# Patient Record
Sex: FEMALE | Race: WHITE | NOT HISPANIC OR LATINO | Employment: OTHER | URBAN - METROPOLITAN AREA
[De-identification: names, ages, dates, MRNs, and addresses within clinical notes are randomized per-mention and may not be internally consistent; named-entity substitution may affect disease eponyms.]

---

## 2018-06-26 ENCOUNTER — OFFICE VISIT CONVERTED (OUTPATIENT)
Dept: FAMILY MEDICINE CLINIC | Facility: CLINIC | Age: 68
End: 2018-06-26
Attending: NURSE PRACTITIONER

## 2019-01-14 ENCOUNTER — HOSPITAL ENCOUNTER (OUTPATIENT)
Dept: FAMILY MEDICINE CLINIC | Facility: CLINIC | Age: 69
Discharge: HOME | End: 2019-01-14
Attending: FAMILY MEDICINE

## 2019-01-14 ENCOUNTER — OFFICE VISIT CONVERTED (OUTPATIENT)
Dept: FAMILY MEDICINE CLINIC | Facility: CLINIC | Age: 69
End: 2019-01-14
Attending: FAMILY MEDICINE

## 2019-01-14 LAB
ALBUMIN SERPL-MCNC: 4.8 G/DL (ref 3.5–5)
ALBUMIN/GLOB SERPL: 1.4 {RATIO} (ref 1.4–2.6)
ALP SERPL-CCNC: 53 U/L (ref 43–160)
ALT SERPL-CCNC: 7 U/L (ref 10–40)
ANION GAP SERPL CALC-SCNC: 22 MMOL/L (ref 8–19)
APPEARANCE UR: CLEAR
AST SERPL-CCNC: 22 U/L (ref 15–50)
BASOPHILS # BLD AUTO: 0.01 10*3/UL (ref 0–0.2)
BASOPHILS NFR BLD AUTO: 0.09 % (ref 0–3)
BILIRUB SERPL-MCNC: 0.88 MG/DL (ref 0.2–1.3)
BILIRUB UR QL STRIP: ABNORMAL
BUN SERPL-MCNC: 13 MG/DL (ref 5–25)
BUN/CREAT SERPL: 12 {RATIO} (ref 6–20)
CALCIUM SERPL-MCNC: 9.7 MG/DL (ref 8.7–10.4)
CHLORIDE SERPL-SCNC: 100 MMOL/L (ref 99–111)
COLOR UR: ABNORMAL
CONV CO2: 22 MMOL/L (ref 22–32)
CONV COLLECTION SOURCE (UA): ABNORMAL
CONV TOTAL PROTEIN: 8.3 G/DL (ref 6.3–8.2)
CONV UROBILINOGEN IN URINE BY AUTOMATED TEST STRIP: 1 {EHRLICHU}/DL (ref 0.1–1)
CREAT UR-MCNC: 1.08 MG/DL (ref 0.5–0.9)
EOSINOPHIL # BLD AUTO: 0.09 10*3/UL (ref 0–0.7)
EOSINOPHIL # BLD AUTO: 0.86 % (ref 0–7)
ERYTHROCYTE [DISTWIDTH] IN BLOOD BY AUTOMATED COUNT: 11.5 % (ref 11.5–14.5)
EST. AVERAGE GLUCOSE BLD GHB EST-MCNC: 117 MG/DL
FOLATE SERPL-MCNC: 10.8 NG/ML (ref 4.8–20)
GFR SERPLBLD BASED ON 1.73 SQ M-ARVRAT: 52 ML/MIN/{1.73_M2}
GLOBULIN UR ELPH-MCNC: 3.5 G/DL (ref 2–3.5)
GLUCOSE SERPL-MCNC: 124 MG/DL (ref 65–99)
GLUCOSE UR QL: NEGATIVE MG/DL
HBA1C MFR BLD: 12 G/DL (ref 12–16)
HBA1C MFR BLD: 5.7 % (ref 3.5–5.7)
HCT VFR BLD AUTO: 35.2 % (ref 37–47)
HGB UR QL STRIP: ABNORMAL
KETONES UR QL STRIP: ABNORMAL MG/DL
LYMPHOCYTES # BLD AUTO: 1.48 10*3/UL (ref 1–5)
Lab: ABNORMAL
MCH RBC QN AUTO: 33.7 PG (ref 27–31)
MCHC RBC AUTO-ENTMCNC: 34.2 G/DL (ref 33–37)
MCV RBC AUTO: 98.5 FL (ref 81–99)
MONOCYTES # BLD AUTO: 0.99 10*3/UL (ref 0.2–1.2)
MONOCYTES NFR BLD AUTO: 9.96 % (ref 3–10)
NEUTROPHILS # BLD AUTO: 7.36 10*3/UL (ref 2–8)
NEUTROPHILS NFR BLD AUTO: 74.1 % (ref 30–85)
NITRITE UR QL STRIP: NEGATIVE
NRBC BLD AUTO-RTO: 0 % (ref 0–0.01)
OSMOLALITY SERPL CALC.SUM OF ELEC: 290 MOSM/KG (ref 273–304)
PH UR: 5.5 [PH] (ref 5–8)
PLATELET # BLD AUTO: 264 10*3/UL (ref 130–400)
PMV BLD AUTO: 8.7 FL (ref 7.4–10.4)
POTASSIUM SERPL-SCNC: 4.8 MMOL/L (ref 3.5–5.3)
PROT UR-MCNC: 15 MG/DL
RBC # BLD AUTO: 3.57 10*6/UL (ref 4.2–5.4)
SODIUM SERPL-SCNC: 139 MMOL/L (ref 135–147)
SP GR UR: 1.01 (ref 1–1.03)
TSH SERPL-ACNC: 2.07 M[IU]/L (ref 0.27–4.2)
VARIANT LYMPHS NFR BLD MANUAL: 15 % (ref 20–45)
VIT B12 SERPL-MCNC: 296 PG/ML (ref 211–911)
WBC # BLD AUTO: 9.93 10*3/UL (ref 4.8–10.8)

## 2019-01-17 LAB
AMOXICILLIN+CLAV SUSC ISLT: 4
AMPICILLIN SUSC ISLT: 4
AMPICILLIN+SULBAC SUSC ISLT: <=2
BACTERIA UR CULT: ABNORMAL
CEFAZOLIN SUSC ISLT: <=4
CEFEPIME SUSC ISLT: <=1
CEFTAZIDIME SUSC ISLT: <=1
CEFTRIAXONE SUSC ISLT: <=1
CEFUROXIME ORAL SUSC ISLT: 4
CEFUROXIME PARENTER SUSC ISLT: 4
CIPROFLOXACIN SUSC ISLT: >=4
ERTAPENEM SUSC ISLT: <=0.5
GENTAMICIN SUSC ISLT: <=1
LEVOFLOXACIN SUSC ISLT: >=8
NITROFURANTOIN SUSC ISLT: <=16
TETRACYCLINE SUSC ISLT: <=1
TMP SMX SUSC ISLT: <=20
TOBRAMYCIN SUSC ISLT: <=1

## 2019-01-28 ENCOUNTER — HOSPITAL ENCOUNTER (OUTPATIENT)
Dept: FAMILY MEDICINE CLINIC | Facility: CLINIC | Age: 69
Discharge: HOME | End: 2019-01-28
Attending: FAMILY MEDICINE

## 2019-01-28 ENCOUNTER — CONVERSION ENCOUNTER (OUTPATIENT)
Dept: FAMILY MEDICINE CLINIC | Facility: CLINIC | Age: 69
End: 2019-01-28

## 2019-01-28 ENCOUNTER — OFFICE VISIT CONVERTED (OUTPATIENT)
Dept: FAMILY MEDICINE CLINIC | Facility: CLINIC | Age: 69
End: 2019-01-28
Attending: FAMILY MEDICINE

## 2019-01-28 LAB
BILIRUB UR QL STRIP: NEGATIVE
COLOR UR: YELLOW
CONV CLARITY OF URINE: CLEAR
CONV PROTEIN IN URINE BY AUTOMATED TEST STRIP: NEGATIVE
CONV UROBILINOGEN IN URINE BY AUTOMATED TEST STRIP: NORMAL
GLUCOSE UR QL: NEGATIVE
HGB UR QL STRIP: NORMAL
KETONES UR QL STRIP: NEGATIVE
LEUKOCYTE ESTERASE UR QL STRIP: NEGATIVE
NITRITE UR QL STRIP: NEGATIVE
PH UR STRIP.AUTO: 5.5 [PH]
SP GR UR: 1.01

## 2019-01-29 LAB
ANION GAP SERPL CALC-SCNC: 22 MMOL/L (ref 8–19)
BUN SERPL-MCNC: 21 MG/DL (ref 5–25)
BUN/CREAT SERPL: 16 {RATIO} (ref 6–20)
CALCIUM SERPL-MCNC: 9.6 MG/DL (ref 8.7–10.4)
CHLORIDE SERPL-SCNC: 101 MMOL/L (ref 99–111)
CONV CO2: 22 MMOL/L (ref 22–32)
CREAT UR-MCNC: 1.3 MG/DL (ref 0.5–0.9)
GFR SERPLBLD BASED ON 1.73 SQ M-ARVRAT: 42 ML/MIN/{1.73_M2}
GLUCOSE SERPL-MCNC: 96 MG/DL (ref 65–99)
OSMOLALITY SERPL CALC.SUM OF ELEC: 291 MOSM/KG (ref 273–304)
POTASSIUM SERPL-SCNC: 5.6 MMOL/L (ref 3.5–5.3)
SODIUM SERPL-SCNC: 139 MMOL/L (ref 135–147)

## 2019-01-31 ENCOUNTER — HOSPITAL ENCOUNTER (OUTPATIENT)
Dept: GENERAL RADIOLOGY | Facility: HOSPITAL | Age: 69
Discharge: HOME | End: 2019-01-31
Attending: FAMILY MEDICINE

## 2019-02-11 ENCOUNTER — OFFICE VISIT CONVERTED (OUTPATIENT)
Dept: FAMILY MEDICINE CLINIC | Facility: CLINIC | Age: 69
End: 2019-02-11
Attending: FAMILY MEDICINE

## 2019-02-11 ENCOUNTER — HOSPITAL ENCOUNTER (OUTPATIENT)
Dept: FAMILY MEDICINE CLINIC | Facility: CLINIC | Age: 69
Discharge: HOME | End: 2019-02-11
Attending: FAMILY MEDICINE

## 2019-02-12 LAB
25(OH)D3 SERPL-MCNC: 30.6 NG/ML (ref 30–100)
ANION GAP SERPL CALC-SCNC: 17 MMOL/L (ref 8–19)
BUN SERPL-MCNC: 18 MG/DL (ref 5–25)
BUN/CREAT SERPL: 15 {RATIO} (ref 6–20)
CALCIUM SERPL-MCNC: 9.7 MG/DL (ref 8.7–10.4)
CHLORIDE SERPL-SCNC: 102 MMOL/L (ref 99–111)
CONV CO2: 23 MMOL/L (ref 22–32)
CREAT UR-MCNC: 1.2 MG/DL (ref 0.5–0.9)
GFR SERPLBLD BASED ON 1.73 SQ M-ARVRAT: 46 ML/MIN/{1.73_M2}
GLUCOSE SERPL-MCNC: 96 MG/DL (ref 65–99)
OSMOLALITY SERPL CALC.SUM OF ELEC: 286 MOSM/KG (ref 273–304)
POTASSIUM SERPL-SCNC: 5.4 MMOL/L (ref 3.5–5.3)
SODIUM SERPL-SCNC: 137 MMOL/L (ref 135–147)

## 2019-03-04 ENCOUNTER — OFFICE VISIT CONVERTED (OUTPATIENT)
Dept: FAMILY MEDICINE CLINIC | Facility: CLINIC | Age: 69
End: 2019-03-04
Attending: FAMILY MEDICINE

## 2019-03-04 ENCOUNTER — HOSPITAL ENCOUNTER (OUTPATIENT)
Dept: FAMILY MEDICINE CLINIC | Facility: CLINIC | Age: 69
Discharge: HOME | End: 2019-03-04
Attending: FAMILY MEDICINE

## 2019-03-04 ENCOUNTER — CONVERSION ENCOUNTER (OUTPATIENT)
Dept: FAMILY MEDICINE CLINIC | Facility: CLINIC | Age: 69
End: 2019-03-04

## 2019-03-05 LAB
ALBUMIN SERPL-MCNC: 4.5 G/DL (ref 3.5–5)
ALBUMIN/GLOB SERPL: 1.5 {RATIO} (ref 1.4–2.6)
ALP SERPL-CCNC: 47 U/L (ref 43–160)
ALT SERPL-CCNC: 14 U/L (ref 10–40)
ANION GAP SERPL CALC-SCNC: 22 MMOL/L (ref 8–19)
AST SERPL-CCNC: 18 U/L (ref 15–50)
BASOPHILS # BLD AUTO: 0.02 10*3/UL (ref 0–0.2)
BASOPHILS NFR BLD AUTO: 0.2 % (ref 0–3)
BILIRUB SERPL-MCNC: 0.71 MG/DL (ref 0.2–1.3)
BUN SERPL-MCNC: 24 MG/DL (ref 5–25)
BUN/CREAT SERPL: 17 {RATIO} (ref 6–20)
CALCIUM SERPL-MCNC: 9.7 MG/DL (ref 8.7–10.4)
CHLORIDE SERPL-SCNC: 101 MMOL/L (ref 99–111)
CONV ABS IMM GRAN: 0.04 10*3/UL (ref 0–0.2)
CONV CO2: 21 MMOL/L (ref 22–32)
CONV IMMATURE GRAN: 0.5 % (ref 0–1.8)
CONV TOTAL PROTEIN: 7.6 G/DL (ref 6.3–8.2)
CREAT UR-MCNC: 1.39 MG/DL (ref 0.5–0.9)
DEPRECATED RDW RBC AUTO: 52.9 FL (ref 36.4–46.3)
EOSINOPHIL # BLD AUTO: 0.23 10*3/UL (ref 0–0.7)
EOSINOPHIL # BLD AUTO: 2.7 % (ref 0–7)
ERYTHROCYTE [DISTWIDTH] IN BLOOD BY AUTOMATED COUNT: 13.6 % (ref 11.7–14.4)
GFR SERPLBLD BASED ON 1.73 SQ M-ARVRAT: 38 ML/MIN/{1.73_M2}
GLOBULIN UR ELPH-MCNC: 3.1 G/DL (ref 2–3.5)
GLUCOSE SERPL-MCNC: 120 MG/DL (ref 65–99)
HBA1C MFR BLD: 13.1 G/DL (ref 12–16)
HCT VFR BLD AUTO: 43.6 % (ref 37–47)
LYMPHOCYTES # BLD AUTO: 2.82 10*3/UL (ref 1–5)
MCH RBC QN AUTO: 31.5 PG (ref 27–31)
MCHC RBC AUTO-ENTMCNC: 30 G/DL (ref 33–37)
MCV RBC AUTO: 104.8 FL (ref 81–99)
MONOCYTES # BLD AUTO: 0.83 10*3/UL (ref 0.2–1.2)
MONOCYTES NFR BLD AUTO: 9.6 % (ref 3–10)
NEUTROPHILS # BLD AUTO: 4.72 10*3/UL (ref 2–8)
NEUTROPHILS NFR BLD AUTO: 54.4 % (ref 30–85)
NRBC CBCN: 0 % (ref 0–0.7)
OSMOLALITY SERPL CALC.SUM OF ELEC: 293 MOSM/KG (ref 273–304)
PLATELET # BLD AUTO: 278 10*3/UL (ref 130–400)
PMV BLD AUTO: 11.6 FL (ref 9.4–12.3)
POTASSIUM SERPL-SCNC: 4.8 MMOL/L (ref 3.5–5.3)
RBC # BLD AUTO: 4.16 10*6/UL (ref 4.2–5.4)
SODIUM SERPL-SCNC: 139 MMOL/L (ref 135–147)
VARIANT LYMPHS NFR BLD MANUAL: 32.6 % (ref 20–45)
WBC # BLD AUTO: 8.66 10*3/UL (ref 4.8–10.8)

## 2019-03-06 LAB
APPEARANCE UR: CLEAR
BILIRUB UR QL: NEGATIVE
COLOR UR: YELLOW
CONV COLLECTION SOURCE (UA): ABNORMAL
CONV UROBILINOGEN IN URINE BY AUTOMATED TEST STRIP: 0.2 {EHRLICHU}/DL (ref 0.1–1)
GLUCOSE UR QL: NEGATIVE MG/DL
HGB UR QL STRIP: NEGATIVE
KETONES UR QL STRIP: ABNORMAL MG/DL
LEUKOCYTE ESTERASE UR QL STRIP: NEGATIVE
NITRITE UR QL STRIP: NEGATIVE
PH UR STRIP.AUTO: 5 [PH] (ref 5–8)
PROT UR QL: NEGATIVE MG/DL
SP GR UR: 1.03 (ref 1–1.03)

## 2019-03-08 ENCOUNTER — HOSPITAL ENCOUNTER (OUTPATIENT)
Dept: FAMILY MEDICINE CLINIC | Facility: CLINIC | Age: 69
Discharge: HOME | End: 2019-03-08
Attending: FAMILY MEDICINE

## 2019-03-08 LAB
AMOXICILLIN+CLAV SUSC ISLT: <=2
AMPICILLIN SUSC ISLT: <=2
AMPICILLIN+SULBAC SUSC ISLT: <=2
BACTERIA UR CULT: ABNORMAL
CEFAZOLIN SUSC ISLT: <=4
CEFEPIME SUSC ISLT: <=1
CEFTAZIDIME SUSC ISLT: <=1
CEFTRIAXONE SUSC ISLT: <=1
CEFUROXIME ORAL SUSC ISLT: 4
CEFUROXIME PARENTER SUSC ISLT: 4
CIPROFLOXACIN SUSC ISLT: <=0.25
ERTAPENEM SUSC ISLT: <=0.5
GENTAMICIN SUSC ISLT: <=1
LEVOFLOXACIN SUSC ISLT: <=0.12
NITROFURANTOIN SUSC ISLT: <=16
TETRACYCLINE SUSC ISLT: <=1
TMP SMX SUSC ISLT: <=20
TOBRAMYCIN SUSC ISLT: <=1

## 2019-03-10 LAB — BACTERIA SPEC AEROBE CULT: NORMAL

## 2019-04-01 ENCOUNTER — CONVERSION ENCOUNTER (OUTPATIENT)
Dept: FAMILY MEDICINE CLINIC | Facility: CLINIC | Age: 69
End: 2019-04-01

## 2019-04-08 ENCOUNTER — HOSPITAL ENCOUNTER (OUTPATIENT)
Dept: FAMILY MEDICINE CLINIC | Facility: CLINIC | Age: 69
Discharge: HOME | End: 2019-04-08
Attending: INTERNAL MEDICINE

## 2019-04-08 LAB
ALBUMIN SERPL-MCNC: 4.6 G/DL (ref 3.5–5)
ALBUMIN/GLOB SERPL: 1.6 {RATIO} (ref 1.4–2.6)
ALP SERPL-CCNC: 45 U/L (ref 43–160)
ALT SERPL-CCNC: 9 U/L (ref 10–40)
ANION GAP SERPL CALC-SCNC: 18 MMOL/L (ref 8–19)
APPEARANCE UR: CLEAR
AST SERPL-CCNC: 14 U/L (ref 15–50)
BILIRUB SERPL-MCNC: 0.91 MG/DL (ref 0.2–1.3)
BILIRUB UR QL: NEGATIVE
BUN SERPL-MCNC: 14 MG/DL (ref 5–25)
BUN/CREAT SERPL: 13 {RATIO} (ref 6–20)
CALCIUM SERPL-MCNC: 9.6 MG/DL (ref 8.7–10.4)
CHLORIDE SERPL-SCNC: 99 MMOL/L (ref 99–111)
COLOR UR: YELLOW
CONV BACTERIA: NEGATIVE
CONV CO2: 26 MMOL/L (ref 22–32)
CONV COLLECTION SOURCE (UA): ABNORMAL
CONV CREATININE URINE, RANDOM: 52.1 MG/DL (ref 10–300)
CONV MICROALBUM.,U,RANDOM: 14.7 MG/L (ref 0–20)
CONV TOTAL PROTEIN: 7.5 G/DL (ref 6.3–8.2)
CONV UROBILINOGEN IN URINE BY AUTOMATED TEST STRIP: 0.2 {EHRLICHU}/DL (ref 0.1–1)
CREAT UR-MCNC: 1.05 MG/DL (ref 0.5–0.9)
GFR SERPLBLD BASED ON 1.73 SQ M-ARVRAT: 54 ML/MIN/{1.73_M2}
GLOBULIN UR ELPH-MCNC: 2.9 G/DL (ref 2–3.5)
GLUCOSE SERPL-MCNC: 153 MG/DL (ref 65–99)
GLUCOSE UR QL: NEGATIVE MG/DL
HGB UR QL STRIP: ABNORMAL
KETONES UR QL STRIP: NEGATIVE MG/DL
LEUKOCYTE ESTERASE UR QL STRIP: ABNORMAL
MICROALBUMIN/CREAT UR: 28.2 MG/G{CRE} (ref 0–35)
NITRITE UR QL STRIP: NEGATIVE
OSMOLALITY SERPL CALC.SUM OF ELEC: 290 MOSM/KG (ref 273–304)
PH UR STRIP.AUTO: 5 [PH] (ref 5–8)
POTASSIUM SERPL-SCNC: 4.8 MMOL/L (ref 3.5–5.3)
PROT UR QL: NEGATIVE MG/DL
RBC #/AREA URNS HPF: ABNORMAL /[HPF]
SODIUM SERPL-SCNC: 138 MMOL/L (ref 135–147)
SP GR UR: 1.01 (ref 1–1.03)
SQUAMOUS SPT QL MICRO: ABNORMAL /[HPF]
WBC #/AREA URNS HPF: ABNORMAL /[HPF]

## 2019-04-09 LAB
CONV PROTEIN TO CREATININE RATIO (RANDOM URINE): 0.12 {RATIO} (ref 0–0.1)
PROT UR-MCNC: 6.4 MG/DL

## 2019-08-22 ENCOUNTER — OFFICE VISIT CONVERTED (OUTPATIENT)
Dept: FAMILY MEDICINE CLINIC | Facility: CLINIC | Age: 69
End: 2019-08-22
Attending: FAMILY MEDICINE

## 2019-08-22 ENCOUNTER — HOSPITAL ENCOUNTER (OUTPATIENT)
Dept: FAMILY MEDICINE CLINIC | Facility: CLINIC | Age: 69
Discharge: HOME | End: 2019-08-22
Attending: FAMILY MEDICINE

## 2019-08-22 LAB
ALBUMIN SERPL-MCNC: 4.6 G/DL (ref 3.5–5)
ALBUMIN/GLOB SERPL: 1.5 {RATIO} (ref 1.4–2.6)
ALP SERPL-CCNC: 48 U/L (ref 43–160)
ALT SERPL-CCNC: 10 U/L (ref 10–40)
ANION GAP SERPL CALC-SCNC: 21 MMOL/L (ref 8–19)
AST SERPL-CCNC: 17 U/L (ref 15–50)
BASOPHILS # BLD AUTO: 0.01 10*3/UL (ref 0–0.2)
BASOPHILS NFR BLD AUTO: 0.1 % (ref 0–3)
BILIRUB SERPL-MCNC: 0.78 MG/DL (ref 0.2–1.3)
BUN SERPL-MCNC: 14 MG/DL (ref 5–25)
BUN/CREAT SERPL: 13 {RATIO} (ref 6–20)
CALCIUM SERPL-MCNC: 9.4 MG/DL (ref 8.7–10.4)
CHLORIDE SERPL-SCNC: 102 MMOL/L (ref 99–111)
CHOLEST SERPL-MCNC: 202 MG/DL (ref 107–200)
CHOLEST/HDLC SERPL: 6.3 {RATIO} (ref 3–6)
CONV ABS IMM GRAN: 0.04 10*3/UL (ref 0–0.2)
CONV CO2: 22 MMOL/L (ref 22–32)
CONV CREATININE URINE, RANDOM: 53.3 MG/DL (ref 10–300)
CONV IMMATURE GRAN: 0.6 % (ref 0–1.8)
CONV MICROALBUM.,U,RANDOM: <12 MG/L (ref 0–20)
CONV TOTAL PROTEIN: 7.7 G/DL (ref 6.3–8.2)
CREAT UR-MCNC: 1.06 MG/DL (ref 0.5–0.9)
DEPRECATED RDW RBC AUTO: 47 FL (ref 36.4–46.3)
EOSINOPHIL # BLD AUTO: 0.21 10*3/UL (ref 0–0.7)
EOSINOPHIL # BLD AUTO: 3.1 % (ref 0–7)
ERYTHROCYTE [DISTWIDTH] IN BLOOD BY AUTOMATED COUNT: 13 % (ref 11.7–14.4)
EST. AVERAGE GLUCOSE BLD GHB EST-MCNC: 137 MG/DL
GFR SERPLBLD BASED ON 1.73 SQ M-ARVRAT: 53 ML/MIN/{1.73_M2}
GLOBULIN UR ELPH-MCNC: 3.1 G/DL (ref 2–3.5)
GLUCOSE SERPL-MCNC: 108 MG/DL (ref 65–99)
HBA1C MFR BLD: 6.4 % (ref 3.5–5.7)
HCT VFR BLD AUTO: 38 % (ref 37–47)
HDLC SERPL-MCNC: 32 MG/DL (ref 40–60)
HGB BLD-MCNC: 11.7 G/DL (ref 12–16)
LDLC SERPL CALC-MCNC: 130 MG/DL (ref 70–100)
LYMPHOCYTES # BLD AUTO: 2.03 10*3/UL (ref 1–5)
LYMPHOCYTES NFR BLD AUTO: 29.7 % (ref 20–45)
MCH RBC QN AUTO: 30.5 PG (ref 27–31)
MCHC RBC AUTO-ENTMCNC: 30.8 G/DL (ref 33–37)
MCV RBC AUTO: 99 FL (ref 81–99)
MICROALBUMIN/CREAT UR: 22.5 MG/G{CRE} (ref 0–35)
MONOCYTES # BLD AUTO: 0.69 10*3/UL (ref 0.2–1.2)
MONOCYTES NFR BLD AUTO: 10.1 % (ref 3–10)
NEUTROPHILS # BLD AUTO: 3.86 10*3/UL (ref 2–8)
NEUTROPHILS NFR BLD AUTO: 56.4 % (ref 30–85)
NRBC CBCN: 0 % (ref 0–0.7)
OSMOLALITY SERPL CALC.SUM OF ELEC: 291 MOSM/KG (ref 273–304)
PLATELET # BLD AUTO: 271 10*3/UL (ref 130–400)
PMV BLD AUTO: 11.4 FL (ref 9.4–12.3)
POTASSIUM SERPL-SCNC: 5.2 MMOL/L (ref 3.5–5.3)
RBC # BLD AUTO: 3.84 10*6/UL (ref 4.2–5.4)
SODIUM SERPL-SCNC: 140 MMOL/L (ref 135–147)
T4 FREE SERPL-MCNC: 1 NG/DL (ref 0.9–1.8)
TRIGL SERPL-MCNC: 198 MG/DL (ref 40–150)
TSH SERPL-ACNC: 3.18 M[IU]/L (ref 0.27–4.2)
VLDLC SERPL-MCNC: 40 MG/DL (ref 5–37)
WBC # BLD AUTO: 6.84 10*3/UL (ref 4.8–10.8)

## 2019-12-09 ENCOUNTER — HOSPITAL ENCOUNTER (OUTPATIENT)
Dept: FAMILY MEDICINE CLINIC | Facility: CLINIC | Age: 69
Discharge: HOME | End: 2019-12-09
Attending: FAMILY MEDICINE

## 2019-12-09 ENCOUNTER — CONVERSION ENCOUNTER (OUTPATIENT)
Dept: FAMILY MEDICINE CLINIC | Facility: CLINIC | Age: 69
End: 2019-12-09

## 2019-12-09 ENCOUNTER — OFFICE VISIT CONVERTED (OUTPATIENT)
Dept: FAMILY MEDICINE CLINIC | Facility: CLINIC | Age: 69
End: 2019-12-09
Attending: FAMILY MEDICINE

## 2019-12-09 LAB
ALBUMIN SERPL-MCNC: 4.5 G/DL (ref 3.5–5)
ALBUMIN/GLOB SERPL: 1.3 {RATIO} (ref 1.4–2.6)
ALP SERPL-CCNC: 52 U/L (ref 43–160)
ALT SERPL-CCNC: 20 U/L (ref 10–40)
ANION GAP SERPL CALC-SCNC: 17 MMOL/L (ref 8–19)
AST SERPL-CCNC: 22 U/L (ref 15–50)
BASOPHILS # BLD AUTO: 0.01 10*3/UL (ref 0–0.2)
BASOPHILS NFR BLD AUTO: 0.1 % (ref 0–3)
BILIRUB SERPL-MCNC: 0.79 MG/DL (ref 0.2–1.3)
BUN SERPL-MCNC: 17 MG/DL (ref 5–25)
BUN/CREAT SERPL: 16 {RATIO} (ref 6–20)
CALCIUM SERPL-MCNC: 10.2 MG/DL (ref 8.7–10.4)
CHLORIDE SERPL-SCNC: 97 MMOL/L (ref 99–111)
CHOLEST SERPL-MCNC: 223 MG/DL (ref 107–200)
CHOLEST/HDLC SERPL: 6.4 {RATIO} (ref 3–6)
CONV ABS IMM GRAN: 0.05 10*3/UL (ref 0–0.2)
CONV CO2: 28 MMOL/L (ref 22–32)
CONV CREATININE URINE, RANDOM: 60.2 MG/DL (ref 10–300)
CONV IMMATURE GRAN: 0.6 % (ref 0–1.8)
CONV MICROALBUM.,U,RANDOM: 24.9 MG/L (ref 0–20)
CONV TOTAL PROTEIN: 7.9 G/DL (ref 6.3–8.2)
CREAT UR-MCNC: 1.08 MG/DL (ref 0.5–0.9)
DEPRECATED RDW RBC AUTO: 48.3 FL (ref 36.4–46.3)
EOSINOPHIL # BLD AUTO: 0.23 10*3/UL (ref 0–0.7)
EOSINOPHIL # BLD AUTO: 2.9 % (ref 0–7)
ERYTHROCYTE [DISTWIDTH] IN BLOOD BY AUTOMATED COUNT: 13 % (ref 11.7–14.4)
GFR SERPLBLD BASED ON 1.73 SQ M-ARVRAT: 52 ML/MIN/{1.73_M2}
GLOBULIN UR ELPH-MCNC: 3.4 G/DL (ref 2–3.5)
GLUCOSE SERPL-MCNC: 123 MG/DL (ref 65–99)
HCT VFR BLD AUTO: 40.6 % (ref 37–47)
HDLC SERPL-MCNC: 35 MG/DL (ref 40–60)
HGB BLD-MCNC: 12.4 G/DL (ref 12–16)
LDLC SERPL CALC-MCNC: 156 MG/DL (ref 70–100)
LYMPHOCYTES # BLD AUTO: 1.86 10*3/UL (ref 1–5)
LYMPHOCYTES NFR BLD AUTO: 23.5 % (ref 20–45)
MCH RBC QN AUTO: 30.7 PG (ref 27–31)
MCHC RBC AUTO-ENTMCNC: 30.5 G/DL (ref 33–37)
MCV RBC AUTO: 100.5 FL (ref 81–99)
MICROALBUMIN/CREAT UR: 41.4 MG/G{CRE} (ref 0–35)
MONOCYTES # BLD AUTO: 0.72 10*3/UL (ref 0.2–1.2)
MONOCYTES NFR BLD AUTO: 9.1 % (ref 3–10)
NEUTROPHILS # BLD AUTO: 5.06 10*3/UL (ref 2–8)
NEUTROPHILS NFR BLD AUTO: 63.8 % (ref 30–85)
NRBC CBCN: 0 % (ref 0–0.7)
OSMOLALITY SERPL CALC.SUM OF ELEC: 287 MOSM/KG (ref 273–304)
PLATELET # BLD AUTO: 288 10*3/UL (ref 130–400)
PMV BLD AUTO: 11.5 FL (ref 9.4–12.3)
POTASSIUM SERPL-SCNC: 4.6 MMOL/L (ref 3.5–5.3)
RBC # BLD AUTO: 4.04 10*6/UL (ref 4.2–5.4)
SODIUM SERPL-SCNC: 137 MMOL/L (ref 135–147)
TRIGL SERPL-MCNC: 162 MG/DL (ref 40–150)
VLDLC SERPL-MCNC: 32 MG/DL (ref 5–37)
WBC # BLD AUTO: 7.93 10*3/UL (ref 4.8–10.8)

## 2019-12-11 LAB
EST. AVERAGE GLUCOSE BLD GHB EST-MCNC: 148 MG/DL
HBA1C MFR BLD: 6.8 % (ref 3.5–5.7)

## 2020-08-06 ENCOUNTER — TELEPHONE CONVERTED (OUTPATIENT)
Dept: FAMILY MEDICINE CLINIC | Facility: CLINIC | Age: 70
End: 2020-08-06
Attending: FAMILY MEDICINE

## 2020-08-07 ENCOUNTER — HOSPITAL ENCOUNTER (OUTPATIENT)
Dept: FAMILY MEDICINE CLINIC | Facility: CLINIC | Age: 70
Discharge: HOME | End: 2020-08-07
Attending: FAMILY MEDICINE

## 2020-08-07 LAB
BASOPHILS # BLD AUTO: 0.03 10*3/UL (ref 0–0.2)
BASOPHILS NFR BLD AUTO: 0.4 % (ref 0–3)
CONV ABS IMM GRAN: 0.04 10*3/UL (ref 0–0.2)
CONV IMMATURE GRAN: 0.6 % (ref 0–1.8)
DEPRECATED RDW RBC AUTO: 49.2 FL (ref 36.4–46.3)
EOSINOPHIL # BLD AUTO: 0.2 10*3/UL (ref 0–0.7)
EOSINOPHIL # BLD AUTO: 2.9 % (ref 0–7)
ERYTHROCYTE [DISTWIDTH] IN BLOOD BY AUTOMATED COUNT: 13.3 % (ref 11.7–14.4)
EST. AVERAGE GLUCOSE BLD GHB EST-MCNC: 157 MG/DL
HBA1C MFR BLD: 7.1 % (ref 3.5–5.7)
HCT VFR BLD AUTO: 40.6 % (ref 37–47)
HGB BLD-MCNC: 12.3 G/DL (ref 12–16)
LYMPHOCYTES # BLD AUTO: 1.99 10*3/UL (ref 1–5)
LYMPHOCYTES NFR BLD AUTO: 28.5 % (ref 20–45)
MCH RBC QN AUTO: 30.4 PG (ref 27–31)
MCHC RBC AUTO-ENTMCNC: 30.3 G/DL (ref 33–37)
MCV RBC AUTO: 100.2 FL (ref 81–99)
MONOCYTES # BLD AUTO: 0.65 10*3/UL (ref 0.2–1.2)
MONOCYTES NFR BLD AUTO: 9.3 % (ref 3–10)
NEUTROPHILS # BLD AUTO: 4.08 10*3/UL (ref 2–8)
NEUTROPHILS NFR BLD AUTO: 58.3 % (ref 30–85)
NRBC CBCN: 0 % (ref 0–0.7)
PLATELET # BLD AUTO: 298 10*3/UL (ref 130–400)
PMV BLD AUTO: 11.5 FL (ref 9.4–12.3)
RBC # BLD AUTO: 4.05 10*6/UL (ref 4.2–5.4)
WBC # BLD AUTO: 6.99 10*3/UL (ref 4.8–10.8)

## 2020-08-08 LAB
ALBUMIN SERPL-MCNC: 4.4 G/DL (ref 3.5–5)
ALBUMIN/GLOB SERPL: 1.6 {RATIO} (ref 1.4–2.6)
ALP SERPL-CCNC: 44 U/L (ref 43–160)
ALT SERPL-CCNC: 38 U/L (ref 10–40)
ANION GAP SERPL CALC-SCNC: 17 MMOL/L (ref 8–19)
AST SERPL-CCNC: 34 U/L (ref 15–50)
BILIRUB SERPL-MCNC: 0.69 MG/DL (ref 0.2–1.3)
BUN SERPL-MCNC: 18 MG/DL (ref 5–25)
BUN/CREAT SERPL: 15 {RATIO} (ref 6–20)
CALCIUM SERPL-MCNC: 10.1 MG/DL (ref 8.7–10.4)
CHLORIDE SERPL-SCNC: 98 MMOL/L (ref 99–111)
CHOLEST SERPL-MCNC: 212 MG/DL (ref 107–200)
CHOLEST/HDLC SERPL: 7.3 {RATIO} (ref 3–6)
CONV CO2: 23 MMOL/L (ref 22–32)
CONV TOTAL PROTEIN: 7.2 G/DL (ref 6.3–8.2)
CREAT UR-MCNC: 1.17 MG/DL (ref 0.5–0.9)
GFR SERPLBLD BASED ON 1.73 SQ M-ARVRAT: 47 ML/MIN/{1.73_M2}
GLOBULIN UR ELPH-MCNC: 2.8 G/DL (ref 2–3.5)
GLUCOSE SERPL-MCNC: 126 MG/DL (ref 65–99)
HDLC SERPL-MCNC: 29 MG/DL (ref 40–60)
LDLC SERPL CALC-MCNC: 150 MG/DL (ref 70–100)
OSMOLALITY SERPL CALC.SUM OF ELEC: 279 MOSM/KG (ref 273–304)
POTASSIUM SERPL-SCNC: 4.9 MMOL/L (ref 3.5–5.3)
SODIUM SERPL-SCNC: 133 MMOL/L (ref 135–147)
T4 FREE SERPL-MCNC: 1.1 NG/DL (ref 0.9–1.8)
TRIGL SERPL-MCNC: 165 MG/DL (ref 40–150)
TSH SERPL-ACNC: 2.73 M[IU]/L (ref 0.27–4.2)
VLDLC SERPL-MCNC: 33 MG/DL (ref 5–37)

## 2020-08-13 LAB
CONV CREATININE URINE, RANDOM: 62.1 MG/DL (ref 10–300)
CONV MICROALBUM.,U,RANDOM: <12 MG/L (ref 0–20)
MICROALBUMIN/CREAT UR: 19.3 MG/G{CRE} (ref 0–35)

## 2021-04-01 ENCOUNTER — OFFICE VISIT CONVERTED (OUTPATIENT)
Dept: FAMILY MEDICINE CLINIC | Facility: CLINIC | Age: 71
End: 2021-04-01
Attending: NURSE PRACTITIONER

## 2021-05-09 VITALS
SYSTOLIC BLOOD PRESSURE: 150 MMHG | TEMPERATURE: 97.4 F | HEIGHT: 65 IN | DIASTOLIC BLOOD PRESSURE: 54 MMHG | WEIGHT: 180.5 LBS | BODY MASS INDEX: 30.07 KG/M2 | OXYGEN SATURATION: 97 % | HEART RATE: 84 BPM

## 2021-05-09 VITALS
OXYGEN SATURATION: 98 % | BODY MASS INDEX: 30.03 KG/M2 | HEIGHT: 65 IN | WEIGHT: 180.25 LBS | SYSTOLIC BLOOD PRESSURE: 144 MMHG | DIASTOLIC BLOOD PRESSURE: 74 MMHG | HEART RATE: 69 BPM | TEMPERATURE: 97.9 F

## 2021-05-09 VITALS
SYSTOLIC BLOOD PRESSURE: 148 MMHG | DIASTOLIC BLOOD PRESSURE: 80 MMHG | OXYGEN SATURATION: 97 % | BODY MASS INDEX: 32.68 KG/M2 | WEIGHT: 196.12 LBS | TEMPERATURE: 96.8 F | HEIGHT: 65 IN | HEART RATE: 54 BPM

## 2021-05-09 VITALS
OXYGEN SATURATION: 95 % | HEART RATE: 71 BPM | TEMPERATURE: 98.5 F | HEIGHT: 65 IN | WEIGHT: 176.12 LBS | BODY MASS INDEX: 29.34 KG/M2 | DIASTOLIC BLOOD PRESSURE: 66 MMHG | SYSTOLIC BLOOD PRESSURE: 140 MMHG

## 2021-05-09 VITALS
HEART RATE: 81 BPM | SYSTOLIC BLOOD PRESSURE: 125 MMHG | OXYGEN SATURATION: 95 % | WEIGHT: 203 LBS | DIASTOLIC BLOOD PRESSURE: 60 MMHG

## 2021-05-09 VITALS
SYSTOLIC BLOOD PRESSURE: 134 MMHG | OXYGEN SATURATION: 97 % | TEMPERATURE: 97.4 F | HEIGHT: 65 IN | DIASTOLIC BLOOD PRESSURE: 80 MMHG | WEIGHT: 200 LBS | BODY MASS INDEX: 33.32 KG/M2 | HEART RATE: 54 BPM

## 2021-05-09 VITALS
OXYGEN SATURATION: 98 % | TEMPERATURE: 97.8 F | HEART RATE: 70 BPM | SYSTOLIC BLOOD PRESSURE: 150 MMHG | WEIGHT: 156.19 LBS | DIASTOLIC BLOOD PRESSURE: 64 MMHG | RESPIRATION RATE: 18 BRPM

## 2021-05-09 VITALS — DIASTOLIC BLOOD PRESSURE: 72 MMHG | SYSTOLIC BLOOD PRESSURE: 160 MMHG | HEART RATE: 51 BPM

## 2021-05-09 VITALS
DIASTOLIC BLOOD PRESSURE: 68 MMHG | HEART RATE: 55 BPM | OXYGEN SATURATION: 98 % | WEIGHT: 176.5 LBS | TEMPERATURE: 98 F | SYSTOLIC BLOOD PRESSURE: 124 MMHG

## 2021-07-19 DIAGNOSIS — E11.65 TYPE 2 DIABETES MELLITUS WITH HYPERGLYCEMIA, WITHOUT LONG-TERM CURRENT USE OF INSULIN (HCC): Primary | ICD-10-CM

## 2021-07-19 LAB
ALBUMIN SERPL-MCNC: 4.6 G/DL (ref 3.5–5.2)
ALBUMIN UR-MCNC: 3.5 MG/DL
ALBUMIN/GLOB SERPL: 1.5 G/DL
ALP SERPL-CCNC: 47 U/L (ref 39–117)
ALT SERPL W P-5'-P-CCNC: 53 U/L (ref 1–33)
ANION GAP SERPL CALCULATED.3IONS-SCNC: 14.4 MMOL/L (ref 5–15)
AST SERPL-CCNC: 58 U/L (ref 1–32)
BASOPHILS # BLD AUTO: 0.03 10*3/MM3 (ref 0–0.2)
BASOPHILS NFR BLD AUTO: 0.4 % (ref 0–1.5)
BILIRUB SERPL-MCNC: 0.7 MG/DL (ref 0–1.2)
BUN SERPL-MCNC: 16 MG/DL (ref 8–23)
BUN/CREAT SERPL: 16.2 (ref 7–25)
CALCIUM SPEC-SCNC: 9.5 MG/DL (ref 8.6–10.5)
CHLORIDE SERPL-SCNC: 99 MMOL/L (ref 98–107)
CHOLEST SERPL-MCNC: 236 MG/DL (ref 0–200)
CO2 SERPL-SCNC: 23.6 MMOL/L (ref 22–29)
CREAT SERPL-MCNC: 0.99 MG/DL (ref 0.57–1)
DEPRECATED RDW RBC AUTO: 45.7 FL (ref 37–54)
EOSINOPHIL # BLD AUTO: 0.18 10*3/MM3 (ref 0–0.4)
EOSINOPHIL NFR BLD AUTO: 2.6 % (ref 0.3–6.2)
ERYTHROCYTE [DISTWIDTH] IN BLOOD BY AUTOMATED COUNT: 13.3 % (ref 12.3–15.4)
GFR SERPL CREATININE-BSD FRML MDRD: 55 ML/MIN/1.73
GLOBULIN UR ELPH-MCNC: 3.1 GM/DL
GLUCOSE SERPL-MCNC: 145 MG/DL (ref 65–99)
HCT VFR BLD AUTO: 41 % (ref 34–46.6)
HDLC SERPL-MCNC: 29 MG/DL (ref 40–60)
HGB BLD-MCNC: 13.3 G/DL (ref 12–15.9)
IMM GRANULOCYTES # BLD AUTO: 0.04 10*3/MM3 (ref 0–0.05)
IMM GRANULOCYTES NFR BLD AUTO: 0.6 % (ref 0–0.5)
LDLC SERPL CALC-MCNC: 163 MG/DL (ref 0–100)
LDLC/HDLC SERPL: 5.52 {RATIO}
LYMPHOCYTES # BLD AUTO: 2.1 10*3/MM3 (ref 0.7–3.1)
LYMPHOCYTES NFR BLD AUTO: 30.3 % (ref 19.6–45.3)
MCH RBC QN AUTO: 30.6 PG (ref 26.6–33)
MCHC RBC AUTO-ENTMCNC: 32.4 G/DL (ref 31.5–35.7)
MCV RBC AUTO: 94.5 FL (ref 79–97)
MONOCYTES # BLD AUTO: 0.59 10*3/MM3 (ref 0.1–0.9)
MONOCYTES NFR BLD AUTO: 8.5 % (ref 5–12)
NEUTROPHILS NFR BLD AUTO: 3.99 10*3/MM3 (ref 1.7–7)
NEUTROPHILS NFR BLD AUTO: 57.6 % (ref 42.7–76)
NRBC BLD AUTO-RTO: 0 /100 WBC (ref 0–0.2)
PLATELET # BLD AUTO: 339 10*3/MM3 (ref 140–450)
PMV BLD AUTO: 11.5 FL (ref 6–12)
POTASSIUM SERPL-SCNC: 5 MMOL/L (ref 3.5–5.2)
PROT SERPL-MCNC: 7.7 G/DL (ref 6–8.5)
RBC # BLD AUTO: 4.34 10*6/MM3 (ref 3.77–5.28)
SODIUM SERPL-SCNC: 137 MMOL/L (ref 136–145)
T4 FREE SERPL-MCNC: 1.04 NG/DL (ref 0.93–1.7)
TRIGL SERPL-MCNC: 234 MG/DL (ref 0–150)
TSH SERPL DL<=0.05 MIU/L-ACNC: 3.18 UIU/ML (ref 0.27–4.2)
VLDLC SERPL-MCNC: 44 MG/DL (ref 5–40)
WBC # BLD AUTO: 6.93 10*3/MM3 (ref 3.4–10.8)

## 2021-07-19 PROCEDURE — 80061 LIPID PANEL: CPT | Performed by: FAMILY MEDICINE

## 2021-07-19 PROCEDURE — 85025 COMPLETE CBC W/AUTO DIFF WBC: CPT | Performed by: FAMILY MEDICINE

## 2021-07-19 PROCEDURE — 83036 HEMOGLOBIN GLYCOSYLATED A1C: CPT | Performed by: FAMILY MEDICINE

## 2021-07-19 PROCEDURE — 82043 UR ALBUMIN QUANTITATIVE: CPT | Performed by: FAMILY MEDICINE

## 2021-07-19 PROCEDURE — 80053 COMPREHEN METABOLIC PANEL: CPT | Performed by: FAMILY MEDICINE

## 2021-07-19 PROCEDURE — 84443 ASSAY THYROID STIM HORMONE: CPT | Performed by: FAMILY MEDICINE

## 2021-07-19 PROCEDURE — 84439 ASSAY OF FREE THYROXINE: CPT | Performed by: FAMILY MEDICINE

## 2021-07-19 RX ORDER — FLUOXETINE HYDROCHLORIDE 20 MG/1
CAPSULE ORAL
Qty: 270 CAPSULE | Refills: 0 | OUTPATIENT
Start: 2021-07-19

## 2021-07-20 LAB — HBA1C MFR BLD: 7.27 % (ref 4.8–5.6)

## 2021-07-20 RX ORDER — LEVETIRACETAM 500 MG/1
TABLET ORAL
Qty: 270 TABLET | OUTPATIENT
Start: 2021-07-20

## 2021-07-20 RX ORDER — FLUOXETINE HYDROCHLORIDE 20 MG/1
CAPSULE ORAL
Qty: 270 CAPSULE | Refills: 0 | OUTPATIENT
Start: 2021-07-20

## 2021-07-20 NOTE — PROGRESS NOTES
Labs show no significant abnormalities except for elevated cholesterol and elevated liver enzymes.  Follow-up in 1-2 weeks to discuss these things.

## 2021-07-23 ENCOUNTER — OFFICE VISIT (OUTPATIENT)
Dept: FAMILY MEDICINE CLINIC | Facility: CLINIC | Age: 71
End: 2021-07-23

## 2021-07-23 VITALS
BODY MASS INDEX: 33.98 KG/M2 | HEART RATE: 81 BPM | WEIGHT: 204.2 LBS | DIASTOLIC BLOOD PRESSURE: 82 MMHG | TEMPERATURE: 96.8 F | OXYGEN SATURATION: 96 % | SYSTOLIC BLOOD PRESSURE: 138 MMHG

## 2021-07-23 DIAGNOSIS — F41.9 ANXIETY AND DEPRESSION: ICD-10-CM

## 2021-07-23 DIAGNOSIS — F32.A ANXIETY AND DEPRESSION: ICD-10-CM

## 2021-07-23 DIAGNOSIS — E78.2 MIXED HYPERLIPIDEMIA: ICD-10-CM

## 2021-07-23 DIAGNOSIS — G40.909 SEIZURE DISORDER (HCC): ICD-10-CM

## 2021-07-23 DIAGNOSIS — E11.65 TYPE 2 DIABETES MELLITUS WITH HYPERGLYCEMIA, WITHOUT LONG-TERM CURRENT USE OF INSULIN (HCC): ICD-10-CM

## 2021-07-23 DIAGNOSIS — R41.3 MEMORY LOSS: ICD-10-CM

## 2021-07-23 DIAGNOSIS — R74.8 ELEVATED LIVER ENZYMES: ICD-10-CM

## 2021-07-23 DIAGNOSIS — F03.90 DEMENTIA WITHOUT BEHAVIORAL DISTURBANCE, UNSPECIFIED DEMENTIA TYPE: Primary | ICD-10-CM

## 2021-07-23 DIAGNOSIS — M19.90 ARTHRITIS: ICD-10-CM

## 2021-07-23 DIAGNOSIS — I10 ESSENTIAL HYPERTENSION: ICD-10-CM

## 2021-07-23 DIAGNOSIS — N30.00 ACUTE CYSTITIS WITHOUT HEMATURIA: ICD-10-CM

## 2021-07-23 LAB
BILIRUB BLD-MCNC: NEGATIVE MG/DL
CLARITY, POC: CLEAR
COLOR UR: YELLOW
FOLATE SERPL-MCNC: 6.41 NG/ML (ref 4.78–24.2)
GLUCOSE UR STRIP-MCNC: NEGATIVE MG/DL
KETONES UR QL: NEGATIVE
LEUKOCYTE EST, POC: ABNORMAL
NITRITE UR-MCNC: POSITIVE MG/ML
PH UR: 5.5 [PH] (ref 5–8)
PROT UR STRIP-MCNC: NEGATIVE MG/DL
RBC # UR STRIP: ABNORMAL /UL
SP GR UR: 1.02 (ref 1–1.03)
UROBILINOGEN UR QL: ABNORMAL
VIT B12 BLD-MCNC: 197 PG/ML (ref 211–946)

## 2021-07-23 PROCEDURE — 87186 SC STD MICRODIL/AGAR DIL: CPT | Performed by: FAMILY MEDICINE

## 2021-07-23 PROCEDURE — 82746 ASSAY OF FOLIC ACID SERUM: CPT | Performed by: FAMILY MEDICINE

## 2021-07-23 PROCEDURE — 81003 URINALYSIS AUTO W/O SCOPE: CPT | Performed by: FAMILY MEDICINE

## 2021-07-23 PROCEDURE — 87086 URINE CULTURE/COLONY COUNT: CPT | Performed by: FAMILY MEDICINE

## 2021-07-23 PROCEDURE — 36415 COLL VENOUS BLD VENIPUNCTURE: CPT | Performed by: FAMILY MEDICINE

## 2021-07-23 PROCEDURE — 99214 OFFICE O/P EST MOD 30 MIN: CPT | Performed by: FAMILY MEDICINE

## 2021-07-23 PROCEDURE — 82607 VITAMIN B-12: CPT | Performed by: FAMILY MEDICINE

## 2021-07-23 PROCEDURE — 87077 CULTURE AEROBIC IDENTIFY: CPT | Performed by: FAMILY MEDICINE

## 2021-07-23 RX ORDER — FLUOXETINE HYDROCHLORIDE 20 MG/1
20 CAPSULE ORAL 3 TIMES DAILY
COMMUNITY
End: 2021-07-23 | Stop reason: SDUPTHER

## 2021-07-23 RX ORDER — DONEPEZIL HYDROCHLORIDE 5 MG/1
5 TABLET, FILM COATED ORAL NIGHTLY
Qty: 90 TABLET | Refills: 1 | Status: SHIPPED | OUTPATIENT
Start: 2021-07-23 | End: 2022-01-10 | Stop reason: SDUPTHER

## 2021-07-23 RX ORDER — LEVETIRACETAM 500 MG/1
500 TABLET ORAL DAILY
Qty: 90 TABLET | Refills: 1 | Status: SHIPPED | OUTPATIENT
Start: 2021-07-23 | End: 2022-01-05 | Stop reason: SDUPTHER

## 2021-07-23 RX ORDER — IBUPROFEN 800 MG/1
800 TABLET ORAL AS NEEDED
Qty: 90 TABLET | Refills: 0 | Status: SHIPPED | OUTPATIENT
Start: 2021-07-23 | End: 2021-10-14

## 2021-07-23 RX ORDER — ASPIRIN 81 MG/1
81 TABLET, CHEWABLE ORAL DAILY
Qty: 90 TABLET | Refills: 1 | Status: SHIPPED | OUTPATIENT
Start: 2021-07-23 | End: 2022-01-05 | Stop reason: SDUPTHER

## 2021-07-23 RX ORDER — FLUOXETINE HYDROCHLORIDE 20 MG/1
60 CAPSULE ORAL 3 TIMES DAILY
Qty: 270 CAPSULE | Refills: 1 | Status: SHIPPED | OUTPATIENT
Start: 2021-07-23 | End: 2021-07-26 | Stop reason: SDUPTHER

## 2021-07-23 RX ORDER — NITROFURANTOIN 25; 75 MG/1; MG/1
100 CAPSULE ORAL 2 TIMES DAILY
Qty: 14 CAPSULE | Refills: 0 | Status: SHIPPED | OUTPATIENT
Start: 2021-07-23 | End: 2021-07-30

## 2021-07-23 RX ORDER — IBUPROFEN 800 MG/1
1 TABLET ORAL AS NEEDED
COMMUNITY
End: 2021-07-23 | Stop reason: SDUPTHER

## 2021-07-23 RX ORDER — LEVETIRACETAM 500 MG/1
1 TABLET ORAL TAKE AS DIRECTED
COMMUNITY
Start: 2021-04-20 | End: 2021-07-23 | Stop reason: SDUPTHER

## 2021-07-23 RX ORDER — ASPIRIN 81 MG/1
1 TABLET, CHEWABLE ORAL DAILY
COMMUNITY
End: 2021-07-23 | Stop reason: SDUPTHER

## 2021-07-23 NOTE — PROGRESS NOTES
Chief Complaint  Anxiety (Refill all meds)  The 10-year ASCVD risk score (Tai CHAO Jr., et al., 2013) is: 34.5%    Values used to calculate the score:      Age: 71 years      Sex: Female      Is Non- : No      Diabetic: Yes      Tobacco smoker: No      Systolic Blood Pressure: 138 mmHg      Is BP treated: Yes      HDL Cholesterol: 29 mg/dL      Total Cholesterol: 236 mg/dL    Subjective          Haylee Gibbs presents to [facility]  Pt having worsening dementia- pt has quit driving- pt has been getting lost- symptoms x 1.5 yrs  Discussed labs  I recommended statin to take to stabilize plaques and help cholesterol- pt refuses any treatment at this time and understands high risk of stroke and heart attack she has in next 10 yrs.  Seizure DO controlled on med- last seizure several yrs ago  Elevated liver enzyes    Anxiety  Presents for follow-up visit. Patient reports no chest pain, compulsions, confusion, decreased concentration, depressed mood, dizziness, dry mouth, excessive worry, feeling of choking, hyperventilation, insomnia, irritability, malaise, muscle tension, nausea, nervous/anxious behavior, obsessions, palpitations, panic, restlessness, shortness of breath or suicidal ideas. Symptoms occur rarely. The severity of symptoms is moderate. The quality of sleep is good. Nighttime awakenings: none.     Compliance with medications is %. Treatment side effects: no side effects.       Objective   No Known Allergies  Immunization History   Administered Date(s) Administered   • COVID-19 (ИРИНА) 03/08/2021   • Influenza, Unspecified 09/30/2019   • Pneumococcal Conjugate 13-Valent (PCV13) 11/24/2015       Vital Signs:   Vitals:    07/23/21 1537   BP: 138/82   Pulse: 81   Temp: 96.8 °F (36 °C)   SpO2: 96%   Weight: 92.6 kg (204 lb 3.2 oz)       Physical Exam  Vitals reviewed.   Constitutional:       Appearance: Normal appearance. She is well-developed.    HENT:      Head: Normocephalic and atraumatic.      Right Ear: External ear normal.      Left Ear: External ear normal.      Mouth/Throat:      Pharynx: No oropharyngeal exudate.   Eyes:      Conjunctiva/sclera: Conjunctivae normal.      Pupils: Pupils are equal, round, and reactive to light.   Cardiovascular:      Rate and Rhythm: Normal rate and regular rhythm.      Pulses: Normal pulses.           Dorsalis pedis pulses are 2+ on the right side and 2+ on the left side.      Heart sounds: Normal heart sounds. No murmur heard.   No friction rub. No gallop.    Pulmonary:      Effort: Pulmonary effort is normal.      Breath sounds: Normal breath sounds. No wheezing or rhonchi.   Abdominal:      General: Bowel sounds are normal. There is no distension.      Palpations: Abdomen is soft.      Tenderness: There is no abdominal tenderness.   Musculoskeletal:      Right foot: Normal range of motion. Deformity present. No bunion, Charcot foot, foot drop or prominent metatarsal heads.      Left foot: Normal range of motion. Deformity present. No bunion, Charcot foot, foot drop or prominent metatarsal heads.   Feet:      Right foot:      Protective Sensation: 3 sites tested. 3 sites sensed.      Skin integrity: Skin integrity normal. No ulcer or blister.      Toenail Condition: Right toenails are normal.      Left foot:      Protective Sensation: 3 sites tested. 0 sites sensed.      Skin integrity: Skin integrity normal. No ulcer or blister.      Toenail Condition: Left toenails are normal.      Comments:      Skin:     General: Skin is warm and dry.   Neurological:      Mental Status: She is alert and oriented to person, place, and time.      Cranial Nerves: No cranial nerve deficit.   Psychiatric:         Mood and Affect: Mood and affect normal.         Behavior: Behavior normal.         Thought Content: Thought content normal.         Judgment: Judgment normal.        Result Review :   The following data was reviewed by:  Kj Canales MD on 07/23/2021:  CMP    CMP 8/7/20 7/19/21   Glucose  145 (A)   Glucose 126 (A)    BUN 18 16   Creatinine 1.17 (A) 0.99   eGFR Non African Am  55 (A)   Sodium 133 (A) 137   Potassium 4.9 5.0   Chloride 98 (A) 99   Calcium 10.1 9.5   Albumin 4.4 4.60   Total Bilirubin 0.69 0.7   Alkaline Phosphatase 44 47   AST (SGOT) 34 58 (A)   ALT (SGPT) 38 53 (A)   (A) Abnormal value            CBC    CBC 8/7/20 7/19/21   WBC 6.99 6.93   RBC 4.05 (A) 4.34   Hemoglobin 12.3 13.3   Hematocrit 40.6 41.0   .2 (A) 94.5   MCH 30.4 30.6   MCHC 30.3 (A) 32.4   RDW 13.3 13.3   Platelets 298 339   (A) Abnormal value            Lipid Panel    Lipid Panel 8/7/20 7/19/21   Total Cholesterol  236 (A)   Total Cholesterol 212 (A)    Triglycerides 165 (A) 234 (A)   HDL Cholesterol 29 (A) 29 (A)   VLDL Cholesterol 33 44 (A)   LDL Cholesterol  150 (A) 163 (A)   LDL/HDL Ratio  5.52   (A) Abnormal value       Comments are available for some flowsheets but are not being displayed.           TSH    TSH 8/7/20 7/19/21   TSH 2.730 3.180           Most Recent A1C    HGBA1C Most Recent 7/19/21   Hemoglobin A1C 7.27 (A)   (A) Abnormal value                      Assessment and Plan    Diagnoses and all orders for this visit:    1. Dementia without behavioral disturbance, unspecified dementia type (CMS/HCC) (Primary)  -     MRI Brain With & Without Contrast; Future  -     Ambulatory Referral to Neurology  -     POCT urinalysis dipstick, automated  -     Vitamin B12 & Folate  -     donepezil (Aricept) 5 MG tablet; Take 1 tablet by mouth Every Night.  Dispense: 90 tablet; Refill: 1    2. Memory loss  -     POCT urinalysis dipstick, automated    3. Type 2 diabetes mellitus with hyperglycemia, without long-term current use of insulin (CMS/[facility])  -     metFORMIN (GLUCOPHAGE) 500 MG tablet; Take 1 tablet by mouth 2 (Two) Times a Day.  Dispense: 180 tablet; Refill: 1  -     Ambulatory Referral for Diabetic Eye Exam-Ophthalmology    4.  Essential hypertension  -     aspirin 81 MG chewable tablet; Chew 1 tablet Daily.  Dispense: 90 tablet; Refill: 1  -     metoprolol tartrate (LOPRESSOR) 25 MG tablet; Take 1 tablet by mouth 2 (Two) Times a Day.  Dispense: 180 tablet; Refill: 1    5. Seizure disorder (CMS/HCC)  -     levETIRAcetam (KEPPRA) 500 MG tablet; Take 1 tablet by mouth Daily. One tablet in the morning and two in the evening  Dispense: 90 tablet; Refill: 1    6. Mixed hyperlipidemia    7. Anxiety and depression  -     FLUoxetine (PROzac) 20 MG capsule; Take 3 capsules by mouth 3 (Three) Times a Day.  Dispense: 270 capsule; Refill: 1    8. Arthritis  -     ibuprofen (ADVIL,MOTRIN) 800 MG tablet; Take 1 tablet by mouth As Needed for Mild Pain .  Dispense: 90 tablet; Refill: 0    9. Elevated liver enzymes  -     US Abdomen Complete; Future    10. Acute cystitis without hematuria  -     nitrofurantoin, macrocrystal-monohydrate, (Macrobid) 100 MG capsule; Take 1 capsule by mouth 2 (Two) Times a Day for 7 days.  Dispense: 14 capsule; Refill: 0  -     Urine Culture - Urine, Urine, Clean Catch            Follow Up   Return in about 1 month (around 8/23/2021) for Recheck.  Patient was given instructions and counseling regarding her condition or for health maintenance advice. Please see specific information pulled into the AVS if appropriate.

## 2021-07-23 NOTE — PATIENT INSTRUCTIONS
Donepezil tablets  What is this medicine?  DONEPEZIL (conti NEP e zil) is used to treat mild to moderate dementia caused by Alzheimer's disease.  This medicine may be used for other purposes; ask your health care provider or pharmacist if you have questions.  COMMON BRAND NAME(S): Aricept  What should I tell my health care provider before I take this medicine?  They need to know if you have any of these conditions:  · asthma or other lung disease  · difficulty passing urine  · head injury  · heart disease  · history of irregular heartbeat  · liver disease  · seizures (convulsions)  · stomach or intestinal disease, ulcers or stomach bleeding  · an unusual or allergic reaction to donepezil, other medicines, foods, dyes, or preservatives  · pregnant or trying to get pregnant  · breast-feeding  How should I use this medicine?  Take this medicine by mouth with a glass of water. Follow the directions on the prescription label. You may take this medicine with or without food. Take this medicine at regular intervals. This medicine is usually taken before bedtime. Do not take it more often than directed. Continue to take your medicine even if you feel better. Do not stop taking except on your doctor's advice.  If you are taking the 23 mg donepezil tablet, swallow it whole; do not cut, crush, or chew it.  Talk to your pediatrician regarding the use of this medicine in children. Special care may be needed.  Overdosage: If you think you have taken too much of this medicine contact a poison control center or emergency room at once.  NOTE: This medicine is only for you. Do not share this medicine with others.  What if I miss a dose?  If you miss a dose, take it as soon as you can. If it is almost time for your next dose, take only that dose, do not take double or extra doses.  What may interact with this medicine?  Do not take this medicine with any of the following medications:  · certain medicines for fungal infections like  itraconazole, fluconazole, posaconazole, and voriconazole  · cisapride  · dextromethorphan; quinidine  · dronedarone  · pimozide  · quinidine  · thioridazine  This medicine may also interact with the following medications:  · antihistamines for allergy, cough and cold  · atropine  · bethanechol  · carbamazepine  · certain medicines for bladder problems like oxybutynin, tolterodine  · certain medicines for Parkinson's disease like benztropine, trihexyphenidyl  · certain medicines for stomach problems like dicyclomine, hyoscyamine  · certain medicines for travel sickness like scopolamine  · dexamethasone  · dofetilide  · ipratropium  · NSAIDs, medicines for pain and inflammation, like ibuprofen or naproxen  · other medicines for Alzheimer's disease  · other medicines that prolong the QT interval (cause an abnormal heart rhythm)  · phenobarbital  · phenytoin  · rifampin, rifabutin or rifapentine  · ziprasidone  This list may not describe all possible interactions. Give your health care provider a list of all the medicines, herbs, non-prescription drugs, or dietary supplements you use. Also tell them if you smoke, drink alcohol, or use illegal drugs. Some items may interact with your medicine.  What should I watch for while using this medicine?  Visit your doctor or health care professional for regular checks on your progress. Check with your doctor or health care professional if your symptoms do not get better or if they get worse.  You may get drowsy or dizzy. Do not drive, use machinery, or do anything that needs mental alertness until you know how this drug affects you.  What side effects may I notice from receiving this medicine?  Side effects that you should report to your doctor or health care professional as soon as possible:  · allergic reactions like skin rash, itching or hives, swelling of the face, lips, or tongue  · feeling faint or lightheaded, falls  · loss of bladder control  · seizures  · signs and  symptoms of a dangerous change in heartbeat or heart rhythm like chest pain; dizziness; fast or irregular heartbeat; palpitations; feeling faint or lightheaded, falls; breathing problems  · signs and symptoms of infection like fever or chills; cough; sore throat; pain or trouble passing urine  · signs and symptoms of liver injury like dark yellow or brown urine; general ill feeling or flu-like symptoms; light-colored stools; loss of appetite; nausea; right upper belly pain; unusually weak or tired; yellowing of the eyes or skin  · slow heartbeat or palpitations  · unusual bleeding or bruising  · vomiting  Side effects that usually do not require medical attention (report to your doctor or health care professional if they continue or are bothersome):  · diarrhea, especially when starting treatment  · headache  · loss of appetite  · muscle cramps  · nausea  · stomach upset  This list may not describe all possible side effects. Call your doctor for medical advice about side effects. You may report side effects to FDA at 8-502-FBR-4994.  Where should I keep my medicine?  Keep out of reach of children.  Store at room temperature between 15 and 30 degrees C (59 and 86 degrees F). Throw away any unused medicine after the expiration date.  NOTE: This sheet is a summary. It may not cover all possible information. If you have questions about this medicine, talk to your doctor, pharmacist, or health care provider.  © 2021 [employer]/Gold Standard (2019-12-09 10:33:41)

## 2021-07-23 NOTE — PROGRESS NOTES
Venipuncture Blood Specimen Collection  Venipuncture performed in left arm by Lexie Phelan with good hemostasis. Patient tolerated the procedure well without complications.   07/23/21   Lexie Phelan

## 2021-07-24 NOTE — PROGRESS NOTES
Call pt: Vitamin b12 lab shows low vitamin B12- this can enhance any dementia/memory problems.  Follow-up in 1-2 weeks to discuss further and you will need monthly shots.

## 2021-07-26 ENCOUNTER — TELEPHONE (OUTPATIENT)
Dept: FAMILY MEDICINE CLINIC | Facility: CLINIC | Age: 71
End: 2021-07-26

## 2021-07-26 DIAGNOSIS — N30.00 ACUTE CYSTITIS WITHOUT HEMATURIA: Primary | ICD-10-CM

## 2021-07-26 DIAGNOSIS — F41.9 ANXIETY AND DEPRESSION: ICD-10-CM

## 2021-07-26 DIAGNOSIS — F32.A ANXIETY AND DEPRESSION: ICD-10-CM

## 2021-07-26 LAB — BACTERIA SPEC AEROBE CULT: ABNORMAL

## 2021-07-26 RX ORDER — FLUOXETINE HYDROCHLORIDE 20 MG/1
60 CAPSULE ORAL DAILY
Qty: 270 CAPSULE | Refills: 1 | Status: SHIPPED | OUTPATIENT
Start: 2021-07-26 | End: 2022-01-05 | Stop reason: SDUPTHER

## 2021-07-26 RX ORDER — SULFAMETHOXAZOLE AND TRIMETHOPRIM 800; 160 MG/1; MG/1
1 TABLET ORAL 2 TIMES DAILY
Qty: 14 TABLET | Refills: 0 | Status: SHIPPED | OUTPATIENT
Start: 2021-07-26 | End: 2021-08-02

## 2021-07-26 NOTE — PROGRESS NOTES
Call pt:  Her urine culture showed UTI resistant to the macrobid prescribed.  I sent in bactrim DS to start taking.  Finish this med and follow-up if not better after finishing the bactrim.  Thanks.

## 2021-07-26 NOTE — TELEPHONE ENCOUNTER
Pharmacy called. Fluoxetine script is 3 tablets 3 times daily. Phar wants to know if this is accurate

## 2021-07-26 NOTE — TELEPHONE ENCOUNTER
Call pharmacy and let them know that med is 3 tabs PO daily- I resent the right script to the pharmacy now.  Thanks.

## 2021-08-09 ENCOUNTER — APPOINTMENT (OUTPATIENT)
Dept: MRI IMAGING | Facility: HOSPITAL | Age: 71
End: 2021-08-09

## 2021-08-09 ENCOUNTER — APPOINTMENT (OUTPATIENT)
Dept: ULTRASOUND IMAGING | Facility: HOSPITAL | Age: 71
End: 2021-08-09

## 2021-08-12 ENCOUNTER — HOSPITAL ENCOUNTER (OUTPATIENT)
Dept: MRI IMAGING | Facility: HOSPITAL | Age: 71
Discharge: HOME | End: 2021-08-12

## 2021-08-12 ENCOUNTER — HOSPITAL ENCOUNTER (OUTPATIENT)
Dept: ULTRASOUND IMAGING | Facility: HOSPITAL | Age: 71
Discharge: HOME | End: 2021-08-12

## 2021-08-12 DIAGNOSIS — F03.90 DEMENTIA WITHOUT BEHAVIORAL DISTURBANCE, UNSPECIFIED DEMENTIA TYPE: ICD-10-CM

## 2021-08-12 DIAGNOSIS — R74.8 ELEVATED LIVER ENZYMES: ICD-10-CM

## 2021-08-12 PROCEDURE — 70553 MRI BRAIN STEM W/O & W/DYE: CPT

## 2021-08-12 PROCEDURE — 0 GADOBENATE DIMEGLUMINE 529 MG/ML SOLUTION: Performed by: FAMILY MEDICINE

## 2021-08-12 PROCEDURE — 76700 US EXAM ABDOM COMPLETE: CPT

## 2021-08-12 PROCEDURE — A9577 INJ MULTIHANCE: HCPCS | Performed by: FAMILY MEDICINE

## 2021-08-12 RX ADMIN — GADOBENATE DIMEGLUMINE 20 ML: 529 INJECTION, SOLUTION INTRAVENOUS at 10:55

## 2021-08-12 NOTE — PROGRESS NOTES
Call pt:  Ultrasound shows fatty liver.  Follow-up in office in 1-2 weeks to discuss.  You will need to see gastroenterology.

## 2021-08-13 NOTE — PROGRESS NOTES
Call pt: MRI showed only small vessel disease, which can lead to stroke over many years- you will need to practice healthy lifestyle by exercising, watching diet, watching BP, blood sugar, and cholesterol in order to prevent strokes.  Follow-up in office in next 1-2 weeks to discuss further.

## 2021-08-17 ENCOUNTER — OFFICE VISIT (OUTPATIENT)
Dept: FAMILY MEDICINE CLINIC | Facility: CLINIC | Age: 71
End: 2021-08-17

## 2021-08-17 VITALS
WEIGHT: 203.6 LBS | DIASTOLIC BLOOD PRESSURE: 72 MMHG | OXYGEN SATURATION: 98 % | HEART RATE: 55 BPM | BODY MASS INDEX: 33.92 KG/M2 | TEMPERATURE: 97.1 F | HEIGHT: 65 IN | SYSTOLIC BLOOD PRESSURE: 138 MMHG

## 2021-08-17 DIAGNOSIS — F03.90 DEMENTIA WITHOUT BEHAVIORAL DISTURBANCE, UNSPECIFIED DEMENTIA TYPE: ICD-10-CM

## 2021-08-17 DIAGNOSIS — I73.9 SMALL VESSEL DISEASE (HCC): ICD-10-CM

## 2021-08-17 DIAGNOSIS — K76.0 FATTY LIVER: Primary | ICD-10-CM

## 2021-08-17 PROCEDURE — 99213 OFFICE O/P EST LOW 20 MIN: CPT | Performed by: FAMILY MEDICINE

## 2021-08-17 NOTE — PROGRESS NOTES
"Chief Complaint  Dementia (MRI brain follow up) and Elevated Hepatic Enzymes (US follow up)    Subjective          Haylee Gibbs presents to [facility]  Ultrasound showed fatty liver  Discussed MRI small vessel disease  Dicussed dementia  Pt has decided not to see neurology or GI for liver or dementia- she will let us know if she needs it    ROS: pt has had no recent fevers, soa, cough, vision changes, headaches, chest pains, palpitations, syncope, dizziness, nausea, vomiting, diarrhea, abdominal pain, rashes, joint pain, depression, anxiety, memory problems, leg swelling.        Objective   Allergies   Allergen Reactions   • Tramadol Hcl GI Intolerance     Immunization History   Administered Date(s) Administered   • COVID-19 (ИРИНА) 03/08/2021   • Influenza, Unspecified 09/30/2019   • Pneumococcal Conjugate 13-Valent (PCV13) 11/24/2015       Vital Signs:   Vitals:    08/17/21 1623   BP: 138/72   Pulse: 55   Temp: 97.1 °F (36.2 °C)   SpO2: 98%   Weight: 92.4 kg (203 lb 9.6 oz)   Height: 163.8 cm (64.5\")       Physical Exam  Vitals reviewed.   Constitutional:       Appearance: Normal appearance. She is well-developed.   HENT:      Head: Normocephalic and atraumatic.      Right Ear: External ear normal.      Left Ear: External ear normal.      Mouth/Throat:      Pharynx: No oropharyngeal exudate.   Eyes:      Conjunctiva/sclera: Conjunctivae normal.      Pupils: Pupils are equal, round, and reactive to light.   Cardiovascular:      Rate and Rhythm: Normal rate and regular rhythm.      Pulses: Normal pulses.      Heart sounds: Normal heart sounds. No murmur heard.   No friction rub. No gallop.    Pulmonary:      Effort: Pulmonary effort is normal.      Breath sounds: Normal breath sounds. No wheezing or rhonchi.   Abdominal:      General: Bowel sounds are normal. There is no distension.      Palpations: Abdomen is soft.      Tenderness: There is no abdominal tenderness.   Skin:     " General: Skin is warm and dry.   Neurological:      Mental Status: She is alert and oriented to person, place, and time.      Cranial Nerves: No cranial nerve deficit.   Psychiatric:         Mood and Affect: Mood and affect normal.         Behavior: Behavior normal.         Thought Content: Thought content normal.         Judgment: Judgment normal.        Result Review :                 Assessment and Plan    Diagnoses and all orders for this visit:    1. Fatty liver (Primary)  -     Ambulatory Referral to Gastroenterology    2. Dementia without behavioral disturbance, unspecified dementia type (CMS/HCC)    3. Small vessel disease (CMS/HCC)            Follow Up   Return in about 6 months (around 2/17/2022) for Recheck.  Patient was given instructions and counseling regarding her condition or for health maintenance advice. Please see specific information pulled into the AVS if appropriate.

## 2021-10-14 DIAGNOSIS — M19.90 ARTHRITIS: ICD-10-CM

## 2021-10-14 RX ORDER — IBUPROFEN 800 MG/1
800 TABLET ORAL AS NEEDED
Qty: 90 TABLET | Refills: 0 | Status: SHIPPED | OUTPATIENT
Start: 2021-10-14 | End: 2022-01-10 | Stop reason: SDUPTHER

## 2022-01-05 DIAGNOSIS — I10 ESSENTIAL HYPERTENSION: ICD-10-CM

## 2022-01-05 DIAGNOSIS — G40.909 SEIZURE DISORDER: ICD-10-CM

## 2022-01-05 DIAGNOSIS — F32.A ANXIETY AND DEPRESSION: ICD-10-CM

## 2022-01-05 DIAGNOSIS — F41.9 ANXIETY AND DEPRESSION: ICD-10-CM

## 2022-01-05 DIAGNOSIS — E11.65 TYPE 2 DIABETES MELLITUS WITH HYPERGLYCEMIA, WITHOUT LONG-TERM CURRENT USE OF INSULIN: ICD-10-CM

## 2022-01-05 RX ORDER — LEVETIRACETAM 500 MG/1
500 TABLET ORAL DAILY
Qty: 90 TABLET | Refills: 1 | Status: SHIPPED | OUTPATIENT
Start: 2022-01-05 | End: 2022-01-10 | Stop reason: SDUPTHER

## 2022-01-05 RX ORDER — FLUOXETINE HYDROCHLORIDE 20 MG/1
60 CAPSULE ORAL DAILY
Qty: 270 CAPSULE | Refills: 1 | Status: SHIPPED | OUTPATIENT
Start: 2022-01-05 | End: 2022-01-10 | Stop reason: SDUPTHER

## 2022-01-05 RX ORDER — ASPIRIN 81 MG/1
81 TABLET, CHEWABLE ORAL DAILY
Qty: 90 TABLET | Refills: 1 | Status: SHIPPED | OUTPATIENT
Start: 2022-01-05 | End: 2022-01-10 | Stop reason: SDUPTHER

## 2022-01-05 NOTE — TELEPHONE ENCOUNTER
Caller: Haylee Gibbs    Relationship: Self    Best call back number: [phone]     Requested Prescriptions:   Requested Prescriptions     Pending Prescriptions Disp Refills   • metFORMIN (GLUCOPHAGE) 500 MG tablet 180 tablet 1     Sig: Take 1 tablet by mouth 2 (Two) Times a Day.   • metoprolol tartrate (LOPRESSOR) 25 MG tablet 180 tablet 1     Sig: Take 1 tablet by mouth 2 (Two) Times a Day.   • aspirin 81 MG chewable tablet 90 tablet 1     Sig: Chew 1 tablet Daily.   • FLUoxetine (PROzac) 20 MG capsule 270 capsule 1     Sig: Take 3 capsules by mouth Daily.   • levETIRAcetam (KEPPRA) 500 MG tablet 90 tablet 1     Sig: Take 1 tablet by mouth Daily. One tablet in the morning and two in the evening        Pharmacy where request should be sent: [address] [phone] [facility] [phone] [facility]         Does the patient have less than a 3 day supply:  [x] Yes  [] No    Alexander Sanchez Rep   01/05/22 14:33 EST

## 2022-01-10 ENCOUNTER — TELEPHONE (OUTPATIENT)
Dept: FAMILY MEDICINE CLINIC | Facility: CLINIC | Age: 72
End: 2022-01-10

## 2022-01-10 DIAGNOSIS — F32.A ANXIETY AND DEPRESSION: ICD-10-CM

## 2022-01-10 DIAGNOSIS — G40.909 SEIZURE DISORDER: ICD-10-CM

## 2022-01-10 DIAGNOSIS — E11.65 TYPE 2 DIABETES MELLITUS WITH HYPERGLYCEMIA, WITHOUT LONG-TERM CURRENT USE OF INSULIN: ICD-10-CM

## 2022-01-10 DIAGNOSIS — F03.90 DEMENTIA WITHOUT BEHAVIORAL DISTURBANCE, UNSPECIFIED DEMENTIA TYPE: ICD-10-CM

## 2022-01-10 DIAGNOSIS — I10 ESSENTIAL HYPERTENSION: ICD-10-CM

## 2022-01-10 DIAGNOSIS — M19.90 ARTHRITIS: ICD-10-CM

## 2022-01-10 DIAGNOSIS — F41.9 ANXIETY AND DEPRESSION: ICD-10-CM

## 2022-01-10 RX ORDER — IBUPROFEN 800 MG/1
800 TABLET ORAL AS NEEDED
Qty: 30 TABLET | Refills: 0 | Status: SHIPPED | OUTPATIENT
Start: 2022-01-10 | End: 2022-01-31 | Stop reason: SDUPTHER

## 2022-01-10 RX ORDER — FLUOXETINE HYDROCHLORIDE 20 MG/1
60 CAPSULE ORAL DAILY
Qty: 90 CAPSULE | Refills: 0 | Status: SHIPPED | OUTPATIENT
Start: 2022-01-10 | End: 2022-01-31 | Stop reason: SDUPTHER

## 2022-01-10 RX ORDER — DONEPEZIL HYDROCHLORIDE 5 MG/1
5 TABLET, FILM COATED ORAL NIGHTLY
Qty: 30 TABLET | Refills: 0 | Status: SHIPPED | OUTPATIENT
Start: 2022-01-10 | End: 2022-01-31 | Stop reason: SINTOL

## 2022-01-10 RX ORDER — LEVETIRACETAM 500 MG/1
500 TABLET ORAL DAILY
Qty: 30 TABLET | Refills: 0 | Status: SHIPPED | OUTPATIENT
Start: 2022-01-10 | End: 2022-01-31 | Stop reason: SDUPTHER

## 2022-01-10 RX ORDER — ASPIRIN 81 MG/1
81 TABLET, CHEWABLE ORAL DAILY
Qty: 30 TABLET | Refills: 0 | Status: SHIPPED | OUTPATIENT
Start: 2022-01-10 | End: 2022-01-31 | Stop reason: SDUPTHER

## 2022-01-10 NOTE — TELEPHONE ENCOUNTER
MED REFILLS ON METFORMIN, METOPROLOL, ASPIRIN, KEPPRA, AND PROZAC TO [facility] [place]. I MADE AN APPOINTMENT ON 01/31/2022 WITH YOU.

## 2022-01-31 ENCOUNTER — OFFICE VISIT (OUTPATIENT)
Dept: FAMILY MEDICINE CLINIC | Facility: CLINIC | Age: 72
End: 2022-01-31

## 2022-01-31 VITALS
HEART RATE: 62 BPM | WEIGHT: 207 LBS | TEMPERATURE: 98 F | HEIGHT: 65 IN | OXYGEN SATURATION: 97 % | BODY MASS INDEX: 34.49 KG/M2 | DIASTOLIC BLOOD PRESSURE: 84 MMHG | SYSTOLIC BLOOD PRESSURE: 148 MMHG

## 2022-01-31 DIAGNOSIS — F32.A ANXIETY AND DEPRESSION: ICD-10-CM

## 2022-01-31 DIAGNOSIS — G40.909 SEIZURE DISORDER: ICD-10-CM

## 2022-01-31 DIAGNOSIS — F41.9 ANXIETY AND DEPRESSION: ICD-10-CM

## 2022-01-31 DIAGNOSIS — I10 PRIMARY HYPERTENSION: Primary | ICD-10-CM

## 2022-01-31 DIAGNOSIS — I10 ESSENTIAL HYPERTENSION: ICD-10-CM

## 2022-01-31 DIAGNOSIS — F03.90 DEMENTIA WITHOUT BEHAVIORAL DISTURBANCE, UNSPECIFIED DEMENTIA TYPE: ICD-10-CM

## 2022-01-31 DIAGNOSIS — E11.65 TYPE 2 DIABETES MELLITUS WITH HYPERGLYCEMIA, WITHOUT LONG-TERM CURRENT USE OF INSULIN: ICD-10-CM

## 2022-01-31 DIAGNOSIS — M19.90 ARTHRITIS: ICD-10-CM

## 2022-01-31 LAB
ALBUMIN SERPL-MCNC: 4.7 G/DL (ref 3.5–5.2)
ALBUMIN UR-MCNC: 3.3 MG/DL
ALBUMIN/GLOB SERPL: 1.4 G/DL
ALP SERPL-CCNC: 59 U/L (ref 39–117)
ALT SERPL W P-5'-P-CCNC: 48 U/L (ref 1–33)
ANION GAP SERPL CALCULATED.3IONS-SCNC: 11.7 MMOL/L (ref 5–15)
AST SERPL-CCNC: 51 U/L (ref 1–32)
BASOPHILS # BLD AUTO: 0.03 10*3/MM3 (ref 0–0.2)
BASOPHILS NFR BLD AUTO: 0.3 % (ref 0–1.5)
BILIRUB SERPL-MCNC: 0.8 MG/DL (ref 0–1.2)
BUN SERPL-MCNC: 22 MG/DL (ref 8–23)
BUN/CREAT SERPL: 21.2 (ref 7–25)
CALCIUM SPEC-SCNC: 10 MG/DL (ref 8.6–10.5)
CHLORIDE SERPL-SCNC: 98 MMOL/L (ref 98–107)
CHOLEST SERPL-MCNC: 230 MG/DL (ref 0–200)
CO2 SERPL-SCNC: 27.3 MMOL/L (ref 22–29)
CREAT SERPL-MCNC: 1.04 MG/DL (ref 0.57–1)
DEPRECATED RDW RBC AUTO: 43.2 FL (ref 37–54)
EOSINOPHIL # BLD AUTO: 0.16 10*3/MM3 (ref 0–0.4)
EOSINOPHIL NFR BLD AUTO: 1.8 % (ref 0.3–6.2)
ERYTHROCYTE [DISTWIDTH] IN BLOOD BY AUTOMATED COUNT: 12.6 % (ref 12.3–15.4)
GFR SERPL CREATININE-BSD FRML MDRD: 52 ML/MIN/1.73
GLOBULIN UR ELPH-MCNC: 3.3 GM/DL
GLUCOSE SERPL-MCNC: 130 MG/DL (ref 65–99)
HBA1C MFR BLD: 8.82 % (ref 4.8–5.6)
HCT VFR BLD AUTO: 38.8 % (ref 34–46.6)
HDLC SERPL-MCNC: 28 MG/DL (ref 40–60)
HGB BLD-MCNC: 12.6 G/DL (ref 12–15.9)
IMM GRANULOCYTES # BLD AUTO: 0.07 10*3/MM3 (ref 0–0.05)
IMM GRANULOCYTES NFR BLD AUTO: 0.8 % (ref 0–0.5)
LDLC SERPL CALC-MCNC: 164 MG/DL (ref 0–100)
LDLC/HDLC SERPL: 5.78 {RATIO}
LYMPHOCYTES # BLD AUTO: 1.98 10*3/MM3 (ref 0.7–3.1)
LYMPHOCYTES NFR BLD AUTO: 22.2 % (ref 19.6–45.3)
MCH RBC QN AUTO: 30.5 PG (ref 26.6–33)
MCHC RBC AUTO-ENTMCNC: 32.5 G/DL (ref 31.5–35.7)
MCV RBC AUTO: 93.9 FL (ref 79–97)
MONOCYTES # BLD AUTO: 0.77 10*3/MM3 (ref 0.1–0.9)
MONOCYTES NFR BLD AUTO: 8.6 % (ref 5–12)
NEUTROPHILS NFR BLD AUTO: 5.91 10*3/MM3 (ref 1.7–7)
NEUTROPHILS NFR BLD AUTO: 66.3 % (ref 42.7–76)
NRBC BLD AUTO-RTO: 0 /100 WBC (ref 0–0.2)
PLATELET # BLD AUTO: 308 10*3/MM3 (ref 140–450)
PMV BLD AUTO: 12.1 FL (ref 6–12)
POTASSIUM SERPL-SCNC: 4.5 MMOL/L (ref 3.5–5.2)
PROT SERPL-MCNC: 8 G/DL (ref 6–8.5)
RBC # BLD AUTO: 4.13 10*6/MM3 (ref 3.77–5.28)
SODIUM SERPL-SCNC: 137 MMOL/L (ref 136–145)
T4 FREE SERPL-MCNC: 1.01 NG/DL (ref 0.93–1.7)
TRIGL SERPL-MCNC: 201 MG/DL (ref 0–150)
TSH SERPL DL<=0.05 MIU/L-ACNC: 2.44 UIU/ML (ref 0.27–4.2)
VLDLC SERPL-MCNC: 38 MG/DL (ref 5–40)
WBC NRBC COR # BLD: 8.92 10*3/MM3 (ref 3.4–10.8)

## 2022-01-31 PROCEDURE — 82043 UR ALBUMIN QUANTITATIVE: CPT | Performed by: FAMILY MEDICINE

## 2022-01-31 PROCEDURE — 80053 COMPREHEN METABOLIC PANEL: CPT | Performed by: FAMILY MEDICINE

## 2022-01-31 PROCEDURE — 80061 LIPID PANEL: CPT | Performed by: FAMILY MEDICINE

## 2022-01-31 PROCEDURE — 99214 OFFICE O/P EST MOD 30 MIN: CPT | Performed by: FAMILY MEDICINE

## 2022-01-31 PROCEDURE — 84439 ASSAY OF FREE THYROXINE: CPT | Performed by: FAMILY MEDICINE

## 2022-01-31 PROCEDURE — 83036 HEMOGLOBIN GLYCOSYLATED A1C: CPT | Performed by: FAMILY MEDICINE

## 2022-01-31 PROCEDURE — 84443 ASSAY THYROID STIM HORMONE: CPT | Performed by: FAMILY MEDICINE

## 2022-01-31 PROCEDURE — 85025 COMPLETE CBC W/AUTO DIFF WBC: CPT | Performed by: FAMILY MEDICINE

## 2022-01-31 RX ORDER — LEVETIRACETAM 500 MG/1
500 TABLET ORAL DAILY
Qty: 90 TABLET | Refills: 1 | Status: SHIPPED | OUTPATIENT
Start: 2022-01-31 | End: 2022-03-22 | Stop reason: SDUPTHER

## 2022-01-31 RX ORDER — ASPIRIN 81 MG/1
81 TABLET, CHEWABLE ORAL DAILY
Qty: 90 TABLET | Refills: 1 | Status: SHIPPED | OUTPATIENT
Start: 2022-01-31 | End: 2022-09-19

## 2022-01-31 RX ORDER — IBUPROFEN 800 MG/1
800 TABLET ORAL AS NEEDED
Qty: 90 TABLET | Refills: 1 | Status: SHIPPED | OUTPATIENT
Start: 2022-01-31 | End: 2022-07-07 | Stop reason: HOSPADM

## 2022-01-31 RX ORDER — LISINOPRIL 10 MG/1
5 TABLET ORAL DAILY
Qty: 45 TABLET | Refills: 1 | Status: ON HOLD | OUTPATIENT
Start: 2022-01-31 | End: 2022-07-03 | Stop reason: SDUPTHER

## 2022-01-31 RX ORDER — FLUOXETINE HYDROCHLORIDE 20 MG/1
60 CAPSULE ORAL DAILY
Qty: 270 CAPSULE | Refills: 1 | Status: SHIPPED | OUTPATIENT
Start: 2022-01-31 | End: 2022-07-19 | Stop reason: SDUPTHER

## 2022-01-31 NOTE — PROGRESS NOTES
Chief Complaint  Hypertension (Refills )    Subjective          Haylee Gibbs presents to [facility]  Discussed pt needing statin- pt refuses at this time- she understands risk of stroke and mI with diabetes and not using statin    Hypertension  This is a chronic problem. The current episode started more than 1 year ago. The problem has been gradually improving since onset. The problem is uncontrolled. Pertinent negatives include no anxiety, blurred vision, chest pain, headaches, malaise/fatigue, neck pain, orthopnea, palpitations, peripheral edema, PND, shortness of breath or sweats. There are no associated agents to hypertension. Risk factors for coronary artery disease include diabetes mellitus, dyslipidemia, post-menopausal state and family history. Current antihypertension treatment includes beta blockers. The current treatment provides moderate improvement. There are no compliance problems.    Diabetes  She presents for her follow-up diabetic visit. She has type 2 diabetes mellitus. Her disease course has been stable. There are no hypoglycemic associated symptoms. Pertinent negatives for hypoglycemia include no headaches or sweats. There are no diabetic associated symptoms. Pertinent negatives for diabetes include no blurred vision, no chest pain, no fatigue, no foot paresthesias, no foot ulcerations, no polydipsia, no polyphagia, no polyuria, no visual change, no weakness and no weight loss. There are no hypoglycemic complications. Symptoms are stable. Current diabetic treatment includes oral agent (monotherapy). She is compliant with treatment all of the time. She is following a generally healthy diet. She rarely participates in exercise. An ACE inhibitor/angiotensin II receptor blocker is not being taken. She does not see a podiatrist.Eye exam is not current.       Objective   Allergies   Allergen Reactions   • Tramadol Hcl GI Intolerance     Immunization History    Administered Date(s) Administered   • COVID-19 (ИРИНА) 03/08/2021   • COVID-19 (PFIZER) PURPLE CAP 11/08/2021   • Influenza, Unspecified 09/30/2019   • Pneumococcal Conjugate 13-Valent (PCV13) 11/24/2015     Past Medical History:   Diagnosis Date   • Anxiety disorder 06/26/2018   • Benign essential hypertension    • Cataract    • COPD (chronic obstructive pulmonary disease) (HCC)    • CVA (cerebral vascular accident) (HCC)    • Depression    • Diabetes mellitus, type 2 (HCC)    • Hyperlipidemia    • Paresthesia and pain of both upper extremities 06/26/2018   • Seizure disorder (HCC) 06/26/2018   • Vitamin B 12 deficiency    • Vitamin D deficiency 06/26/2018      Past Surgical History:   Procedure Laterality Date   • APPENDECTOMY     • BACK SURGERY      LOW BACK DISC   • CATARACT EXTRACTION     • HYSTERECTOMY     • NECK SURGERY      NECK DISC   • TONSILLECTOMY        Social History     Socioeconomic History   • Marital status: Single   Tobacco Use   • Smoking status: Former Smoker     Packs/day: 0.50     Years: 20.00     Pack years: 10.00   • Smokeless tobacco: Former User   • Tobacco comment: SMOKES LESS THAN 1 PACK PER DAY, FOR 20 YEARS NEVER USES OTHER TOBACCO PRODCUTS   Vaping Use   • Vaping Use: Never used   Substance and Sexual Activity   • Alcohol use: Never   • Drug use: Never        Current Outpatient Medications:   •  aspirin 81 MG chewable tablet, Chew 1 tablet Daily., Disp: 90 tablet, Rfl: 1  •  FLUoxetine (PROzac) 20 MG capsule, Take 3 capsules by mouth Daily., Disp: 270 capsule, Rfl: 1  •  ibuprofen (ADVIL,MOTRIN) 800 MG tablet, Take 1 tablet by mouth As Needed for Mild Pain ., Disp: 90 tablet, Rfl: 1  •  levETIRAcetam (KEPPRA) 500 MG tablet, Take 1 tablet by mouth Daily. One tablet in the morning and two in the evening, Disp: 90 tablet, Rfl: 1  •  metFORMIN (GLUCOPHAGE) 500 MG tablet, Take 1 tablet by mouth 2 (Two) Times a Day., Disp: 180 tablet, Rfl: 1  •  metoprolol tartrate (LOPRESSOR) 25 MG  "tablet, Take 1 tablet by mouth 2 (Two) Times a Day., Disp: 180 tablet, Rfl: 1  •  lisinopril (PRINIVIL,ZESTRIL) 10 MG tablet, Take 0.5 tablets by mouth Daily., Disp: 45 tablet, Rfl: 1   Family History   Problem Relation Age of Onset   • Seizures Mother    • Stroke Mother    • Diabetes type II Sister    • Hypertension Sister    • Diabetes type II Brother    • Hypertension Brother    • Alcohol abuse Brother    • Heart disease Other         MOTHER, GRANDMOTHER, SISTER; FATHER, GRANDFATHER OR BROTHER DEVELOPED HEART DISEASE BEFORE THE AGE OF 65          Vital Signs:   Vitals:    01/31/22 1113   BP: 148/84   Pulse: 62   Temp: 98 °F (36.7 °C)   SpO2: 97%   Weight: 93.9 kg (207 lb)   Height: 163.8 cm (64.5\")       Physical Exam  Vitals reviewed.   Constitutional:       Appearance: Normal appearance. She is well-developed.   HENT:      Head: Normocephalic and atraumatic.      Right Ear: External ear normal.      Left Ear: External ear normal.      Mouth/Throat:      Pharynx: No oropharyngeal exudate.   Eyes:      Conjunctiva/sclera: Conjunctivae normal.      Pupils: Pupils are equal, round, and reactive to light.   Cardiovascular:      Rate and Rhythm: Normal rate and regular rhythm.      Pulses: Normal pulses.           Dorsalis pedis pulses are 2+ on the right side and 2+ on the left side.      Heart sounds: Normal heart sounds. No murmur heard.  No friction rub. No gallop.    Pulmonary:      Effort: Pulmonary effort is normal.      Breath sounds: Normal breath sounds. No wheezing or rhonchi.   Abdominal:      General: Abdomen is flat. Bowel sounds are normal. There is no distension.      Palpations: Abdomen is soft. There is no mass.      Tenderness: There is no abdominal tenderness. There is no guarding or rebound.      Hernia: No hernia is present.   Musculoskeletal:         General: Normal range of motion.      Cervical back: Normal range of motion and neck supple.      Right foot: Normal range of motion. Deformity " present. No bunion, Charcot foot, foot drop or prominent metatarsal heads.      Left foot: Normal range of motion. Deformity present. No bunion, Charcot foot, foot drop or prominent metatarsal heads.   Feet:      Right foot:      Protective Sensation: 3 sites tested. 3 sites sensed.      Skin integrity: Skin integrity normal. No ulcer or blister.      Toenail Condition: Right toenails are normal.      Left foot:      Protective Sensation: 3 sites tested. 1 site sensed.     Skin integrity: Skin integrity normal. No ulcer or blister.      Toenail Condition: Left toenails are normal.      Comments:    Hammer toes bilaterally.  Skin:     General: Skin is warm and dry.      Capillary Refill: Capillary refill takes less than 2 seconds.   Neurological:      General: No focal deficit present.      Mental Status: She is alert and oriented to person, place, and time.      Cranial Nerves: No cranial nerve deficit.   Psychiatric:         Mood and Affect: Mood and affect normal.         Behavior: Behavior normal.         Thought Content: Thought content normal.         Judgment: Judgment normal.        Result Review :   The following data was reviewed by: Kj Canales MD on 01/31/2022:  CMP    CMP 7/19/21   Glucose 145 (A)   BUN 16   Creatinine 0.99   eGFR Non African Am 55 (A)   Sodium 137   Potassium 5.0   Chloride 99   Calcium 9.5   Albumin 4.60   Total Bilirubin 0.7   Alkaline Phosphatase 47   AST (SGOT) 58 (A)   ALT (SGPT) 53 (A)   (A) Abnormal value            CBC    CBC 7/19/21   WBC 6.93   RBC 4.34   Hemoglobin 13.3   Hematocrit 41.0   MCV 94.5   MCH 30.6   MCHC 32.4   RDW 13.3   Platelets 339           Lipid Panel    Lipid Panel 7/19/21   Total Cholesterol 236 (A)   Triglycerides 234 (A)   HDL Cholesterol 29 (A)   VLDL Cholesterol 44 (A)   LDL Cholesterol  163 (A)   LDL/HDL Ratio 5.52   (A) Abnormal value            TSH    TSH 7/19/21   TSH 3.180           Most Recent A1C    HGBA1C Most Recent 7/19/21   Hemoglobin  A1C 7.27 (A)   (A) Abnormal value            Microalbumin    Microalbumin 7/19/21   Microalbumin, Urine 3.5                     Assessment and Plan    Diagnoses and all orders for this visit:    1. Primary hypertension (Primary)  -     TSH+Free T4  -     TSH+Free T4; Future  -     lisinopril (PRINIVIL,ZESTRIL) 10 MG tablet; Take 0.5 tablets by mouth Daily.  Dispense: 45 tablet; Refill: 1    2. Essential hypertension  -     aspirin 81 MG chewable tablet; Chew 1 tablet Daily.  Dispense: 90 tablet; Refill: 1  -     metoprolol tartrate (LOPRESSOR) 25 MG tablet; Take 1 tablet by mouth 2 (Two) Times a Day.  Dispense: 180 tablet; Refill: 1    3. Dementia without behavioral disturbance, unspecified dementia type (HCC)    4. Anxiety and depression  -     FLUoxetine (PROzac) 20 MG capsule; Take 3 capsules by mouth Daily.  Dispense: 270 capsule; Refill: 1    5. Arthritis  -     ibuprofen (ADVIL,MOTRIN) 800 MG tablet; Take 1 tablet by mouth As Needed for Mild Pain .  Dispense: 90 tablet; Refill: 1    6. Seizure disorder (HCC)  -     levETIRAcetam (KEPPRA) 500 MG tablet; Take 1 tablet by mouth Daily. One tablet in the morning and two in the evening  Dispense: 90 tablet; Refill: 1    7. Type 2 diabetes mellitus with hyperglycemia, without long-term current use of insulin (HCC)  -     metFORMIN (GLUCOPHAGE) 500 MG tablet; Take 1 tablet by mouth 2 (Two) Times a Day.  Dispense: 180 tablet; Refill: 1  -     CBC Auto Differential  -     Comprehensive Metabolic Panel  -     Hemoglobin A1c  -     Lipid Panel  -     MicroAlbumin, Urine, Random - Urine, Clean Catch  -     CBC Auto Differential; Future  -     Comprehensive Metabolic Panel; Future  -     Hemoglobin A1c; Future  -     Lipid Panel; Future  -     Ambulatory Referral for Diabetic Eye Exam-Ophthalmology  -     Ortho Footwear Ladies' Shoes            Follow Up   Return in about 6 months (around 7/31/2022) for Medicare Wellness.  Patient was given instructions and counseling  regarding her condition or for health maintenance advice. Please see specific information pulled into the AVS if appropriate.     Pt refuses to see podiatry at this time.

## 2022-01-31 NOTE — PROGRESS NOTES
Venipuncture Blood Specimen Collection  Venipuncture performed in left arm  by Deepika Sevilla with good hemostasis. Patient tolerated the procedure well without complications.   01/31/22   Deepika Sevilla

## 2022-03-22 ENCOUNTER — TELEPHONE (OUTPATIENT)
Dept: FAMILY MEDICINE CLINIC | Facility: CLINIC | Age: 72
End: 2022-03-22

## 2022-03-22 DIAGNOSIS — G40.909 SEIZURE DISORDER: ICD-10-CM

## 2022-03-22 DIAGNOSIS — E11.65 TYPE 2 DIABETES MELLITUS WITH HYPERGLYCEMIA, WITHOUT LONG-TERM CURRENT USE OF INSULIN: ICD-10-CM

## 2022-03-22 RX ORDER — LEVETIRACETAM 500 MG/1
500 TABLET ORAL DAILY
Qty: 90 TABLET | Refills: 1 | Status: SHIPPED | OUTPATIENT
Start: 2022-03-22 | End: 2022-04-14

## 2022-03-22 NOTE — TELEPHONE ENCOUNTER
Caller: YAMILET    Relationship: Brother/Sister    Best call back number: [phone]    Requested Prescriptions:   Requested Prescriptions     Pending Prescriptions Disp Refills   • levETIRAcetam (KEPPRA) 500 MG tablet 90 tablet 1     Sig: Take 1 tablet by mouth Daily. One tablet in the morning and two in the evening   • metFORMIN (GLUCOPHAGE) 500 MG tablet 180 tablet 1     Sig: Take 1 tablet by mouth 2 (Two) Times a Day.        Pharmacy where request should be sent: [address] [phone] [facility] [phone] [facility]     Additional details provided by patient: THE PATIENT IS RUNNING OUT OF MEDICATION AND WOULD LIKE TO VERIFY THE FREQUENCY AND QUANTITY    Does the patient have less than a 3 day supply:  [] Yes  [x] No    Alexander Teran Rep   03/22/22 09:42 EDT

## 2022-03-22 NOTE — TELEPHONE ENCOUNTER
Caller: YAMILET    Relationship: Brother/Sister    Best call back number: [phone]    What form or medical record are you requesting: JURY DUTY EXCUSE    Who is requesting this form or medical record from you: SELF    How would you like to receive the form or medical records (pick-up, mail, fax): PICK-UP  If pick-up, provide patient with address and location details    Timeframe paperwork needed: ASAP    Additional notes: THE PATIENT RECEIVED A JURY DUTY SUMMONS AND WOULD LIKE A NOTE FROM PCP EXCUSING HER. SHE NEEDS HER SISTER TO PICK THIS UP ASAP AND A CALL BACK WHEN THIS IS READY.

## 2022-03-30 ENCOUNTER — OFFICE VISIT (OUTPATIENT)
Dept: FAMILY MEDICINE CLINIC | Facility: CLINIC | Age: 72
End: 2022-03-30

## 2022-03-30 VITALS
TEMPERATURE: 98.1 F | BODY MASS INDEX: 34.64 KG/M2 | HEART RATE: 82 BPM | SYSTOLIC BLOOD PRESSURE: 112 MMHG | OXYGEN SATURATION: 98 % | WEIGHT: 205 LBS | DIASTOLIC BLOOD PRESSURE: 78 MMHG

## 2022-03-30 DIAGNOSIS — M25.562 ACUTE PAIN OF LEFT KNEE: ICD-10-CM

## 2022-03-30 DIAGNOSIS — M79.601 RIGHT ARM PAIN: Primary | ICD-10-CM

## 2022-03-30 PROCEDURE — 99213 OFFICE O/P EST LOW 20 MIN: CPT | Performed by: FAMILY MEDICINE

## 2022-03-30 NOTE — PROGRESS NOTES
Chief Complaint  Arm Pain (Right arm pain from fall this morning )    Subjective          Haylee Gibbs presents to [facility]  Pt never hit her head    Arm Pain   The incident occurred 3 to 6 hours ago. The incident occurred at home. The injury mechanism was a fall. Pain location: righty arm from elbow down and left knee. The quality of the pain is described as aching. The pain does not radiate. The pain is moderate. The pain has been constant since the incident. Pertinent negatives include no chest pain, muscle weakness, numbness or tingling. Nothing aggravates the symptoms. She has tried nothing for the symptoms.       Objective   Allergies   Allergen Reactions   • Tramadol Hcl GI Intolerance     Immunization History   Administered Date(s) Administered   • COVID-19 (anywayanyday) 03/08/2021   • COVID-19 (PFIZER) PURPLE CAP 11/08/2021   • Influenza, Unspecified 09/30/2019   • Pneumococcal Conjugate 13-Valent (PCV13) 11/24/2015     Past Medical History:   Diagnosis Date   • Anxiety disorder 06/26/2018   • Benign essential hypertension    • Cataract    • COPD (chronic obstructive pulmonary disease) ([facility])    • CVA (cerebral vascular accident) ([facility])    • Depression    • Diabetes mellitus, type 2 ([facility])    • Hyperlipidemia    • Paresthesia and pain of both upper extremities 06/26/2018   • Seizure disorder ([facility]) 06/26/2018   • Vitamin B 12 deficiency    • Vitamin D deficiency 06/26/2018      Past Surgical History:   Procedure Laterality Date   • APPENDECTOMY     • BACK SURGERY      LOW BACK DISC   • CATARACT EXTRACTION     • HYSTERECTOMY     • NECK SURGERY      NECK DISC   • TONSILLECTOMY        Social History     Socioeconomic History   • Marital status: Single   Tobacco Use   • Smoking status: Former Smoker     Packs/day: 0.50     Years: 20.00     Pack years: 10.00   • Smokeless tobacco: Former User   • Tobacco comment: SMOKES LESS THAN 1 PACK PER DAY, FOR 20 YEARS NEVER USES OTHER TOBACCO  PRODCUTS   Vaping Use   • Vaping Use: Never used   Substance and Sexual Activity   • Alcohol use: Never   • Drug use: Never        Current Outpatient Medications:   •  aspirin 81 MG chewable tablet, Chew 1 tablet Daily., Disp: 90 tablet, Rfl: 1  •  FLUoxetine (PROzac) 20 MG capsule, Take 3 capsules by mouth Daily., Disp: 270 capsule, Rfl: 1  •  ibuprofen (ADVIL,MOTRIN) 800 MG tablet, Take 1 tablet by mouth As Needed for Mild Pain ., Disp: 90 tablet, Rfl: 1  •  levETIRAcetam (KEPPRA) 500 MG tablet, Take 1 tablet by mouth Daily. One tablet in the morning and two in the evening, Disp: 90 tablet, Rfl: 1  •  lisinopril (PRINIVIL,ZESTRIL) 10 MG tablet, Take 0.5 tablets by mouth Daily., Disp: 45 tablet, Rfl: 1  •  metFORMIN (GLUCOPHAGE) 500 MG tablet, Take 1 tablet by mouth 2 (Two) Times a Day., Disp: 180 tablet, Rfl: 1  •  metoprolol tartrate (LOPRESSOR) 25 MG tablet, Take 1 tablet by mouth 2 (Two) Times a Day., Disp: 180 tablet, Rfl: 1   Family History   Problem Relation Age of Onset   • Seizures Mother    • Stroke Mother    • Diabetes type II Sister    • Hypertension Sister    • Diabetes type II Brother    • Hypertension Brother    • Alcohol abuse Brother    • Heart disease Other         MOTHER, GRANDMOTHER, SISTER; FATHER, GRANDFATHER OR BROTHER DEVELOPED HEART DISEASE BEFORE THE AGE OF 65          Vital Signs:   Vitals:    03/30/22 1552   BP: 112/78   Pulse: 82   Temp: 98.1 °F (36.7 °C)   SpO2: 98%   Weight: 93 kg (205 lb)       Review of Systems   Constitutional: Negative for fatigue and fever.   HENT: Negative for sore throat.    Eyes: Negative for visual disturbance.   Respiratory: Negative for cough, chest tightness, shortness of breath and wheezing.    Cardiovascular: Negative for chest pain, palpitations and leg swelling.   Gastrointestinal: Negative for abdominal pain, diarrhea, nausea and vomiting.   Musculoskeletal: Positive for arthralgias.   Skin: Negative for rash.   Neurological: Negative for dizziness,  tingling, light-headedness, numbness and headaches.      Physical Exam  Vitals reviewed.   Constitutional:       Appearance: Normal appearance. She is well-developed.   HENT:      Head: Normocephalic and atraumatic.      Right Ear: External ear normal.      Left Ear: External ear normal.      Mouth/Throat:      Pharynx: No oropharyngeal exudate.   Eyes:      Conjunctiva/sclera: Conjunctivae normal.      Pupils: Pupils are equal, round, and reactive to light.   Cardiovascular:      Rate and Rhythm: Normal rate and regular rhythm.      Pulses: Normal pulses.      Heart sounds: Normal heart sounds. No murmur heard.    No friction rub. No gallop.   Pulmonary:      Effort: Pulmonary effort is normal.      Breath sounds: Normal breath sounds. No wheezing or rhonchi.   Abdominal:      General: Abdomen is flat. Bowel sounds are normal. There is no distension.      Palpations: Abdomen is soft. There is no mass.      Tenderness: There is no abdominal tenderness. There is no guarding or rebound.      Hernia: No hernia is present.   Musculoskeletal:         General: Normal range of motion.      Cervical back: Normal range of motion and neck supple.      Comments: Tenderness and abrasion left knee and right forearm with tenderness also right elbow, wrist, metacarpals of right hand, normal ROM, stable of all extremities, no redness, warmth, bruising, or swelling.   Skin:     General: Skin is warm and dry.      Capillary Refill: Capillary refill takes less than 2 seconds.   Neurological:      General: No focal deficit present.      Mental Status: She is alert and oriented to person, place, and time.      Cranial Nerves: No cranial nerve deficit.   Psychiatric:         Mood and Affect: Mood and affect normal.         Behavior: Behavior normal.         Thought Content: Thought content normal.         Judgment: Judgment normal.        Result Review :   The following data was reviewed by: Kj Canales MD on 03/30/2022:    Data  reviewed: Radiologic studies I viewed and interpreted 3 views of right elbow, forearm, wrist, hand, and left knee x-rays:no fxs.          Assessment and Plan    Diagnoses and all orders for this visit:    1. Right arm pain (Primary)  -     XR Elbow 2 View Right (In Office)  -     XR Forearm 2 View Right (In Office)  -     XR Wrist 3+ View Right (In Office)  -     XR Hand 3+ View Right (In Office)    2. Acute pain of left knee  -     XR Knee 1 or 2 View Left (In Office)            Follow Up   Return in 3 weeks (on 4/20/2022) for Medicare Wellness.  Patient was given instructions and counseling regarding her condition or for health maintenance advice. Please see specific information pulled into the AVS if appropriate.

## 2022-04-13 DIAGNOSIS — G40.909 SEIZURE DISORDER: ICD-10-CM

## 2022-04-14 RX ORDER — LEVETIRACETAM 500 MG/1
TABLET ORAL
Qty: 90 TABLET | Refills: 1 | Status: SHIPPED | OUTPATIENT
Start: 2022-04-14 | End: 2022-07-19 | Stop reason: SDUPTHER

## 2022-04-29 ENCOUNTER — OFFICE VISIT (OUTPATIENT)
Dept: FAMILY MEDICINE CLINIC | Facility: CLINIC | Age: 72
End: 2022-04-29

## 2022-04-29 VITALS
HEIGHT: 65 IN | WEIGHT: 210 LBS | DIASTOLIC BLOOD PRESSURE: 80 MMHG | BODY MASS INDEX: 34.99 KG/M2 | SYSTOLIC BLOOD PRESSURE: 112 MMHG | TEMPERATURE: 98.3 F | HEART RATE: 59 BPM | OXYGEN SATURATION: 98 %

## 2022-04-29 DIAGNOSIS — R29.6 MULTIPLE FALLS: ICD-10-CM

## 2022-04-29 DIAGNOSIS — M79.601 RIGHT ARM PAIN: Primary | ICD-10-CM

## 2022-04-29 PROCEDURE — 99213 OFFICE O/P EST LOW 20 MIN: CPT | Performed by: FAMILY MEDICINE

## 2022-04-29 NOTE — PROGRESS NOTES
Chief Complaint  Dizziness (Needs handrails for home )    Subjective          Haylee Gibbs presents to [facility]  Pt has had multiple falls from a step in front of her apartment that she keeps falling on- she says step is slightly uneven and there are no rails to hold onto when going up or down step- pt requesting that a handrail be placed next to step so she can better keep her balance and not fall- last fall was in March when she was seen in this office (see that visit note 3/30/2022)    Pt is sometimes unsteady on feet and a handrail by her step would help steady her and keep her from falling anymore at her apartment    Pt fell last month and right arm is still having generalized pain throughout worse with doing dishes and certain movements  Arm Pain   Incident onset: 1 month ago. The incident occurred at home. The injury mechanism was a fall. Pain location: right arm, including wrist, elbow, shoulder. The quality of the pain is described as aching. The pain radiates to the right arm. The pain is moderate. The pain has been intermittent since the incident. Pertinent negatives include no chest pain, muscle weakness, numbness or tingling. The symptoms are aggravated by movement. She has tried NSAIDs, heat, ice and rest for the symptoms. The treatment provided no relief.       Objective   Allergies   Allergen Reactions   • Tramadol Hcl GI Intolerance     Immunization History   Administered Date(s) Administered   • COVID-19 (ИРИНА) 03/08/2021   • COVID-19 (PFIZER) PURPLE CAP 11/08/2021   • Influenza, Unspecified 09/30/2019   • Pneumococcal Conjugate 13-Valent (PCV13) 11/24/2015     Past Medical History:   Diagnosis Date   • Anxiety disorder 06/26/2018   • Benign essential hypertension    • Cataract    • COPD (chronic obstructive pulmonary disease) ([facility])    • CVA (cerebral vascular accident) ([facility])    • Depression    • Diabetes mellitus, type 2 ([facility])    • Hyperlipidemia    •  Paresthesia and pain of both upper extremities 06/26/2018   • Seizure disorder (HCC) 06/26/2018   • Vitamin B 12 deficiency    • Vitamin D deficiency 06/26/2018      Past Surgical History:   Procedure Laterality Date   • APPENDECTOMY     • BACK SURGERY      LOW BACK DISC   • CATARACT EXTRACTION     • HYSTERECTOMY     • NECK SURGERY      NECK DISC   • TONSILLECTOMY        Social History     Socioeconomic History   • Marital status: Single   Tobacco Use   • Smoking status: Former Smoker     Packs/day: 0.50     Years: 20.00     Pack years: 10.00   • Smokeless tobacco: Former User   • Tobacco comment: SMOKES LESS THAN 1 PACK PER DAY, FOR 20 YEARS NEVER USES OTHER TOBACCO PRODCUTS   Vaping Use   • Vaping Use: Never used   Substance and Sexual Activity   • Alcohol use: Never   • Drug use: Never        Current Outpatient Medications:   •  aspirin 81 MG chewable tablet, Chew 1 tablet Daily., Disp: 90 tablet, Rfl: 1  •  FLUoxetine (PROzac) 20 MG capsule, Take 3 capsules by mouth Daily., Disp: 270 capsule, Rfl: 1  •  ibuprofen (ADVIL,MOTRIN) 800 MG tablet, Take 1 tablet by mouth As Needed for Mild Pain ., Disp: 90 tablet, Rfl: 1  •  levETIRAcetam (KEPPRA) 500 MG tablet, TAKE 1 TABLET BY MOUTH EVERY MORNING AND 2 TABLETS IN THE EVENING, Disp: 90 tablet, Rfl: 1  •  lisinopril (PRINIVIL,ZESTRIL) 10 MG tablet, Take 0.5 tablets by mouth Daily., Disp: 45 tablet, Rfl: 1  •  metFORMIN (GLUCOPHAGE) 500 MG tablet, Take 1 tablet by mouth 2 (Two) Times a Day., Disp: 180 tablet, Rfl: 1  •  metoprolol tartrate (LOPRESSOR) 25 MG tablet, Take 1 tablet by mouth 2 (Two) Times a Day., Disp: 180 tablet, Rfl: 1   Family History   Problem Relation Age of Onset   • Seizures Mother    • Stroke Mother    • Diabetes type II Sister    • Hypertension Sister    • Diabetes type II Brother    • Hypertension Brother    • Alcohol abuse Brother    • Heart disease Other         MOTHER, GRANDMOTHER, SISTER; FATHER, GRANDFATHER OR BROTHER DEVELOPED HEART DISEASE  "BEFORE THE AGE OF 65          Vital Signs:   Vitals:    04/29/22 1719   BP: 112/80   Pulse: 59   Temp: 98.3 °F (36.8 °C)   SpO2: 98%   Weight: 95.3 kg (210 lb)   Height: 165.1 cm (65\")       Review of Systems   Cardiovascular: Negative for chest pain.   Neurological: Negative for tingling and numbness.      Physical Exam  Vitals reviewed.   Constitutional:       Appearance: Normal appearance. She is well-developed.   HENT:      Head: Normocephalic and atraumatic.      Right Ear: External ear normal.      Left Ear: External ear normal.      Mouth/Throat:      Pharynx: No oropharyngeal exudate.   Eyes:      Conjunctiva/sclera: Conjunctivae normal.      Pupils: Pupils are equal, round, and reactive to light.   Cardiovascular:      Rate and Rhythm: Normal rate and regular rhythm.      Pulses: Normal pulses.      Heart sounds: Normal heart sounds. No murmur heard.    No friction rub. No gallop.   Pulmonary:      Effort: Pulmonary effort is normal.      Breath sounds: Normal breath sounds. No wheezing or rhonchi.   Abdominal:      General: Bowel sounds are normal. There is no distension.      Palpations: Abdomen is soft.      Tenderness: There is no abdominal tenderness.   Musculoskeletal:         General: Normal range of motion.      Comments: Generalized right arm tenderness at multiple points along arm, no redness, warmth, swelling, or bruising, stable, normal ROM.    Pt has steady gait when walking in office on flat smooth floor.   Skin:     General: Skin is warm and dry.      Capillary Refill: Capillary refill takes less than 2 seconds.   Neurological:      General: No focal deficit present.      Mental Status: She is alert and oriented to person, place, and time.      Cranial Nerves: No cranial nerve deficit.   Psychiatric:         Mood and Affect: Mood and affect normal.         Behavior: Behavior normal.         Thought Content: Thought content normal.         Judgment: Judgment normal.        Result Review : "                 Assessment and Plan    Diagnoses and all orders for this visit:    1. Right arm pain (Primary)  -     Ambulatory Referral to Orthopedic Surgery    2. Multiple falls            Follow Up   Return if symptoms worsen or fail to improve.  Patient was given instructions and counseling regarding her condition or for health maintenance advice. Please see specific information pulled into the AVS if appropriate.     Filled out forms for pt to get hand rail at steps outside her apartment.

## 2022-06-12 DIAGNOSIS — E11.65 TYPE 2 DIABETES MELLITUS WITH HYPERGLYCEMIA, WITHOUT LONG-TERM CURRENT USE OF INSULIN: ICD-10-CM

## 2022-06-27 DIAGNOSIS — M19.90 ARTHRITIS: ICD-10-CM

## 2022-06-27 DIAGNOSIS — I10 PRIMARY HYPERTENSION: ICD-10-CM

## 2022-06-27 RX ORDER — LISINOPRIL 10 MG/1
TABLET ORAL
Qty: 45 TABLET | Refills: 1 | OUTPATIENT
Start: 2022-06-27

## 2022-06-27 RX ORDER — IBUPROFEN 800 MG/1
800 TABLET ORAL AS NEEDED
Qty: 90 TABLET | Refills: 1 | OUTPATIENT
Start: 2022-06-27

## 2022-07-01 ENCOUNTER — APPOINTMENT (OUTPATIENT)
Dept: CT IMAGING | Facility: HOSPITAL | Age: 72
End: 2022-07-01

## 2022-07-01 ENCOUNTER — APPOINTMENT (OUTPATIENT)
Dept: GENERAL RADIOLOGY | Facility: HOSPITAL | Age: 72
End: 2022-07-01

## 2022-07-01 ENCOUNTER — APPOINTMENT (OUTPATIENT)
Dept: MRI IMAGING | Facility: HOSPITAL | Age: 72
End: 2022-07-01

## 2022-07-01 ENCOUNTER — HOSPITAL ENCOUNTER (INPATIENT)
Facility: HOSPITAL | Age: 72
LOS: 6 days | Discharge: NURSING, REHAB OR LONG-TERM CARE | End: 2022-07-07
Attending: EMERGENCY MEDICINE | Admitting: FAMILY MEDICINE

## 2022-07-01 DIAGNOSIS — R29.810 FACIAL DROOP DUE TO ACUTE STROKE: ICD-10-CM
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
## 2022-07-01 DIAGNOSIS — I63.9 FACIAL DROOP DUE TO ACUTE STROKE: ICD-10-CM

## 2022-07-01 DIAGNOSIS — R26.2 DIFFICULTY WALKING: ICD-10-CM

## 2022-07-01 DIAGNOSIS — R13.12 DYSPHAGIA, OROPHARYNGEAL: ICD-10-CM

## 2022-07-01 DIAGNOSIS — I10 PRIMARY HYPERTENSION: ICD-10-CM

## 2022-07-01 DIAGNOSIS — I63.81 ACUTE LACUNAR STROKE: Primary | ICD-10-CM

## 2022-07-01 DIAGNOSIS — Z78.9 DECREASED ACTIVITIES OF DAILY LIVING (ADL): ICD-10-CM

## 2022-07-01 DIAGNOSIS — R27.0 ATAXIA: ICD-10-CM

## 2022-07-01 LAB
ALBUMIN SERPL-MCNC: 4.2 G/DL (ref 3.5–5.2)
ALBUMIN/GLOB SERPL: 1.4 G/DL
ALP SERPL-CCNC: 66 U/L (ref 39–117)
ALT SERPL W P-5'-P-CCNC: 49 U/L (ref 1–33)
ANION GAP SERPL CALCULATED.3IONS-SCNC: 12.9 MMOL/L (ref 5–15)
AST SERPL-CCNC: 61 U/L (ref 1–32)
BASOPHILS # BLD AUTO: 0.03 10*3/MM3 (ref 0–0.2)
BASOPHILS NFR BLD AUTO: 0.3 % (ref 0–1.5)
BILIRUB SERPL-MCNC: 0.7 MG/DL (ref 0–1.2)
BUN SERPL-MCNC: 15 MG/DL (ref 8–23)
BUN/CREAT SERPL: 13.5 (ref 7–25)
CALCIUM SPEC-SCNC: 10.1 MG/DL (ref 8.6–10.5)
CHLORIDE SERPL-SCNC: 97 MMOL/L (ref 98–107)
CK SERPL-CCNC: 196 U/L (ref 20–180)
CO2 SERPL-SCNC: 24.1 MMOL/L (ref 22–29)
CREAT SERPL-MCNC: 1.11 MG/DL (ref 0.57–1)
DEPRECATED RDW RBC AUTO: 45.4 FL (ref 37–54)
EGFRCR SERPLBLD CKD-EPI 2021: 52.6 ML/MIN/1.73
EOSINOPHIL # BLD AUTO: 0.06 10*3/MM3 (ref 0–0.4)
EOSINOPHIL NFR BLD AUTO: 0.6 % (ref 0.3–6.2)
ERYTHROCYTE [DISTWIDTH] IN BLOOD BY AUTOMATED COUNT: 13.1 % (ref 12.3–15.4)
GLOBULIN UR ELPH-MCNC: 3.1 GM/DL
GLUCOSE SERPL-MCNC: 197 MG/DL (ref 65–99)
HCT VFR BLD AUTO: 37.2 % (ref 34–46.6)
HGB BLD-MCNC: 12.2 G/DL (ref 12–15.9)
HOLD SPECIMEN: NORMAL
HOLD SPECIMEN: NORMAL
IMM GRANULOCYTES # BLD AUTO: 0.07 10*3/MM3 (ref 0–0.05)
IMM GRANULOCYTES NFR BLD AUTO: 0.7 % (ref 0–0.5)
INR PPP: 1.09 (ref 0.86–1.15)
LYMPHOCYTES # BLD AUTO: 1.88 10*3/MM3 (ref 0.7–3.1)
LYMPHOCYTES NFR BLD AUTO: 18.3 % (ref 19.6–45.3)
MAGNESIUM SERPL-MCNC: 1.8 MG/DL (ref 1.6–2.4)
MCH RBC QN AUTO: 31 PG (ref 26.6–33)
MCHC RBC AUTO-ENTMCNC: 32.8 G/DL (ref 31.5–35.7)
MCV RBC AUTO: 94.7 FL (ref 79–97)
MONOCYTES # BLD AUTO: 0.85 10*3/MM3 (ref 0.1–0.9)
MONOCYTES NFR BLD AUTO: 8.3 % (ref 5–12)
NEUTROPHILS NFR BLD AUTO: 7.37 10*3/MM3 (ref 1.7–7)
NEUTROPHILS NFR BLD AUTO: 71.8 % (ref 42.7–76)
NRBC BLD AUTO-RTO: 0 /100 WBC (ref 0–0.2)
PHOSPHATE SERPL-MCNC: 3.5 MG/DL (ref 2.5–4.5)
PLATELET # BLD AUTO: 293 10*3/MM3 (ref 140–450)
PMV BLD AUTO: 11 FL (ref 6–12)
POTASSIUM SERPL-SCNC: 4.5 MMOL/L (ref 3.5–5.2)
PROT SERPL-MCNC: 7.3 G/DL (ref 6–8.5)
PROTHROMBIN TIME: 14.2 SECONDS (ref 11.8–14.9)
RBC # BLD AUTO: 3.93 10*6/MM3 (ref 3.77–5.28)
SODIUM SERPL-SCNC: 134 MMOL/L (ref 136–145)
TROPONIN T SERPL-MCNC: <0.01 NG/ML (ref 0–0.03)
WBC NRBC COR # BLD: 10.26 10*3/MM3 (ref 3.4–10.8)
WHOLE BLOOD HOLD COAG: NORMAL
WHOLE BLOOD HOLD SPECIMEN: NORMAL

## 2022-07-01 PROCEDURE — 85025 COMPLETE CBC W/AUTO DIFF WBC: CPT | Performed by: EMERGENCY MEDICINE

## 2022-07-01 PROCEDURE — 84100 ASSAY OF PHOSPHORUS: CPT | Performed by: HOSPITALIST

## 2022-07-01 PROCEDURE — 85610 PROTHROMBIN TIME: CPT | Performed by: EMERGENCY MEDICINE

## 2022-07-01 PROCEDURE — 99285 EMERGENCY DEPT VISIT HI MDM: CPT

## 2022-07-01 PROCEDURE — 71045 X-RAY EXAM CHEST 1 VIEW: CPT

## 2022-07-01 PROCEDURE — 94799 UNLISTED PULMONARY SVC/PX: CPT

## 2022-07-01 PROCEDURE — 83735 ASSAY OF MAGNESIUM: CPT | Performed by: HOSPITALIST

## 2022-07-01 PROCEDURE — 94761 N-INVAS EAR/PLS OXIMETRY MLT: CPT

## 2022-07-01 PROCEDURE — 80053 COMPREHEN METABOLIC PANEL: CPT | Performed by: EMERGENCY MEDICINE

## 2022-07-01 PROCEDURE — 93005 ELECTROCARDIOGRAM TRACING: CPT | Performed by: EMERGENCY MEDICINE

## 2022-07-01 PROCEDURE — 70551 MRI BRAIN STEM W/O DYE: CPT

## 2022-07-01 PROCEDURE — 70450 CT HEAD/BRAIN W/O DYE: CPT

## 2022-07-01 PROCEDURE — 84484 ASSAY OF TROPONIN QUANT: CPT | Performed by: EMERGENCY MEDICINE

## 2022-07-01 PROCEDURE — 82550 ASSAY OF CK (CPK): CPT | Performed by: EMERGENCY MEDICINE

## 2022-07-01 PROCEDURE — 94760 N-INVAS EAR/PLS OXIMETRY 1: CPT

## 2022-07-01 RX ORDER — LEVETIRACETAM 500 MG/1
1000 TABLET ORAL EVERY EVENING
COMMUNITY
End: 2022-07-19 | Stop reason: SDUPTHER

## 2022-07-01 RX ORDER — NITROGLYCERIN 0.4 MG/1
0.4 TABLET SUBLINGUAL
Status: DISCONTINUED | OUTPATIENT
Start: 2022-07-01 | End: 2022-07-07 | Stop reason: HOSPADM

## 2022-07-01 RX ORDER — DOCUSATE SODIUM 100 MG/1
100 CAPSULE, LIQUID FILLED ORAL DAILY
Status: DISCONTINUED | OUTPATIENT
Start: 2022-07-02 | End: 2022-07-07 | Stop reason: HOSPADM

## 2022-07-01 RX ORDER — ONDANSETRON 2 MG/ML
4 INJECTION INTRAMUSCULAR; INTRAVENOUS EVERY 6 HOURS PRN
Status: DISCONTINUED | OUTPATIENT
Start: 2022-07-01 | End: 2022-07-07 | Stop reason: HOSPADM

## 2022-07-01 RX ORDER — ALUMINA, MAGNESIA, AND SIMETHICONE 2400; 2400; 240 MG/30ML; MG/30ML; MG/30ML
15 SUSPENSION ORAL EVERY 6 HOURS PRN
Status: DISCONTINUED | OUTPATIENT
Start: 2022-07-01 | End: 2022-07-07 | Stop reason: HOSPADM

## 2022-07-01 RX ORDER — ATORVASTATIN CALCIUM 40 MG/1
80 TABLET, FILM COATED ORAL NIGHTLY
Status: DISCONTINUED | OUTPATIENT
Start: 2022-07-01 | End: 2022-07-07 | Stop reason: HOSPADM

## 2022-07-01 RX ORDER — ASPIRIN 81 MG/1
81 TABLET, CHEWABLE ORAL DAILY
Status: DISCONTINUED | OUTPATIENT
Start: 2022-07-02 | End: 2022-07-07 | Stop reason: HOSPADM

## 2022-07-01 RX ORDER — LEVETIRACETAM 500 MG/1
500 TABLET ORAL DAILY
Status: DISCONTINUED | OUTPATIENT
Start: 2022-07-02 | End: 2022-07-07 | Stop reason: HOSPADM

## 2022-07-01 RX ORDER — SODIUM CHLORIDE 0.9 % (FLUSH) 0.9 %
10 SYRINGE (ML) INJECTION AS NEEDED
Status: DISCONTINUED | OUTPATIENT
Start: 2022-07-01 | End: 2022-07-07 | Stop reason: HOSPADM

## 2022-07-01 RX ORDER — SODIUM CHLORIDE 0.9 % (FLUSH) 0.9 %
10 SYRINGE (ML) INJECTION EVERY 12 HOURS SCHEDULED
Status: DISCONTINUED | OUTPATIENT
Start: 2022-07-01 | End: 2022-07-07 | Stop reason: HOSPADM

## 2022-07-01 RX ORDER — NICOTINE 21 MG/24HR
1 PATCH, TRANSDERMAL 24 HOURS TRANSDERMAL EVERY 24 HOURS
Status: DISCONTINUED | OUTPATIENT
Start: 2022-07-01 | End: 2022-07-02

## 2022-07-01 RX ORDER — FLUOXETINE HYDROCHLORIDE 20 MG/1
60 CAPSULE ORAL DAILY
Status: DISCONTINUED | OUTPATIENT
Start: 2022-07-02 | End: 2022-07-07 | Stop reason: HOSPADM

## 2022-07-01 RX ORDER — ONDANSETRON 2 MG/ML
4 INJECTION INTRAMUSCULAR; INTRAVENOUS EVERY 6 HOURS PRN
Status: DISCONTINUED | OUTPATIENT
Start: 2022-07-01 | End: 2022-07-01 | Stop reason: SDUPTHER

## 2022-07-01 RX ORDER — ONDANSETRON 4 MG/1
4 TABLET, FILM COATED ORAL EVERY 6 HOURS PRN
Status: DISCONTINUED | OUTPATIENT
Start: 2022-07-01 | End: 2022-07-07 | Stop reason: HOSPADM

## 2022-07-01 RX ORDER — SODIUM CHLORIDE 9 MG/ML
75 INJECTION, SOLUTION INTRAVENOUS CONTINUOUS
Status: DISCONTINUED | OUTPATIENT
Start: 2022-07-01 | End: 2022-07-02

## 2022-07-01 RX ADMIN — SODIUM CHLORIDE 1000 ML: 9 INJECTION, SOLUTION INTRAVENOUS at 19:07

## 2022-07-01 RX ADMIN — SODIUM CHLORIDE 75 ML/HR: 9 INJECTION, SOLUTION INTRAVENOUS at 23:52

## 2022-07-01 RX ADMIN — ATORVASTATIN CALCIUM 80 MG: 40 TABLET, FILM COATED ORAL at 23:53

## 2022-07-01 NOTE — ED PROVIDER NOTES
Time: 6:19 PM EDT  Arrived by: private car  Chief Complaint: stroke  History provided by: patient/family  History is limited by: N/A     History of Present Illness:  Patient is a 72 y.o. year old female who presents to the emergency department with left sided droop after she fell a couple days ago. She states she spent the night on the floor, usually ambulates with walker and cane.  Per daughter, she has been leaning to left side recently and facial droop is worse than usual. She states patient has a history of stroke and seizure.       History provided by:  Patient and relative      Similar Symptoms Previously: no  Recently seen: no      Patient Care Team  Primary Care Provider: Kj Canales MD    Past Medical History:     Allergies   Allergen Reactions   • Tramadol Hcl GI Intolerance     Past Medical History:   Diagnosis Date   • Anxiety disorder 06/26/2018   • Benign essential hypertension    • Cataract    • COPD (chronic obstructive pulmonary disease) ([facility])    • CVA (cerebral vascular accident) ([facility])    • Depression    • Diabetes mellitus, type 2 ([facility])    • Hyperlipidemia    • Paresthesia and pain of both upper extremities 06/26/2018   • Seizure disorder ([facility]) 06/26/2018   • Vitamin B 12 deficiency    • Vitamin D deficiency 06/26/2018     Past Surgical History:   Procedure Laterality Date   • APPENDECTOMY     • BACK SURGERY      LOW BACK DISC   • CATARACT EXTRACTION     • HYSTERECTOMY     • NECK SURGERY      NECK DISC   • TONSILLECTOMY       Family History   Problem Relation Age of Onset   • Seizures Mother    • Stroke Mother    • Diabetes type II Sister    • Hypertension Sister    • Diabetes type II Brother    • Hypertension Brother    • Alcohol abuse Brother    • Heart disease Other         MOTHER, GRANDMOTHER, SISTER; FATHER, GRANDFATHER OR BROTHER DEVELOPED HEART DISEASE BEFORE THE AGE OF 65       Home Medications:  Prior to Admission medications    Medication Sig Start Date End Date Taking?  Authorizing Provider   aspirin 81 MG chewable tablet Chew 1 tablet Daily. 1/31/22   Kj Canales MD   FLUoxetine (PROzac) 20 MG capsule Take 3 capsules by mouth Daily. 1/31/22   Kj Canales MD   ibuprofen (ADVIL,MOTRIN) 800 MG tablet Take 1 tablet by mouth As Needed for Mild Pain . 1/31/22   Kj Canales MD   levETIRAcetam (KEPPRA) 500 MG tablet TAKE 1 TABLET BY MOUTH EVERY MORNING AND 2 TABLETS IN THE EVENING 4/14/22   Kj Canales MD   lisinopril (PRINIVIL,ZESTRIL) 10 MG tablet Take 0.5 tablets by mouth Daily. 1/31/22   Kj Canales MD   metFORMIN (GLUCOPHAGE) 500 MG tablet Take 1 tablet by mouth 2 (Two) Times a Day. 3/22/22   Kj Canales MD   metoprolol tartrate (LOPRESSOR) 25 MG tablet Take 1 tablet by mouth 2 (Two) Times a Day. 1/31/22   Kj Canales MD        Social History:   Social History     Tobacco Use   • Smoking status: Former Smoker     Packs/day: 0.50     Years: 20.00     Pack years: 10.00   • Smokeless tobacco: Former User   • Tobacco comment: SMOKES LESS THAN 1 PACK PER DAY, FOR 20 YEARS NEVER USES OTHER TOBACCO PRODCUTS   Vaping Use   • Vaping Use: Never used   Substance Use Topics   • Alcohol use: Never   • Drug use: Never     Recent travel: not applicable     Review of Systems:  Review of Systems   Constitutional: Negative for chills and fever.   HENT: Negative for congestion, ear pain and sore throat.    Eyes: Negative for pain.   Respiratory: Negative for cough, chest tightness and shortness of breath.    Cardiovascular: Negative for chest pain.   Gastrointestinal: Negative for abdominal pain, diarrhea, nausea and vomiting.   Genitourinary: Negative for flank pain and hematuria.   Musculoskeletal: Negative for joint swelling.   Skin: Negative for pallor.   Neurological: Positive for facial asymmetry (left side droop). Negative for seizures and headaches.   All other systems reviewed and are negative.       Physical Exam:  BP  "149/83   Pulse 50   Temp 98.4 °F (36.9 °C) (Oral)   Resp 18   Ht 162.6 cm (64\")   Wt 94.2 kg (207 lb 10.8 oz)   SpO2 96%   BMI 35.65 kg/m²     Physical Exam  Vitals and nursing note reviewed.   Constitutional:       General: She is not in acute distress.     Appearance: Normal appearance. She is not toxic-appearing.   HENT:      Head: Normocephalic and atraumatic.      Comments: Minimal Left sided facial droop       Mouth/Throat:      Mouth: Mucous membranes are moist.   Eyes:      General: No scleral icterus.  Cardiovascular:      Rate and Rhythm: Normal rate and regular rhythm.      Pulses: Normal pulses.      Heart sounds: Normal heart sounds.   Pulmonary:      Effort: Pulmonary effort is normal. No respiratory distress.      Breath sounds: Normal breath sounds.   Abdominal:      General: Abdomen is flat.      Palpations: Abdomen is soft.      Tenderness: There is no abdominal tenderness.   Musculoskeletal:         General: Normal range of motion.      Cervical back: Normal range of motion and neck supple.   Skin:     General: Skin is warm and dry.   Neurological:      Mental Status: She is alert and oriented to person, place, and time. Mental status is at baseline.      Sensory: Sensation is intact.      Motor: Motor function is intact.      Comments: Normal finger nose finger                 Medications in the Emergency Department:  Medications   sodium chloride 0.9 % flush 10 mL (has no administration in time range)   aspirin chewable tablet 81 mg (has no administration in time range)   FLUoxetine (PROzac) capsule 60 mg (has no administration in time range)   levETIRAcetam (KEPPRA) tablet 500 mg (has no administration in time range)   sodium chloride 0.9 % flush 10 mL (10 mL Intravenous Given 7/2/22 0008)   sodium chloride 0.9 % flush 10 mL (has no administration in time range)   atorvastatin (LIPITOR) tablet 80 mg (80 mg Oral Given 7/1/22 0520)   sodium chloride 0.9 % infusion (75 mL/hr Intravenous New " Bag 7/1/22 9768)   docusate sodium (COLACE) capsule 100 mg (has no administration in time range)   nitroglycerin (NITROSTAT) SL tablet 0.4 mg (has no administration in time range)   ondansetron (ZOFRAN) tablet 4 mg (has no administration in time range)     Or   ondansetron (ZOFRAN) injection 4 mg (has no administration in time range)   aluminum-magnesium hydroxide-simethicone (MAALOX MAX) 400-400-40 MG/5ML suspension 15 mL (has no administration in time range)   metoprolol tartrate (LOPRESSOR) tablet 25 mg (has no administration in time range)   sodium chloride 0.9 % bolus 1,000 mL (0 mL Intravenous Stopped 7/1/22 2100)        Labs  Lab Results (last 24 hours)     Procedure Component Value Units Date/Time    CBC & Differential [[id number]]  (Abnormal) Collected: 07/01/22 1820    Specimen: Blood Updated: 07/01/22 1827    Narrative:      The following orders were created for panel order CBC & Differential.  Procedure                               Abnormality         Status                     ---------                               -----------         ------                     CBC Auto Differential[[id number]]        Abnormal            Final result                 Please view results for these tests on the individual orders.    Comprehensive Metabolic Panel [[id number]]  (Abnormal) Collected: 07/01/22 1820    Specimen: Blood Updated: 07/01/22 1845     Glucose 197 mg/dL      BUN 15 mg/dL      Creatinine 1.11 mg/dL      Sodium 134 mmol/L      Potassium 4.5 mmol/L      Chloride 97 mmol/L      CO2 24.1 mmol/L      Calcium 10.1 mg/dL      Total Protein 7.3 g/dL      Albumin 4.20 g/dL      ALT (SGPT) 49 U/L      AST (SGOT) 61 U/L      Alkaline Phosphatase 66 U/L      Total Bilirubin 0.7 mg/dL      Globulin 3.1 gm/dL      A/G Ratio 1.4 g/dL      BUN/Creatinine Ratio 13.5     Anion Gap 12.9 mmol/L      eGFR 52.6 mL/min/1.73      Comment: National Kidney Foundation and American Society of Nephrology (ASN) Task Force recommended  calculation based on the Chronic Kidney Disease Epidemiology Collaboration (CKD-EPI) equation refit without adjustment for race.       Narrative:      GFR Normal >60  Chronic Kidney Disease <60  Kidney Failure <15      Protime-INR [[id number]]  (Normal) Collected: 07/01/22 1820    Specimen: Blood Updated: 07/01/22 1844     Protime 14.2 Seconds      INR 1.09    Narrative:      Suggested Therapeutic Ranges For Oral Anticoagulant Therapy:  Level of Therapy                      INR Target Range  Standard Dose                            2.0-3.0  High Dose                                2.5-3.5  Patients not receiving anticoagulant  Therapy Normal Range                     0.86-1.15    CBC Auto Differential [[id number]]  (Abnormal) Collected: 07/01/22 1820    Specimen: Blood Updated: 07/01/22 1827     WBC 10.26 10*3/mm3      RBC 3.93 10*6/mm3      Hemoglobin 12.2 g/dL      Hematocrit 37.2 %      MCV 94.7 fL      MCH 31.0 pg      MCHC 32.8 g/dL      RDW 13.1 %      RDW-SD 45.4 fl      MPV 11.0 fL      Platelets 293 10*3/mm3      Neutrophil % 71.8 %      Lymphocyte % 18.3 %      Monocyte % 8.3 %      Eosinophil % 0.6 %      Basophil % 0.3 %      Immature Grans % 0.7 %      Neutrophils, Absolute 7.37 10*3/mm3      Lymphocytes, Absolute 1.88 10*3/mm3      Monocytes, Absolute 0.85 10*3/mm3      Eosinophils, Absolute 0.06 10*3/mm3      Basophils, Absolute 0.03 10*3/mm3      Immature Grans, Absolute 0.07 10*3/mm3      nRBC 0.0 /100 WBC     CK [[id number]]  (Abnormal) Collected: 07/01/22 1820    Specimen: Blood Updated: 07/01/22 1844     Creatine Kinase 196 U/L     Troponin [[id number]]  (Normal) Collected: 07/01/22 1820    Specimen: Blood Updated: 07/01/22 1856     Troponin T <0.010 ng/mL     Narrative:      Troponin T Reference Range:  <= 0.03 ng/mL-   Negative for AMI  >0.03 ng/mL-     Abnormal for myocardial necrosis.  Clinicians would have to utilize clinical acumen, EKG, Troponin and serial changes to determine if it is an  Acute Myocardial Infarction or myocardial injury due to an underlying chronic condition.       Results may be falsely decreased if patient taking Biotin.      Magnesium [[id number]]  (Normal) Collected: 07/01/22 2302    Specimen: Blood Updated: 07/01/22 2350     Magnesium 1.8 mg/dL     Phosphorus [[id number]]  (Normal) Collected: 07/01/22 2302    Specimen: Blood Updated: 07/01/22 2350     Phosphorus 3.5 mg/dL            Imaging:  MRI Brain Without Contrast    Result Date: 7/1/2022  PROCEDURE: MRI BRAIN WO CONTRAST  COMPARISON: [facility], CT, CT HEAD WO CONTRAST STROKE PROTOCOL, 7/01/2022, 18:11.  INDICATIONS: eval left facial droop, ataxic gait/fall      TECHNIQUE: A variety of imaging planes and parameters were utilized for visualization of suspected pathology.  Images were performed without contrast.  FINDINGS:  There is a focal area of restricted diffusion in the posterior limb of the right internal capsule measuring 9 mm in diameter which is hypointense on the ADC map consistent with an acute lacunar infarct.  There are no additional areas of acute ischemia.  The patient has mild volume loss with slight prominence of the sulci, fissures, and ventricles.  The basal cisterns are well-maintained.  There is abnormal signal in the subcortical and periventricular white matter felt to represent chronic microvascular ischemia.  There is no acute hemorrhage, midline shift, or suspicious extra-axial fluid collections.  There are normal signal voids within the intracranial arteries and the major dural sinuses.  The visualized paranasal and mastoid sinuses are clear.  There is suggestion of previous cataract surgery.        1. 9 mm focal area of restricted diffusion in the posterior limb of the right internal capsule consistent with an acute lacunar infarct. 2. Volume loss secondary to cerebral atrophy. 3. Chronic microvascular ischemia.      SIDNEY HARRELL MD       Electronically Signed and Approved By: SIDNEY HARRELL  MD on 7/01/2022 at 20:17             XR Chest 1 View    Result Date: 7/1/2022  PROCEDURE: XR CHEST 1 VW  COMPARISON: [facility], , CHEST PA/AP & LAT 2V, 1/14/2019, 13:53.  INDICATIONS: Stroke Protocol (Onset > 12 hrs), LEFY SIDED DROOP AFTER FALL A COUPLE DAYS AGO  FINDINGS:  The heart size is normal.  The pulmonary vascular markings are normal.  The lungs and pleural spaces are clear of active disease.  There are chronic age-related changes involving the bony and vascular structures.       No active disease.       SIDNEY HARRELL MD       Electronically Signed and Approved By: SIDNEY HARRELL MD on 7/01/2022 at 19:02             CT Head Without Contrast Stroke Protocol    Result Date: 7/1/2022  PROCEDURE: CT HEAD WO CONTRAST STROKE PROTOCOL  COMPARISON:  None INDICATIONS: Stroke, follow up  PROTOCOL:   Standard imaging protocol performed    RADIATION:   DLP: 954.5 mGy*cm   MA and/or KV was adjusted to minimize radiation dose.     TECHNIQUE: After obtaining the patient's consent, CT images were obtained without non-ionic intravenous contrast material.  FINDINGS:  There is prominence of the sulci, fissures, and ventricles indicating volume loss secondary to cerebral atrophy.  The basal cisterns are well-maintained.  There are areas of decreased density in the white matter tracts felt to represent chronic microvascular ischemia.  There is no acute hemorrhage, midline shift, or suspicious extra-axial fluid collections.  There are atherosclerotic vascular calcifications within the distal vertebral arteries and cavernous carotid arteries.  The visualized paranasal and mastoid sinuses are clear.  There is no depressed skull fracture.        1. No acute findings. 2. Mild volume loss secondary to cerebral atrophy. 3. Chronic ischemic changes.     SIDNEY HARRELL MD       Electronically Signed and Approved By: SIDNEY HARRELL MD on 7/01/2022 at 18:24               Procedures:  ECG 12 Lead      Date/Time: 7/1/2022 6:35  PM  Performed by: Liang Mckeon MD  Authorized by: Liang Mckeon MD   Interpreted by physician  Previous ECG: no previous ECG available  Rhythm: sinus bradycardia  Rate: bradycardic  BPM: 50  ST Segments: ST segments normal  ST Depression: V4, V5, V6, V1, V2 and aVF (mild depression in V1,V2.V4,V5,V6 AVF)  T Waves: T waves normal  Comments: NORMAL P WAVES  QRS WAVES IN SEPTAL LEADS          Progress                            Medical Decision Making:  MDM  Number of Diagnoses or Management Options     Amount and/or Complexity of Data Reviewed  Clinical lab tests: reviewed  Tests in the radiology section of CPT®: reviewed         Differential diagnosis in this patient with signs of possible ischemic stroke include TIA or ischemic stroke, hemorrhagic stroke, hypoglycemic episode, toxic or metabolic encephalopathy, paresthesias.        This patient is a pleasant 72-year-old female with previous history of stroke with no residual deficit now presents after a fall at home and family realize she has some mild left-sided facial droop and also listing to the left when trying to walk.    CT was negative for bleed and I performed MRI of the brain which came back positive for acute lacunar stroke.    I have consulted with our neurologist and will plan to admit the patient for further evaluation and management of acute ischemic stroke.        Final diagnoses:   Fall, initial encounter   Facial droop due to acute stroke (HCC)   Ataxia   Acute lacunar stroke (HCC)        Disposition:  ED Disposition     ED Disposition   Decision to Admit    Condition   --    Comment   Level of Care: Telemetry [5]   Diagnosis: Acute lacunar stroke (HCC) [[id number]]   Admitting Physician: BACILIO DURAND [[id number]]   Attending Physician: BACILIO DURAND [[id number]]   Isolate for COVID?: No [0]   Certification: I Certify That Inpatient Hospital Services Are Medically Necessary For Greater Than 2 Midnights               This medical record created using  voice recognition software.           Hari Rosenbaum  07/01/22 1826       Hari Rosenbaum  07/01/22 1827       Hari Rosenbaum  07/01/22 1830       Hari Rosenbaum  07/01/22 1838       Liang Mckeon MD  07/02/22 0255

## 2022-07-02 ENCOUNTER — APPOINTMENT (OUTPATIENT)
Dept: CARDIOLOGY | Facility: HOSPITAL | Age: 72
End: 2022-07-02

## 2022-07-02 LAB
ANION GAP SERPL CALCULATED.3IONS-SCNC: 13.4 MMOL/L (ref 5–15)
BH CV ECHO MEAS - AO ROOT DIAM: 4.1 CM
BH CV ECHO MEAS - EDV(MOD-SP2): 53 ML
BH CV ECHO MEAS - EDV(MOD-SP4): 53 ML
BH CV ECHO MEAS - EF(MOD-BP): 55.9 %
BH CV ECHO MEAS - ESV(MOD-SP2): 24 ML
BH CV ECHO MEAS - ESV(MOD-SP4): 23 ML
BH CV ECHO MEAS - IVSD: 1.1 CM
BH CV ECHO MEAS - LA DIMENSION: 2.9 CM
BH CV ECHO MEAS - LAT PEAK E' VEL: 6.2 CM/SEC
BH CV ECHO MEAS - LVIDD: 4.6 CM
BH CV ECHO MEAS - LVIDS: 3.3 CM
BH CV ECHO MEAS - LVOT DIAM: 2 CM
BH CV ECHO MEAS - LVPWD: 1.1 CM
BH CV ECHO MEAS - MED PEAK E' VEL: 4.58 CM/SEC
BH CV ECHO MEAS - MV A MAX VEL: 83 CM/SEC
BH CV ECHO MEAS - MV DEC TIME: 187 MSEC
BH CV ECHO MEAS - MV E MAX VEL: 58 CM/SEC
BH CV ECHO MEAS - MV E/A: 0.7
BH CV ECHO MEAS - RVDD: 2.7 CM
BH CV ECHO MEAS - TAPSE (>1.6): 2.19 CM
BH CV ECHO MEASUREMENTS AVERAGE E/E' RATIO: 10.76
BUN SERPL-MCNC: 11 MG/DL (ref 8–23)
BUN/CREAT SERPL: 14.1 (ref 7–25)
CALCIUM SPEC-SCNC: 9.6 MG/DL (ref 8.6–10.5)
CHLORIDE SERPL-SCNC: 98 MMOL/L (ref 98–107)
CHOLEST SERPL-MCNC: 201 MG/DL (ref 0–200)
CO2 SERPL-SCNC: 22.6 MMOL/L (ref 22–29)
CREAT SERPL-MCNC: 0.78 MG/DL (ref 0.57–1)
DEPRECATED RDW RBC AUTO: 46.1 FL (ref 37–54)
EGFRCR SERPLBLD CKD-EPI 2021: 80.3 ML/MIN/1.73
ERYTHROCYTE [DISTWIDTH] IN BLOOD BY AUTOMATED COUNT: 13.1 % (ref 12.3–15.4)
GLUCOSE BLDC GLUCOMTR-MCNC: 206 MG/DL (ref 70–99)
GLUCOSE BLDC GLUCOMTR-MCNC: 234 MG/DL (ref 70–99)
GLUCOSE BLDC GLUCOMTR-MCNC: 242 MG/DL (ref 70–99)
GLUCOSE BLDC GLUCOMTR-MCNC: 257 MG/DL (ref 70–99)
GLUCOSE SERPL-MCNC: 214 MG/DL (ref 65–99)
HBA1C MFR BLD: 9 % (ref 4.8–5.6)
HCT VFR BLD AUTO: 37.3 % (ref 34–46.6)
HDLC SERPL-MCNC: 24 MG/DL (ref 40–60)
HGB BLD-MCNC: 12 G/DL (ref 12–15.9)
IVRT: 74 MSEC
LDLC SERPL CALC-MCNC: 139 MG/DL (ref 0–100)
LDLC/HDLC SERPL: 5.65 {RATIO}
LEFT ATRIUM VOLUME INDEX: 16 ML/M2
MAGNESIUM SERPL-MCNC: 1.8 MG/DL (ref 1.6–2.4)
MAXIMAL PREDICTED HEART RATE: 147 BPM
MCH RBC QN AUTO: 30.6 PG (ref 26.6–33)
MCHC RBC AUTO-ENTMCNC: 32.2 G/DL (ref 31.5–35.7)
MCV RBC AUTO: 95.2 FL (ref 79–97)
PHOSPHATE SERPL-MCNC: 3.5 MG/DL (ref 2.5–4.5)
PLATELET # BLD AUTO: 239 10*3/MM3 (ref 140–450)
PMV BLD AUTO: 11.1 FL (ref 6–12)
POTASSIUM SERPL-SCNC: 4.1 MMOL/L (ref 3.5–5.2)
QT INTERVAL: 515 MS
RBC # BLD AUTO: 3.92 10*6/MM3 (ref 3.77–5.28)
SODIUM SERPL-SCNC: 134 MMOL/L (ref 136–145)
STRESS TARGET HR: 125 BPM
TRIGL SERPL-MCNC: 207 MG/DL (ref 0–150)
VLDLC SERPL-MCNC: 38 MG/DL (ref 5–40)
WBC NRBC COR # BLD: 7.11 10*3/MM3 (ref 3.4–10.8)

## 2022-07-02 PROCEDURE — 99223 1ST HOSP IP/OBS HIGH 75: CPT | Performed by: HOSPITALIST

## 2022-07-02 PROCEDURE — 85027 COMPLETE CBC AUTOMATED: CPT | Performed by: HOSPITALIST

## 2022-07-02 PROCEDURE — 80048 BASIC METABOLIC PNL TOTAL CA: CPT | Performed by: HOSPITALIST

## 2022-07-02 PROCEDURE — 83735 ASSAY OF MAGNESIUM: CPT | Performed by: HOSPITALIST

## 2022-07-02 PROCEDURE — 99222 1ST HOSP IP/OBS MODERATE 55: CPT | Performed by: PSYCHIATRY & NEUROLOGY

## 2022-07-02 PROCEDURE — 82962 GLUCOSE BLOOD TEST: CPT

## 2022-07-02 PROCEDURE — 80061 LIPID PANEL: CPT | Performed by: HOSPITALIST

## 2022-07-02 PROCEDURE — 94761 N-INVAS EAR/PLS OXIMETRY MLT: CPT

## 2022-07-02 PROCEDURE — 63710000001 INSULIN LISPRO (HUMAN) PER 5 UNITS: Performed by: INTERNAL MEDICINE

## 2022-07-02 PROCEDURE — 97161 PT EVAL LOW COMPLEX 20 MIN: CPT

## 2022-07-02 PROCEDURE — 84100 ASSAY OF PHOSPHORUS: CPT | Performed by: HOSPITALIST

## 2022-07-02 PROCEDURE — 94799 UNLISTED PULMONARY SVC/PX: CPT

## 2022-07-02 PROCEDURE — 83036 HEMOGLOBIN GLYCOSYLATED A1C: CPT | Performed by: HOSPITALIST

## 2022-07-02 PROCEDURE — 92610 EVALUATE SWALLOWING FUNCTION: CPT

## 2022-07-02 PROCEDURE — 25010000002 ENOXAPARIN PER 10 MG: Performed by: INTERNAL MEDICINE

## 2022-07-02 PROCEDURE — 93306 TTE W/DOPPLER COMPLETE: CPT

## 2022-07-02 PROCEDURE — 63710000001 INSULIN DETEMIR PER 5 UNITS: Performed by: INTERNAL MEDICINE

## 2022-07-02 RX ORDER — INSULIN LISPRO 100 [IU]/ML
0-7 INJECTION, SOLUTION INTRAVENOUS; SUBCUTANEOUS
Status: DISCONTINUED | OUTPATIENT
Start: 2022-07-02 | End: 2022-07-07 | Stop reason: HOSPADM

## 2022-07-02 RX ORDER — DEXTROSE MONOHYDRATE 25 G/50ML
25 INJECTION, SOLUTION INTRAVENOUS
Status: DISCONTINUED | OUTPATIENT
Start: 2022-07-02 | End: 2022-07-07 | Stop reason: HOSPADM

## 2022-07-02 RX ORDER — ENOXAPARIN SODIUM 100 MG/ML
40 INJECTION SUBCUTANEOUS NIGHTLY
Status: DISCONTINUED | OUTPATIENT
Start: 2022-07-02 | End: 2022-07-07 | Stop reason: HOSPADM

## 2022-07-02 RX ORDER — NICOTINE POLACRILEX 4 MG
24 LOZENGE BUCCAL
Status: DISCONTINUED | OUTPATIENT
Start: 2022-07-02 | End: 2022-07-07 | Stop reason: HOSPADM

## 2022-07-02 RX ORDER — LISINOPRIL 5 MG/1
5 TABLET ORAL DAILY
Status: DISCONTINUED | OUTPATIENT
Start: 2022-07-02 | End: 2022-07-03

## 2022-07-02 RX ADMIN — ATORVASTATIN CALCIUM 80 MG: 40 TABLET, FILM COATED ORAL at 21:54

## 2022-07-02 RX ADMIN — ASPIRIN 81 MG CHEWABLE TABLET 81 MG: 81 TABLET CHEWABLE at 09:53

## 2022-07-02 RX ADMIN — FLUOXETINE 60 MG: 20 CAPSULE ORAL at 09:53

## 2022-07-02 RX ADMIN — LEVETIRACETAM 500 MG: 500 TABLET, FILM COATED ORAL at 09:53

## 2022-07-02 RX ADMIN — INSULIN LISPRO 4 UNITS: 100 INJECTION, SOLUTION INTRAVENOUS; SUBCUTANEOUS at 12:10

## 2022-07-02 RX ADMIN — INSULIN DETEMIR 5 UNITS: 100 INJECTION, SOLUTION SUBCUTANEOUS at 21:56

## 2022-07-02 RX ADMIN — Medication 10 ML: at 00:08

## 2022-07-02 RX ADMIN — ENOXAPARIN SODIUM 40 MG: 100 INJECTION SUBCUTANEOUS at 21:56

## 2022-07-02 RX ADMIN — Medication 10 ML: at 21:57

## 2022-07-02 RX ADMIN — INSULIN LISPRO 3 UNITS: 100 INJECTION, SOLUTION INTRAVENOUS; SUBCUTANEOUS at 17:12

## 2022-07-02 RX ADMIN — LISINOPRIL 5 MG: 5 TABLET ORAL at 12:09

## 2022-07-02 NOTE — PLAN OF CARE
Goal Outcome Evaluation:  Plan of Care Reviewed With: patient, family        Progress: no change  Outcome Evaluation: Patient presents with deficits in balance, endurance, transfers, and ambulation. Patient will benefit from skilled PT services to address these mobility deficits and decrease risk of falls.

## 2022-07-02 NOTE — CONSULTS
[facility]   Neurology Consult Note    Patient Name: Haylee Gibbs  : 1950  MRN: [id number]  Primary Care Physician:  Kj Canales MD  Referring Physician: No ref. provider found  Date of admission: 2022    Subjective   Subjective     Reason for Consult/ Chief Complaint: Left-sided weakness, stroke    HPI:  Haylee Gibbs is a 72 y.o. female evaluated for left-sided weakness and stroke.  Patient was found by her family members on the floor yesterday morning.  She had fall and floor in her place and could not get up.  She states that she could not reach the phone.  MRI of the brain shows a 9 mm focal area of restricted diffusion in the posterior limb of the right internal capsule consistent with acute lacunar infarct.  The patient states that her diabetic medications have been lowered.  She has had elevated hemoglobin A1c for over a year.  She is being followed up for hypertension and diabetes.  She states that she has been taking aspirin on a daily basis.  She has had previous work-up including an MRI of the brain in the past for dementia which shows significant white matter changes.  She is also a carotid ultrasound 2 years ago which was unremarkable.  She does not check her blood sugars nor her blood pressure.  She lives by herself.  Her sister is with her today and tells me that she does not go to the doctor regularly.    She states that she was able to use her arm and leg even when she had this stroke 2 days ago and was never paralyzed completely.  She was just unable to get out of the floor.  Physical therapy is yet to assess the patient at this time.    Personal History     Past Medical History:   Diagnosis Date   • Anxiety disorder 2018   • Benign essential hypertension    • Cataract    • COPD (chronic obstructive pulmonary disease) ([facility])    • CVA (cerebral vascular accident) ([facility])    • Depression    • Diabetes mellitus, type 2 ([facility])    • Hyperlipidemia    • Paresthesia and  pain of both upper extremities 06/26/2018   • Seizure disorder (HCC) 06/26/2018   • Vitamin B 12 deficiency    • Vitamin D deficiency 06/26/2018       Past Surgical History:   Procedure Laterality Date   • APPENDECTOMY     • BACK SURGERY      LOW BACK DISC   • CATARACT EXTRACTION     • HYSTERECTOMY     • NECK SURGERY      NECK DISC   • TONSILLECTOMY         Family History: family history includes Alcohol abuse in her brother; Diabetes type II in her brother and sister; Heart disease in an other family member; Hypertension in her brother and sister; Seizures in her mother; Stroke in her mother. Otherwise pertinent FHx was reviewed and not pertinent to current issue.    Social History:  reports that she has quit smoking. She has a 10.00 pack-year smoking history. She has quit using smokeless tobacco. She reports that she does not drink alcohol and does not use drugs.    Home Medications:  FLUoxetine, aspirin, ibuprofen, levETIRAcetam, lisinopril, metFORMIN, and metoprolol tartrate    Allergies:  Allergies   Allergen Reactions   • Tramadol Hcl GI Intolerance       Objective    Objective     Vitals:   Temp:  [98.1 °F (36.7 °C)-98.4 °F (36.9 °C)] 98.1 °F (36.7 °C)  Heart Rate:  [50-57] 57  Resp:  [16-20] 16  BP: ()/() 159/66    Physical Exam: She is alert, fluent, aphasic, follows commands well.  Visual fields full confrontation, EOMs full directions gaze, facial sensation symmetrical pinprick, facial strength is full, soft palate elevation and tongue normal.  There is no drift of the upper or lower extremities.  There is no weakness in vision muscle testing.  Fine finger movements are intact.  Reflexes absent in the biceps, triceps, patellar's and ankles.  Plantar tend to be extensor on the left.  Sensation symmetrical pinprick left and right.  Cerebellar testing is intact.  Station gait was not tested.  Heart regular and rhythm normal in rate.      Result Review    Result Review:  I have personally reviewed  the results from the time of this admission to 7/2/2022 10:40 EDT and agree with these findings:  [x]  Laboratory  []  Microbiology  [x]  Radiology  [x]  EKG/Telemetry   [x]  Cardiology/Vascular   []  Pathology  [x]  Old records  []  Other:      Assessment & Plan   Assessment / Plan   Active Hospital Problems:  Active Hospital Problems    Diagnosis    • Acute lacunar stroke (HCC)          Plan: I discussed with her that she has lacunar infarction secondary to small vessel disease.  This is from hypertension and diabetes.  She is to take Plavix and aspirin for 3 months and then discontinue taking 1 or the other.  She is to continue taking atorvastatin 80 mg.  I discussed with her that she needs to follow-up with her primary care provider and her diabetes and hypertension needs to be under better control.  We are awaiting the most recent hemoglobin A1c.  Discussed with her that she will be evaluated by physical therapy and if she is unable to ambulate dependently she may need to undergo rehabilitation.  At this time also is pending carotid ultrasound to be performed.    Total time spent with the patient and coordinating patient care was 55 minutes.    electronically signed by Wes Connelly MD, 07/02/22, 10:40 AM EDT.

## 2022-07-02 NOTE — CASE MANAGEMENT/SOCIAL WORK
Discharge Planning Assessment   [facility]     Patient Name: Haylee Gibbs  MRN: [id number]  Today's Date: 7/2/2022    Admit Date: 7/1/2022     Discharge Needs Assessment     Row Name 07/02/22 1408       Living Environment    People in Home alone    Current Living Arrangements home    Duration at Residence 10 years    Primary Care Provided by self    Family Caregiver if Needed child(ning), adult    Quality of Family Relationships helpful;involved;supportive       Resource/Environmental Concerns    Resource/Environmental Concerns none       Transition Planning    Patient/Family Anticipates Transition to home    Patient/Family Anticipated Services at Transition rehabilitation services;home health care    Transportation Anticipated family or friend will provide       Discharge Needs Assessment    Equipment Currently Used at Home walker, standard;cane, straight    Anticipated Changes Related to Illness none    Discharge Coordination/Progress Pt has been vaccinated. PCP: Kj Canales.  Pt is agreeable to Inpt rehab, Home health or OP therapy if needed. Pt has a son and daughter who are very helpful if needed. SW will continue to follow for discharge needs.               Discharge Plan     Row Name 07/02/22 1412       Plan    Plan Pt has been vaccinated. PCP: Kj Canales.  Pt is agreeable to Inpt rehab, Home health or OP therapy if needed. Pt has a son and daughter who are very helpful if needed. SW will continue to follow for discharge needs.              Continued Care and Services - Admitted Since 7/1/2022    Coordination has not been started for this encounter.          Demographic Summary     Row Name 07/02/22 1404       General Information    Admission Type inpatient    Referral Source admission list    Reason for Consult discharge planning    Preferred Language English       Contact Information    Permission Granted to Share Info With power of  for healthcare    Contact Information Obtained for power of   for healthcare       Healthcare Power of  Information    Name, Healthcare Power of  Wolf Rojo    Phone, Healthcare Power of  [phone]               Functional Status     Row Name 07/02/22 1406       Functional Status    Usual Activity Tolerance good    Current Activity Tolerance good       Assessment of Health Literacy    How often do you have someone help you read hospital materials? Sometimes    How often do you have problems learning about your medical condition because of difficulty understanding written information? Sometimes    How often do you have a problem understanding what is told to you about your medical condition? Sometimes    How confident are you filling out medical forms by yourself? Somewhat    Health Literacy Good       Functional Status, IADL    Medications independent    Meal Preparation independent    Housekeeping independent    Laundry independent    Shopping assistive person;independent    IADL Comments Pt does not drive, she has good family support at home.       Mental Status    General Appearance WDL WDL       Employment/    Employment Status self-employed               Psychosocial    No documentation.                Abuse/Neglect    No documentation.                Legal     Row Name 07/02/22 1408       Financial/Legal    Source of Income social security    Application for Public Assistance not applied       Legal    Criminal Activity/Legal Involvement none               Substance Abuse    No documentation.                Patient Forms    No documentation.                   Kelley Mart

## 2022-07-02 NOTE — PROGRESS NOTES
[facility]   Hospitalist Progress Note  Date: 2022  Patient Name: Haylee Gibbs  : 1950  MRN: [id number]  Date of admission: 2022  Consultants:   -Neurology: Dr. Wes Connelly    Subjective   Subjective     Chief Complaint: Left-sided weakness and facial droop    Summary:   Haylee Gibbs is a 72 y.o. female with essential hypertension, COPD, depression, type 2 diabetes mellitus, hyperlipidemia, seizure disorder who presented to the ED with left-sided facial droop and left-sided weakness.  Eval in ED significant for MRI showing 9 mm focal area of restricted diffusion in posterior limb of right internal capsule consistent with acute lacunar infarct.  Hospital service contacted for further evaluation management.  Neurology consulted.  Echocardiogram ordered.    Interval Followup:   No acute events overnight.  Patient endorsed left-sided weakness compared to right side.  She denied any chest pain, shortness breath, abdominal pain, nausea or vomiting.  Nursing with no additional acute issues to report.    Review of Systems   All systems reviewed and negative unless stated otherwise under subjective.    Objective   Objective     Vitals:   Temp:  [97.4 °F (36.3 °C)-98.4 °F (36.9 °C)] 97.4 °F (36.3 °C)  Heart Rate:  [50-75] 75  Resp:  [15-20] 15  BP: ()/() 155/48  Physical Exam   Gen: No acute distress, Conversant, Pleasant, sitting up in bed  HEENT: MMM, Atraumatic  Neck: Supple, Trachea midline  Resp: CTAB, No w/r/r, No respiratory distress appreciated, equal chest rise bilaterally  Card: RRR, No m/r/g  Abd: Soft, Nontender, Nondistended, + bowel sounds  Ext: No cyanosis, No clubbing  Neuro: CN II-XII grossly intact, No focal deficits appreciated  Psych: AAO x 3, Normal mood, Normal affect    Result Review    Result Review:  I have personally reviewed the results from the time of this admission to 2022 11:39 EDT and agree with these findings:  [x]  Laboratory: Hyperglycemia noted.   Creatinine improved.  Total cholesterol and LDL cholesterol elevated.  []  Microbiology:   []  Radiology:   [x]  EKG/Telemetry:    []  Cardiology/Vascular:    []  Pathology:  []  Old records:  []  Other:    Assessment & Plan   Assessment / Plan     Assessment:  Acute lacunar infarct  Type 2 diabetes mellitus with hyperglycemia  Acute renal failure, resolved  Essential hypertension  COPD, not acutely exacerbated  Anxiety/depression  Hyperlipidemia  History of seizure disorder  Obesity with BMI: 35.65    Plan:  -Neurology consulted and following, appreciate assistance and recommendations in the care of this patient.  -Continue aspirin, statin and Plavix  -Start sliding scale insulin.  Monitor blood glucose level and SSI requirement and titrate insulin regimen accordingly.  Patient may require insulin therapy at time of discharge  -Echocardiogram pending, follow-up results  -Carotid ultrasound pending  -PT/OT consulted   -Continue appropriate home medications  -Start home lisinopril  -Will monitor electrolytes and renal function with BMP and magnesium level in the AM  -Will monitor WBC and Hgb with CBC in the AM  -Clinical course will dictate further management     DVT Prophylaxis: Lovenox  Diet: Diabetic/cardiac  Dispo: PT/OT consulted  Code Status: Full code     Personally reviewed patients labs and imaging, discussed with patient and nurse at bedside. Discussed case with the following consultants: Neurology.     Part of this note may be an electronic transcription/translation of spoken language to printed text using the Dragon dictation system.    DVT prophylaxis:  Mechanical DVT prophylaxis orders are present.    CODE STATUS:   Level Of Support Discussed With: Patient  Code Status (Patient has no pulse and is not breathing): CPR (Attempt to Resuscitate)  Medical Interventions (Patient has pulse or is breathing): Full Support        Electronically signed by Heriberto Craig MD, 07/02/22, 11:39 AM EDT.

## 2022-07-02 NOTE — PLAN OF CARE
Problem: Adult Inpatient Plan of Care  Goal: Plan of Care Review  Outcome: Ongoing, Progressing  Goal: Patient-Specific Goal (Individualized)  Outcome: Ongoing, Progressing  Goal: Absence of Hospital-Acquired Illness or Injury  Outcome: Ongoing, Progressing  Intervention: Identify and Manage Fall Risk  Goal: Optimal Comfort and Wellbeing  Outcome: Ongoing, Progressing  Goal: Readiness for Transition of Care  Outcome: Ongoing, Progressing   Goal Outcome Evaluation:  Plan of Care Reviewed With: patient        Progress: no change  Outcome Evaluation: Patient doing well, worked with therapy today, was able to get to bathroom with 1/standby assist. All questions and concerns by pt and family were addressed by staff. No other issues at this time. Will continue to monitor, call light in reach.

## 2022-07-02 NOTE — H&P
[facility]  Date: 20[facility]   Patient Name: Haylee Gibbs  : 1950  MRN: [id number]  Primary Care Physician:  Kj Canales MD  Date of admission: 2022    Subjective Stroke  Subjective     Chief Complaint: Stroke    HPI: Patient is a 72-year-old female with a past medical history of hypertension, seizures, COPD, depression who presents with a left-sided droop.  Patient fell 2 days ago she spent the night on the floor.  At baseline, she ambulates with a walker and a cane.  Her granddaughter who is at the bedside says that the left side of her face is drooping.  She has a history of stroke and seizures.    Patient lives alone.  Granddaughter says she is getting more confused with figuring out which pills to take.    On arrival to the ED, patient's temperature is 98.1, pulse is 52, respiratory rate is 18, blood pressure is 160/69, and she is 96% on room air.  MRI shows a 9 mm focal area of restricted diffusion in the posterior limb of the right internal capsule consistent with an acute lacunar infarct.    Patient passed bedside swallow.  On labs her magnesium is 1.8 phosphorus is 3.5.  Her creatinine is 1.11.  Sodium is 134, chloride is 97.  CBC is normal.  Troponin is negative.  Creatinine kinase is 196.  Personal History     Past Medical History:  Past Medical History:   Diagnosis Date   • Anxiety disorder 2018   • Benign essential hypertension    • Cataract    • COPD (chronic obstructive pulmonary disease) ([facility])    • CVA (cerebral vascular accident) ([facility])    • Depression    • Diabetes mellitus, type 2 ([facility])    • Hyperlipidemia    • Paresthesia and pain of both upper extremities 2018   • Seizure disorder ([facility]) 2018   • Vitamin B 12 deficiency    • Vitamin D deficiency 2018         Past Surgical History:  Past Surgical History:   Procedure Laterality Date   • APPENDECTOMY     • BACK SURGERY      LOW BACK DISC   • CATARACT EXTRACTION      • HYSTERECTOMY     • NECK SURGERY      NECK DISC   • TONSILLECTOMY         Family History:   Family History   Problem Relation Age of Onset   • Seizures Mother    • Stroke Mother    • Diabetes type II Sister    • Hypertension Sister    • Diabetes type II Brother    • Hypertension Brother    • Alcohol abuse Brother    • Heart disease Other         MOTHER, GRANDMOTHER, SISTER; FATHER, GRANDFATHER OR BROTHER DEVELOPED HEART DISEASE BEFORE THE AGE OF 65       Social History:   Social History     Tobacco Use   • Smoking status: Former Smoker     Packs/day: 0.50     Years: 20.00     Pack years: 10.00   • Smokeless tobacco: Former User   • Tobacco comment: SMOKES LESS THAN 1 PACK PER DAY, FOR 20 YEARS NEVER USES OTHER TOBACCO PRODCUTS   Vaping Use   • Vaping Use: Never used   Substance Use Topics   • Alcohol use: Never   • Drug use: Never       Home Medications:  FLUoxetine, aspirin, ibuprofen, levETIRAcetam, lisinopril, metFORMIN, and metoprolol tartrate    Allergies:  Allergies   Allergen Reactions   • Tramadol Hcl GI Intolerance       Review of Systems   All systems were reviewed and negative except for: Minimal face droop    Objective   Objective     Vitals:   Temp:  [98.1 °F (36.7 °C)-98.4 °F (36.9 °C)] 98.4 °F (36.9 °C)  Heart Rate:  [50-57] 50  Resp:  [18] 18  BP: (143-169)/(44-95) 149/83    Physical Exam    Constitutional: Left-sided face droop   Eyes: Pupils equal, sclerae anicteric, no conjunctival injection   HENT: NCAT, mucous membranes moist   Neck: Supple, no thyromegaly, no lymphadenopathy, trachea midline   Respiratory: Clear to auscultation bilaterally, nonlabored respirations    Cardiovascular: RRR, no murmurs, rubs, or gallops, palpable pedal pulses bilaterally   Gastrointestinal: Positive bowel sounds, soft, nontender, nondistended   Musculoskeletal: No bilateral ankle edema, no clubbing or cyanosis to extremities   Psychiatric: Appropriate affect, cooperative   Neurologic: Oriented x 3, strength  symmetric in all extremities, Cranial Nerves grossly intact to confrontation, speech clear   Skin: No rashes     Result Review    Result Review:  I have personally reviewed the results from the time of this admission to 7/2/2022 00:01 EDT and agree with these findings:  []  Laboratory  []  Microbiology  []  Radiology  []  EKG/Telemetry   []  Cardiology/Vascular   []  Pathology  []  Old records  []  Other:      Assessment & Plan   Assessment / Plan   #1 acute lacunar infarct in the posterior limb of the right internal capsule  -Aspirin and high intensity statin started  -Check hemoglobin A1c and lipid panel  -Neurology consulted  -Echo ordered  -Speech, PT, OT    #2 seizure disorder  -Continue Keppra    #3 depression continue Prozac    #4 essential hypertension-resume metoprolol tomorrow    #5 MOSES  -IV fluids      DVT prophylaxis:  Mechanical DVT prophylaxis orders are present.    CODE STATUS:    Level Of Support Discussed With: Patient  Code Status (Patient has no pulse and is not breathing): CPR (Attempt to Resuscitate)  Medical Interventions (Patient has pulse or is breathing): Full Support      Admission Status:  I believe this patient meets inpatient status.    Electronically signed by So Beth DO, 07/02/22, 12:01 AM EDT.

## 2022-07-02 NOTE — PLAN OF CARE
Goal Outcome Evaluation:  Pt rested well with no complaints of pain. AO x 4.  Pt appeared to have slight facial droop, but family member said it had resolved and this is patients normal appearance, possibly from prior stroke.  Patient passed dysphagia screening with no issues and was able to drink water with no coughing or issues.  Pt.  Is positive for stroke.  CHRIS SHELDON RN

## 2022-07-02 NOTE — THERAPY EVALUATION
Acute Care - Physical Therapy Initial Evaluation   [facility]     Patient Name: Haylee Gibbs  : 1950  MRN: [id number]  Today's Date: 2022      Visit Dx:     ICD-10-CM ICD-9-CM   1. Acute lacunar stroke (HCC)  I63.81 434.91   2. Fall, initial encounter  W19.XXXA E888.9   3. Facial droop due to acute stroke (HCC)  I63.9 434.91    R29.810 781.94   4. Ataxia  R27.0 781.3   5. Dysphagia, oropharyngeal  R13.12 787.22   6. Difficulty walking  R26.2 719.7     Patient Active Problem List   Diagnosis   • Type 2 diabetes mellitus with hyperglycemia, without long-term current use of insulin ([facility])   • Seizure disorder ([facility])   • Arthritis   • Anxiety and depression   • Primary hypertension   • Acute lacunar stroke (HCC)     Past Medical History:   Diagnosis Date   • Anxiety disorder 2018   • Benign essential hypertension    • Cataract    • COPD (chronic obstructive pulmonary disease) ([facility])    • CVA (cerebral vascular accident) ([facility])    • Depression    • Diabetes mellitus, type 2 ([facility])    • Hyperlipidemia    • Paresthesia and pain of both upper extremities 2018   • Seizure disorder ([facility]) 2018   • Vitamin B 12 deficiency    • Vitamin D deficiency 2018     Past Surgical History:   Procedure Laterality Date   • APPENDECTOMY     • BACK SURGERY      LOW BACK DISC   • CATARACT EXTRACTION     • HYSTERECTOMY     • NECK SURGERY      NECK DISC   • TONSILLECTOMY       PT Assessment (last 12 hours)     PT Evaluation and Treatment     Row Name 22 1200          Physical Therapy Time and Intention    Document Type evaluation  -AV     Mode of Treatment individual therapy;physical therapy  -AV     Row Name 22 1200          General Information    Patient Profile Reviewed yes  -AV     Patient Observations alert;cooperative;agree to therapy  -AV     Prior Level of Function independent:;all household mobility;gait;transfer;ADL's  Ambulated with STC. No home O2.  -AV     Equipment Currently Used at Home  cane, straight  -AV     Existing Precautions/Restrictions fall  -AV     Row Name 07/02/22 1200          Living Environment    Current Living Arrangements home  -AV     Home Accessibility stairs to enter home  -AV     People in Home alone  -AV     Row Name 07/02/22 1200          Home Main Entrance    Number of Stairs, Main Entrance two  -AV     Stair Railings, Main Entrance railings on both sides of stairs  -AV     Row Name 07/02/22 1200          Range of Motion (ROM)    Range of Motion bilateral lower extremities;ROM is WFL  -AV     Row Name 07/02/22 1200          Strength (Manual Muscle Testing)    Strength (Manual Muscle Testing) bilateral lower extremities  4/5  -AV     Row Name 07/02/22 1200          Bed Mobility    Bed Mobility supine-sit-supine  -AV     Supine-Sit-Supine [place] (Bed Mobility) minimum assist (75% patient effort);verbal cues;1 person assist  -AV     Bed Mobility, Safety Issues decreased use of legs for bridging/pushing  -AV     Assistive Device (Bed Mobility) head of bed elevated;bed rails  -AV     Row Name 07/02/22 1200          Transfers    Transfers sit-stand transfer  -AV     Sit-Stand [place] (Transfers) contact guard;minimum assist (75% patient effort);verbal cues;1 person assist  -AV     Row Name 07/02/22 1200          Sit-Stand Transfer    Assistive Device (Sit-Stand Transfers) walker, front-wheeled  -AV     Row Name 07/02/22 1200          Gait/Stairs (Locomotion)    Gait/Stairs Locomotion gait/ambulation independence;gait/ambulation assistive device;distance ambulated  -AV     [place] Level (Gait) contact guard;verbal cues;1 person assist  -AV     Assistive Device (Gait) walker, front-wheeled  -AV     Distance in Feet (Gait) 50, 15  -AV     Pattern (Gait) step-through  -AV     Deviations/Abnormal Patterns (Gait) gait speed decreased  -AV     Row Name 07/02/22 1200          Safety Issues, Functional Mobility    Impairments Affecting Function (Mobility)  balance;endurance/activity tolerance;strength  -AV     Row Name 07/02/22 1200          Balance    Balance Assessment standing dynamic balance  -AV     Dynamic Standing Balance contact guard;verbal cues;1-person assist  -AV     Position/Device Used, Standing Balance supported;walker, front-wheeled  -AV     Row Name 07/02/22 1200          Plan of Care Review    Plan of Care Reviewed With patient;family  -AV     Progress no change  -AV     Outcome Evaluation Patient presents with deficits in balance, endurance, transfers, and ambulation. Patient will benefit from skilled PT services to address these mobility deficits and decrease risk of falls.  -AV     Row Name 07/02/22 1200          Therapy Assessment/Plan (PT)    Rehab Potential (PT) good, to achieve stated therapy goals  -AV     Criteria for Skilled Interventions Met (PT) yes;meets criteria  -AV     Therapy Frequency (PT) daily  -AV     Problem List (PT) problems related to;balance;mobility;strength  -AV     Activity Limitations Related to Problem List (PT) unable to ambulate safely;unable to transfer safely  -AV     Row Name 07/02/22 1200          PT Evaluation Complexity    History, PT Evaluation Complexity no personal factors and/or comorbidities  -AV     Examination of Body Systems (PT Eval Complexity) total of 4 or more elements  -AV     Clinical Presentation (PT Evaluation Complexity) stable  -AV     Clinical Decision Making (PT Evaluation Complexity) low complexity  -AV     Overall Complexity (PT Evaluation Complexity) low complexity  -AV     Row Name 07/02/22 1200          Therapy Plan Review/Discharge Plan (PT)    Therapy Plan Review (PT) evaluation/treatment results reviewed;participants voiced agreement with care plan;patient  -AV     Row Name 07/02/22 1200          Physical Therapy Goals    Bed Mobility Goal Selection (PT) bed mobility, PT goal 1  -AV     Transfer Goal Selection (PT) transfer, PT goal 1  -AV     Gait Training Goal Selection (PT) gait  training, PT goal 1  -AV     Row Name 07/02/22 1200          Bed Mobility Goal 1 (PT)    Activity/Assistive Device (Bed Mobility Goal 1, PT) bed mobility activities, all  -AV     [place] Level/Cues Needed (Bed Mobility Goal 1, PT) independent  -AV     Time Frame (Bed Mobility Goal 1, PT) 10 days  -AV     Row Name 07/02/22 1200          Transfer Goal 1 (PT)    Activity/Assistive Device (Transfer Goal 1, PT) transfers, all;walker, rolling  -AV     [place] Level/Cues Needed (Transfer Goal 1, PT) modified independence  -AV     Time Frame (Transfer Goal 1, PT) 10 days  -AV     Row Name 07/02/22 1200          Gait Training Goal 1 (PT)    Activity/Assistive Device (Gait Training Goal 1, PT) gait (walking locomotion);assistive device use;walker, rolling  -AV     [place] Level (Gait Training Goal 1, PT) modified independence  -AV     Distance (Gait Training Goal 1, PT) 200  -AV     Time Frame (Gait Training Goal 1, PT) 10 days  -AV           User Key  (r) = Recorded By, (t) = Taken By, (c) = Cosigned By    Initials Name Provider Type    AV Feng Carey, PT Physical Therapist                Physical Therapy Education                 Title: PT OT SLP Therapies (In Progress)     Topic: Physical Therapy (In Progress)     Point: Mobility training (Done)     Learning Progress Summary           Patient Acceptance, E,TB, VU by AV at 7/2/2022 1253                   Point: Home exercise program (Not Started)     Learner Progress:  Not documented in this visit.          Point: Body mechanics (Not Started)     Learner Progress:  Not documented in this visit.          Point: Precautions (Not Started)     Learner Progress:  Not documented in this visit.                      User Key     Initials Effective Dates Name Provider Type Discipline    AV 06/11/21 -  Feng Carey, PT Physical Therapist PT              PT Recommendation and Plan  Anticipated Discharge Disposition (PT): sub acute care setting, home with home  health  Planned Therapy Interventions (PT): balance training, bed mobility training, gait training, neuromuscular re-education, strengthening, transfer training  Therapy Frequency (PT): daily  Plan of Care Reviewed With: patient, family  Progress: no change  Outcome Evaluation: Patient presents with deficits in balance, endurance, transfers, and ambulation. Patient will benefit from skilled PT services to address these mobility deficits and decrease risk of falls.   Outcome Measures     Row Name 07/02/22 1200             How much help from another person do you currently need...    Turning from your back to your side while in flat bed without using bedrails? 3  -AV      Moving from lying on back to sitting on the side of a flat bed without bedrails? 3  -AV      Moving to and from a bed to a chair (including a wheelchair)? 3  -AV      Standing up from a chair using your arms (e.g., wheelchair, bedside chair)? 3  -AV      Climbing 3-5 steps with a railing? 3  -AV      To walk in hospital room? 3  -AV      AM-PAC 6 Clicks Score (PT) 18  -AV              Functional Assessment    Outcome Measure Options AM-PAC 6 Clicks Basic Mobility (PT)  -AV            User Key  (r) = Recorded By, (t) = Taken By, (c) = Cosigned By    Initials Name Provider Type    AV Feng Carey, ANDRÉS Physical Therapist                 Time Calculation:    PT Charges     Row Name 07/02/22 1252             Time Calculation    PT Received On 07/02/22  -AV      PT Goal Re-Cert Due Date 07/11/22  -AV              Untimed Charges    PT Eval/Re-eval Minutes 35  -AV              Total Minutes    Untimed Charges Total Minutes 35  -AV       Total Minutes 35  -AV            User Key  (r) = Recorded By, (t) = Taken By, (c) = Cosigned By    Initials Name Provider Type    Feng Avila, PT Physical Therapist              Therapy Charges for Today     Code Description Service Date Service Provider Modifiers Qty    [id number] HC PT EVAL LOW COMPLEXITY 3  7/2/2022 Feng Carey, PT GP 1          PT G-Codes  Outcome Measure Options: AM-PAC 6 Clicks Basic Mobility (PT)  AM-PAC 6 Clicks Score (PT): 18    Feng Carey, PT  7/2/2022

## 2022-07-02 NOTE — THERAPY EVALUATION
Acute Care - Speech Language Pathology   Swallow Initial Evaluation GANESH Shearer     Patient Name: Haylee Gibbs  : 1950  MRN: [id number]  Today's Date: 2022               Admit Date: 2022    Visit Dx:     ICD-10-CM ICD-9-CM   1. Acute lacunar stroke (HCC)  I63.81 434.91   2. Fall, initial encounter  W19.XXXA E888.9   3. Facial droop due to acute stroke (HCC)  I63.9 434.91    R29.810 781.94   4. Ataxia  R27.0 781.3   5. Dysphagia, oropharyngeal  R13.12 787.22     Patient Active Problem List   Diagnosis   • Type 2 diabetes mellitus with hyperglycemia, without long-term current use of insulin ([facility])   • Seizure disorder ([facility])   • Arthritis   • Anxiety and depression   • Primary hypertension   • Acute lacunar stroke ([facility])     Past Medical History:   Diagnosis Date   • Anxiety disorder 2018   • Benign essential hypertension    • Cataract    • COPD (chronic obstructive pulmonary disease) ([facility])    • CVA (cerebral vascular accident) ([facility])    • Depression    • Diabetes mellitus, type 2 ([facility])    • Hyperlipidemia    • Paresthesia and pain of both upper extremities 2018   • Seizure disorder ([facility]) 2018   • Vitamin B 12 deficiency    • Vitamin D deficiency 2018     Past Surgical History:   Procedure Laterality Date   • APPENDECTOMY     • BACK SURGERY      LOW BACK DISC   • CATARACT EXTRACTION     • HYSTERECTOMY     • NECK SURGERY      NECK DISC   • TONSILLECTOMY       PAIN SCALE: None noted    PRECAUTIONS/CONTRAINDICATIONS: None noted    SUSPECTED ABUSE/NEGLECT/EXPLOITATION: None noted    SOCIAL/PSYCHOLOGICAL NEEDS/BARRIERS: None noted    PAST SOCIAL HISTORY: Lives at home    PRIOR FUNCTION: Independent    PATIENT GOALS/EXPECTATIONS: Did not report    HISTORY: This patient is a 72-year-old white female admitted with left-sided weakness.  MRI was positive for acute CVA.    CURRENT DIET LEVEL: Regular diet    OBJECTIVE:    TEST ADMINISTERED: Clinical dysphagia evaluation    COGNITION/SAFETY  AWARENESS: Alert and oriented    BEHAVIORAL OBSERVATIONS: Pleasant and cooperative    ORAL MOTOR EXAM: Lingual and labial strength and range of motion within functional limits.  Very subtle left labial droop however range of motion appears within functional limits    VOICE QUALITY: Within normal limits    REFLEX EXAM: Deferred    POSTURE: 90 degrees upright    FEEDING/SWALLOWING FUNCTION: Assessed with full range of consistencies with thin liquids from spoon cup and straw, purée consistencies and regular solids    CLINICAL OBSERVATIONS: Oral stage is characterized by good bolus preparation and control.  Timely oral transit.  Pharyngeal phase is timely with good laryngeal elevation per palpation.  No clinical signs or symptoms of aspiration were noted.  Vocal quality may clear to auscultation.  Denies globus sensation after completion of swallow.    DYSPHAGIA CRITERIA: N/A    FUNCTIONAL ASSESSMENT INSTRUMENT: Patient currently scored a level 7 of 7 on Functional Communication Measures for swallowing indicating a 0% limitation in function.    ASSESSMENT/ PLAN OF CARE:  Pt presents with functional oropharyngeal swallow.  No clinical signs or symptoms of aspiration were noted.    PROBLEMS:  1.  N/A   LTG 1: N/A   STG 1a: N/A       FREQUENCY/DURATION: N/A    REHAB POTENTIAL:  Pt has good rehab potential.  The following limitations may influence improvement/ length of tx medical status.    RECOMMENDATIONS:   1.   DIET: Regular diet-thin liquids    2.  POSITION: 90 degrees upright for all intake    3.  COMPENSATORY STRATEGIES: General swallow precautions    No further dysphagia therapy is indicated at this time.  Available for reconsult should patient's medical status warrant.    Pt/responsible party agrees with plan of care and has been informed of all alternatives, risks and benefits.       EDUCATION  The patient has been educated in the following areas:   Dysphagia (Swallowing Impairment).       Nadia Mcnulty,  SLP  7/2/2022

## 2022-07-03 ENCOUNTER — APPOINTMENT (OUTPATIENT)
Dept: CT IMAGING | Facility: HOSPITAL | Age: 72
End: 2022-07-03

## 2022-07-03 LAB
ANION GAP SERPL CALCULATED.3IONS-SCNC: 12.7 MMOL/L (ref 5–15)
BUN SERPL-MCNC: 16 MG/DL (ref 8–23)
BUN/CREAT SERPL: 16.8 (ref 7–25)
CALCIUM SPEC-SCNC: 9.2 MG/DL (ref 8.6–10.5)
CHLORIDE SERPL-SCNC: 98 MMOL/L (ref 98–107)
CO2 SERPL-SCNC: 23.3 MMOL/L (ref 22–29)
CREAT SERPL-MCNC: 0.95 MG/DL (ref 0.57–1)
DEPRECATED RDW RBC AUTO: 44 FL (ref 37–54)
EGFRCR SERPLBLD CKD-EPI 2021: 63.4 ML/MIN/1.73
ERYTHROCYTE [DISTWIDTH] IN BLOOD BY AUTOMATED COUNT: 12.8 % (ref 12.3–15.4)
GLUCOSE BLDC GLUCOMTR-MCNC: 201 MG/DL (ref 70–99)
GLUCOSE BLDC GLUCOMTR-MCNC: 224 MG/DL (ref 70–99)
GLUCOSE BLDC GLUCOMTR-MCNC: 249 MG/DL (ref 70–99)
GLUCOSE BLDC GLUCOMTR-MCNC: 308 MG/DL (ref 70–99)
GLUCOSE SERPL-MCNC: 249 MG/DL (ref 65–99)
HCT VFR BLD AUTO: 36.5 % (ref 34–46.6)
HGB BLD-MCNC: 11.7 G/DL (ref 12–15.9)
MAGNESIUM SERPL-MCNC: 1.8 MG/DL (ref 1.6–2.4)
MCH RBC QN AUTO: 30 PG (ref 26.6–33)
MCHC RBC AUTO-ENTMCNC: 32.1 G/DL (ref 31.5–35.7)
MCV RBC AUTO: 93.6 FL (ref 79–97)
PHOSPHATE SERPL-MCNC: 3.1 MG/DL (ref 2.5–4.5)
PLATELET # BLD AUTO: 247 10*3/MM3 (ref 140–450)
PMV BLD AUTO: 10.8 FL (ref 6–12)
POTASSIUM SERPL-SCNC: 4.7 MMOL/L (ref 3.5–5.2)
RBC # BLD AUTO: 3.9 10*6/MM3 (ref 3.77–5.28)
SODIUM SERPL-SCNC: 134 MMOL/L (ref 136–145)
WBC NRBC COR # BLD: 9.19 10*3/MM3 (ref 3.4–10.8)

## 2022-07-03 PROCEDURE — 80048 BASIC METABOLIC PNL TOTAL CA: CPT | Performed by: HOSPITALIST

## 2022-07-03 PROCEDURE — 85027 COMPLETE CBC AUTOMATED: CPT | Performed by: HOSPITALIST

## 2022-07-03 PROCEDURE — 25010000002 ENOXAPARIN PER 10 MG: Performed by: INTERNAL MEDICINE

## 2022-07-03 PROCEDURE — 63710000001 INSULIN LISPRO (HUMAN) PER 5 UNITS: Performed by: INTERNAL MEDICINE

## 2022-07-03 PROCEDURE — 83735 ASSAY OF MAGNESIUM: CPT | Performed by: HOSPITALIST

## 2022-07-03 PROCEDURE — 84100 ASSAY OF PHOSPHORUS: CPT | Performed by: HOSPITALIST

## 2022-07-03 PROCEDURE — 97165 OT EVAL LOW COMPLEX 30 MIN: CPT

## 2022-07-03 PROCEDURE — 82962 GLUCOSE BLOOD TEST: CPT

## 2022-07-03 PROCEDURE — 94760 N-INVAS EAR/PLS OXIMETRY 1: CPT

## 2022-07-03 PROCEDURE — 70496 CT ANGIOGRAPHY HEAD: CPT

## 2022-07-03 PROCEDURE — 94799 UNLISTED PULMONARY SVC/PX: CPT

## 2022-07-03 PROCEDURE — 63710000001 INSULIN DETEMIR PER 5 UNITS: Performed by: INTERNAL MEDICINE

## 2022-07-03 PROCEDURE — 99233 SBSQ HOSP IP/OBS HIGH 50: CPT | Performed by: INTERNAL MEDICINE

## 2022-07-03 PROCEDURE — 70498 CT ANGIOGRAPHY NECK: CPT

## 2022-07-03 PROCEDURE — 99232 SBSQ HOSP IP/OBS MODERATE 35: CPT | Performed by: PSYCHIATRY & NEUROLOGY

## 2022-07-03 PROCEDURE — 0 IOPAMIDOL PER 1 ML: Performed by: INTERNAL MEDICINE

## 2022-07-03 RX ORDER — LISINOPRIL 10 MG/1
10 TABLET ORAL DAILY
Qty: 30 TABLET | Refills: 0 | Status: SHIPPED | OUTPATIENT
Start: 2022-07-03 | End: 2022-07-19 | Stop reason: SDUPTHER

## 2022-07-03 RX ORDER — LISINOPRIL 10 MG/1
10 TABLET ORAL DAILY
Status: DISCONTINUED | OUTPATIENT
Start: 2022-07-03 | End: 2022-07-07 | Stop reason: HOSPADM

## 2022-07-03 RX ORDER — CLOPIDOGREL BISULFATE 75 MG/1
75 TABLET ORAL DAILY
Qty: 30 TABLET | Refills: 0 | Status: SHIPPED | OUTPATIENT
Start: 2022-07-03 | End: 2022-07-19 | Stop reason: SDUPTHER

## 2022-07-03 RX ORDER — CLOPIDOGREL BISULFATE 75 MG/1
75 TABLET ORAL DAILY
Status: DISCONTINUED | OUTPATIENT
Start: 2022-07-03 | End: 2022-07-07 | Stop reason: HOSPADM

## 2022-07-03 RX ORDER — ATORVASTATIN CALCIUM 80 MG/1
80 TABLET, FILM COATED ORAL NIGHTLY
Qty: 30 TABLET | Refills: 0 | Status: SHIPPED | OUTPATIENT
Start: 2022-07-03 | End: 2022-07-19 | Stop reason: SDUPTHER

## 2022-07-03 RX ADMIN — INSULIN LISPRO 5 UNITS: 100 INJECTION, SOLUTION INTRAVENOUS; SUBCUTANEOUS at 11:42

## 2022-07-03 RX ADMIN — INSULIN DETEMIR 10 UNITS: 100 INJECTION, SOLUTION SUBCUTANEOUS at 20:18

## 2022-07-03 RX ADMIN — ATORVASTATIN CALCIUM 80 MG: 40 TABLET, FILM COATED ORAL at 20:18

## 2022-07-03 RX ADMIN — INSULIN LISPRO 3 UNITS: 100 INJECTION, SOLUTION INTRAVENOUS; SUBCUTANEOUS at 08:00

## 2022-07-03 RX ADMIN — INSULIN DETEMIR 5 UNITS: 100 INJECTION, SOLUTION SUBCUTANEOUS at 08:40

## 2022-07-03 RX ADMIN — METOPROLOL TARTRATE 25 MG: 25 TABLET, FILM COATED ORAL at 08:40

## 2022-07-03 RX ADMIN — METOPROLOL TARTRATE 25 MG: 25 TABLET, FILM COATED ORAL at 20:18

## 2022-07-03 RX ADMIN — LISINOPRIL 10 MG: 10 TABLET ORAL at 08:40

## 2022-07-03 RX ADMIN — ASPIRIN 81 MG CHEWABLE TABLET 81 MG: 81 TABLET CHEWABLE at 08:40

## 2022-07-03 RX ADMIN — CLOPIDOGREL BISULFATE 75 MG: 75 TABLET ORAL at 10:43

## 2022-07-03 RX ADMIN — Medication 10 ML: at 08:43

## 2022-07-03 RX ADMIN — ENOXAPARIN SODIUM 40 MG: 100 INJECTION SUBCUTANEOUS at 20:19

## 2022-07-03 RX ADMIN — LEVETIRACETAM 500 MG: 500 TABLET, FILM COATED ORAL at 08:39

## 2022-07-03 RX ADMIN — Medication 10 ML: at 20:19

## 2022-07-03 RX ADMIN — INSULIN LISPRO 3 UNITS: 100 INJECTION, SOLUTION INTRAVENOUS; SUBCUTANEOUS at 17:28

## 2022-07-03 RX ADMIN — IOPAMIDOL 100 ML: 755 INJECTION, SOLUTION INTRAVENOUS at 11:21

## 2022-07-03 RX ADMIN — FLUOXETINE 60 MG: 20 CAPSULE ORAL at 08:40

## 2022-07-03 NOTE — THERAPY EVALUATION
Patient Name: Haylee Gibbs  : 1950    MRN: [id number]                              Today's Date: 7/3/2022       Admit Date: 2022    Visit Dx:     ICD-10-CM ICD-9-CM   1. Acute lacunar stroke (HCC)  I63.81 434.91   2. Fall, initial encounter  W19.XXXA E888.9   3. Facial droop due to acute stroke (HCC)  I63.9 434.91    R29.810 781.94   4. Ataxia  R27.0 781.3   5. Dysphagia, oropharyngeal  R13.12 787.22   6. Difficulty walking  R26.2 719.7   7. Decreased activities of daily living (ADL)  Z78.9 V49.89     Patient Active Problem List   Diagnosis   • Type 2 diabetes mellitus with hyperglycemia, without long-term current use of insulin ([facility])   • Seizure disorder ([facility])   • Arthritis   • Anxiety and depression   • Primary hypertension   • Acute lacunar stroke (HCC)     Past Medical History:   Diagnosis Date   • Anxiety disorder 2018   • Benign essential hypertension    • Cataract    • COPD (chronic obstructive pulmonary disease) ([facility])    • CVA (cerebral vascular accident) ([facility])    • Depression    • Diabetes mellitus, type 2 ([facility])    • Hyperlipidemia    • Paresthesia and pain of both upper extremities 2018   • Seizure disorder ([facility]) 2018   • Vitamin B 12 deficiency    • Vitamin D deficiency 2018     Past Surgical History:   Procedure Laterality Date   • APPENDECTOMY     • BACK SURGERY      LOW BACK DISC   • CATARACT EXTRACTION     • HYSTERECTOMY     • NECK SURGERY      NECK DISC   • TONSILLECTOMY        General Information     Row Name 22 0918          OT Time and Intention    Document Type evaluation  -AC     Mode of Treatment individual therapy;occupational therapy  -AC     Row Name 22 09          General Information    Patient Profile Reviewed yes  -AC     Prior Level of Function independent:;all household mobility;community mobility;gait;ADL's  pt. reports she used a cane or a walker but has no other dme in the home.  -AC     Existing Precautions/Restrictions fall  -AC      Barriers to Rehab none identified  -     Row Name 07/03/22 0918          Occupational Profile    Reason for Services/Referral (Occupational Profile) Pt. is a 72year old female admitted for the above diagnosis. Pt. referred to OT services to assess independence with ADLs and adl transfers/fx'l mobility. No previous OT services for current condition.  -     Row Name 07/03/22 0918          Living Environment    People in Home alone  -     Row Name 07/03/22 0918          Cognition    Orientation Status (Cognition) oriented x 3  -     Row Name 07/03/22 0918          Safety Issues, Functional Mobility    Safety Issues Affecting Function (Mobility) --  none identified  -     Impairments Affecting Function (Mobility) balance;endurance/activity tolerance  -           User Key  (r) = Recorded By, (t) = Taken By, (c) = Cosigned By    Initials Name Provider Type     Miriam Lilly, OT Occupational Therapist                 Mobility/ADL's     Row Name 07/03/22 0921          Bed Mobility    Comment, (Bed Mobility) patient in recliner upon OT arrival in the room.  -     Row Name 07/03/22 0921          Transfers    Transfers sit-stand transfer  -     Sit-Stand Gap (Transfers) standby assist;contact guard;1 person assist  -     Row Name 07/03/22 0921          Sit-Stand Transfer    Assistive Device (Sit-Stand Transfers) walker, front-wheeled  -     Row Name 07/03/22 0921          Functional Mobility    Functional Mobility- Ind. Level contact guard assist;verbal cues required;standby assist  -     Functional Mobility- Device walker, front-wheeled  -     Functional Mobility- Comment patient was CGA/SBA with rolling walker for approx 7 steps from/to recliner.  -     Row Name 07/03/22 0921          Activities of Daily Living    BADL Assessment/Intervention --  patient is setup for upper body bathing/dressing, setup for grooming, setup for self-feeding, patient is CGA/SBA for lower body  bathing/dressing, SBA/CGA for toileting.  -           User Key  (r) = Recorded By, (t) = Taken By, (c) = Cosigned By    Initials Name Provider Type    Miriam Covington OT Occupational Therapist               Obj/Interventions     Row Name 07/03/22 0925          Sensory Assessment (Somatosensory)    Sensory Assessment (Somatosensory) sensation intact  -[facility] Name 07/03/22 0925          Vision Assessment/Intervention    Visual Impairment/Limitations WNL  -[facility] Name 07/03/22 0925          Range of Motion Comprehensive    General Range of Motion bilateral upper extremity ROM WFL  -[facility] Name 07/03/22 0925          Strength Comprehensive (MMT)    Comment, General Manual Muscle Testing (MMT) Assessment RUE 5/5, LUE 4+/5  -[facility] Name 07/03/22 0925          Motor Skills    Motor Skills coordination;functional endurance  -     Coordination WNL  -     Functional Endurance fair +  -[facility] Name 07/03/22 0925          Balance    Balance Assessment standing dynamic balance  -     Dynamic Standing Balance contact guard;verbal cues;1-person assist;standby assist  -     Position/Device Used, Standing Balance supported;walker, front-wheeled  -     Balance Interventions standing;sit to stand;supported;dynamic;minimal challenge;occupation based/functional task  -           User Key  (r) = Recorded By, (t) = Taken By, (c) = Cosigned By    Initials Name Provider Type    Miriam Covington OT Occupational Therapist               Goals/Plan     Row Name 07/03/22 0928          Transfer Goal 1 (OT)    Activity/Assistive Device (Transfer Goal 1, OT) transfers, all  -AC     [place] Level/Cues Needed (Transfer Goal 1, OT) modified independence  -AC     Time Frame (Transfer Goal 1, OT) long term goal (LTG);10 days  -[facility] Name 07/03/22 0928          Bathing Goal 1 (OT)    Activity/Device (Bathing Goal 1, OT) bathing skills, all  -AC     [place] Level/Cues Needed (Bathing Goal 1, OT) modified  independence  -AC     Time Frame (Bathing Goal 1, OT) long term goal (LTG);10 days  -AC     Row Name 07/03/22 0928          Dressing Goal 1 (OT)    Activity/Device (Dressing Goal 1, OT) dressing skills, all  -AC     [place]/Cues Needed (Dressing Goal 1, OT) modified independence  -AC     Time Frame (Dressing Goal 1, OT) long term goal (LTG);10 days  -AC     Row Name 07/03/22 0928          Toileting Goal 1 (OT)    Activity/Device (Toileting Goal 1, OT) toileting skills, all  -AC     [place] Level/Cues Needed (Toileting Goal 1, OT) modified independence  -AC     Time Frame (Toileting Goal 1, OT) long term goal (LTG);10 days  -AC     Row Name 07/03/22 0928          Grooming Goal 1 (OT)    Activity/Device (Grooming Goal 1, OT) grooming skills, all  -AC     [place] (Grooming Goal 1, OT) modified independence  -AC     Time Frame (Grooming Goal 1, OT) long term goal (LTG);10 days  -AC     Row Name 07/03/22 0928          Strength Goal 1 (OT)    Strength Goal 1 (OT) patient will improve LUE strength to 5/5  -AC     Time Frame (Strength Goal 1, OT) long term goal (LTG);10 days  -AC     Row Name 07/03/22 0928          Problem Specific Goal 1 (OT)    Problem Specific Goal 1 (OT) patient will improve endurance to good.  -AC     Time Frame (Problem Specific Goal 1, OT) long term goal (LTG);10 days  -AC     Row Name 07/03/22 0928          Therapy Assessment/Plan (OT)    Planned Therapy Interventions (OT) activity tolerance training;occupation/activity based interventions;neuromuscular control/coordination retraining;BADL retraining;functional balance retraining;patient/caregiver education/training;ROM/therapeutic exercise;transfer/mobility retraining  -AC           User Key  (r) = Recorded By, (t) = Taken By, (c) = Cosigned By    Initials Name Provider Type    AC Miriam Lilly OT Occupational Therapist               Clinical Impression     Row Name 07/03/22 0927          Plan of Care Review    Plan of Care Reviewed  With patient  -AC     Progress no change  -     Outcome Evaluation Patient presents with limitations that impede his/her ability to perform ADLS. The skills of a therapist are necessary to maximize independence with ADLs.  -     Row Name 07/03/22 0927          Therapy Assessment/Plan (OT)    Patient/Family Therapy Goal Statement (OT) Patient would like to maximize independence with adls.  -     Rehab Potential (OT) good, to achieve stated therapy goals  -     Criteria for Skilled Therapeutic Interventions Met (OT) yes;meets criteria;skilled treatment is necessary  -     Therapy Frequency (OT) 5 times/wk  -     Row Name 07/03/22 0927          Therapy Plan Review/Discharge Plan (OT)    Equipment Needs Upon Discharge (OT) walker, rolling;commode chair  -     Anticipated Discharge Disposition (OT) home with assist;home with home health  -     Row Name 07/03/22 0927          Positioning and Restraints    Pre-Treatment Position sitting in chair/recliner  -     Post Treatment Position chair  -AC     In Chair sitting;call light within reach;encouraged to call for assist;exit alarm on  -           User Key  (r) = Recorded By, (t) = Taken By, (c) = Cosigned By    Initials Name Provider Type    AC Miriam Lilly, RANDY Occupational Therapist               Outcome Measures     Row Name 07/03/22 0930          How much help from another is currently needed...    Putting on and taking off regular lower body clothing? 3  -AC     Bathing (including washing, rinsing, and drying) 3  -AC     Toileting (which includes using toilet bed pan or urinal) 3  -AC     Putting on and taking off regular upper body clothing 4  -AC     Taking care of personal grooming (such as brushing teeth) 4  -AC     Eating meals 4  -AC     AM-PAC 6 Clicks Score (OT) 21  -     Row Name 07/03/22 0930          Functional Assessment    Outcome Measure Options AM-PAC 6 Clicks Daily Activity (OT);Optimal Instrument  -     Row Name 07/03/22 0930           Optimal Instrument    Optimal Instrument Optimal - 3  -AC     Bending/Stooping 1  -AC     Standing 2  -AC     Reaching 1  -AC     From the list, choose the 3 activities you would most like to be able to do without any difficulty Bending/stooping;Standing;Reaching  -AC     Total Score Optimal - 3 4  -AC           User Key  (r) = Recorded By, (t) = Taken By, (c) = Cosigned By    Initials Name Provider Type     Miriam Lilly OT Occupational Therapist                Occupational Therapy Education                 Title: PT OT SLP Therapies (In Progress)     Topic: Occupational Therapy (Done)     Point: ADL training (Done)     Description:   Instruct learner(s) on proper safety adaptation and remediation techniques during self care or transfers.   Instruct in proper use of assistive devices.              Learning Progress Summary           Patient Acceptance, E, VU by  at 7/3/2022 0930                   Point: Home exercise program (Done)     Description:   Instruct learner(s) on appropriate technique for monitoring, assisting and/or progressing therapeutic exercises/activities.              Learning Progress Summary           Patient Acceptance, E, VU by  at 7/3/2022 0930                   Point: Precautions (Done)     Description:   Instruct learner(s) on prescribed precautions during self-care and functional transfers.              Learning Progress Summary           Patient Acceptance, E, VU by  at 7/3/2022 0930                   Point: Body mechanics (Done)     Description:   Instruct learner(s) on proper positioning and spine alignment during self-care, functional mobility activities and/or exercises.              Learning Progress Summary           Patient Acceptance, E, VU by  at 7/3/2022 0930                               User Key     Initials Effective Dates Name Provider Type Discipline     06/16/21 -  Miriam Lilly OT Occupational Therapist OT              OT Recommendation and  Plan  Planned Therapy Interventions (OT): activity tolerance training, occupation/activity based interventions, neuromuscular control/coordination retraining, BADL retraining, functional balance retraining, patient/caregiver education/training, ROM/therapeutic exercise, transfer/mobility retraining  Therapy Frequency (OT): 5 times/wk  Plan of Care Review  Plan of Care Reviewed With: patient  Progress: no change  Outcome Evaluation: Patient presents with limitations that impede his/her ability to perform ADLS. The skills of a therapist are necessary to maximize independence with ADLs.     Time Calculation:    Time Calculation- OT     Row Name 07/03/22 0932             Time Calculation- OT    OT Received On 07/03/22  -      OT Goal Re-Cert Due Date 07/12/22  -AC              Untimed Charges    OT Eval/Re-eval Minutes 32  -AC              Total Minutes    Untimed Charges Total Minutes 32  -AC       Total Minutes 32  -AC            User Key  (r) = Recorded By, (t) = Taken By, (c) = Cosigned By    Initials Name Provider Type    AC Miriam Lilly OT Occupational Therapist              Therapy Charges for Today     Code Description Service Date Service Provider Modifiers Qty    [id number] HC OT EVAL LOW COMPLEXITY 3 7/3/2022 Miriam Lilly OT GO 1               Miriam Lilly OT  7/3/2022

## 2022-07-03 NOTE — PROGRESS NOTES
[facility]   Hospitalist Progress Note  Date: 7/3/2022  Patient Name: Haylee Gibbs  : 1950  MRN: [id number]  Date of admission: 2022  Consultants:   -Neurology: Dr. Wes Connelly    Subjective   Subjective     Chief Complaint: Left-sided weakness and facial droop    Summary:   Haylee Gibbs is a 72 y.o. female with essential hypertension, COPD, depression, type 2 diabetes mellitus, hyperlipidemia, seizure disorder who presented to the ED with left-sided facial droop and left-sided weakness.  Eval in ED significant for MRI showing 9 mm focal area of restricted diffusion in posterior limb of right internal capsule consistent with acute lacunar infarct.  Hospital service contacted for further evaluation management.  Neurology consulted.  Echocardiogram ordered.    Interval Followup:   No acute events overnight.  Patient stated her left-sided weakness was improved compared to, had been.  She denies chest pain, abdominal pain, nausea or vomiting.  After I completed my rounds and examination of the patient was contacted by nursing that patient had episode of confusion and patient unsafe to return home on her own.  Nursing with no additional acute issues to report.    Review of Systems   All systems reviewed and negative unless stated otherwise under subjective.    Objective   Objective     Vitals:   Temp:  [97.3 °F (36.3 °C)-99.5 °F (37.5 °C)] 98.8 °F (37.1 °C)  Heart Rate:  [62-76] 66  Resp:  [16-18] 18  BP: (127-176)/(46-73) 127/46  Physical Exam   Gen: No acute distress, Conversant, Pleasant, sitting up in chair at bedside eating breakfast and watching TV  HEENT: MMM, Atraumatic  Neck: Supple, Trachea midline  Resp: CTAB, No w/r/r, good aeration, no respiratory distress appreciated, equal chest rise bilaterally  Card: RRR, No m/r/g  Abd: Soft, Nontender, Nondistended, + bowel sounds  Ext: No cyanosis, No clubbing  Neuro: CN II-XII grossly intact, No focal deficits appreciated  Psych: AAO x 3, Normal  mood, Normal affect    Result Review    Result Review:  I have personally reviewed the results from the time of this admission to 7/3/2022 13:24 EDT and agree with these findings:  [x]  Laboratory: Hyperglycemia.  Creatinine and electrolytes stable.  WC, hemoglobin and platelet stable.  []  Microbiology:   []  Radiology:   [x]  EKG/Telemetry: PVCs.  Sinus rhythm.  NSVT.  [x]  Cardiology/Vascular: Echocardiogram reviewed.  EF: 55%.  Diastolic dysfunction noted.  []  Pathology:  []  Old records:  []  Other:    Assessment & Plan   Assessment / Plan     Assessment:  Acute lacunar infarct  Type 2 diabetes mellitus with hyperglycemia  Acute renal failure, resolved  Essential hypertension  COPD, not acutely exacerbated  Anxiety/depression  Hyperlipidemia  History of seizure disorder  Chronic diastolic heart failure, not acutely exacerbated  Obesity with BMI: 35.65    Plan:  -Neurology consulted and following, appreciate assistance and recommendations in the care of this patient.  -Continue aspirin, statin and Plavix  -Start Levemir and continue SSI.  Monitor blood glucose level and SSI requirement and adjust insulin regimen as needed  -CTA head and neck ordered, follow-up results  -PT/OT consulted .  -Patient not safe to return home on her own, has episodes of intermittent confusion and decreased ability to ambulate safely.  PT/OT following.  -Continue appropriate home medications  -Monitor electrolytes and renal function with BMP and magnesium level in the AM  -Monitor WBC and Hgb with CBC in the AM  -Clinical course will dictate further management     DVT Prophylaxis: Lovenox  Diet: Diabetic/cardiac  Dispo: PT/OT consulted  Code Status: Full code     Personally reviewed patients labs and imaging, discussed with patient and nurse at bedside. Discussed case with the following consultants: Neurology.     Part of this note may be an electronic transcription/translation of spoken language to printed text using the Dragon  dictation system.    DVT prophylaxis:  Medical and mechanical DVT prophylaxis orders are present.    CODE STATUS:   Level Of Support Discussed With: Patient  Code Status (Patient has no pulse and is not breathing): CPR (Attempt to Resuscitate)  Medical Interventions (Patient has pulse or is breathing): Full Support    Electronically signed by Heriberto Craig MD, 07/03/22, 1:24 PM EDT.

## 2022-07-03 NOTE — PLAN OF CARE
Goal Outcome Evaluation:      Pt rested well with no complaints.  Pt. getting up to BSC with 1 assist and doing well.  Encouraged patient to use call light and not to attempt getting up on own.  Pt.  Confused at beginning of shift of how many days she had been in here.  CHRIS SHELDON RN

## 2022-07-03 NOTE — PROGRESS NOTES
[facility]   Neurology Progress Note    Patient Name: Haylee Gibbs  : 1950  MRN: [id number]  Primary Care Physician:  Kj Canales MD  Referring Physician: Heriberto Craig MD  Date of admission: 2022    Subjective   Subjective     Reason for Consult/ Chief Complaint: Follow-up visit    HPI:  Haylee Gibbs is a 72 y.o. female televisit for her lower extremity weakness.  She states that she feels weaker today than yesterday.  She has been going up and down without assistance of her relatives as well as nursing staff however she is not independent with walking.  Her CT angiogram of the head and neck is pending interpretation at this time.  He is on dual antiplatelet agents.  She was noted earlier to be slightly disoriented by her niece who is in the room with her today.        Objective     Vitals:   Temp:  [97.3 °F (36.3 °C)-99.5 °F (37.5 °C)] 98.8 °F (37.1 °C)  Heart Rate:  [66-76] 66  Resp:  [16-18] 18  BP: (127-176)/(46-73) 127/46    Physical Exam: Alert, fluent, phasic, follows commands well.  No drift in upper extremity.  She states that the left leg feels weaker than the right leg and unable to sustain it more than 5 seconds against gravity.  Heart is regular rhythm normal in rate      Result Review    Result Review:  I have personally reviewed the results from the time of this admission to 7/3/2022 14:49 EDT and agree with these findings:  [x]  Laboratory  []  Microbiology  [x]  Radiology  [x]  EKG/Telemetry   [x]  Cardiology/Vascular   [x]  Pathology  []  Old records  []  Other:      Assessment & Plan   Assessment / Plan   Active Hospital Problems:  Active Hospital Problems    Diagnosis    • Acute lacunar stroke (HCC)          Plan: I discussed with her that she may need to undergo rehabilitation that she lives by herself.  Awaiting carotid and brain CT angiogram results at this time.  Continue atorvastatin and dual antiplatelet agents.  She also has significantly elevated A1c's of  9.0.  She is presently on insulin.    Total time spent with the patient and coordinating patient care was 25 minutes.    electronically signed by Wes Connelly MD, 07/03/22, 2:49 PM EDT.

## 2022-07-03 NOTE — PLAN OF CARE
Problem: Adult Inpatient Plan of Care  Goal: Plan of Care Review  Outcome: Ongoing, Not Progressing  Goal: Patient-Specific Goal (Individualized)  Outcome: Ongoing, Not Progressing  Goal: Absence of Hospital-Acquired Illness or Injury  Outcome: Ongoing, Not Progressing  Intervention: Identify and Manage Fall Risk  Intervention: Prevent and Manage VTE (Venous Thromboembolism) Risk  Goal: Optimal Comfort and Wellbeing  Outcome: Ongoing, Not Progressing  Goal: Readiness for Transition of Care  Outcome: Ongoing, Not Progressing   Goal Outcome Evaluation:  Plan of Care Reviewed With: patient        Progress: declining  Outcome Evaluation: Patient has had difficulty ambulating and bearing weight on legs. Patient has been resting this afternoon, with niece at bedside. Family considering rehab options at this time. No issues, concerns, or complaints at this time. Will continue to monitor, call light in reach.

## 2022-07-04 LAB
ANION GAP SERPL CALCULATED.3IONS-SCNC: 12.6 MMOL/L (ref 5–15)
BACTERIA UR QL AUTO: ABNORMAL /HPF
BILIRUB UR QL STRIP: NEGATIVE
BUN SERPL-MCNC: 14 MG/DL (ref 8–23)
BUN/CREAT SERPL: 17.3 (ref 7–25)
CALCIUM SPEC-SCNC: 9.4 MG/DL (ref 8.6–10.5)
CHLORIDE SERPL-SCNC: 94 MMOL/L (ref 98–107)
CLARITY UR: ABNORMAL
CO2 SERPL-SCNC: 22.4 MMOL/L (ref 22–29)
COLOR UR: ABNORMAL
CREAT SERPL-MCNC: 0.81 MG/DL (ref 0.57–1)
DEPRECATED RDW RBC AUTO: 44 FL (ref 37–54)
EGFRCR SERPLBLD CKD-EPI 2021: 76.8 ML/MIN/1.73
ERYTHROCYTE [DISTWIDTH] IN BLOOD BY AUTOMATED COUNT: 13.2 % (ref 12.3–15.4)
GLUCOSE BLDC GLUCOMTR-MCNC: 180 MG/DL (ref 70–99)
GLUCOSE BLDC GLUCOMTR-MCNC: 195 MG/DL (ref 70–99)
GLUCOSE BLDC GLUCOMTR-MCNC: 213 MG/DL (ref 70–99)
GLUCOSE BLDC GLUCOMTR-MCNC: 238 MG/DL (ref 70–99)
GLUCOSE SERPL-MCNC: 210 MG/DL (ref 65–99)
GLUCOSE UR STRIP-MCNC: NEGATIVE MG/DL
HCT VFR BLD AUTO: 35.2 % (ref 34–46.6)
HGB BLD-MCNC: 11.6 G/DL (ref 12–15.9)
HGB UR QL STRIP.AUTO: NEGATIVE
HYALINE CASTS UR QL AUTO: ABNORMAL /LPF
KETONES UR QL STRIP: ABNORMAL
LEUKOCYTE ESTERASE UR QL STRIP.AUTO: ABNORMAL
MAGNESIUM SERPL-MCNC: 1.9 MG/DL (ref 1.6–2.4)
MCH RBC QN AUTO: 30.5 PG (ref 26.6–33)
MCHC RBC AUTO-ENTMCNC: 33 G/DL (ref 31.5–35.7)
MCV RBC AUTO: 92.6 FL (ref 79–97)
NITRITE UR QL STRIP: NEGATIVE
PH UR STRIP.AUTO: 6 [PH] (ref 5–8)
PLATELET # BLD AUTO: 239 10*3/MM3 (ref 140–450)
PMV BLD AUTO: 11.3 FL (ref 6–12)
POTASSIUM SERPL-SCNC: 4.2 MMOL/L (ref 3.5–5.2)
PROT UR QL STRIP: ABNORMAL
RBC # BLD AUTO: 3.8 10*6/MM3 (ref 3.77–5.28)
RBC # UR STRIP: ABNORMAL /HPF
REF LAB TEST METHOD: ABNORMAL
SODIUM SERPL-SCNC: 129 MMOL/L (ref 136–145)
SP GR UR STRIP: >1.03 (ref 1–1.03)
SQUAMOUS #/AREA URNS HPF: ABNORMAL /HPF
UROBILINOGEN UR QL STRIP: ABNORMAL
WBC # UR STRIP: ABNORMAL /HPF
WBC NRBC COR # BLD: 9.32 10*3/MM3 (ref 3.4–10.8)

## 2022-07-04 PROCEDURE — 87086 URINE CULTURE/COLONY COUNT: CPT | Performed by: PHYSICIAN ASSISTANT

## 2022-07-04 PROCEDURE — 63710000001 INSULIN LISPRO (HUMAN) PER 5 UNITS: Performed by: INTERNAL MEDICINE

## 2022-07-04 PROCEDURE — 25010000002 CEFTRIAXONE PER 250 MG: Performed by: PHYSICIAN ASSISTANT

## 2022-07-04 PROCEDURE — 80048 BASIC METABOLIC PNL TOTAL CA: CPT | Performed by: HOSPITALIST

## 2022-07-04 PROCEDURE — 81001 URINALYSIS AUTO W/SCOPE: CPT | Performed by: PHYSICIAN ASSISTANT

## 2022-07-04 PROCEDURE — 25010000002 ENOXAPARIN PER 10 MG: Performed by: INTERNAL MEDICINE

## 2022-07-04 PROCEDURE — 87186 SC STD MICRODIL/AGAR DIL: CPT | Performed by: PHYSICIAN ASSISTANT

## 2022-07-04 PROCEDURE — 99233 SBSQ HOSP IP/OBS HIGH 50: CPT | Performed by: INTERNAL MEDICINE

## 2022-07-04 PROCEDURE — 97530 THERAPEUTIC ACTIVITIES: CPT

## 2022-07-04 PROCEDURE — 83735 ASSAY OF MAGNESIUM: CPT | Performed by: INTERNAL MEDICINE

## 2022-07-04 PROCEDURE — 63710000001 INSULIN DETEMIR PER 5 UNITS: Performed by: INTERNAL MEDICINE

## 2022-07-04 PROCEDURE — 82962 GLUCOSE BLOOD TEST: CPT

## 2022-07-04 PROCEDURE — 85027 COMPLETE CBC AUTOMATED: CPT | Performed by: HOSPITALIST

## 2022-07-04 PROCEDURE — 87077 CULTURE AEROBIC IDENTIFY: CPT | Performed by: PHYSICIAN ASSISTANT

## 2022-07-04 PROCEDURE — 94799 UNLISTED PULMONARY SVC/PX: CPT

## 2022-07-04 RX ORDER — CEFTRIAXONE SODIUM 1 G/50ML
1 INJECTION, SOLUTION INTRAVENOUS EVERY 24 HOURS
Status: DISCONTINUED | OUTPATIENT
Start: 2022-07-04 | End: 2022-07-07 | Stop reason: HOSPADM

## 2022-07-04 RX ADMIN — INSULIN DETEMIR 10 UNITS: 100 INJECTION, SOLUTION SUBCUTANEOUS at 21:35

## 2022-07-04 RX ADMIN — FLUOXETINE 60 MG: 20 CAPSULE ORAL at 08:41

## 2022-07-04 RX ADMIN — Medication 10 ML: at 21:38

## 2022-07-04 RX ADMIN — INSULIN LISPRO 3 UNITS: 100 INJECTION, SOLUTION INTRAVENOUS; SUBCUTANEOUS at 08:40

## 2022-07-04 RX ADMIN — CEFTRIAXONE SODIUM 1 G: 1 INJECTION, SOLUTION INTRAVENOUS at 21:36

## 2022-07-04 RX ADMIN — LEVETIRACETAM 500 MG: 500 TABLET, FILM COATED ORAL at 08:42

## 2022-07-04 RX ADMIN — ENOXAPARIN SODIUM 40 MG: 100 INJECTION SUBCUTANEOUS at 21:35

## 2022-07-04 RX ADMIN — METOPROLOL TARTRATE 25 MG: 25 TABLET, FILM COATED ORAL at 08:42

## 2022-07-04 RX ADMIN — INSULIN LISPRO 3 UNITS: 100 INJECTION, SOLUTION INTRAVENOUS; SUBCUTANEOUS at 11:51

## 2022-07-04 RX ADMIN — METOPROLOL TARTRATE 25 MG: 25 TABLET, FILM COATED ORAL at 21:36

## 2022-07-04 RX ADMIN — CLOPIDOGREL BISULFATE 75 MG: 75 TABLET ORAL at 08:41

## 2022-07-04 RX ADMIN — INSULIN LISPRO 2 UNITS: 100 INJECTION, SOLUTION INTRAVENOUS; SUBCUTANEOUS at 17:13

## 2022-07-04 RX ADMIN — ATORVASTATIN CALCIUM 80 MG: 40 TABLET, FILM COATED ORAL at 21:36

## 2022-07-04 RX ADMIN — ASPIRIN 81 MG CHEWABLE TABLET 81 MG: 81 TABLET CHEWABLE at 08:41

## 2022-07-04 RX ADMIN — INSULIN DETEMIR 10 UNITS: 100 INJECTION, SOLUTION SUBCUTANEOUS at 08:40

## 2022-07-04 RX ADMIN — LISINOPRIL 10 MG: 10 TABLET ORAL at 08:42

## 2022-07-04 RX ADMIN — DOCUSATE SODIUM 100 MG: 100 CAPSULE, LIQUID FILLED ORAL at 08:41

## 2022-07-04 RX ADMIN — Medication 10 ML: at 08:42

## 2022-07-04 NOTE — PLAN OF CARE
Goal Outcome Evaluation:               PATIENT HAS DIFFICULTY AMBULATING. SHE IS A HEAVY 1 ASSIST WITH GAIT BELT. HER BLOOD GLUCOSE LEVELS ARE MORE CONTROLLED SINCE INCREASING LEVEMIR DOSING. AWAITING REHAB PLACEMENT

## 2022-07-04 NOTE — PROGRESS NOTES
[facility]   Hospitalist Progress Note  Date: 2022  Patient Name: Haylee Gibbs  : 1950  MRN: [id number]  Date of admission: 2022  Consultants:   -Neurology: Dr. Wes Connelly    Subjective   Subjective     Chief Complaint: Left-sided weakness and facial droop    Summary:   Haylee Gibbs is a 72 y.o. female with essential hypertension, COPD, depression, type 2 diabetes mellitus, hyperlipidemia, seizure disorder who presented to the ED with left-sided facial droop and left-sided weakness.  Eval in ED significant for MRI showing 9 mm focal area of restricted diffusion in posterior limb of right internal capsule consistent with acute lacunar infarct.  Hospital service contacted for further evaluation management.  Neurology consulted.  Echocardiogram showed EF: 55.9% and patient to have diastolic dysfunction.  Initially patient wished to discharge home with home health, however, patient with decreased mobility and ability to care for self and safe discharge home not possible and is now seeking rehab placement.    Interval Followup:   No acute events overnight.  With episodes of intermittent confusion yesterday per staff.  Now wishing to pursue rehab placement.  She denied any chest pain, shortness of breath, abdominal pain, nausea or vomiting.  Nursing with no additional acute issues to report.    Review of Systems   All systems reviewed and negative unless stated otherwise under subjective.    Objective   Objective     Vitals:   Temp:  [98.8 °F (37.1 °C)-100 °F (37.8 °C)] 99.5 °F (37.5 °C)  Heart Rate:  [60-66] 60  Resp:  [16-19] 18  BP: (103-171)/(46-75) 103/75  Physical Exam   Gen: No acute distress, Conversant, Pleasant, sitting up in bed watching TV  HEENT: MMM, Atraumatic  Neck: Supple, Trachea midline  Resp: CTAB, No w/r/r, good aeration, equal chest rise bilaterally, no increase in work of breathing  Card: RRR, No m/r/g  Abd: Soft, Nontender, Nondistended, + bowel sounds  Ext: No cyanosis,  No clubbing  Neuro: CN II-XII grossly intact, No focal deficits appreciated  Psych: AAO x 3, Normal mood, Normal affect    Result Review    Result Review:  I have personally reviewed the results from the time of this admission to 7/4/2022 09:15 EDT and agree with these findings:  [x]  Laboratory: Hyperglycemia.  Sodium level decreased.  Creatinine and other electrolytes stable.  WC, hemoglobin and platelet stable.  []  Microbiology:   [x]  Radiology: CTA head and neck reviewed.  57% stenosis of proximal right ICA, narrowing of both vertebral arteries at V4 segments appreciated.  [x]  EKG/Telemetry: NSVT.  Sinus rhythm.  Tachycardia at times.  []  Cardiology/Vascular:   []  Pathology:  []  Old records:  []  Other:    Assessment & Plan   Assessment / Plan     Assessment:  Acute lacunar infarct  Type 2 diabetes mellitus with hyperglycemia  Acute renal failure, resolved  Essential hypertension  Hyponatremia, new unknown clinical significance  COPD, not acutely exacerbated  Anxiety/depression  Hyperlipidemia  History of seizure disorder  Chronic diastolic heart failure, not acutely exacerbated  Obesity with BMI: 35.65    Plan:  -Neurology consulted and following, appreciate assistance and recommendations in the care of this patient.  -Continue aspirin, statin and Plavix  -Given hyperglycemia will increase dose of Levemir.  Continue SSI.  Monitor blood glucose level and SSI requirement and adjust insulin regimen accordingly  -PT/OT consulted .  -Patient not safe to return home on her own, has episodes of intermittent confusion and decreased ability to ambulate safely.  PT/OT consulted.  Patient interested in rehab placement.  -Continue appropriate home medications  -Will monitor electrolytes and renal function with BMP and magnesium level in the AM  -Will monitor WBC and Hgb with CBC in the AM  -Clinical course will dictate further management     DVT Prophylaxis: Lovenox  Diet: Diabetic/cardiac  Dispo: PT/OT  consulted  Code Status: Full code     Personally reviewed patients labs and imaging, discussed with patient and nurse at bedside. Discussed case with the following consultants: Neurology.     Part of this note may be an electronic transcription/translation of spoken language to printed text using the Dragon dictation system.    DVT prophylaxis:  Medical and mechanical DVT prophylaxis orders are present.    CODE STATUS:   Level Of Support Discussed With: Patient  Code Status (Patient has no pulse and is not breathing): CPR (Attempt to Resuscitate)  Medical Interventions (Patient has pulse or is breathing): Full Support    Electronically signed by Heriberto Craig MD, 07/04/22, 9:15 AM EDT.

## 2022-07-04 NOTE — PLAN OF CARE
Goal Outcome Evaluation:              Outcome Evaluation: VSS, no complaints this evening. Paitent alert to self, place, and situation. Waiting on rehab options.

## 2022-07-04 NOTE — THERAPY TREATMENT NOTE
Acute Care - Physical Therapy Treatment Note   [facility]     Patient Name: Haylee Gibbs  : 1950  MRN: [id number]  Today's Date: 2022      Visit Dx:     ICD-10-CM ICD-9-CM   1. Acute lacunar stroke (HCC)  I63.81 434.91   2. Fall, initial encounter  W19.XXXA E888.9   3. Facial droop due to acute stroke (HCC)  I63.9 434.91    R29.810 781.94   4. Ataxia  R27.0 781.3   5. Dysphagia, oropharyngeal  R13.12 787.22   6. Difficulty walking  R26.2 719.7   7. Decreased activities of daily living (ADL)  Z78.9 V49.89   8. Primary hypertension  I10 401.9     Patient Active Problem List   Diagnosis   • Type 2 diabetes mellitus with hyperglycemia, without long-term current use of insulin ([facility])   • Seizure disorder ([facility])   • Arthritis   • Anxiety and depression   • Primary hypertension   • Acute lacunar stroke (HCC)     Past Medical History:   Diagnosis Date   • Anxiety disorder 2018   • Benign essential hypertension    • Cataract    • COPD (chronic obstructive pulmonary disease) ([facility])    • CVA (cerebral vascular accident) ([facility])    • Depression    • Diabetes mellitus, type 2 ([facility])    • Hyperlipidemia    • Paresthesia and pain of both upper extremities 2018   • Seizure disorder ([facility]) 2018   • Vitamin B 12 deficiency    • Vitamin D deficiency 2018     Past Surgical History:   Procedure Laterality Date   • APPENDECTOMY     • BACK SURGERY      LOW BACK DISC   • CATARACT EXTRACTION     • HYSTERECTOMY     • NECK SURGERY      NECK DISC   • TONSILLECTOMY       PT Assessment (last 12 hours)     PT Evaluation and Treatment     Row Name 22 1200          Physical Therapy Time and Intention    Subjective Information complains of;weakness;fatigue  -RH     Document Type therapy note (daily note)  -RH     Mode of Treatment physical therapy;individual therapy  -RH     Patient Effort fair  -RH     Comment Pt very anxious upon standing.  -     Row Name 22 1200          Pain    Additional Documentation  Pain Scale: FACES Pre/Post-Treatment (Group)  -     Row Name 07/04/22 1200          Pain Scale: FACES Pre/Post-Treatment    Pain: FACES Scale, Pretreatment 0-->no hurt  -     Posttreatment Pain Rating 0-->no hurt  -     Row Name 07/04/22 1200          Bed Mobility    Bed Mobility supine-sit  -RH     Supine-Sit [place] (Bed Mobility) minimum assist (75% patient effort);moderate assist (50% patient effort)  -     Assistive Device (Bed Mobility) bed rails  -     Comment, (Bed Mobility) Pt maintains sitting at side of bed with CGA.  -     Row Name 07/04/22 1200          Transfers    Transfers stand pivot/stand step transfer  -     Row Name 07/04/22 1200          Stand Pivot/Stand Step Transfer    Stand Pivot/Stand Step [place] (Transfers) moderate assist (50% patient effort)  -     Assistive Device (Stand Pivot Stand Step Transfer) --  Pt transfers without AD.  -     Comment, (Stand Pivot Transfer) Pt with short shuffling steps to assist with pivot transfer.  Pt performs stand pivot sit transfer x 2.  -     Row Name 07/04/22 1200          Balance    Dynamic Standing Balance moderate assist  -     Row Name             Wound 07/03/22 1900 Right anterior groin MASD (Moisture associated skin damage)    Wound - Properties Group Placement Date: 07/03/22  -KR Placement Time: 1900  -KR Side: Right  -KR Orientation: anterior  -KR Location: groin  -KR Primary Wound Type: MASD  -KR     Retired Wound - Properties Group Placement Date: 07/03/22  -KR Placement Time: 1900  -KR Side: Right  -KR Orientation: anterior  -KR Location: groin  -KR Primary Wound Type: MASD  -KR     Retired Wound - Properties Group Date first assessed: 07/03/22  -KR Time first assessed: 1900  -KR Side: Right  -KR Location: groin  -KR Primary Wound Type: MASD  -KR     Row Name 07/04/22 1200          Vital Signs    O2 Delivery Intra Treatment room air  -     Row Name 07/04/22 1200          Progress Summary (PT)    Progress  Toward Functional Goals (PT) progress toward functional goals is gradual  -           User Key  (r) = Recorded By, (t) = Taken By, (c) = Cosigned By    Initials Name Provider Type    Dalia Denson, RN Registered Nurse    RH Trung Ridley PTA Physical Therapist Assistant                Physical Therapy Education                 Title: PT OT SLP Therapies (In Progress)     Topic: Physical Therapy (In Progress)     Point: Mobility training (Done)     Learning Progress Summary           Patient Acceptance, E,TB, VU by AV at 7/2/2022 1253                   Point: Home exercise program (Not Started)     Learner Progress:  Not documented in this visit.          Point: Body mechanics (Not Started)     Learner Progress:  Not documented in this visit.          Point: Precautions (Not Started)     Learner Progress:  Not documented in this visit.                      User Key     Initials Effective Dates Name Provider Type Discipline     06/11/21 -  Feng Carey, ANDRÉS Physical Therapist PT              PT Recommendation and Plan     Progress Summary (PT)  Progress Toward Functional Goals (PT): progress toward functional goals is gradual   Outcome Measures     Row Name 07/04/22 1200 07/02/22 1200          How much help from another person do you currently need...    Turning from your back to your side while in flat bed without using bedrails? 4  - 3  -AV     Moving from lying on back to sitting on the side of a flat bed without bedrails? 3  - 3  -AV     Moving to and from a bed to a chair (including a wheelchair)? 2  - 3  -AV     Standing up from a chair using your arms (e.g., wheelchair, bedside chair)? 2  - 3  -AV     Climbing 3-5 steps with a railing? 1  - 3  -AV     To walk in hospital room? 3  -RH 3  -AV     AM-PAC 6 Clicks Score (PT) 15  - 18  -AV            Functional Assessment    Outcome Measure Options -- AM-PAC 6 Clicks Basic Mobility (PT)  -AV           User Key  (r) = Recorded By, (t) = Taken By,  (c) = Cosigned By    Initials Name Provider Type    RH Trung Ridley PTA Physical Therapist Assistant    AV Feng Carey, PT Physical Therapist                 Time Calculation:    PT Charges     Row Name 07/04/22 1225             Time Calculation    PT Received On 07/04/22  -RH              Timed Charges    59917 - PT Therapeutic Activity Minutes 23  -RH              Total Minutes    Timed Charges Total Minutes 23  -RH       Total Minutes 23  -RH            User Key  (r) = Recorded By, (t) = Taken By, (c) = Cosigned By    Initials Name Provider Type     Trung Ridley PTA Physical Therapist Assistant              Therapy Charges for Today     Code Description Service Date Service Provider Modifiers Qty    09123000638 HC PT THERAPEUTIC ACT EA 15 MIN 7/4/2022 Trung Ridley PTA GP 2          PT G-Codes  Outcome Measure Options: AM-PAC 6 Clicks Daily Activity (OT), Optimal Instrument  AM-PAC 6 Clicks Score (PT): 15  AM-PAC 6 Clicks Score (OT): 21    Trung Ridley PTA  7/4/2022

## 2022-07-05 LAB
ANION GAP SERPL CALCULATED.3IONS-SCNC: 14.4 MMOL/L (ref 5–15)
BUN SERPL-MCNC: 22 MG/DL (ref 8–23)
BUN/CREAT SERPL: 20.8 (ref 7–25)
CALCIUM SPEC-SCNC: 9.4 MG/DL (ref 8.6–10.5)
CHLORIDE SERPL-SCNC: 94 MMOL/L (ref 98–107)
CO2 SERPL-SCNC: 21.6 MMOL/L (ref 22–29)
CREAT SERPL-MCNC: 1.06 MG/DL (ref 0.57–1)
DEPRECATED RDW RBC AUTO: 44.4 FL (ref 37–54)
EGFRCR SERPLBLD CKD-EPI 2021: 55.6 ML/MIN/1.73
ERYTHROCYTE [DISTWIDTH] IN BLOOD BY AUTOMATED COUNT: 13.2 % (ref 12.3–15.4)
GLUCOSE BLDC GLUCOMTR-MCNC: 179 MG/DL (ref 70–99)
GLUCOSE BLDC GLUCOMTR-MCNC: 180 MG/DL (ref 70–99)
GLUCOSE BLDC GLUCOMTR-MCNC: 182 MG/DL (ref 70–99)
GLUCOSE BLDC GLUCOMTR-MCNC: 211 MG/DL (ref 70–99)
GLUCOSE SERPL-MCNC: 191 MG/DL (ref 65–99)
HCT VFR BLD AUTO: 37.5 % (ref 34–46.6)
HGB BLD-MCNC: 12.3 G/DL (ref 12–15.9)
MAGNESIUM SERPL-MCNC: 2 MG/DL (ref 1.6–2.4)
MCH RBC QN AUTO: 30.4 PG (ref 26.6–33)
MCHC RBC AUTO-ENTMCNC: 32.8 G/DL (ref 31.5–35.7)
MCV RBC AUTO: 92.6 FL (ref 79–97)
PLATELET # BLD AUTO: 282 10*3/MM3 (ref 140–450)
PMV BLD AUTO: 10.9 FL (ref 6–12)
POTASSIUM SERPL-SCNC: 4.6 MMOL/L (ref 3.5–5.2)
RBC # BLD AUTO: 4.05 10*6/MM3 (ref 3.77–5.28)
SARS-COV-2 RNA PNL SPEC NAA+PROBE: DETECTED
SODIUM SERPL-SCNC: 130 MMOL/L (ref 136–145)
WBC NRBC COR # BLD: 9.27 10*3/MM3 (ref 3.4–10.8)

## 2022-07-05 PROCEDURE — 63710000001 INSULIN LISPRO (HUMAN) PER 5 UNITS: Performed by: INTERNAL MEDICINE

## 2022-07-05 PROCEDURE — 80048 BASIC METABOLIC PNL TOTAL CA: CPT | Performed by: INTERNAL MEDICINE

## 2022-07-05 PROCEDURE — 83735 ASSAY OF MAGNESIUM: CPT | Performed by: INTERNAL MEDICINE

## 2022-07-05 PROCEDURE — 94799 UNLISTED PULMONARY SVC/PX: CPT

## 2022-07-05 PROCEDURE — 25010000002 ENOXAPARIN PER 10 MG: Performed by: INTERNAL MEDICINE

## 2022-07-05 PROCEDURE — U0004 COV-19 TEST NON-CDC HGH THRU: HCPCS | Performed by: FAMILY MEDICINE

## 2022-07-05 PROCEDURE — 63710000001 INSULIN DETEMIR PER 5 UNITS: Performed by: INTERNAL MEDICINE

## 2022-07-05 PROCEDURE — U0005 INFEC AGEN DETEC AMPLI PROBE: HCPCS | Performed by: FAMILY MEDICINE

## 2022-07-05 PROCEDURE — 82962 GLUCOSE BLOOD TEST: CPT

## 2022-07-05 PROCEDURE — 99233 SBSQ HOSP IP/OBS HIGH 50: CPT | Performed by: FAMILY MEDICINE

## 2022-07-05 PROCEDURE — 85027 COMPLETE CBC AUTOMATED: CPT | Performed by: INTERNAL MEDICINE

## 2022-07-05 PROCEDURE — 97530 THERAPEUTIC ACTIVITIES: CPT

## 2022-07-05 PROCEDURE — 97110 THERAPEUTIC EXERCISES: CPT

## 2022-07-05 PROCEDURE — 25010000002 CEFTRIAXONE PER 250 MG: Performed by: PHYSICIAN ASSISTANT

## 2022-07-05 RX ORDER — MENTHOL AND METHYL SALICYLATE 7.6; 29 G/100G; G/100G
1 OINTMENT TOPICAL 3 TIMES DAILY PRN
Status: DISCONTINUED | OUTPATIENT
Start: 2022-07-05 | End: 2022-07-07 | Stop reason: HOSPADM

## 2022-07-05 RX ADMIN — MENTHOL AND METHYL SALICYLATE 1 APPLICATION: 7.6; 29 OINTMENT TOPICAL at 17:56

## 2022-07-05 RX ADMIN — CEFTRIAXONE SODIUM 1 G: 1 INJECTION, SOLUTION INTRAVENOUS at 21:44

## 2022-07-05 RX ADMIN — INSULIN LISPRO 2 UNITS: 100 INJECTION, SOLUTION INTRAVENOUS; SUBCUTANEOUS at 11:59

## 2022-07-05 RX ADMIN — FLUOXETINE 60 MG: 20 CAPSULE ORAL at 08:12

## 2022-07-05 RX ADMIN — METOPROLOL TARTRATE 25 MG: 25 TABLET, FILM COATED ORAL at 08:12

## 2022-07-05 RX ADMIN — Medication 10 ML: at 21:44

## 2022-07-05 RX ADMIN — Medication 10 ML: at 08:18

## 2022-07-05 RX ADMIN — INSULIN DETEMIR 10 UNITS: 100 INJECTION, SOLUTION SUBCUTANEOUS at 08:13

## 2022-07-05 RX ADMIN — CLOPIDOGREL BISULFATE 75 MG: 75 TABLET ORAL at 08:12

## 2022-07-05 RX ADMIN — ATORVASTATIN CALCIUM 80 MG: 40 TABLET, FILM COATED ORAL at 21:43

## 2022-07-05 RX ADMIN — INSULIN DETEMIR 10 UNITS: 100 INJECTION, SOLUTION SUBCUTANEOUS at 21:49

## 2022-07-05 RX ADMIN — LISINOPRIL 10 MG: 10 TABLET ORAL at 08:12

## 2022-07-05 RX ADMIN — DOCUSATE SODIUM 100 MG: 100 CAPSULE, LIQUID FILLED ORAL at 08:12

## 2022-07-05 RX ADMIN — INSULIN LISPRO 3 UNITS: 100 INJECTION, SOLUTION INTRAVENOUS; SUBCUTANEOUS at 17:25

## 2022-07-05 RX ADMIN — LEVETIRACETAM 500 MG: 500 TABLET, FILM COATED ORAL at 08:12

## 2022-07-05 RX ADMIN — METOPROLOL TARTRATE 25 MG: 25 TABLET, FILM COATED ORAL at 21:43

## 2022-07-05 RX ADMIN — ASPIRIN 81 MG CHEWABLE TABLET 81 MG: 81 TABLET CHEWABLE at 08:13

## 2022-07-05 RX ADMIN — INSULIN LISPRO 2 UNITS: 100 INJECTION, SOLUTION INTRAVENOUS; SUBCUTANEOUS at 08:13

## 2022-07-05 RX ADMIN — ENOXAPARIN SODIUM 40 MG: 100 INJECTION SUBCUTANEOUS at 21:43

## 2022-07-05 NOTE — THERAPY TREATMENT NOTE
Acute Care - Physical Therapy Treatment Note   [facility]     Patient Name: Haylee Gibbs  : 1950  MRN: [id number]  Today's Date: 2022      Visit Dx:     ICD-10-CM ICD-9-CM   1. Acute lacunar stroke (HCC)  I63.81 434.91   2. Fall, initial encounter  W19.XXXA E888.9   3. Facial droop due to acute stroke (HCC)  I63.9 434.91    R29.810 781.94   4. Ataxia  R27.0 781.3   5. Dysphagia, oropharyngeal  R13.12 787.22   6. Difficulty walking  R26.2 719.7   7. Decreased activities of daily living (ADL)  Z78.9 V49.89   8. Primary hypertension  I10 401.9     Patient Active Problem List   Diagnosis   • Type 2 diabetes mellitus with hyperglycemia, without long-term current use of insulin ([facility])   • Seizure disorder ([facility])   • Arthritis   • Anxiety and depression   • Primary hypertension   • Acute lacunar stroke (HCC)     Past Medical History:   Diagnosis Date   • Anxiety disorder 2018   • Benign essential hypertension    • Cataract    • COPD (chronic obstructive pulmonary disease) ([facility])    • CVA (cerebral vascular accident) ([facility])    • Depression    • Diabetes mellitus, type 2 ([facility])    • Hyperlipidemia    • Paresthesia and pain of both upper extremities 2018   • Seizure disorder ([facility]) 2018   • Vitamin B 12 deficiency    • Vitamin D deficiency 2018     Past Surgical History:   Procedure Laterality Date   • APPENDECTOMY     • BACK SURGERY      LOW BACK DISC   • CATARACT EXTRACTION     • HYSTERECTOMY     • NECK SURGERY      NECK DISC   • TONSILLECTOMY       PT Assessment (last 12 hours)     PT Evaluation and Treatment     Row Name 22 7228          Physical Therapy Time and Intention    Subjective Information complains of;weakness;pain  -RH     Document Type therapy note (daily note)  -RH     Mode of Treatment physical therapy;individual therapy  -RH     Patient Effort fair  -RH     Row Name 22 7836          Pain Scale: FACES Pre/Post-Treatment    Pain: FACES Scale, Pretreatment 2-->hurts  little bit  -RH     Posttreatment Pain Rating --  3/10  -RH     Pain Location - Side/Orientation Left  -RH     Pain Location - knee  -RH     Row Name 07/05/22 0940          Bed Mobility    Bed Mobility supine-sit  -RH     Supine-Sit [place] (Bed Mobility) minimum assist (75% patient effort)  -     Assistive Device (Bed Mobility) bed rails  -RH     Comment, (Bed Mobility) Pt maintains sitting with SBA.  -     Row Name 07/05/22 0940          Transfers    Transfers sit-stand transfer;stand-sit transfer  -     Sit-Stand [place] (Transfers) moderate assist (50% patient effort)  -     Stand-Sit [place] (Transfers) moderate assist (50% patient effort)  -     Row Name 07/05/22 0940          Sit-Stand Transfer    Assistive Device (Sit-Stand Transfers) walker, front-wheeled  -     Comment, (Sit-Stand Transfer) Pt not tolerating weight-bearing on her L knee well.  -     Row Name 07/05/22 0940          Stand-Sit Transfer    Assistive Device (Stand-Sit Transfers) walker, front-wheeled  -RH     Row Name 07/05/22 0940          Gait/Stairs (Locomotion)    [place] Level (Gait) minimum assist (75% patient effort);moderate assist (50% patient effort)  -     Assistive Device (Gait) walker, front-wheeled  -RH     Distance in Feet (Gait) 2  -RH     Pattern (Gait) 3-point;step-to  -RH     Deviations/Abnormal Patterns (Gait) festinating/shuffling;gait speed decreased;stride length decreased;antalgic  -     Left Sided Gait Deviations knee buckling, left side  -RH     Gait Assessment/Intervention Pt with shuffling steps with partial buckling of the L knee.  -     Row Name             Wound 07/03/22 1900 Right anterior groin MASD (Moisture associated skin damage)    Wound - Properties Group Placement Date: 07/03/22  -KR Placement Time: 1900  -KR Side: Right  -KR Orientation: anterior  -KR Location: groin  -KR Primary Wound Type: MASD  -KR     Retired Wound - Properties Group Placement Date: 07/03/22   -KR Placement Time: 1900 -KR Side: Right  -KR Orientation: anterior  -KR Location: groin  -KR Primary Wound Type: MASD  -KR     Retired Wound - Properties Group Date first assessed: 07/03/22  -KR Time first assessed: 1900 -KR Side: Right  -KR Location: groin  -KR Primary Wound Type: MASD  -KR     Row Name 07/05/22 0940          Vital Signs    O2 Delivery Intra Treatment room air  -RH     Row Name 07/05/22 0940          Progress Summary (PT)    Progress Toward Functional Goals (PT) progress toward functional goals is gradual  -RH           User Key  (r) = Recorded By, (t) = Taken By, (c) = Cosigned By    Initials Name Provider Type    Dalia Denson, RN Registered Nurse     Trung Ridley PTA Physical Therapist Assistant               Bilateral Lower Extremity   Exercise  Reps  Sets    Short arc quads   10 2   Heel slides  10 2   Ankle pumps  10 2   Quad sets  10 2   Straight leg raise  10 2   Hip ab/adduction 10 2        Physical Therapy Education                 Title: PT OT SLP Therapies (In Progress)     Topic: Physical Therapy (In Progress)     Point: Mobility training (Done)     Learning Progress Summary           Patient Acceptance, E,TB, VU by  at 7/2/2022 1253                   Point: Home exercise program (Not Started)     Learner Progress:  Not documented in this visit.          Point: Body mechanics (Done)     Learning Progress Summary           Patient Acceptance, E,TB, VU,NR by  at 7/4/2022 1627   Family Acceptance, E,TB, VU,NR by  at 7/4/2022 1627                   Point: Precautions (Not Started)     Learner Progress:  Not documented in this visit.                      User Key     Initials Effective Dates Name Provider Type [facility] 06/16/21 -  Sylvia Fong, RN Registered Nurse Nurse     06/11/21 -  Feng Carey, ANDRÉS Physical Therapist PT              PT Recommendation and Plan     Progress Summary (PT)  Progress Toward Functional Goals (PT): progress toward functional goals  is gradual   Outcome Measures     Row Name 07/05/22 0949 07/04/22 1200 07/02/22 1200       How much help from another person do you currently need...    Turning from your back to your side while in flat bed without using bedrails? 4  -RH 4  -RH 3  -AV    Moving from lying on back to sitting on the side of a flat bed without bedrails? 3  -RH 3  -RH 3  -AV    Moving to and from a bed to a chair (including a wheelchair)? 2  -RH 2  -RH 3  -AV    Standing up from a chair using your arms (e.g., wheelchair, bedside chair)? 2  -RH 2  -RH 3  -AV    Climbing 3-5 steps with a railing? 1  -RH 1  -RH 3  -AV    To walk in hospital room? 2  -RH 3  -RH 3  -AV    AM-PAC 6 Clicks Score (PT) 14  -RH 15  -RH 18  -AV       Functional Assessment    Outcome Measure Options -- -- AM-PAC 6 Clicks Basic Mobility (PT)  -AV          User Key  (r) = Recorded By, (t) = Taken By, (c) = Cosigned By    Initials Name Provider Type    RH Trung Ridley PTA Physical Therapist Assistant    AV Feng Carey, PT Physical Therapist                 Time Calculation:    PT Charges     Row Name 07/05/22 0939             Time Calculation    PT Received On 07/05/22  -RH              Timed Charges    25022 - PT Therapeutic Exercise Minutes 24  -RH      84638 - Gait Training Minutes  2  -RH      37263 - PT Therapeutic Activity Minutes 12  -RH              Total Minutes    Timed Charges Total Minutes 38  -RH       Total Minutes 38  -RH            User Key  (r) = Recorded By, (t) = Taken By, (c) = Cosigned By    Initials Name Provider Type     Trung Ridley PTA Physical Therapist Assistant              Therapy Charges for Today     Code Description Service Date Service Provider Modifiers Qty    01218316331 HC PT THERAPEUTIC ACT EA 15 MIN 7/4/2022 Trung Ridley PTA GP 2    33420439550 HC PT THER PROC EA 15 MIN 7/5/2022 Trung Ridley PTA GP 2    82592201713 HC PT THERAPEUTIC ACT EA 15 MIN 7/5/2022 Trung Ridley PTA GP 1          PT G-Codes  Outcome Measure  Options: AM-PAC 6 Clicks Daily Activity (OT), Optimal Instrument  AM-PAC 6 Clicks Score (PT): 14  AM-PAC 6 Clicks Score (OT): 21    Trung Ridley, ANNE  7/5/2022

## 2022-07-05 NOTE — PLAN OF CARE
Goal Outcome Evaluation:           Progress: no change  Outcome Evaluation: VSS. Patient postive for UTI, MD aware, see new orders. Patient alert but disoriented to time. Difficult ambulation and transferring and patient having difficulty following commands. No complaints, awaiting rehab placement

## 2022-07-05 NOTE — CONSULTS
Patient states that she was diagnosed with diabetes approximately 10 years ago. Patient states she was previously prescribed a different dose of metformin and took it three times daily, but the dose was reduced some time ago. Provided education on how metformin works, and the ability to increase her dose once again.     Patient HbA1c has increased every year, starting in January of 2019. HbA1c was 5.7% on 1/14/19, 6.8% on 12/9/19, 7.27% on 7/19/21, 8.82% on 1/31/22, and currently 9%. Patient motivated to avoid insulin and is amenable to increasing metformin dose, and adding another oral agent if needed.   62y male above PMH known to me BIBEMS intoxicated c/o leg swelling, PE as above, reassessed after more awake, states leg swelling worse than usual (though appears similar to prior), refuses duplex, labs appreciated, will replace K and give dose lasix, will d/c home (pt not ambulatory at baseline and has no interest in detox/STR placement)

## 2022-07-05 NOTE — SIGNIFICANT NOTE
07/05/22 1115   Coping/Psychosocial   Observed Emotional State calm;cooperative   Verbalized Emotional State hopefulness   Trust Relationship/Rapport empathic listening provided   Involvement in Care interacting with patient   Additional Documentation Spiritual Care (Group)   Spiritual Care   Use of Spiritual Resources non-Uatsdin use of spiritual care   Spiritual Care Source  initiative   Spiritual Care Follow-Up follow-up, none required as presently assessed   Response to Spiritual Care engaged in conversation   Spiritual Care Interventions supportive conversation provided   Spiritual Care Visit Type initial   Receptivity to Spiritual Care visit welcomed

## 2022-07-05 NOTE — PLAN OF CARE
Goal Outcome Evaluation:  Plan of Care Reviewed With: patient, family        Progress: improving  Outcome Evaluation: Patient has been up to chair all day.  Tolerating well.  Patient waiting for Covid result for transfer to SNF

## 2022-07-06 LAB
BACTERIA SPEC AEROBE CULT: ABNORMAL
GLUCOSE BLDC GLUCOMTR-MCNC: 154 MG/DL (ref 70–99)
GLUCOSE BLDC GLUCOMTR-MCNC: 193 MG/DL (ref 70–99)
GLUCOSE BLDC GLUCOMTR-MCNC: 198 MG/DL (ref 70–99)
GLUCOSE BLDC GLUCOMTR-MCNC: 234 MG/DL (ref 70–99)

## 2022-07-06 PROCEDURE — 63710000001 INSULIN LISPRO (HUMAN) PER 5 UNITS: Performed by: INTERNAL MEDICINE

## 2022-07-06 PROCEDURE — 82962 GLUCOSE BLOOD TEST: CPT

## 2022-07-06 PROCEDURE — 63710000001 INSULIN DETEMIR PER 5 UNITS: Performed by: INTERNAL MEDICINE

## 2022-07-06 PROCEDURE — 25010000002 CEFTRIAXONE PER 250 MG: Performed by: PHYSICIAN ASSISTANT

## 2022-07-06 PROCEDURE — 99232 SBSQ HOSP IP/OBS MODERATE 35: CPT | Performed by: FAMILY MEDICINE

## 2022-07-06 PROCEDURE — 94799 UNLISTED PULMONARY SVC/PX: CPT

## 2022-07-06 PROCEDURE — 25010000002 ENOXAPARIN PER 10 MG: Performed by: INTERNAL MEDICINE

## 2022-07-06 RX ADMIN — METOPROLOL TARTRATE 25 MG: 25 TABLET, FILM COATED ORAL at 09:17

## 2022-07-06 RX ADMIN — INSULIN DETEMIR 10 UNITS: 100 INJECTION, SOLUTION SUBCUTANEOUS at 21:40

## 2022-07-06 RX ADMIN — INSULIN LISPRO 2 UNITS: 100 INJECTION, SOLUTION INTRAVENOUS; SUBCUTANEOUS at 17:21

## 2022-07-06 RX ADMIN — LEVETIRACETAM 500 MG: 500 TABLET, FILM COATED ORAL at 10:41

## 2022-07-06 RX ADMIN — Medication 10 ML: at 21:39

## 2022-07-06 RX ADMIN — CEFTRIAXONE SODIUM 1 G: 1 INJECTION, SOLUTION INTRAVENOUS at 21:39

## 2022-07-06 RX ADMIN — ATORVASTATIN CALCIUM 80 MG: 40 TABLET, FILM COATED ORAL at 21:39

## 2022-07-06 RX ADMIN — INSULIN LISPRO 2 UNITS: 100 INJECTION, SOLUTION INTRAVENOUS; SUBCUTANEOUS at 09:18

## 2022-07-06 RX ADMIN — INSULIN LISPRO 3 UNITS: 100 INJECTION, SOLUTION INTRAVENOUS; SUBCUTANEOUS at 12:24

## 2022-07-06 RX ADMIN — CLOPIDOGREL BISULFATE 75 MG: 75 TABLET ORAL at 09:17

## 2022-07-06 RX ADMIN — LISINOPRIL 10 MG: 10 TABLET ORAL at 10:41

## 2022-07-06 RX ADMIN — FLUOXETINE 60 MG: 20 CAPSULE ORAL at 10:41

## 2022-07-06 RX ADMIN — ENOXAPARIN SODIUM 40 MG: 100 INJECTION SUBCUTANEOUS at 21:39

## 2022-07-06 RX ADMIN — METOPROLOL TARTRATE 25 MG: 25 TABLET, FILM COATED ORAL at 21:39

## 2022-07-06 RX ADMIN — Medication 10 ML: at 09:18

## 2022-07-06 RX ADMIN — INSULIN DETEMIR 10 UNITS: 100 INJECTION, SOLUTION SUBCUTANEOUS at 09:25

## 2022-07-06 RX ADMIN — ASPIRIN 81 MG CHEWABLE TABLET 81 MG: 81 TABLET CHEWABLE at 09:17

## 2022-07-06 RX ADMIN — DOCUSATE SODIUM 100 MG: 100 CAPSULE, LIQUID FILLED ORAL at 09:18

## 2022-07-06 NOTE — PLAN OF CARE
Goal Outcome Evaluation:  Plan of Care Reviewed With: patient           Outcome Evaluation: no complaints of pain or soa today. pt [facility].

## 2022-07-06 NOTE — PROGRESS NOTES
[facility]   Hospitalist Progress Note  Date: 2022  Patient Name: Haylee Gibbs  : 1950  MRN: [id number]  Date of admission: 2022  Consultants:   -Neurology: Dr. Wes Connelly    Subjective   Subjective     Chief Complaint: Left-sided weakness and facial droop    Summary:   Haylee Gibbs is a 72 y.o. female with essential hypertension, COPD, depression, type 2 diabetes mellitus, hyperlipidemia, seizure disorder who presented to the ED with left-sided facial droop and left-sided weakness.  Eval in ED significant for MRI showing 9 mm focal area of restricted diffusion in posterior limb of right internal capsule consistent with acute lacunar infarct.  Hospital service contacted for further evaluation management.  Neurology consulted.  Echocardiogram showed EF: 55.9% and patient to have diastolic dysfunction.  Initially patient wished to discharge home with home health, however, patient with decreased mobility and ability to care for self and safe discharge home not possible and is now seeking rehab placement.    Interval Followup:   Patient lying in bed resting comfortably.  Patient states that she feels like she is slowly improving her strength though frustrated that she does not feel like her body knows what to do.  Patient denies any new issues eager to get to rehab.  No acute events overnight.   She denied any chest pain, shortness of breath, abdominal pain, nausea or vomiting.  Patient had a bed on skilled nursing facility but COVID detected by PCR prior to transfer.  Patient asymptomatic for infection satting well on room air no cough no shortness of breath no fever no chills.  Patient placed in isolation.  Nursing with no additional acute issues to report.    Review of Systems  Constitutional: Negative for fatigue and fever.   HENT: Negative for sore throat and trouble swallowing.    Eyes: Negative for pain and discharge.   Respiratory: Negative for cough and shortness of breath.     Cardiovascular: Negative for chest pain and palpitations.   Gastrointestinal: Negative for abdominal pain, nausea and vomiting.   Endocrine: Negative for cold intolerance and heat intolerance.   Genitourinary: Negative for difficulty urinating and dysuria.   Musculoskeletal: Negative for back pain and neck stiffness.   Skin: Negative for color change and rash.   Neurological: Negative for syncope and headaches.   Hematological: Negative for adenopathy.   Psychiatric/Behavioral: Negative for confusion and hallucinations.    Objective   Objective     Vitals:   Temp:  [97.6 °F (36.4 °C)-99.3 °F (37.4 °C)] 98.1 °F (36.7 °C)  Heart Rate:  [53-90] 57  Resp:  [18-22] 22  BP: (127-151)/(51-90) 136/57  Physical Exam   Gen: No acute distress, Conversant, Pleasant, sitting up in bedside chair  HEENT: MMM, Atraumatic  Neck: Supple, Trachea midline  Resp: CTAB, No w/r/r, good aeration, equal chest rise bilaterally, no increase in work of breathing  Card: RRR, No m/r/g  Abd: Soft, Nontender, Nondistended, + bowel sounds  Ext: No cyanosis, No clubbing  Neuro: CN II-XII grossly intact, right lower extremity 4 out of 5 strength on flexion extension at the hip and knee  Psych: AAO x 3, Normal mood, Normal affect     Result Review    Result Review:  I have personally reviewed the results from the time of this admission to 7/5/2022 21:38 EDT and agree with these findings:  [x]  Laboratory:   CBC:      Lab 07/05/22  0419 07/04/22  0453 07/03/22  0433 07/02/22  0457 07/01/22  1820   WBC 9.27 9.32 9.19 7.11 10.26   HEMOGLOBIN 12.3 11.6* 11.7* 12.0 12.2   HEMATOCRIT 37.5 35.2 36.5 37.3 37.2   PLATELETS 282 239 247 239 293   NEUTROS ABS  --   --   --   --  7.37*   IMMATURE GRANS (ABS)  --   --   --   --  0.07*   LYMPHS ABS  --   --   --   --  1.88   MONOS ABS  --   --   --   --  0.85   EOS ABS  --   --   --   --  0.06   MCV 92.6 92.6 93.6 95.2 94.7     CMP:      Lab 07/05/22  0419 07/04/22  0453 07/03/22  0433 07/02/22  0457  07/01/22  2302 07/01/22  1820   SODIUM 130* 129* 134* 134*  --  134*   POTASSIUM 4.6 4.2 4.7 4.1  --  4.5   CHLORIDE 94* 94* 98 98  --  97*   CO2 21.6* 22.4 23.3 22.6  --  24.1   ANION GAP 14.4 12.6 12.7 13.4  --  12.9   BUN 22 14 16 11  --  15   CREATININE 1.06* 0.81 0.95 0.78  --  1.11*   EGFR 55.6* 76.8 63.4 80.3  --  52.6*   GLUCOSE 191* 210* 249* 214*  --  197*   CALCIUM 9.4 9.4 9.2 9.6  --  10.1   MAGNESIUM 2.0 1.9 1.8 1.8 1.8  --    PHOSPHORUS  --   --  3.1 3.5 3.5  --    TOTAL PROTEIN  --   --   --   --   --  7.3   ALBUMIN  --   --   --   --   --  4.20   GLOBULIN  --   --   --   --   --  3.1   ALT (SGPT)  --   --   --   --   --  49*   AST (SGOT)  --   --   --   --   --  61*   BILIRUBIN  --   --   --   --   --  0.7   ALK PHOS  --   --   --   --   --  66       [x]  Microbiology:   Microbiology Results (last 10 days)     Procedure Component Value - Date/Time    COVID PRE-OP / PRE-PROCEDURE SCREENING ORDER (NO ISOLATION) - Swab, Nasopharynx [[id number]]  (Abnormal) Collected: 07/05/22 1002    Lab Status: Final result Specimen: Swab from Nasopharynx Updated: 07/05/22 1803    Narrative:      The following orders were created for panel order COVID PRE-OP / PRE-PROCEDURE SCREENING ORDER (NO ISOLATION) - Swab, Nasopharynx.  Procedure                               Abnormality         Status                     ---------                               -----------         ------                     COVID-19,APTIMA PANTHER(...[[id number]]  Abnormal            Final result                 Please view results for these tests on the individual orders.    COVID-19,APTIMA PANTHER(CARLOS), DANIEL/ SUNDAY, NP/OP SWAB IN UTM/VTM/SALINE TRANSPORT MEDIA,24 HR TAT - Swab, Nasopharynx [[id number]]  (Abnormal) Collected: 07/05/22 1002    Lab Status: Final result Specimen: Swab from Nasopharynx Updated: 07/05/22 1803     COVID19 Detected    Narrative:      Fact sheet for providers: https://www.fda.gov/media/423000/download     Fact sheet for  patients: https://www.[place].gov/media/845417/download    Test performed by RT PCR.    Urine Culture - Urine, Urine, Clean Catch [[id number]]  (Abnormal) Collected: 07/04/22 1937    Lab Status: Preliminary result Specimen: Urine, Clean Catch Updated: 07/05/22 1444     Urine Culture >100,000 CFU/mL Gram Negative Bacilli    Narrative:      Colonization of the urinary tract without infection is common. Treatment is discouraged unless the patient is symptomatic, pregnant, or undergoing an invasive urologic procedure.          [x]  Radiology:  CT Angiogram Neck    Result Date: 7/3/2022    1. 57% stenosis of the proximal right ICA. 2. Narrowing of the V4 segments of both vertebral arteries. 3. Narrowing of the distal M1 segment of the right MCA.  Narrowing of the proximal M1 segment of the left MCA. 4. Narrowing of bilateral posterior cerebral arteries.     SANFORD SINGLETON MD       Electronically Signed and Approved By: SANFORD SINGLETON MD on 7/03/2022 at 18:13             MRI Brain Without Contrast    Result Date: 7/1/2022    1. 9 mm focal area of restricted diffusion in the posterior limb of the right internal capsule consistent with an acute lacunar infarct. 2. Volume loss secondary to cerebral atrophy. 3. Chronic microvascular ischemia.      SIDNEY HARRELL MD       Electronically Signed and Approved By: SIDNEY HARRELL MD on 7/01/2022 at 20:17             XR Chest 1 View    Result Date: 7/1/2022   No active disease.       SIDNEY HARRELL MD       Electronically Signed and Approved By: SIDNEY HARRELL MD on 7/01/2022 at 19:02             CT Angiogram Head    Result Date: 7/3/2022    1. 57% stenosis of the proximal right ICA. 2. Narrowing of the V4 segments of both vertebral arteries. 3. Narrowing of the distal M1 segment of the right MCA.  Narrowing of the proximal M1 segment of the left MCA. 4. Narrowing of bilateral posterior cerebral arteries.     SANFORD SINGLETON MD       Electronically Signed and Approved By: SANFORD SINGLETON MD on  7/03/2022 at 18:13             CT Head Without Contrast Stroke Protocol    Result Date: 7/1/2022    1. No acute findings. 2. Mild volume loss secondary to cerebral atrophy. 3. Chronic ischemic changes.     SIDNEY HARRELL MD       Electronically Signed and Approved By: SIDNEY HARRELL MD on 7/01/2022 at 18:24               [x]  EKG/Telemetry: NSVT.  Sinus rhythm.  Tachycardia at times.  []  Cardiology/Vascular:   []  Pathology:  []  Old records:  []  Other:    Assessment & Plan   Assessment / Plan     Assessment:  Acute lacunar infarct  Type 2 diabetes mellitus with hyperglycemia  Acute urinary tract infection  Acute renal failure, resolved  Essential hypertension  Hyponatremia, new unknown clinical significance  COPD, not acutely exacerbated  Anxiety/depression  Hyperlipidemia  History of seizure disorder  Chronic diastolic heart failure, not acutely exacerbated  Obesity with BMI: 35.65  COVID-19 infection asymptomatic    Plan:  -Neurology consulted and following, appreciate assistance and recommendations in the care of this patient.  -Continue aspirin, statin and Plavix  -Continue to titrate Levemir as needed to improve blood glucose control.  -Continue SSI.    -Continue ceftriaxone and de-escalate based on cultures  -Continue home fluoxetine and Keppra  -Continue home lisinopril and metoprolol and titrate as needed  -Continue physical therapy occupational therapy.  -No indication for antivirals or steroids for treatment of COVID-19 at this time as patient is asymptomatic and satting well on room air and positive PCR could represent a prior infection.  -Patient not safe to return home on her own, has episodes of intermittent confusion and decreased ability to ambulate safely.  PT/OT consulted.  Patient interested in rehab placement though her COVID status presents challenges to this.  -Continue appropriate home medications  -Clinical course will dictate further management     DVT Prophylaxis: Lovenox  Diet:  Diabetic/cardiac  Dispo: PT/OT consulted  Code Status: Full code     Personally reviewed patients labs and imaging, discussed with patient and nurse at bedside. Discussed case with the following consultants: Neurology.     Part of this note may be an electronic transcription/translation of spoken language to printed text using the Dragon dictation system.    DVT prophylaxis:  Medical and mechanical DVT prophylaxis orders are present.    CODE STATUS:   Level Of Support Discussed With: Patient  Code Status (Patient has no pulse and is not breathing): CPR (Attempt to Resuscitate)  Medical Interventions (Patient has pulse or is breathing): Full Support

## 2022-07-06 NOTE — SIGNIFICANT NOTE
07/06/22 6950   Plan   Plan [facility] has a bed available tomorrow and can accept patient. Family is agreeable for bed. SW notified MD. Patient to discharge to [facility] tomorrow.

## 2022-07-07 VITALS
SYSTOLIC BLOOD PRESSURE: 116 MMHG | OXYGEN SATURATION: 100 % | HEIGHT: 64 IN | WEIGHT: 207.67 LBS | DIASTOLIC BLOOD PRESSURE: 63 MMHG | RESPIRATION RATE: 20 BRPM | TEMPERATURE: 98.4 F | HEART RATE: 51 BPM | BODY MASS INDEX: 35.45 KG/M2

## 2022-07-07 LAB
GLUCOSE BLDC GLUCOMTR-MCNC: 163 MG/DL (ref 70–99)
GLUCOSE BLDC GLUCOMTR-MCNC: 172 MG/DL (ref 70–99)

## 2022-07-07 PROCEDURE — 63710000001 INSULIN DETEMIR PER 5 UNITS: Performed by: INTERNAL MEDICINE

## 2022-07-07 PROCEDURE — 63710000001 INSULIN LISPRO (HUMAN) PER 5 UNITS: Performed by: INTERNAL MEDICINE

## 2022-07-07 PROCEDURE — 82962 GLUCOSE BLOOD TEST: CPT

## 2022-07-07 PROCEDURE — 99239 HOSP IP/OBS DSCHRG MGMT >30: CPT | Performed by: FAMILY MEDICINE

## 2022-07-07 RX ORDER — ACETAMINOPHEN 325 MG/1
650 TABLET ORAL EVERY 6 HOURS PRN
Status: DISCONTINUED | OUTPATIENT
Start: 2022-07-07 | End: 2022-07-07 | Stop reason: HOSPADM

## 2022-07-07 RX ADMIN — FLUOXETINE 60 MG: 20 CAPSULE ORAL at 08:06

## 2022-07-07 RX ADMIN — CLOPIDOGREL BISULFATE 75 MG: 75 TABLET ORAL at 08:06

## 2022-07-07 RX ADMIN — INSULIN LISPRO 2 UNITS: 100 INJECTION, SOLUTION INTRAVENOUS; SUBCUTANEOUS at 11:59

## 2022-07-07 RX ADMIN — LEVETIRACETAM 500 MG: 500 TABLET, FILM COATED ORAL at 08:06

## 2022-07-07 RX ADMIN — ASPIRIN 81 MG CHEWABLE TABLET 81 MG: 81 TABLET CHEWABLE at 08:06

## 2022-07-07 RX ADMIN — METOPROLOL TARTRATE 25 MG: 25 TABLET, FILM COATED ORAL at 08:06

## 2022-07-07 RX ADMIN — INSULIN LISPRO 2 UNITS: 100 INJECTION, SOLUTION INTRAVENOUS; SUBCUTANEOUS at 08:07

## 2022-07-07 RX ADMIN — INSULIN DETEMIR 10 UNITS: 100 INJECTION, SOLUTION SUBCUTANEOUS at 08:06

## 2022-07-07 RX ADMIN — ACETAMINOPHEN 650 MG: 325 TABLET ORAL at 11:22

## 2022-07-07 RX ADMIN — Medication 10 ML: at 08:07

## 2022-07-07 RX ADMIN — LISINOPRIL 10 MG: 10 TABLET ORAL at 08:06

## 2022-07-07 NOTE — PROGRESS NOTES
[facility]   Hospitalist Progress Note  Date: 2022  Patient Name: Haylee Gibbs  : 1950  MRN: [id number]  Date of admission: 2022  Consultants:   -Neurology: Dr. Wes Connelly    Subjective   Subjective     Chief Complaint: Left-sided weakness and facial droop    Summary:   Haylee Gibbs is a 72 y.o. female with essential hypertension, COPD, depression, type 2 diabetes mellitus, hyperlipidemia, seizure disorder who presented to the ED with left-sided facial droop and left-sided weakness.  Eval in ED significant for MRI showing 9 mm focal area of restricted diffusion in posterior limb of right internal capsule consistent with acute lacunar infarct.  Hospital service contacted for further evaluation management.  Neurology consulted.  Echocardiogram showed EF: 55.9% and patient to have diastolic dysfunction.  Initially patient wished to discharge home with home health, however, patient with decreased mobility and ability to care for self and safe discharge home not possible and pursued rehab placement. Patient had a bed on skilled nursing facility but COVID detected by PCR prior to transfer.  Patient asymptomatic for infection satting well on room air no cough no shortness of breath no fever no chills.  Patient placed in isolation.    Interval Followup:   Patient lying in bed resting comfortably with family at the bedside.  Patient states that she still feels like she is slowly improving.  Patient denies any new issues eager to get to rehab.  No acute events overnight.   She denied any chest pain, shortness of breath, abdominal pain, nausea or vomiting.    Nursing with no additional acute issues to report.  Patient has a bed at [facility] tomorrow.    Review of Systems  Constitutional: Negative for fatigue and fever.   HENT: Negative for sore throat and trouble swallowing.    Eyes: Negative for pain and discharge.   Respiratory: Negative for cough and shortness of breath.    Cardiovascular:  Negative for chest pain and palpitations.   Gastrointestinal: Negative for abdominal pain, nausea and vomiting.   Endocrine: Negative for cold intolerance and heat intolerance.   Genitourinary: Negative for difficulty urinating and dysuria.   Musculoskeletal: Negative for back pain and neck stiffness.   Skin: Negative for color change and rash.   Neurological: Negative for syncope and headaches.   Hematological: Negative for adenopathy.   Psychiatric/Behavioral: Negative for confusion and hallucinations.    Objective   Objective     Vitals:   Temp:  [97.3 °F (36.3 °C)-99 °F (37.2 °C)] 98.8 °F (37.1 °C)  Heart Rate:  [50-58] 55  Resp:  [20-22] 20  BP: (112-167)/(42-70) 112/70  Physical Exam   Gen: No acute distress, Conversant, Pleasant, sitting up in bedside chair  HEENT: MMM, Atraumatic  Neck: Supple, Trachea midline  Resp: CTAB, No w/r/r, good aeration, equal chest rise bilaterally, no increase in work of breathing  Card: RRR, No m/r/g  Abd: Soft, Nontender, Nondistended, + bowel sounds  Ext: No cyanosis, No clubbing  Neuro: CN II-XII grossly intact, right lower extremity 4 out of 5 strength on flexion extension at the hip and knee  Psych: AAO x 3, Normal mood, Normal affect     Result Review    Result Review:  I have personally reviewed the results from the time of this admission to 7/6/2022 21:06 EDT and agree with these findings:  [x]  Laboratory:   CBC:      Lab 07/05/22  0419 07/04/22  0453 07/03/22  0433 07/02/22  0457 07/01/22  1820   WBC 9.27 9.32 9.19 7.11 10.26   HEMOGLOBIN 12.3 11.6* 11.7* 12.0 12.2   HEMATOCRIT 37.5 35.2 36.5 37.3 37.2   PLATELETS 282 239 247 239 293   NEUTROS ABS  --   --   --   --  7.37*   IMMATURE GRANS (ABS)  --   --   --   --  0.07*   LYMPHS ABS  --   --   --   --  1.88   MONOS ABS  --   --   --   --  0.85   EOS ABS  --   --   --   --  0.06   MCV 92.6 92.6 93.6 95.2 94.7     CMP:        Lab 07/05/22  0419 07/04/22  0453 07/03/22  0433 07/02/22  0457 07/01/22  2302 07/01/22  1823    SODIUM 130* 129* 134* 134*  --  134*   POTASSIUM 4.6 4.2 4.7 4.1  --  4.5   CHLORIDE 94* 94* 98 98  --  97*   CO2 21.6* 22.4 23.3 22.6  --  24.1   ANION GAP 14.4 12.6 12.7 13.4  --  12.9   BUN 22 14 16 11  --  15   CREATININE 1.06* 0.81 0.95 0.78  --  1.11*   EGFR 55.6* 76.8 63.4 80.3  --  52.6*   GLUCOSE 191* 210* 249* 214*  --  197*   CALCIUM 9.4 9.4 9.2 9.6  --  10.1   MAGNESIUM 2.0 1.9 1.8 1.8 1.8  --    PHOSPHORUS  --   --  3.1 3.5 3.5  --    TOTAL PROTEIN  --   --   --   --   --  7.3   ALBUMIN  --   --   --   --   --  4.20   GLOBULIN  --   --   --   --   --  3.1   ALT (SGPT)  --   --   --   --   --  49*   AST (SGOT)  --   --   --   --   --  61*   BILIRUBIN  --   --   --   --   --  0.7   ALK PHOS  --   --   --   --   --  66       [x]  Microbiology:   Microbiology Results (last 10 days)     Procedure Component Value - Date/Time    COVID PRE-OP / PRE-PROCEDURE SCREENING ORDER (NO ISOLATION) - Swab, Nasopharynx [[id number]]  (Abnormal) Collected: 07/05/22 1002    Lab Status: Final result Specimen: Swab from Nasopharynx Updated: 07/05/22 1803    Narrative:      The following orders were created for panel order COVID PRE-OP / PRE-PROCEDURE SCREENING ORDER (NO ISOLATION) - Swab, Nasopharynx.  Procedure                               Abnormality         Status                     ---------                               -----------         ------                     COVID-19,APTIMA PANTHER(...[[id number]]  Abnormal            Final result                 Please view results for these tests on the individual orders.    COVID-19,APTIMA PANTHER(CARLOS), DANIEL/ SUNDAY, NP/OP SWAB IN UTM/VTM/SALINE TRANSPORT MEDIA,24 HR TAT - Swab, Nasopharynx [[id number]]  (Abnormal) Collected: 07/05/22 1002    Lab Status: Final result Specimen: Swab from Nasopharynx Updated: 07/05/22 1803     COVID19 Detected    Narrative:      Fact sheet for providers: https://www.fda.gov/media/831106/download     Fact sheet for patients:  https://www.fda.gov/media/431858/download    Test performed by RT PCR.    Urine Culture - Urine, Urine, Clean Catch [[id number]]  (Abnormal)  (Susceptibility) Collected: 07/04/22 1937    Lab Status: Final result Specimen: Urine, Clean Catch Updated: 07/06/22 0715     Urine Culture >100,000 CFU/mL Escherichia coli    Narrative:      Colonization of the urinary tract without infection is common. Treatment is discouraged unless the patient is symptomatic, pregnant, or undergoing an invasive urologic procedure.    Susceptibility      Escherichia coli      SARAH      Ampicillin Susceptible      Ampicillin + Sulbactam Susceptible      Cefazolin Susceptible      Cefepime Susceptible      Ceftazidime Susceptible      Ceftriaxone Susceptible      Gentamicin Susceptible      Levofloxacin Resistant     Nitrofurantoin Susceptible      Piperacillin + Tazobactam Susceptible      Trimethoprim + Sulfamethoxazole Susceptible                                 [x]  Radiology:  CT Angiogram Neck    Result Date: 7/3/2022    1. 57% stenosis of the proximal right ICA. 2. Narrowing of the V4 segments of both vertebral arteries. 3. Narrowing of the distal M1 segment of the right MCA.  Narrowing of the proximal M1 segment of the left MCA. 4. Narrowing of bilateral posterior cerebral arteries.     SANFORD SINGLETON MD       Electronically Signed and Approved By: SANFORD SINGLETON MD on 7/03/2022 at 18:13             MRI Brain Without Contrast    Result Date: 7/1/2022    1. 9 mm focal area of restricted diffusion in the posterior limb of the right internal capsule consistent with an acute lacunar infarct. 2. Volume loss secondary to cerebral atrophy. 3. Chronic microvascular ischemia.      SIDNEY HARRELL MD       Electronically Signed and Approved By: SIDNEY HARRELL MD on 7/01/2022 at 20:17             XR Chest 1 View    Result Date: 7/1/2022   No active disease.       SIDNEY HARRELL MD       Electronically Signed and Approved By: SIDNEY HARRELL MD on 7/01/2022 at  19:02             CT Angiogram Head    Result Date: 7/3/2022    1. 57% stenosis of the proximal right ICA. 2. Narrowing of the V4 segments of both vertebral arteries. 3. Narrowing of the distal M1 segment of the right MCA.  Narrowing of the proximal M1 segment of the left MCA. 4. Narrowing of bilateral posterior cerebral arteries.     SANFORD SINGLETON MD       Electronically Signed and Approved By: SANFORD SINGLETON MD on 7/03/2022 at 18:13             CT Head Without Contrast Stroke Protocol    Result Date: 7/1/2022    1. No acute findings. 2. Mild volume loss secondary to cerebral atrophy. 3. Chronic ischemic changes.     SIDNEY HARRELL MD       Electronically Signed and Approved By: SIDNEY HARRELL MD on 7/01/2022 at 18:24               [x]  EKG/Telemetry: NSVT.  Sinus rhythm.  Tachycardia at times.  []  Cardiology/Vascular:   []  Pathology:  []  Old records:  []  Other:    Assessment & Plan   Assessment / Plan     Assessment:  Acute lacunar infarct  Type 2 diabetes mellitus with hyperglycemia  Acute urinary tract infection  Acute renal failure, resolved  Essential hypertension  Hyponatremia, new unknown clinical significance  COPD, not acutely exacerbated  Anxiety/depression  Hyperlipidemia  History of seizure disorder  Chronic diastolic heart failure, not acutely exacerbated  Obesity with BMI: 35.65  COVID-19 infection asymptomatic    Plan:  -Neurology consulted and following, appreciate assistance and recommendations in the care of this patient.  -Continue aspirin, statin and Plavix  -Continue to titrate Levemir as needed to improve blood glucose control.  -Continue SSI.    -Continue ceftriaxone and transition to oral antibiotics on discharge  -Continue home fluoxetine and Keppra  -Continue home lisinopril and metoprolol and titrate as needed  -Continue physical therapy occupational therapy.  -No indication for antivirals or steroids for treatment of COVID-19 at this time as patient is asymptomatic and satting well on  room air and positive PCR could represent a prior infection.  -Patient not safe to return home on her own, has episodes of intermittent confusion and decreased ability to ambulate safely.  PT/OT consulted.  Patient interested in rehab placement though her COVID status presents challenges to this.  -Continue appropriate home medications  -Clinical course will dictate further management     DVT Prophylaxis: Lovenox  Diet: Diabetic/cardiac  Dispo: Patient has a bed at [facility] tomorrow per discharge planning  Code Status: Full code     Personally reviewed patients labs and imaging, discussed with patient and nurse at bedside.      Part of this note may be an electronic transcription/translation of spoken language to printed text using the Dragon dictation system.    DVT prophylaxis:  Medical and mechanical DVT prophylaxis orders are present.    CODE STATUS:   Level Of Support Discussed With: Patient  Code Status (Patient has no pulse and is not breathing): CPR (Attempt to Resuscitate)  Medical Interventions (Patient has pulse or is breathing): Full Support

## 2022-07-07 NOTE — PLAN OF CARE
Goal Outcome Evaluation:  Plan of Care Reviewed With: patient        Progress: no change       Patient slept well throughout the night. Tolerated IV abt well. Denies pain at this time. Hopeful to d/c to rehab today. Vitals stable. No overnight concerns or events to report. Will continue to monitor and update as needed.

## 2022-07-07 NOTE — DISCHARGE SUMMARY
Psychiatric         HOSPITALIST  DISCHARGE SUMMARY    Patient Name: Haylee Gibbs  : 1950  MRN: [id number]    Date of Admission: 2022  Date of Discharge: 2022  Primary Care Physician: Kj Canales MD    Consults     Date and Time Order Name Status Description    2022 10:43 PM Inpatient Neurology Consult Stroke Completed     2022  8:27 PM Hospitalist (on-call MD unless specified)      2022  8:27 PM Inpatient Neurology Consult General            Active and Resolved Hospital Problems:  Acute lacunar infarct  Type 2 diabetes mellitus with hyperglycemia  Acute urinary tract infection  Acute renal failure, resolved  Essential hypertension  Hyponatremia, new unknown clinical significance  COPD, not acutely exacerbated  Anxiety/depression  Hyperlipidemia  History of seizure disorder  Chronic diastolic heart failure, not acutely exacerbated  Obesity with BMI: 35.65  COVID-19 infection asymptomatic  Active Hospital Problems    Diagnosis POA   • Acute lacunar stroke (HCC) [I63.81] Yes   • Type 2 diabetes mellitus with hyperglycemia, without long-term current use of insulin (HCC) [E11.65] Yes      Resolved Hospital Problems   No resolved problems to display.       Hospital Course     Hospital Course:  Haylee Gibbs is a 72 y.o. female with essential hypertension, COPD, depression, type 2 diabetes mellitus, hyperlipidemia, seizure disorder who presented to the ED with left-sided facial droop and left-sided weakness.  Eval in ED significant for MRI showing 9 mm focal area of restricted diffusion in posterior limb of right internal capsule consistent with acute lacunar infarct.  Hospital service contacted for further evaluation management.  Neurology consulted.  Echocardiogram showed EF: 55.9% and patient to have diastolic dysfunction.  Urine culture positive for E. coli completed course of antibiotics while inpatient. Initially patient wished to discharge home with home  health, however, patient with decreased mobility and ability to care for self and safe discharge home not possible and pursued rehab placement. Patient had a bed on skilled nursing facility but COVID detected by PCR prior to transfer.  Patient asymptomatic for infection satting well on room air no cough no shortness of breath no fever no chills.  Patient placed in isolation.  Patient remained asymptomatic.  Continue to work well physical therapy occupational therapy.  Patient seen evaluated on day of discharge stable for discharge to [facility] rehab hospital on aspirin Plavix atorvastatin to follow-up with primary care provider and neurology on discharge as below.        DISCHARGE Follow Up Recommendations for labs and diagnostics: As above      Day of Discharge     Vital Signs:  Temp:  [98.2 °F (36.8 °C)-98.8 °F (37.1 °C)] 98.8 °F (37.1 °C)  Heart Rate:  [52-58] 53  Resp:  [20] 20  BP: (112-146)/(47-86) 128/86  Physical Exam:   Gen: No acute distress, Conversant, Pleasant, sitting up in bedside chair  HEENT: MMM, Atraumatic  Neck: Supple, Trachea midline  Resp: CTAB, No w/r/r, good aeration, equal chest rise bilaterally, no increase in work of breathing  Card: RRR, No m/r/g  Abd: Soft, Nontender, Nondistended, + bowel sounds  Ext: No cyanosis, No clubbing  Neuro: CN II-XII grossly intact, right lower extremity 4 out of 5 strength on flexion extension at the hip and knee  Psych: AAO x 3, Normal mood, Normal affect       Discharge Details        Discharge Medications      New Medications      Instructions Start Date   atorvastatin 80 MG tablet  Commonly known as: LIPITOR   80 mg, Oral, Nightly      clopidogrel 75 MG tablet  Commonly known as: PLAVIX   75 mg, Oral, Daily         Changes to Medications      Instructions Start Date   levETIRAcetam 500 MG tablet  Commonly known as: KEPPRA  What changed: Another medication with the same name was changed. Make sure you understand how and when to take each.   1,000 mg,  Oral, Every Evening      levETIRAcetam 500 MG tablet  Commonly known as: KEPPRA  What changed: See the new instructions.   TAKE 1 TABLET BY MOUTH EVERY MORNING AND 2 TABLETS IN THE EVENING      lisinopril 10 MG tablet  Commonly known as: PRINIVILZESTRIL  What changed: how much to take   10 mg, Oral, Daily         Continue These Medications      Instructions Start Date   aspirin 81 MG chewable tablet   81 mg, Oral, Daily      FLUoxetine 20 MG capsule  Commonly known as: PROzac   60 mg, Oral, Daily      metFORMIN 500 MG tablet  Commonly known as: GLUCOPHAGE   500 mg, Oral, 2 Times Daily      metoprolol tartrate 25 MG tablet  Commonly known as: LOPRESSOR   25 mg, Oral, 2 Times Daily         Stop These Medications    ibuprofen 800 MG tablet  Commonly known as: ADVIL,MOTRIN            Allergies   Allergen Reactions   • Tramadol Hcl GI Intolerance       Dis[place] [facility] [place]:  Hospital:  Diet Order   Procedures   • Diet Regular; Consistent Carbohydrate, Cardiac       Discharge Activity:   Activity Instructions     Activity as Tolerated            CODE STATUS:  Code Status and Medical Interventions:   Ordered at: 07/01/22 2048     Level Of Support Discussed With:    Patient     Code Status (Patient has no pulse and is not breathing):    CPR (Attempt to Resuscitate)     Medical Interventions (Patient has pulse or is breathing):    Full Support         Future Appointments   Date Time Provider Department Center   7/29/2022  9:15 AM Kj Canales MD [facility]   7/29/2022 10:15 AM Kj Canales MD [facility]       Additional Instructions for the Follow-ups that You Need to Schedule     Discharge Follow-up with PCP   As directed       Currently Documented PCP:    Kj Canales MD    PCP Phone Number:    [phone]     Follow Up Details: Follow-up in 3 to 5 days         Discharge Follow-up with Specified Provider: Dr. Wes Connelly; 3 Weeks   As directed       To: Dr. Wes Connelly    Follow Up: 3 Weeks               Pertinent  and/or Most Recent Results     PROCEDURES:   None    LAB RESULTS:      Lab 07/05/22 0419 07/04/22 0453 07/03/22 0433 07/02/22 0457 07/01/22  1820   WBC 9.27 9.32 9.19 7.11 10.26   HEMOGLOBIN 12.3 11.6* 11.7* 12.0 12.2   HEMATOCRIT 37.5 35.2 36.5 37.3 37.2   PLATELETS 282 239 247 239 293   NEUTROS ABS  --   --   --   --  7.37*   IMMATURE GRANS (ABS)  --   --   --   --  0.07*   LYMPHS ABS  --   --   --   --  1.88   MONOS ABS  --   --   --   --  0.85   EOS ABS  --   --   --   --  0.06   MCV 92.6 92.6 93.6 95.2 94.7   PROTIME  --   --   --   --  14.2         Lab 07/05/22 0419 07/04/22 0453 07/03/22 0433 07/02/22 0457 07/01/22 2302 07/01/22  1820   SODIUM 130* 129* 134* 134*  --  134*   POTASSIUM 4.6 4.2 4.7 4.1  --  4.5   CHLORIDE 94* 94* 98 98  --  97*   CO2 21.6* 22.4 23.3 22.6  --  24.1   ANION GAP 14.4 12.6 12.7 13.4  --  12.9   BUN 22 14 16 11  --  15   CREATININE 1.06* 0.81 0.95 0.78  --  1.11*   EGFR 55.6* 76.8 63.4 80.3  --  52.6*   GLUCOSE 191* 210* 249* 214*  --  197*   CALCIUM 9.4 9.4 9.2 9.6  --  10.1   MAGNESIUM 2.0 1.9 1.8 1.8 1.8  --    PHOSPHORUS  --   --  3.1 3.5 3.5  --    HEMOGLOBIN A1C  --   --   --  9.00*  --   --          Lab 07/01/22  1820   TOTAL PROTEIN 7.3   ALBUMIN 4.20   GLOBULIN 3.1   ALT (SGPT) 49*   AST (SGOT) 61*   BILIRUBIN 0.7   ALK PHOS 66         Lab 07/01/22  1820   TROPONIN T <0.010   PROTIME 14.2   INR 1.09         Lab 07/02/22  0457   CHOLESTEROL 201*   LDL CHOL 139*   HDL CHOL 24*   TRIGLYCERIDES 207*             Brief Urine Lab Results  (Last result in the past 365 days)      Color   Clarity   Blood   Leuk Est   Nitrite   Protein   CREAT   Urine HCG        07/04/22 1937 Dark Yellow   Cloudy   Negative   Small (1+)   Negative   30 mg/dL (1+)               Microbiology Results (last 10 days)     Procedure Component Value - Date/Time    COVID PRE-OP / PRE-PROCEDURE SCREENING ORDER (NO ISOLATION) -  Swab, Nasopharynx [[id number]]  (Abnormal) Collected: 07/05/22 1002    Lab Status: Final result Specimen: Swab from Nasopharynx Updated: 07/05/22 1803    Narrative:      The following orders were created for panel order COVID PRE-OP / PRE-PROCEDURE SCREENING ORDER (NO ISOLATION) - Swab, Nasopharynx.  Procedure                               Abnormality         Status                     ---------                               -----------         ------                     COVID-19,APTIMA PANTHER(...[[id number]]  Abnormal            Final result                 Please view results for these tests on the individual orders.    COVID-19,APTIMA PANTHER(CARLOS),[facility] SUNDAY, NP/OP SWAB IN UTM/VTM/SALINE TRANSPORT MEDIA,24 HR TAT - Swab, Nasopharynx [[id number]]  (Abnormal) Collected: 07/05/22 1002    Lab Status: Final result Specimen: Swab from Nasopharynx Updated: 07/05/22 1803     COVID19 Detected    Narrative:      Fact sheet for providers: https://www.fda.gov/media/872744/download     Fact sheet for patients: https://www.fda.gov/media/195107/download    Test performed by RT PCR.    Urine Culture - Urine, Urine, Clean Catch [[id number]]  (Abnormal)  (Susceptibility) Collected: 07/04/22 1937    Lab Status: Final result Specimen: Urine, Clean Catch Updated: 07/06/22 0715     Urine Culture >100,000 CFU/mL Escherichia coli    Narrative:      Colonization of the urinary tract without infection is common. Treatment is discouraged unless the patient is symptomatic, pregnant, or undergoing an invasive urologic procedure.    Susceptibility      Escherichia coli      SARAH      Ampicillin Susceptible      Ampicillin + Sulbactam Susceptible      Cefazolin Susceptible      Cefepime Susceptible      Ceftazidime Susceptible      Ceftriaxone Susceptible      Gentamicin Susceptible      Levofloxacin Resistant     Nitrofurantoin Susceptible      Piperacillin + Tazobactam Susceptible      Trimethoprim + Sulfamethoxazole Susceptible                                  CT Angiogram Neck    Result Date: 7/3/2022  Impression:   1. 57% stenosis of the proximal right ICA. 2. Narrowing of the V4 segments of both vertebral arteries. 3. Narrowing of the distal M1 segment of the right MCA.  Narrowing of the proximal M1 segment of the left MCA. 4. Narrowing of bilateral posterior cerebral arteries.     SANFORD SINGLETON MD       Electronically Signed and Approved By: SANFORD SINGLETON MD on 7/03/2022 at 18:13             MRI Brain Without Contrast    Result Date: 7/1/2022  Impression:   1. 9 mm focal area of restricted diffusion in the posterior limb of the right internal capsule consistent with an acute lacunar infarct. 2. Volume loss secondary to cerebral atrophy. 3. Chronic microvascular ischemia.      SIDNEY HARRELL MD       Electronically Signed and Approved By: SIDNEY HARRELL MD on 7/01/2022 at 20:17             XR Chest 1 View    Result Date: 7/1/2022  Impression:  No active disease.       SIDNEY HARRELL MD       Electronically Signed and Approved By: SIDNEY HARRELL MD on 7/01/2022 at 19:02             CT Angiogram Head    Result Date: 7/3/2022  Impression:   1. 57% stenosis of the proximal right ICA. 2. Narrowing of the V4 segments of both vertebral arteries. 3. Narrowing of the distal M1 segment of the right MCA.  Narrowing of the proximal M1 segment of the left MCA. 4. Narrowing of bilateral posterior cerebral arteries.     SANFORD SINGLETON MD       Electronically Signed and Approved By: SANFORD SINGLETON MD on 7/03/2022 at 18:13             CT Head Without Contrast Stroke Protocol    Result Date: 7/1/2022  Impression:   1. No acute findings. 2. Mild volume loss secondary to cerebral atrophy. 3. Chronic ischemic changes.     SIDNEY HARRELL MD       Electronically Signed and Approved By: SIDNEY HARRELL MD on 7/01/2022 at 18:24                       Results for orders placed during the hospital encounter of 07/01/22    Adult Transthoracic Echo Complete W/ Cont if Necessary Per Protocol  (With Agitated Saline)    Interpretation Summary  · Left ventricular wall thickness is consistent with mild concentric hypertrophy.  · Calculated left ventricular EF = 55.9% Estimated left ventricular EF was in agreement with the calculated left ventricular EF.  · Left ventricular diastolic function is consistent with (grade Ia w/high LAP) impaired relaxation.  · Mild mitral valve regurgitation is present      Labs Pending at Discharge:        Time spent on Discharge including face to face service: Greater than 35 minutes    Electronically signed by Melchor Marmolejo MD, 07/07/22, 11:08 AM EDT.

## 2022-07-14 ENCOUNTER — TRANSITIONAL CARE MANAGEMENT TELEPHONE ENCOUNTER (OUTPATIENT)
Dept: CALL CENTER | Facility: HOSPITAL | Age: 72
End: 2022-07-14

## 2022-07-14 ENCOUNTER — READMISSION MANAGEMENT (OUTPATIENT)
Dept: CALL CENTER | Facility: HOSPITAL | Age: 72
End: 2022-07-14

## 2022-07-14 NOTE — OUTREACH NOTE
Call Center TCM Note    Flowsheet Row Responses   [facility] patient discharged from? Non-BH   Does the patient have one of the following disease processes/diagnoses(primary or secondary)? Other   TCM attempt successful? No   Unsuccessful attempts Attempt 1          Chyna Varghese MA    7/14/2022, 10:53 EDT

## 2022-07-14 NOTE — OUTREACH NOTE
Call Center TCM Note    Flowsheet Row Responses   [facility] patient discharged from? Non-BH   Does the patient have one of the following disease processes/diagnoses(primary or secondary)? Other   TCM attempt successful? No   Unsuccessful attempts Attempt 2          Chyna Varghese MA    7/14/2022, 15:54 EDT

## 2022-07-14 NOTE — OUTREACH NOTE
Prep Survey    Flowsheet Row Responses   [facility] facility patient discharged from? Non-BH   Is LACE score < 7 ? Non-BH Discharge   Emergency Room discharge w/ pulse ox? No   Eligibility [facility]   Hospital [facility]    Date of Discharge 07/13/22   Discharge Disposition Home or Self Care   Discharge diagnosis Unavailable    Does the patient have one of the following disease processes/diagnoses(primary or secondary)? Other   Does the patient have Home health ordered? Yes   What is the Home health agency?  Home with home health    Prep survey completed? Yes          LEWIS ALCANTAR - Registered Nurse

## 2022-07-15 ENCOUNTER — TRANSITIONAL CARE MANAGEMENT TELEPHONE ENCOUNTER (OUTPATIENT)
Dept: CALL CENTER | Facility: HOSPITAL | Age: 72
End: 2022-07-15

## 2022-07-15 NOTE — OUTREACH NOTE
Call Center TCM Note    Flowsheet Row Responses   [facility] patient discharged from? Non-BH  [[facility]]   Does the patient have one of the following disease processes/diagnoses(primary or secondary)? Other   TCM attempt successful? Yes  [verbal release on file for Wolf Rojo, Kaci Morales and Ruby Cordova]   Call start time 1218   Call end time 1227   Discharge diagnosis Unavailable    Person spoke with today (if not patient) and relationship ra Chen and Wolf andre   Medication alerts for this patient PLAVIX   Meds reviewed with patient/caregiver? Yes   Is the patient having any side effects they believe may be caused by any medication additions or changes? No   Does the patient have all medications ordered at discharge? Yes   Is the patient taking all medications as directed (includes completed medication regime)? Yes   Medication comments Ra, sets up and assists with medications   Does the patient have a primary care provider?  Yes   Does the patient have an appointment with their PCP within 7 days of discharge? Yes   Comments regarding PCP Hospital PCP FOLLOW UP APPOINTMENT IS 7/19/22@430pm   Has the patient kept scheduled appointments due by today? N/A   What is the Home health agency?  Home with home health    Has home health visited the patient within 72 hours of discharge? Yes   Psychosocial issues? No   What is the patient's perception of their health status since discharge? Improving  [Family reports patient is improving--ambulating w/walker, speech is returning to normal and able to dress independently, family assisting with medications.  Pt still w/residual cough from Covid but no SOA.  HH is assisting with care and P,T.]   Is the patient/caregiver able to teach back signs and symptoms related to disease process for when to call PCP? Yes   Is the patient/caregiver able to teach back signs and symptoms related to disease process for when to call 911? Yes   TCM call completed? Yes           Bianca Chen, SUZETTE    7/15/2022, 12:30 EDT

## 2022-07-19 ENCOUNTER — OFFICE VISIT (OUTPATIENT)
Dept: FAMILY MEDICINE CLINIC | Facility: CLINIC | Age: 72
End: 2022-07-19

## 2022-07-19 VITALS
HEART RATE: 68 BPM | TEMPERATURE: 97.3 F | HEIGHT: 64 IN | OXYGEN SATURATION: 97 % | BODY MASS INDEX: 35 KG/M2 | SYSTOLIC BLOOD PRESSURE: 120 MMHG | DIASTOLIC BLOOD PRESSURE: 82 MMHG | WEIGHT: 205 LBS

## 2022-07-19 DIAGNOSIS — I63.81 LACUNAR INFARCTION: Primary | ICD-10-CM

## 2022-07-19 DIAGNOSIS — K59.00 CONSTIPATION, UNSPECIFIED CONSTIPATION TYPE: ICD-10-CM

## 2022-07-19 DIAGNOSIS — F41.9 ANXIETY AND DEPRESSION: ICD-10-CM

## 2022-07-19 DIAGNOSIS — R25.2 MUSCLE CRAMPS: ICD-10-CM

## 2022-07-19 DIAGNOSIS — E11.65 TYPE 2 DIABETES MELLITUS WITH HYPERGLYCEMIA, WITHOUT LONG-TERM CURRENT USE OF INSULIN: ICD-10-CM

## 2022-07-19 DIAGNOSIS — E78.2 MIXED HYPERLIPIDEMIA: ICD-10-CM

## 2022-07-19 DIAGNOSIS — F32.A ANXIETY AND DEPRESSION: ICD-10-CM

## 2022-07-19 DIAGNOSIS — G40.909 SEIZURE DISORDER: ICD-10-CM

## 2022-07-19 DIAGNOSIS — I10 ESSENTIAL HYPERTENSION: ICD-10-CM

## 2022-07-19 DIAGNOSIS — I10 PRIMARY HYPERTENSION: ICD-10-CM

## 2022-07-19 DIAGNOSIS — Z09 HOSPITAL DISCHARGE FOLLOW-UP: ICD-10-CM

## 2022-07-19 PROCEDURE — 99495 TRANSJ CARE MGMT MOD F2F 14D: CPT | Performed by: FAMILY MEDICINE

## 2022-07-19 PROCEDURE — 85025 COMPLETE CBC W/AUTO DIFF WBC: CPT | Performed by: FAMILY MEDICINE

## 2022-07-19 PROCEDURE — 83735 ASSAY OF MAGNESIUM: CPT | Performed by: FAMILY MEDICINE

## 2022-07-19 PROCEDURE — 80053 COMPREHEN METABOLIC PANEL: CPT | Performed by: FAMILY MEDICINE

## 2022-07-19 PROCEDURE — 36415 COLL VENOUS BLD VENIPUNCTURE: CPT | Performed by: FAMILY MEDICINE

## 2022-07-19 PROCEDURE — 1111F DSCHRG MED/CURRENT MED MERGE: CPT | Performed by: FAMILY MEDICINE

## 2022-07-19 RX ORDER — LEVETIRACETAM 500 MG/1
1000 TABLET ORAL EVERY EVENING
Qty: 180 TABLET | Refills: 1 | Status: SHIPPED | OUTPATIENT
Start: 2022-07-19 | End: 2022-08-25 | Stop reason: SDUPTHER

## 2022-07-19 RX ORDER — CLOPIDOGREL BISULFATE 75 MG/1
75 TABLET ORAL DAILY
Qty: 90 TABLET | Refills: 1 | Status: SHIPPED | OUTPATIENT
Start: 2022-07-19 | End: 2022-08-18

## 2022-07-19 RX ORDER — ATORVASTATIN CALCIUM 80 MG/1
80 TABLET, FILM COATED ORAL NIGHTLY
Qty: 90 TABLET | Refills: 1 | Status: SHIPPED | OUTPATIENT
Start: 2022-07-19 | End: 2022-07-19

## 2022-07-19 RX ORDER — ALBUTEROL SULFATE 90 UG/1
1 AEROSOL, METERED RESPIRATORY (INHALATION) EVERY 4 HOURS PRN
COMMUNITY
Start: 2022-07-13 | End: 2022-12-15 | Stop reason: SDUPTHER

## 2022-07-19 RX ORDER — FLUOXETINE HYDROCHLORIDE 20 MG/1
60 CAPSULE ORAL DAILY
Qty: 270 CAPSULE | Refills: 1 | Status: SHIPPED | OUTPATIENT
Start: 2022-07-19 | End: 2022-10-14

## 2022-07-19 RX ORDER — LEVETIRACETAM 500 MG/1
500 TABLET ORAL
Qty: 90 TABLET | Refills: 1 | Status: SHIPPED | OUTPATIENT
Start: 2022-07-19 | End: 2022-08-25 | Stop reason: SDUPTHER

## 2022-07-19 RX ORDER — CLOPIDOGREL BISULFATE 75 MG/1
75 TABLET ORAL DAILY
Qty: 90 TABLET | Refills: 1 | Status: SHIPPED | OUTPATIENT
Start: 2022-07-19 | End: 2022-07-19

## 2022-07-19 RX ORDER — LISINOPRIL 10 MG/1
10 TABLET ORAL DAILY
Qty: 90 TABLET | Refills: 1 | Status: SHIPPED | OUTPATIENT
Start: 2022-07-19 | End: 2023-03-02

## 2022-07-19 RX ORDER — MELATONIN
1000 DAILY
COMMUNITY

## 2022-07-19 RX ORDER — ATORVASTATIN CALCIUM 80 MG/1
80 TABLET, FILM COATED ORAL NIGHTLY
Qty: 90 TABLET | Refills: 1 | Status: SHIPPED | OUTPATIENT
Start: 2022-07-19 | End: 2022-08-18

## 2022-07-19 NOTE — PROGRESS NOTES
Venipuncture Blood Specimen Collection  Venipuncture performed in left arm  by Deepika Sevilla with good hemostasis. Patient tolerated the procedure well without complications.   07/19/22   Deepika Sevilla

## 2022-07-19 NOTE — PROGRESS NOTES
Transitional Care Follow Up Visit  Subjective     Haylee Gibbs is a 72 y.o. female who presents for a transitional care management visit.    Within 48 business hours after discharge our office contacted her via telephone to coordinate her care and needs.      I reviewed and discussed the details of that call along with the discharge summary, hospital problems, inpatient lab results, inpatient diagnostic studies, and consultation reports with Haylee.     Current outpatient and discharge medications have been reconciled for the patient.  Reviewed by: Kj Canales MD      Date of TCM Phone Call 7/14/2022   [facility]    Date of Discharge 7/13/2022   Discharge Disposition Home or Self Care     Risk for Readmission (LACE) Score: 9 (7/7/2022  6:01 AM)      Pt slowly improving.  Pt having some muscle cramps in left arm and leg intermittently.  Pt had some elevated BG during visit.  Pt has been able to have BM- no constipation     Course During Hospital Stay:  Pt was admitted for lacunar infarction.  Also diagnosed with covid.  Went to rehab and was not able to do much PT due to covid.  Pt has physical therapy coming to her home through [facility].  Pt has a little weakness on left side.     The following portions of the patient's history were reviewed and updated as appropriate:   She  has a past medical history of Anxiety disorder (06/26/2018), Benign essential hypertension, Cataract, COPD (chronic obstructive pulmonary disease) ([facility]), CVA (cerebral vascular accident) ([facility]), Depression, Diabetes mellitus, type 2 (HCC), Hyperlipidemia, Paresthesia and pain of both upper extremities (06/26/2018), Seizure disorder (HCC) (06/26/2018), Vitamin B 12 deficiency, and Vitamin D deficiency (06/26/2018).  She does not have any pertinent problems on file.  She  has a past surgical history that includes Appendectomy; Cataract extraction; Hysterectomy; Back surgery; Neck surgery; and  Tonsillectomy.  Her family history includes Alcohol abuse in her brother; Diabetes type II in her brother and sister; Heart disease in an other family member; Hypertension in her brother and sister; Seizures in her mother; Stroke in her mother.  She  reports that she has quit smoking. She has a 10.00 pack-year smoking history. She has quit using smokeless tobacco. She reports that she does not drink alcohol and does not use drugs.  Current Outpatient Medications   Medication Sig Dispense Refill   • aspirin 81 MG chewable tablet Chew 1 tablet Daily. 90 tablet 1   • atorvastatin (LIPITOR) 80 MG tablet Take 1 tablet by mouth Every Night for 30 days. 90 tablet 1   • cholecalciferol (VITAMIN D3) 25 MCG (1000 UT) tablet Take 1,000 Units by mouth Daily.     • clopidogrel (PLAVIX) 75 MG tablet Take 1 tablet by mouth Daily for 30 days. 90 tablet 1   • FLUoxetine (PROzac) 20 MG capsule Take 3 capsules by mouth Daily. 270 capsule 1   • levETIRAcetam (KEPPRA) 500 MG tablet Take 1 tablet by mouth Every Morning. 90 tablet 1   • levETIRAcetam (KEPPRA) 500 MG tablet Take 2 tablets by mouth Every Evening. 180 tablet 1   • lisinopril (PRINIVIL,ZESTRIL) 10 MG tablet Take 1 tablet by mouth Daily for 30 days. 90 tablet 1   • metFORMIN (GLUCOPHAGE) 500 MG tablet Take 1 tablet by mouth 2 (Two) Times a Day. 180 tablet 1   • metoprolol tartrate (LOPRESSOR) 25 MG tablet Take 1 tablet by mouth 2 (Two) Times a Day. 180 tablet 1   • Ventolin  (90 Base) MCG/ACT inhaler Inhale 1 puff Every 4 (Four) Hours As Needed.     • empagliflozin (Jardiance) 25 MG tablet tablet Take 1 tablet by mouth Daily. 90 tablet 1   • linaclotide (Linzess) 145 MCG capsule capsule Take 1 capsule by mouth Every Morning Before Breakfast. 90 capsule 0     No current facility-administered medications for this visit.     Current Outpatient Medications on File Prior to Visit   Medication Sig   • aspirin 81 MG chewable tablet Chew 1 tablet Daily.   • cholecalciferol  (VITAMIN D3) 25 MCG (1000 UT) tablet Take 1,000 Units by mouth Daily.   • Ventolin  (90 Base) MCG/ACT inhaler Inhale 1 puff Every 4 (Four) Hours As Needed.     No current facility-administered medications on file prior to visit.     She is allergic to tramadol hcl..    Review of Systems   Constitutional: Negative for fatigue and fever.   HENT: Negative for sore throat.    Eyes: Negative for visual disturbance.   Respiratory: Negative for cough, chest tightness, shortness of breath and wheezing.    Cardiovascular: Negative for chest pain, palpitations and leg swelling.   Gastrointestinal: Negative for abdominal pain, constipation, diarrhea, nausea and vomiting.   Skin: Negative for rash.   Neurological: Negative for syncope, light-headedness and headaches.   Psychiatric/Behavioral: Negative for decreased concentration, dysphoric mood and sleep disturbance. The patient is not nervous/anxious.        Objective   Physical Exam  Vitals reviewed.   Constitutional:       Appearance: Normal appearance. She is well-developed.   HENT:      Head: Normocephalic and atraumatic.      Right Ear: External ear normal.      Left Ear: External ear normal.      Mouth/Throat:      Pharynx: No oropharyngeal exudate.   Eyes:      Conjunctiva/sclera: Conjunctivae normal.      Pupils: Pupils are equal, round, and reactive to light.   Cardiovascular:      Rate and Rhythm: Normal rate and regular rhythm.      Pulses: Normal pulses.      Heart sounds: Normal heart sounds. No murmur heard.    No friction rub. No gallop.   Pulmonary:      Effort: Pulmonary effort is normal.      Breath sounds: Normal breath sounds. No wheezing or rhonchi.   Abdominal:      General: Abdomen is flat. Bowel sounds are normal. There is no distension.      Palpations: Abdomen is soft. There is no mass.      Tenderness: There is no abdominal tenderness. There is no guarding or rebound.      Hernia: No hernia is present.   Musculoskeletal:         General:  Normal range of motion.   Skin:     General: Skin is warm and dry.      Capillary Refill: Capillary refill takes less than 2 seconds.   Neurological:      General: No focal deficit present.      Mental Status: She is alert and oriented to person, place, and time.      Cranial Nerves: No cranial nerve deficit.   Psychiatric:         Mood and Affect: Mood and affect normal.         Behavior: Behavior normal.         Thought Content: Thought content normal.         Judgment: Judgment normal.         Assessment & Plan   Problems Addressed this Visit        Cardiac and Vasculature    Primary hypertension    Relevant Medications    lisinopril (PRINIVIL,ZESTRIL) 10 MG tablet    metoprolol tartrate (LOPRESSOR) 25 MG tablet    Other Relevant Orders    CBC Auto Differential (Completed)    Comprehensive Metabolic Panel (Completed)       Endocrine and Metabolic    Type 2 diabetes mellitus with hyperglycemia, without long-term current use of insulin (HCC)    Relevant Medications    empagliflozin (Jardiance) 25 MG tablet tablet    metFORMIN (GLUCOPHAGE) 500 MG tablet       Mental Health    Anxiety and depression    Relevant Medications    FLUoxetine (PROzac) 20 MG capsule       Neuro    Seizure disorder (HCC)    Relevant Medications    levETIRAcetam (KEPPRA) 500 MG tablet    levETIRAcetam (KEPPRA) 500 MG tablet      Other Visit Diagnoses     Lacunar infarction (HCC)    -  Primary    Relevant Medications    clopidogrel (PLAVIX) 75 MG tablet    Muscle cramps        Relevant Orders    Magnesium (Completed)    Essential hypertension        Relevant Medications    lisinopril (PRINIVIL,ZESTRIL) 10 MG tablet    metoprolol tartrate (LOPRESSOR) 25 MG tablet    Hospital discharge follow-up        Constipation, unspecified constipation type        Relevant Medications    linaclotide (Linzess) 145 MCG capsule capsule    Mixed hyperlipidemia        Relevant Medications    atorvastatin (LIPITOR) 80 MG tablet      Diagnoses       Codes Comments     Lacunar infarction (HCC)    -  Primary ICD-10-CM: I63.81  ICD-9-CM: 434.91     Muscle cramps     ICD-10-CM: R25.2  ICD-9-CM: 729.82     Type 2 diabetes mellitus with hyperglycemia, without long-term current use of insulin (HCC)     ICD-10-CM: E11.65  ICD-9-CM: 250.00, 790.29     Anxiety and depression     ICD-10-CM: F41.9, F32.A  ICD-9-CM: 300.00, 311     Primary hypertension     ICD-10-CM: I10  ICD-9-CM: 401.9     Essential hypertension     ICD-10-CM: I10  ICD-9-CM: 401.9     Seizure disorder (HCC)     ICD-10-CM: G40.909  ICD-9-CM: 345.90     Hospital discharge follow-up     ICD-10-CM: Z09  ICD-9-CM: V67.59     Constipation, unspecified constipation type     ICD-10-CM: K59.00  ICD-9-CM: 564.00     Mixed hyperlipidemia     ICD-10-CM: E78.2  ICD-9-CM: 272.2

## 2022-07-20 DIAGNOSIS — G40.909 SEIZURE DISORDER: ICD-10-CM

## 2022-07-20 LAB
ALBUMIN SERPL-MCNC: 4 G/DL (ref 3.5–5.2)
ALBUMIN/GLOB SERPL: 1.4 G/DL
ALP SERPL-CCNC: 60 U/L (ref 39–117)
ALT SERPL W P-5'-P-CCNC: 31 U/L (ref 1–33)
ANION GAP SERPL CALCULATED.3IONS-SCNC: 9.9 MMOL/L (ref 5–15)
AST SERPL-CCNC: 36 U/L (ref 1–32)
BASOPHILS # BLD AUTO: 0.04 10*3/MM3 (ref 0–0.2)
BASOPHILS NFR BLD AUTO: 0.5 % (ref 0–1.5)
BILIRUB SERPL-MCNC: 0.4 MG/DL (ref 0–1.2)
BUN SERPL-MCNC: 22 MG/DL (ref 8–23)
BUN/CREAT SERPL: 25.6 (ref 7–25)
CALCIUM SPEC-SCNC: 9.4 MG/DL (ref 8.6–10.5)
CHLORIDE SERPL-SCNC: 102 MMOL/L (ref 98–107)
CO2 SERPL-SCNC: 24.1 MMOL/L (ref 22–29)
CREAT SERPL-MCNC: 0.86 MG/DL (ref 0.57–1)
DEPRECATED RDW RBC AUTO: 46.6 FL (ref 37–54)
EGFRCR SERPLBLD CKD-EPI 2021: 71.4 ML/MIN/1.73
EOSINOPHIL # BLD AUTO: 0.14 10*3/MM3 (ref 0–0.4)
EOSINOPHIL NFR BLD AUTO: 1.8 % (ref 0.3–6.2)
ERYTHROCYTE [DISTWIDTH] IN BLOOD BY AUTOMATED COUNT: 13.3 % (ref 12.3–15.4)
GLOBULIN UR ELPH-MCNC: 2.8 GM/DL
GLUCOSE SERPL-MCNC: 173 MG/DL (ref 65–99)
HCT VFR BLD AUTO: 34.5 % (ref 34–46.6)
HGB BLD-MCNC: 11.2 G/DL (ref 12–15.9)
IMM GRANULOCYTES # BLD AUTO: 0.06 10*3/MM3 (ref 0–0.05)
IMM GRANULOCYTES NFR BLD AUTO: 0.8 % (ref 0–0.5)
LYMPHOCYTES # BLD AUTO: 1.87 10*3/MM3 (ref 0.7–3.1)
LYMPHOCYTES NFR BLD AUTO: 23.8 % (ref 19.6–45.3)
MAGNESIUM SERPL-MCNC: 1.9 MG/DL (ref 1.6–2.4)
MCH RBC QN AUTO: 31.2 PG (ref 26.6–33)
MCHC RBC AUTO-ENTMCNC: 32.5 G/DL (ref 31.5–35.7)
MCV RBC AUTO: 96.1 FL (ref 79–97)
MONOCYTES # BLD AUTO: 0.68 10*3/MM3 (ref 0.1–0.9)
MONOCYTES NFR BLD AUTO: 8.7 % (ref 5–12)
NEUTROPHILS NFR BLD AUTO: 5.07 10*3/MM3 (ref 1.7–7)
NEUTROPHILS NFR BLD AUTO: 64.4 % (ref 42.7–76)
NRBC BLD AUTO-RTO: 0 /100 WBC (ref 0–0.2)
PLATELET # BLD AUTO: 324 10*3/MM3 (ref 140–450)
PMV BLD AUTO: 11.4 FL (ref 6–12)
POTASSIUM SERPL-SCNC: 4.8 MMOL/L (ref 3.5–5.2)
PROT SERPL-MCNC: 6.8 G/DL (ref 6–8.5)
RBC # BLD AUTO: 3.59 10*6/MM3 (ref 3.77–5.28)
SODIUM SERPL-SCNC: 136 MMOL/L (ref 136–145)
WBC NRBC COR # BLD: 7.86 10*3/MM3 (ref 3.4–10.8)

## 2022-07-20 RX ORDER — LEVETIRACETAM 500 MG/1
TABLET ORAL
Qty: 90 TABLET | Refills: 1 | OUTPATIENT
Start: 2022-07-20

## 2022-07-22 ENCOUNTER — TELEPHONE (OUTPATIENT)
Dept: FAMILY MEDICINE CLINIC | Facility: CLINIC | Age: 72
End: 2022-07-22

## 2022-07-22 NOTE — TELEPHONE ENCOUNTER
Caller: Haylee Gibbs    Relationship: Self    Best call back number: [phone]    What is the best time to reach you: ANY TIME     Who are you requesting to speak with (clinical staff, provider,  specific staff member):CLINICAL         What was the call regarding: PATIENT NEEDS CLARIFICATION ON IF SHE NEEDS TO COME IN FOR THE APPOINTMENTS THAT SHE HAS SCHEDULED ON 07/29/2022     Do you require a callback: YES

## 2022-07-22 NOTE — TELEPHONE ENCOUNTER
Pt does not need to be seen on 7/29/22, but needs to be seen for her medicare wellness when it was scheduled.

## 2022-07-25 DIAGNOSIS — K59.00 CONSTIPATION, UNSPECIFIED CONSTIPATION TYPE: ICD-10-CM

## 2022-08-01 ENCOUNTER — CLINICAL SUPPORT (OUTPATIENT)
Dept: FAMILY MEDICINE CLINIC | Facility: CLINIC | Age: 72
End: 2022-08-01

## 2022-08-01 ENCOUNTER — TELEPHONE (OUTPATIENT)
Dept: FAMILY MEDICINE CLINIC | Facility: CLINIC | Age: 72
End: 2022-08-01

## 2022-08-01 DIAGNOSIS — R30.0 DYSURIA: Primary | ICD-10-CM

## 2022-08-01 DIAGNOSIS — R30.0 DYSURIA: ICD-10-CM

## 2022-08-01 LAB
BACTERIA UR QL AUTO: ABNORMAL /HPF
BILIRUB UR QL STRIP: NEGATIVE
CLARITY UR: ABNORMAL
COLOR UR: YELLOW
GLUCOSE UR STRIP-MCNC: ABNORMAL MG/DL
HGB UR QL STRIP.AUTO: NEGATIVE
HYALINE CASTS UR QL AUTO: ABNORMAL /LPF
KETONES UR QL STRIP: NEGATIVE
LEUKOCYTE ESTERASE UR QL STRIP.AUTO: ABNORMAL
NITRITE UR QL STRIP: POSITIVE
PH UR STRIP.AUTO: 6 [PH] (ref 5–8)
PROT UR QL STRIP: ABNORMAL
RBC # UR STRIP: ABNORMAL /HPF
REF LAB TEST METHOD: ABNORMAL
SP GR UR STRIP: >=1.03 (ref 1–1.03)
SQUAMOUS #/AREA URNS HPF: ABNORMAL /HPF
UROBILINOGEN UR QL STRIP: ABNORMAL
WBC # UR STRIP: ABNORMAL /HPF

## 2022-08-01 PROCEDURE — 81001 URINALYSIS AUTO W/SCOPE: CPT | Performed by: FAMILY MEDICINE

## 2022-08-01 PROCEDURE — 87077 CULTURE AEROBIC IDENTIFY: CPT | Performed by: FAMILY MEDICINE

## 2022-08-01 PROCEDURE — 87186 SC STD MICRODIL/AGAR DIL: CPT | Performed by: FAMILY MEDICINE

## 2022-08-01 PROCEDURE — 87086 URINE CULTURE/COLONY COUNT: CPT | Performed by: FAMILY MEDICINE

## 2022-08-01 PROCEDURE — 87081 CULTURE SCREEN ONLY: CPT | Performed by: FAMILY MEDICINE

## 2022-08-01 PROCEDURE — 87185 SC STD ENZYME DETCJ PER NZM: CPT

## 2022-08-01 NOTE — TELEPHONE ENCOUNTER
Patient's niece (April) called, the patient possibly has a UTI. The niece wants to know if she can come to office and get a specimen container to get a U/A and culture if needed.

## 2022-08-04 DIAGNOSIS — N30.00 ACUTE CYSTITIS WITHOUT HEMATURIA: Primary | ICD-10-CM

## 2022-08-04 NOTE — PROGRESS NOTES
Call pt: will refer to urology and infectious disease due to organism may be resistant to a lot of antibiotics.  Pt is to go to ER if she worsens at all.

## 2022-08-05 DIAGNOSIS — N30.00 ACUTE CYSTITIS WITHOUT HEMATURIA: Primary | ICD-10-CM

## 2022-08-05 LAB — BACTERIA SPEC AEROBE CULT: ABNORMAL

## 2022-08-05 RX ORDER — SULFAMETHOXAZOLE AND TRIMETHOPRIM 800; 160 MG/1; MG/1
1 TABLET ORAL 2 TIMES DAILY
Qty: 14 TABLET | Refills: 0 | Status: SHIPPED | OUTPATIENT
Start: 2022-08-05 | End: 2022-08-12

## 2022-08-05 NOTE — PROGRESS NOTES
Call pt:  Urine culture shows that bactrim will treat the organism.  This has been sent to the pharmacy.  Take this until completion x 7 days.  Follow-up if not better after finishing antibiotic.

## 2022-08-15 ENCOUNTER — TELEPHONE (OUTPATIENT)
Dept: FAMILY MEDICINE CLINIC | Facility: CLINIC | Age: 72
End: 2022-08-15

## 2022-08-16 ENCOUNTER — OFFICE VISIT (OUTPATIENT)
Dept: FAMILY MEDICINE CLINIC | Facility: CLINIC | Age: 72
End: 2022-08-16

## 2022-08-16 VITALS
BODY MASS INDEX: 33.81 KG/M2 | HEART RATE: 82 BPM | DIASTOLIC BLOOD PRESSURE: 85 MMHG | WEIGHT: 197 LBS | TEMPERATURE: 98.2 F | SYSTOLIC BLOOD PRESSURE: 110 MMHG | OXYGEN SATURATION: 98 %

## 2022-08-16 DIAGNOSIS — R74.8 ELEVATED LIVER ENZYMES: ICD-10-CM

## 2022-08-16 DIAGNOSIS — E11.65 TYPE 2 DIABETES MELLITUS WITH HYPERGLYCEMIA, WITHOUT LONG-TERM CURRENT USE OF INSULIN: ICD-10-CM

## 2022-08-16 DIAGNOSIS — N30.00 ACUTE CYSTITIS WITHOUT HEMATURIA: Primary | ICD-10-CM

## 2022-08-16 LAB
ALBUMIN SERPL-MCNC: 4.3 G/DL (ref 3.5–5.2)
ALBUMIN/GLOB SERPL: 1.4 G/DL
ALP SERPL-CCNC: 62 U/L (ref 39–117)
ALT SERPL W P-5'-P-CCNC: 49 U/L (ref 1–33)
ANION GAP SERPL CALCULATED.3IONS-SCNC: 12 MMOL/L (ref 5–15)
AST SERPL-CCNC: 57 U/L (ref 1–32)
BILIRUB SERPL-MCNC: 0.5 MG/DL (ref 0–1.2)
BILIRUB UR QL STRIP: NEGATIVE
BUN SERPL-MCNC: 22 MG/DL (ref 8–23)
BUN/CREAT SERPL: 21.6 (ref 7–25)
CALCIUM SPEC-SCNC: 9.6 MG/DL (ref 8.6–10.5)
CHLORIDE SERPL-SCNC: 106 MMOL/L (ref 98–107)
CLARITY UR: ABNORMAL
CO2 SERPL-SCNC: 20 MMOL/L (ref 22–29)
COLOR UR: YELLOW
CREAT SERPL-MCNC: 1.02 MG/DL (ref 0.57–1)
EGFRCR SERPLBLD CKD-EPI 2021: 58.2 ML/MIN/1.73
GLOBULIN UR ELPH-MCNC: 3.1 GM/DL
GLUCOSE SERPL-MCNC: 133 MG/DL (ref 65–99)
GLUCOSE UR STRIP-MCNC: ABNORMAL MG/DL
HGB UR QL STRIP.AUTO: NEGATIVE
KETONES UR QL STRIP: NEGATIVE
LEUKOCYTE ESTERASE UR QL STRIP.AUTO: ABNORMAL
NITRITE UR QL STRIP: POSITIVE
PH UR STRIP.AUTO: 5.5 [PH] (ref 5–8)
POTASSIUM SERPL-SCNC: 5.3 MMOL/L (ref 3.5–5.2)
PROT SERPL-MCNC: 7.4 G/DL (ref 6–8.5)
PROT UR QL STRIP: ABNORMAL
SODIUM SERPL-SCNC: 138 MMOL/L (ref 136–145)
SP GR UR STRIP: >=1.03 (ref 1–1.03)
UROBILINOGEN UR QL STRIP: ABNORMAL

## 2022-08-16 PROCEDURE — 87186 SC STD MICRODIL/AGAR DIL: CPT | Performed by: FAMILY MEDICINE

## 2022-08-16 PROCEDURE — 87086 URINE CULTURE/COLONY COUNT: CPT | Performed by: FAMILY MEDICINE

## 2022-08-16 PROCEDURE — 36415 COLL VENOUS BLD VENIPUNCTURE: CPT | Performed by: FAMILY MEDICINE

## 2022-08-16 PROCEDURE — 81001 URINALYSIS AUTO W/SCOPE: CPT | Performed by: FAMILY MEDICINE

## 2022-08-16 PROCEDURE — 99213 OFFICE O/P EST LOW 20 MIN: CPT | Performed by: FAMILY MEDICINE

## 2022-08-16 PROCEDURE — 80053 COMPREHEN METABOLIC PANEL: CPT | Performed by: FAMILY MEDICINE

## 2022-08-16 PROCEDURE — 87088 URINE BACTERIA CULTURE: CPT | Performed by: FAMILY MEDICINE

## 2022-08-16 RX ORDER — SULFAMETHOXAZOLE AND TRIMETHOPRIM 800; 160 MG/1; MG/1
1 TABLET ORAL 2 TIMES DAILY
Qty: 14 TABLET | Refills: 0 | Status: SHIPPED | OUTPATIENT
Start: 2022-08-16 | End: 2022-08-23

## 2022-08-16 RX ORDER — BLOOD-GLUCOSE METER
1 KIT MISCELLANEOUS DAILY
Qty: 1 KIT | Refills: 0 | Status: SHIPPED | OUTPATIENT
Start: 2022-08-16 | End: 2023-01-17

## 2022-08-16 RX ORDER — SACCHAROMYCES BOULARDII 250 MG
250 CAPSULE ORAL 2 TIMES DAILY
Qty: 20 CAPSULE | Refills: 0 | Status: SHIPPED | OUTPATIENT
Start: 2022-08-16 | End: 2022-08-26

## 2022-08-16 NOTE — PROGRESS NOTES
Venipuncture Blood Specimen Collection  Venipuncture performed in left arm  by Deepika Sevilla with good hemostasis. Patient tolerated the procedure well without complications.   08/16/22   Deepika Sevilla

## 2022-08-16 NOTE — PROGRESS NOTES
Chief Complaint  Urinary Tract Infection (Follow up on UTI and labs )    Subjective          Haylee Gibbs presents to [facility]  Discussed labs  Elevated liver enzymes  Pt still has fatigue  UTI is improving- still has small infection- will give 1 more week of bactrim- pt awaiting to see infectious disease      Objective   Allergies   Allergen Reactions   • Tramadol Hcl GI Intolerance     Immunization History   Administered Date(s) Administered   • COVID-19 (ИРИНА) 03/08/2021   • COVID-19 (PFIZER) PURPLE CAP 11/08/2021   • Fluzone High-Dose 65+yrs 10/19/2021   • Influenza, Unspecified 09/30/2019   • Pneumococcal Conjugate 13-Valent (PCV13) 11/24/2015     Past Medical History:   Diagnosis Date   • Anxiety disorder 06/26/2018   • Benign essential hypertension    • Cataract    • COPD (chronic obstructive pulmonary disease) (HCC)    • CVA (cerebral vascular accident) ([facility])    • Depression    • Diabetes mellitus, type 2 (HCC)    • Hyperlipidemia    • Paresthesia and pain of both upper extremities 06/26/2018   • Seizure disorder (HCC) 06/26/2018   • Vitamin B 12 deficiency    • Vitamin D deficiency 06/26/2018      Past Surgical History:   Procedure Laterality Date   • APPENDECTOMY     • BACK SURGERY      LOW BACK DISC   • CATARACT EXTRACTION     • HYSTERECTOMY     • NECK SURGERY      NECK DISC   • TONSILLECTOMY        Social History     Socioeconomic History   • Marital status: Single   Tobacco Use   • Smoking status: Former Smoker     Packs/day: 0.50     Years: 20.00     Pack years: 10.00   • Smokeless tobacco: Former User   • Tobacco comment: SMOKES LESS THAN 1 PACK PER DAY, FOR 20 YEARS NEVER USES OTHER TOBACCO PRODCUTS   Vaping Use   • Vaping Use: Never used   Substance and Sexual Activity   • Alcohol use: Never   • Drug use: Never        Current Outpatient Medications:   •  aspirin 81 MG chewable tablet, Chew 1 tablet Daily., Disp: 90 tablet, Rfl: 1  •  atorvastatin (LIPITOR) 80  MG tablet, Take 1 tablet by mouth Every Night for 30 days., Disp: 90 tablet, Rfl: 1  •  Blood Glucose Monitoring Suppl (FreeStyle Lite) w/Device kit, 1 each Daily., Disp: 1 kit, Rfl: 0  •  cholecalciferol (VITAMIN D3) 25 MCG (1000 UT) tablet, Take 1,000 Units by mouth Daily., Disp: , Rfl:   •  clopidogrel (PLAVIX) 75 MG tablet, Take 1 tablet by mouth Daily for 30 days., Disp: 90 tablet, Rfl: 1  •  empagliflozin (Jardiance) 25 MG tablet tablet, Take 1 tablet by mouth Daily., Disp: 90 tablet, Rfl: 1  •  FLUoxetine (PROzac) 20 MG capsule, Take 3 capsules by mouth Daily., Disp: 270 capsule, Rfl: 1  •  glucose blood (FREESTYLE LITE) test strip, Test BG daily., Disp: 100 each, Rfl: 5  •  levETIRAcetam (KEPPRA) 500 MG tablet, Take 1 tablet by mouth Every Morning., Disp: 90 tablet, Rfl: 1  •  levETIRAcetam (KEPPRA) 500 MG tablet, Take 2 tablets by mouth Every Evening., Disp: 180 tablet, Rfl: 1  •  linaclotide (Linzess) 145 MCG capsule capsule, Take 1 capsule by mouth Every Morning Before Breakfast., Disp: 90 capsule, Rfl: 0  •  lisinopril (PRINIVIL,ZESTRIL) 10 MG tablet, Take 1 tablet by mouth Daily for 30 days., Disp: 90 tablet, Rfl: 1  •  metFORMIN (GLUCOPHAGE) 500 MG tablet, Take 1 tablet by mouth 2 (Two) Times a Day., Disp: 180 tablet, Rfl: 1  •  metoprolol tartrate (LOPRESSOR) 25 MG tablet, Take 1 tablet by mouth 2 (Two) Times a Day., Disp: 180 tablet, Rfl: 1  •  saccharomyces boulardii (Florastor) 250 MG capsule, Take 1 capsule by mouth 2 (Two) Times a Day for 10 days., Disp: 20 capsule, Rfl: 0  •  sulfamethoxazole-trimethoprim (Bactrim DS) 800-160 MG per tablet, Take 1 tablet by mouth 2 (Two) Times a Day for 7 days., Disp: 14 tablet, Rfl: 0  •  Ventolin  (90 Base) MCG/ACT inhaler, Inhale 1 puff Every 4 (Four) Hours As Needed., Disp: , Rfl:    Family History   Problem Relation Age of Onset   • Seizures Mother    • Stroke Mother    • Diabetes type II Sister    • Hypertension Sister    • Diabetes type II Brother     • Hypertension Brother    • Alcohol abuse Brother    • Heart disease Other         MOTHER, GRANDMOTHER, SISTER; FATHER, GRANDFATHER OR BROTHER DEVELOPED HEART DISEASE BEFORE THE AGE OF 65          Vital Signs:   Vitals:    08/16/22 1615   BP: 110/85   Pulse: 82   Temp: 98.2 °F (36.8 °C)   SpO2: 98%   Weight: 89.4 kg (197 lb)       Review of Systems   Constitutional: Positive for fatigue. Negative for fever.   Eyes: Negative for visual disturbance.   Respiratory: Negative for cough, chest tightness, shortness of breath and wheezing.    Cardiovascular: Negative for chest pain, palpitations and leg swelling.   Gastrointestinal: Negative for abdominal pain, diarrhea, nausea and vomiting.   Skin: Negative for rash.   Neurological: Negative for light-headedness and headaches.      Physical Exam  Vitals reviewed.   Constitutional:       Appearance: Normal appearance. She is well-developed.   HENT:      Head: Normocephalic and atraumatic.      Right Ear: External ear normal.      Left Ear: External ear normal.      Mouth/Throat:      Pharynx: No oropharyngeal exudate.   Eyes:      Conjunctiva/sclera: Conjunctivae normal.      Pupils: Pupils are equal, round, and reactive to light.   Cardiovascular:      Rate and Rhythm: Normal rate and regular rhythm.      Pulses: Normal pulses.      Heart sounds: Normal heart sounds. No murmur heard.    No friction rub. No gallop.   Pulmonary:      Effort: Pulmonary effort is normal.      Breath sounds: Normal breath sounds. No wheezing or rhonchi.   Abdominal:      General: Abdomen is flat. Bowel sounds are normal. There is no distension.      Palpations: Abdomen is soft. There is no mass.      Tenderness: There is no abdominal tenderness. There is no guarding or rebound.      Hernia: No hernia is present.   Musculoskeletal:         General: Normal range of motion.   Skin:     General: Skin is warm and dry.      Capillary Refill: Capillary refill takes less than 2 seconds.    Neurological:      General: No focal deficit present.      Mental Status: She is alert and oriented to person, place, and time.      Cranial Nerves: No cranial nerve deficit.   Psychiatric:         Mood and Affect: Mood and affect normal.         Behavior: Behavior normal.         Thought Content: Thought content normal.         Judgment: Judgment normal.        Result Review :   The following data was reviewed by: Kj Canales MD on 08/16/2022:  CMP    CMP 7/8/22 7/11/22 7/19/22   Glucose 165 (A) 153 (A) 173 (A)   BUN 26 (A) 17 22   Creatinine 0.96 0.93 0.86   Sodium 132 (A) 132 (A) 136   Potassium 4.9 4.8 4.8   Chloride 98 99 102   Calcium 9.4 9.5 9.4   Albumin 3.70 3.60 4.00   Total Bilirubin 0.6 0.4 0.4   Alkaline Phosphatase 59 61 60   AST (SGOT) 52 (A) 40 (A) 36 (A)   ALT (SGPT) 41 (A) 35 (A) 31   (A) Abnormal value            CBC    CBC 7/8/22 7/11/22 7/19/22   WBC 8.60 7.27 7.86   RBC 3.64 (A) 3.41 (A) 3.59 (A)   Hemoglobin 11.2 (A) 10.2 (A) 11.2 (A)   Hematocrit 34.1 32.3 (A) 34.5   MCV 93.7 94.7 96.1   MCH 30.8 29.9 31.2   MCHC 32.8 31.6 32.5   RDW 13.0 12.7 13.3   Platelets 284 303 324   (A) Abnormal value                      Assessment and Plan    Diagnoses and all orders for this visit:    1. Acute cystitis without hematuria (Primary)  -     sulfamethoxazole-trimethoprim (Bactrim DS) 800-160 MG per tablet; Take 1 tablet by mouth 2 (Two) Times a Day for 7 days.  Dispense: 14 tablet; Refill: 0  -     saccharomyces boulardii (Florastor) 250 MG capsule; Take 1 capsule by mouth 2 (Two) Times a Day for 10 days.  Dispense: 20 capsule; Refill: 0  -     Urinalysis With Microscopic If Indicated (No Culture) - Urine, Clean Catch; Future  -     Urine Culture - Urine, Urine, Clean Catch    2. Elevated liver enzymes  -     Comprehensive Metabolic Panel    3. Type 2 diabetes mellitus with hyperglycemia, without long-term current use of insulin (HCC)  -     Blood Glucose Monitoring Suppl (FreeStyle Lite) w/Device  kit; 1 each Daily.  Dispense: 1 kit; Refill: 0  -     glucose blood (FREESTYLE LITE) test strip; Test BG daily.  Dispense: 100 each; Refill: 5            Follow Up   No follow-ups on file.  Patient was given instructions and counseling regarding her condition or for health maintenance advice. Please see specific information pulled into the AVS if appropriate.

## 2022-08-17 LAB
BACTERIA UR QL AUTO: ABNORMAL /HPF
HYALINE CASTS UR QL AUTO: ABNORMAL /LPF
RBC # UR STRIP: ABNORMAL /HPF
REF LAB TEST METHOD: ABNORMAL
SQUAMOUS #/AREA URNS HPF: ABNORMAL /HPF
WBC # UR STRIP: ABNORMAL /HPF

## 2022-08-18 DIAGNOSIS — N30.00 ACUTE CYSTITIS WITHOUT HEMATURIA: Primary | ICD-10-CM

## 2022-08-18 LAB — BACTERIA SPEC AEROBE CULT: ABNORMAL

## 2022-08-18 RX ORDER — CEFDINIR 300 MG/1
300 CAPSULE ORAL 2 TIMES DAILY
Qty: 14 CAPSULE | Refills: 0 | Status: SHIPPED | OUTPATIENT
Start: 2022-08-18 | End: 2022-08-25

## 2022-08-18 NOTE — PROGRESS NOTES
Call pt:  Labs show elevated potassium slightly.  Stop any vitamins or oral potassium and drink plenty of water.  Recheck lab in 1 week.

## 2022-08-18 NOTE — PROGRESS NOTES
Call pt;New urine culture shows E.coli resistant to bactrim, so I sent in cefdinir to the pharmacy which should work, according to the culture.  Follow-up if not better after finishing antibiotic.

## 2022-08-25 DIAGNOSIS — G40.909 SEIZURE DISORDER: ICD-10-CM

## 2022-08-25 RX ORDER — LEVETIRACETAM 500 MG/1
500 TABLET ORAL
Qty: 90 TABLET | Refills: 1 | Status: SHIPPED | OUTPATIENT
Start: 2022-08-25 | End: 2023-01-20 | Stop reason: SDUPTHER

## 2022-08-25 RX ORDER — LEVETIRACETAM 500 MG/1
1000 TABLET ORAL EVERY EVENING
Qty: 180 TABLET | Refills: 1 | Status: SHIPPED | OUTPATIENT
Start: 2022-08-25 | End: 2023-01-20 | Stop reason: SDUPTHER

## 2022-08-25 NOTE — TELEPHONE ENCOUNTER
"    Caller: Haylee Gibbs    Relationship: Self    Best call back number: [phone]    Requested Prescriptions:   Requested Prescriptions     Pending Prescriptions Disp Refills   • levETIRAcetam (KEPPRA) 500 MG tablet 180 tablet 1     Sig: Take 2 tablets by mouth Every Evening.   • levETIRAcetam (KEPPRA) 500 MG tablet 90 tablet 1     Sig: Take 1 tablet by mouth Every Morning.        Pharmacy where request should be sent: [address] [phone] [facility] [phone]      Additional details provided by patient: PATIENT WILL NEED PRESCRIPTION WRITTEN OUT AS ONE SCRIPT FOR INSURANCE, \" ONE IN MORNING AND TWO IN EVENING\". PLEASE CALL BACK AND ADVISE IF ISSUES.     Does the patient have less than a 3 day supply:  [x] Yes  [] No    Alexander Martin Rep   08/25/22 09:03 EDT         "

## 2022-08-29 ENCOUNTER — CLINICAL SUPPORT (OUTPATIENT)
Dept: FAMILY MEDICINE CLINIC | Facility: CLINIC | Age: 72
End: 2022-08-29

## 2022-08-29 DIAGNOSIS — R30.0 DYSURIA: Primary | ICD-10-CM

## 2022-08-30 LAB
BACTERIA UR QL AUTO: ABNORMAL /HPF
BILIRUB UR QL STRIP: NEGATIVE
CLARITY UR: CLEAR
COLOR UR: YELLOW
GLUCOSE UR STRIP-MCNC: ABNORMAL MG/DL
HGB UR QL STRIP.AUTO: ABNORMAL
HYALINE CASTS UR QL AUTO: ABNORMAL /LPF
KETONES UR QL STRIP: NEGATIVE
LEUKOCYTE ESTERASE UR QL STRIP.AUTO: ABNORMAL
NITRITE UR QL STRIP: POSITIVE
PH UR STRIP.AUTO: 5.5 [PH] (ref 5–8)
PROT UR QL STRIP: ABNORMAL
RBC # UR STRIP: ABNORMAL /HPF
REF LAB TEST METHOD: ABNORMAL
SP GR UR STRIP: >=1.03 (ref 1–1.03)
SQUAMOUS #/AREA URNS HPF: ABNORMAL /HPF
UROBILINOGEN UR QL STRIP: ABNORMAL
WBC # UR STRIP: ABNORMAL /HPF

## 2022-08-30 PROCEDURE — 81001 URINALYSIS AUTO W/SCOPE: CPT | Performed by: FAMILY MEDICINE

## 2022-09-01 ENCOUNTER — TELEPHONE (OUTPATIENT)
Dept: FAMILY MEDICINE CLINIC | Facility: CLINIC | Age: 72
End: 2022-09-01

## 2022-09-01 DIAGNOSIS — N30.00 ACUTE CYSTITIS WITHOUT HEMATURIA: Primary | ICD-10-CM

## 2022-09-01 RX ORDER — CEFDINIR 300 MG/1
300 CAPSULE ORAL 2 TIMES DAILY
Qty: 14 CAPSULE | Refills: 0 | Status: SHIPPED | OUTPATIENT
Start: 2022-09-01 | End: 2022-09-08

## 2022-09-01 RX ORDER — SACCHAROMYCES BOULARDII 250 MG
250 CAPSULE ORAL 2 TIMES DAILY
Qty: 20 CAPSULE | Refills: 0 | Status: SHIPPED | OUTPATIENT
Start: 2022-09-01 | End: 2022-09-11

## 2022-09-01 NOTE — TELEPHONE ENCOUNTER
Call pt:  What question do they have?  Let them know about the recent UA result.  I sent message regarding this.  Thanks.

## 2022-09-01 NOTE — TELEPHONE ENCOUNTER
Returned pt's call. Her son wanted to confirm the recent urine cx results. Will follow up, if not better after second round of cefdinir for UTI.

## 2022-09-01 NOTE — TELEPHONE ENCOUNTER
Caller: DAVID GERMAN    Relationship: Emergency Contact    Best call back number: [phone]    What test was performed: URINALYSIS    When was the test performed: 08/30/22    Where was the test performed: IN OFFICE    Additional notes: THE PATIENT'S WOULD LIKE A CALL BACK TO DISCUSS TEST RESULTS. HE IS ON  VERBAL

## 2022-09-09 ENCOUNTER — TELEPHONE (OUTPATIENT)
Dept: FAMILY MEDICINE CLINIC | Facility: CLINIC | Age: 72
End: 2022-09-09

## 2022-09-09 ENCOUNTER — CLINICAL SUPPORT (OUTPATIENT)
Dept: FAMILY MEDICINE CLINIC | Facility: CLINIC | Age: 72
End: 2022-09-09

## 2022-09-09 DIAGNOSIS — R30.0 DYSURIA: Primary | ICD-10-CM

## 2022-09-09 PROCEDURE — 87186 SC STD MICRODIL/AGAR DIL: CPT

## 2022-09-09 PROCEDURE — 81001 URINALYSIS AUTO W/SCOPE: CPT | Performed by: FAMILY MEDICINE

## 2022-09-09 PROCEDURE — 87086 URINE CULTURE/COLONY COUNT: CPT | Performed by: FAMILY MEDICINE

## 2022-09-09 PROCEDURE — 87077 CULTURE AEROBIC IDENTIFY: CPT | Performed by: FAMILY MEDICINE

## 2022-09-09 NOTE — TELEPHONE ENCOUNTER
Caller: DAVID GERMAN    Relationship: Emergency Contact    Best call back number: [phone]    What orders are you requesting (i.e. lab or imaging): URINALYSIS     In what timeframe would the patient need to come in: ASAP    Where will you receive your lab/imaging services: IN OFFICE    Additional notes:  STATED THAT THE PATIENT FINISHED ANTIBIOTICS TODAY AND WANTED TO KNOW IF THERE IS A NEED FOR FOLLOW UP TEST TO SEE IF INFECTION HAS CLEARED. PLEASE CALL TO ADVISE.

## 2022-09-12 DIAGNOSIS — N30.00 ACUTE CYSTITIS WITHOUT HEMATURIA: Primary | ICD-10-CM

## 2022-09-12 LAB — BACTERIA SPEC AEROBE CULT: ABNORMAL

## 2022-09-12 RX ORDER — NITROFURANTOIN 25; 75 MG/1; MG/1
100 CAPSULE ORAL 2 TIMES DAILY
Qty: 14 CAPSULE | Refills: 0 | Status: SHIPPED | OUTPATIENT
Start: 2022-09-12 | End: 2022-09-19

## 2022-09-12 NOTE — PROGRESS NOTES
Call pt: UTI resistant to cefdinir so I sent in macrobid to your pharmacy.  This should cure the UTI.

## 2022-09-18 DIAGNOSIS — I10 ESSENTIAL HYPERTENSION: ICD-10-CM

## 2022-09-19 RX ORDER — ASPIRIN 81 MG
TABLET,CHEWABLE ORAL
Qty: 90 TABLET | Refills: 1 | Status: SHIPPED | OUTPATIENT
Start: 2022-09-19 | End: 2023-02-06

## 2022-09-27 ENCOUNTER — OFFICE VISIT (OUTPATIENT)
Dept: FAMILY MEDICINE CLINIC | Facility: CLINIC | Age: 72
End: 2022-09-27

## 2022-09-27 VITALS
WEIGHT: 195 LBS | HEIGHT: 64 IN | HEART RATE: 70 BPM | OXYGEN SATURATION: 97 % | DIASTOLIC BLOOD PRESSURE: 80 MMHG | SYSTOLIC BLOOD PRESSURE: 122 MMHG | TEMPERATURE: 97.5 F | BODY MASS INDEX: 33.29 KG/M2

## 2022-09-27 DIAGNOSIS — R31.9 HEMATURIA, UNSPECIFIED TYPE: Primary | ICD-10-CM

## 2022-09-27 DIAGNOSIS — B37.31 VAGINAL CANDIDIASIS: ICD-10-CM

## 2022-09-27 DIAGNOSIS — N30.01 ACUTE CYSTITIS WITH HEMATURIA: ICD-10-CM

## 2022-09-27 DIAGNOSIS — F32.A DEPRESSION, UNSPECIFIED DEPRESSION TYPE: ICD-10-CM

## 2022-09-27 LAB
BILIRUB BLD-MCNC: NEGATIVE MG/DL
CLARITY, POC: CLEAR
COLOR UR: YELLOW
GLUCOSE UR STRIP-MCNC: ABNORMAL MG/DL
KETONES UR QL: NEGATIVE
LEUKOCYTE EST, POC: ABNORMAL
NITRITE UR-MCNC: POSITIVE MG/ML
PH UR: 5.5 [PH] (ref 5–8)
PROT UR STRIP-MCNC: ABNORMAL MG/DL
RBC # UR STRIP: ABNORMAL /UL
SP GR UR: 1.01 (ref 1–1.03)
UROBILINOGEN UR QL: ABNORMAL

## 2022-09-27 PROCEDURE — 99214 OFFICE O/P EST MOD 30 MIN: CPT | Performed by: FAMILY MEDICINE

## 2022-09-27 PROCEDURE — 87077 CULTURE AEROBIC IDENTIFY: CPT | Performed by: FAMILY MEDICINE

## 2022-09-27 PROCEDURE — 87086 URINE CULTURE/COLONY COUNT: CPT | Performed by: FAMILY MEDICINE

## 2022-09-27 PROCEDURE — 81002 URINALYSIS NONAUTO W/O SCOPE: CPT | Performed by: FAMILY MEDICINE

## 2022-09-27 PROCEDURE — 87081 CULTURE SCREEN ONLY: CPT | Performed by: FAMILY MEDICINE

## 2022-09-27 PROCEDURE — 87186 SC STD MICRODIL/AGAR DIL: CPT | Performed by: FAMILY MEDICINE

## 2022-09-27 RX ORDER — NITROFURANTOIN 25; 75 MG/1; MG/1
100 CAPSULE ORAL 2 TIMES DAILY
Qty: 14 CAPSULE | Refills: 0 | Status: SHIPPED | OUTPATIENT
Start: 2022-09-27 | End: 2022-10-04

## 2022-09-27 RX ORDER — CLOPIDOGREL BISULFATE 75 MG/1
75 TABLET ORAL DAILY
COMMUNITY
Start: 2022-09-09 | End: 2022-12-15 | Stop reason: SDUPTHER

## 2022-09-27 RX ORDER — ATORVASTATIN CALCIUM 80 MG/1
80 TABLET, FILM COATED ORAL NIGHTLY
COMMUNITY
Start: 2022-09-07 | End: 2023-02-06 | Stop reason: SDUPTHER

## 2022-09-27 RX ORDER — QUETIAPINE 200 MG/1
200 TABLET, FILM COATED, EXTENDED RELEASE ORAL NIGHTLY
Qty: 30 TABLET | Refills: 1 | Status: SHIPPED | OUTPATIENT
Start: 2022-09-27 | End: 2022-10-14

## 2022-09-27 RX ORDER — FLUCONAZOLE 150 MG/1
150 TABLET ORAL DAILY
Qty: 2 TABLET | Refills: 0 | Status: SHIPPED | OUTPATIENT
Start: 2022-09-27 | End: 2022-09-29

## 2022-09-27 NOTE — PROGRESS NOTES
Chief Complaint  Vaginitis (Possible yeast infection) and Depression (Discuss increasing or changing Prozac)    Subjective          Haylee Gibbs presents to [facility]  History of Present Illness  Pt is still having itching, redness in vaginal area and has dysuria    Pt has had increasing depression- no SI or HI- pt tolerating meds well- pt and son feel safe for pt      Objective   Allergies   Allergen Reactions   • Tramadol Hcl GI Intolerance     Immunization History   Administered Date(s) Administered   • COVID-19 (ИРИНА) 03/08/2021   • COVID-19 (PFIZER) PURPLE CAP 11/08/2021   • Covid-19 (Pfizer) Gray Cap 08/22/2022   • Fluzone High-Dose 65+yrs 10/19/2021   • Influenza, Unspecified 09/30/2019   • Pneumococcal Conjugate 13-Valent (PCV13) 11/24/2015     Past Medical History:   Diagnosis Date   • Anxiety disorder 06/26/2018   • Benign essential hypertension    • Cataract    • COPD (chronic obstructive pulmonary disease) ([facility])    • CVA (cerebral vascular accident) ([facility])    • Depression    • Diabetes mellitus, type 2 ([facility])    • Hyperlipidemia    • Paresthesia and pain of both upper extremities 06/26/2018   • Seizure disorder ([facility]) 06/26/2018   • Vitamin B 12 deficiency    • Vitamin D deficiency 06/26/2018      Past Surgical History:   Procedure Laterality Date   • APPENDECTOMY     • BACK SURGERY      LOW BACK DISC   • CATARACT EXTRACTION     • HYSTERECTOMY     • NECK SURGERY      NECK DISC   • TONSILLECTOMY        Social History     Socioeconomic History   • Marital status: Single   Tobacco Use   • Smoking status: Former Smoker     Packs/day: 0.50     Years: 20.00     Pack years: 10.00   • Smokeless tobacco: Former User   • Tobacco comment: SMOKES LESS THAN 1 PACK PER DAY, FOR 20 YEARS NEVER USES OTHER TOBACCO PRODCUTS   Vaping Use   • Vaping Use: Never used   Substance and Sexual Activity   • Alcohol use: Never   • Drug use: Never        Current Outpatient Medications:   •   Aspirin Low Dose 81 MG chewable tablet, CHEW 1 TABLET BY MOUTH DAILY, Disp: 90 tablet, Rfl: 1  •  atorvastatin (LIPITOR) 80 MG tablet, Take 80 mg by mouth Every Night., Disp: , Rfl:   •  Blood Glucose Monitoring Suppl (FreeStyle Lite) w/Device kit, 1 each Daily., Disp: 1 kit, Rfl: 0  •  clopidogrel (PLAVIX) 75 MG tablet, Take 75 mg by mouth Daily., Disp: , Rfl:   •  empagliflozin (Jardiance) 25 MG tablet tablet, Take 1 tablet by mouth Daily., Disp: 90 tablet, Rfl: 1  •  FLUoxetine (PROzac) 20 MG capsule, Take 3 capsules by mouth Daily., Disp: 270 capsule, Rfl: 1  •  glucose blood (FREESTYLE LITE) test strip, Test BG daily., Disp: 100 each, Rfl: 5  •  levETIRAcetam (KEPPRA) 500 MG tablet, Take 2 tablets by mouth Every Evening., Disp: 180 tablet, Rfl: 1  •  levETIRAcetam (KEPPRA) 500 MG tablet, Take 1 tablet by mouth Every Morning., Disp: 90 tablet, Rfl: 1  •  linaclotide (Linzess) 145 MCG capsule capsule, Take 1 capsule by mouth Every Morning Before Breakfast., Disp: 90 capsule, Rfl: 0  •  metFORMIN (GLUCOPHAGE) 500 MG tablet, Take 1 tablet by mouth 2 (Two) Times a Day., Disp: 180 tablet, Rfl: 1  •  metoprolol tartrate (LOPRESSOR) 25 MG tablet, Take 1 tablet by mouth 2 (Two) Times a Day., Disp: 180 tablet, Rfl: 1  •  Ventolin  (90 Base) MCG/ACT inhaler, Inhale 1 puff Every 4 (Four) Hours As Needed., Disp: , Rfl:   •  cholecalciferol (VITAMIN D3) 25 MCG (1000 UT) tablet, Take 1,000 Units by mouth Daily., Disp: , Rfl:   •  fluconazole (Diflucan) 150 MG tablet, Take 1 tablet by mouth Daily for 2 doses., Disp: 2 tablet, Rfl: 0  •  lisinopril (PRINIVIL,ZESTRIL) 10 MG tablet, Take 1 tablet by mouth Daily for 30 days., Disp: 90 tablet, Rfl: 1  •  nitrofurantoin, macrocrystal-monohydrate, (Macrobid) 100 MG capsule, Take 1 capsule by mouth 2 (Two) Times a Day for 7 days., Disp: 14 capsule, Rfl: 0  •  QUEtiapine XR (SEROquel XR) 200 MG 24 hr tablet, Take 1 tablet by mouth Every Night., Disp: 30 tablet, Rfl: 1   Family  "History   Problem Relation Age of Onset   • Seizures Mother    • Stroke Mother    • Diabetes type II Sister    • Hypertension Sister    • Diabetes type II Brother    • Hypertension Brother    • Alcohol abuse Brother    • Heart disease Other         MOTHER, GRANDMOTHER, SISTER; FATHER, GRANDFATHER OR BROTHER DEVELOPED HEART DISEASE BEFORE THE AGE OF 65          Vital Signs:   Vitals:    09/27/22 1624   BP: 122/80   BP Location: Left arm   Pulse: 70   Temp: 97.5 °F (36.4 °C)   SpO2: 97%   Weight: 88.5 kg (195 lb)   Height: 162.6 cm (64\")       Review of Systems   Constitutional: Positive for fatigue. Negative for fever.   HENT: Negative for sore throat.    Eyes: Negative for visual disturbance.   Respiratory: Negative for cough, chest tightness and shortness of breath.    Cardiovascular: Negative for chest pain, palpitations and leg swelling.   Gastrointestinal: Negative for abdominal pain, diarrhea, nausea and vomiting.   Genitourinary: Positive for dysuria and vaginal discharge. Negative for vaginal pain.   Skin: Negative for rash.   Neurological: Negative for light-headedness and headaches.      Physical Exam  Vitals reviewed.   Constitutional:       Appearance: Normal appearance. She is well-developed.   HENT:      Head: Normocephalic and atraumatic.      Right Ear: External ear normal.      Left Ear: External ear normal.      Nose: Nose normal.      Mouth/Throat:      Mouth: Mucous membranes are moist.      Pharynx: Oropharynx is clear. No oropharyngeal exudate or posterior oropharyngeal erythema.   Eyes:      Conjunctiva/sclera: Conjunctivae normal.      Pupils: Pupils are equal, round, and reactive to light.   Cardiovascular:      Rate and Rhythm: Normal rate and regular rhythm.      Pulses: Normal pulses.      Heart sounds: Normal heart sounds. No murmur heard.    No friction rub. No gallop.   Pulmonary:      Effort: Pulmonary effort is normal.      Breath sounds: Normal breath sounds. No wheezing or rhonchi. "   Abdominal:      General: Abdomen is flat. Bowel sounds are normal. There is no distension.      Palpations: Abdomen is soft. There is no mass.      Tenderness: There is no abdominal tenderness. There is no right CVA tenderness, left CVA tenderness, guarding or rebound.      Hernia: No hernia is present.   Musculoskeletal:         General: Normal range of motion.      Cervical back: Normal range of motion and neck supple.   Skin:     General: Skin is warm and dry.      Capillary Refill: Capillary refill takes less than 2 seconds.   Neurological:      General: No focal deficit present.      Mental Status: She is alert and oriented to person, place, and time.      Cranial Nerves: No cranial nerve deficit.   Psychiatric:         Mood and Affect: Mood and affect normal.         Behavior: Behavior normal.         Thought Content: Thought content normal.         Judgment: Judgment normal.        Result Review :   The following data was reviewed by: Kj Canales MD on 09/27/2022:  CMP    CMP 7/11/22 7/19/22 8/16/22   Glucose 153 (A) 173 (A) 133 (A)   BUN 17 22 22   Creatinine 0.93 0.86 1.02 (A)   Sodium 132 (A) 136 138   Potassium 4.8 4.8 5.3 (A)   Chloride 99 102 106   Calcium 9.5 9.4 9.6   Albumin 3.60 4.00 4.30   Total Bilirubin 0.4 0.4 0.5   Alkaline Phosphatase 61 60 62   AST (SGOT) 40 (A) 36 (A) 57 (A)   ALT (SGPT) 35 (A) 31 49 (A)   (A) Abnormal value            CBC    CBC 7/8/22 7/11/22 7/19/22   WBC 8.60 7.27 7.86   RBC 3.64 (A) 3.41 (A) 3.59 (A)   Hemoglobin 11.2 (A) 10.2 (A) 11.2 (A)   Hematocrit 34.1 32.3 (A) 34.5   MCV 93.7 94.7 96.1   MCH 30.8 29.9 31.2   MCHC 32.8 31.6 32.5   RDW 13.0 12.7 13.3   Platelets 284 303 324   (A) Abnormal value            Lipid Panel    Lipid Panel 1/31/22 7/2/22   Total Cholesterol 230 (A) 201 (A)   Triglycerides 201 (A) 207 (A)   HDL Cholesterol 28 (A) 24 (A)   VLDL Cholesterol 38 38   LDL Cholesterol  164 (A) 139 (A)   LDL/HDL Ratio 5.78 5.65   (A) Abnormal value             TSH    TSH 1/31/22   TSH 2.440                     Assessment and Plan    Diagnoses and all orders for this visit:    1. Hematuria, unspecified type (Primary)  -     POCT urinalysis dipstick, manual    2. Vaginal candidiasis  -     fluconazole (Diflucan) 150 MG tablet; Take 1 tablet by mouth Daily for 2 doses.  Dispense: 2 tablet; Refill: 0    3. Acute cystitis with hematuria  -     Urine Culture - Urine, Urine, Clean Catch  -     nitrofurantoin, macrocrystal-monohydrate, (Macrobid) 100 MG capsule; Take 1 capsule by mouth 2 (Two) Times a Day for 7 days.  Dispense: 14 capsule; Refill: 0    4. Depression, unspecified depression type  -     QUEtiapine XR (SEROquel XR) 200 MG 24 hr tablet; Take 1 tablet by mouth Every Night.  Dispense: 30 tablet; Refill: 1    continue prozac.        Follow Up   Return in about 2 weeks (around 10/11/2022) for Recheck.  Patient was given instructions and counseling regarding her condition or for health maintenance advice. Please see specific information pulled into the AVS if appropriate.     Pt told to hold lipitor while she is taking diflucan.  Pt understands risk of tardative dykinesia with seroquel.

## 2022-09-28 ENCOUNTER — CLINICAL SUPPORT (OUTPATIENT)
Dept: FAMILY MEDICINE CLINIC | Facility: CLINIC | Age: 72
End: 2022-09-28

## 2022-09-28 DIAGNOSIS — M19.90 ARTHRITIS: ICD-10-CM

## 2022-09-28 DIAGNOSIS — F41.9 ANXIETY AND DEPRESSION: ICD-10-CM

## 2022-09-28 DIAGNOSIS — I63.81 ACUTE LACUNAR STROKE: ICD-10-CM

## 2022-09-28 DIAGNOSIS — G40.909 SEIZURE DISORDER: ICD-10-CM

## 2022-09-28 DIAGNOSIS — I10 PRIMARY HYPERTENSION: ICD-10-CM

## 2022-09-28 DIAGNOSIS — F32.A ANXIETY AND DEPRESSION: ICD-10-CM

## 2022-10-01 LAB — BACTERIA SPEC AEROBE CULT: ABNORMAL

## 2022-10-03 DIAGNOSIS — N30.00 ACUTE CYSTITIS WITHOUT HEMATURIA: Primary | ICD-10-CM

## 2022-10-03 RX ORDER — LEVOFLOXACIN 500 MG/1
500 TABLET, FILM COATED ORAL DAILY
Qty: 7 TABLET | Refills: 0 | Status: SHIPPED | OUTPATIENT
Start: 2022-10-03 | End: 2022-10-10

## 2022-10-14 ENCOUNTER — OFFICE VISIT (OUTPATIENT)
Dept: FAMILY MEDICINE CLINIC | Facility: CLINIC | Age: 72
End: 2022-10-14

## 2022-10-14 VITALS
HEART RATE: 70 BPM | DIASTOLIC BLOOD PRESSURE: 74 MMHG | SYSTOLIC BLOOD PRESSURE: 126 MMHG | WEIGHT: 197 LBS | TEMPERATURE: 97.5 F | OXYGEN SATURATION: 97 % | BODY MASS INDEX: 33.81 KG/M2

## 2022-10-14 DIAGNOSIS — N30.01 ACUTE CYSTITIS WITH HEMATURIA: ICD-10-CM

## 2022-10-14 DIAGNOSIS — F41.9 ANXIETY AND DEPRESSION: ICD-10-CM

## 2022-10-14 DIAGNOSIS — F32.A ANXIETY AND DEPRESSION: ICD-10-CM

## 2022-10-14 DIAGNOSIS — I63.81 ACUTE LACUNAR STROKE: Primary | ICD-10-CM

## 2022-10-14 DIAGNOSIS — R23.3 EASY BRUISING: ICD-10-CM

## 2022-10-14 DIAGNOSIS — R53.83 FATIGUE, UNSPECIFIED TYPE: ICD-10-CM

## 2022-10-14 LAB
ALBUMIN SERPL-MCNC: 4.5 G/DL (ref 3.5–5.2)
ALBUMIN/GLOB SERPL: 1.6 G/DL
ALP SERPL-CCNC: 60 U/L (ref 39–117)
ALT SERPL W P-5'-P-CCNC: 34 U/L (ref 1–33)
ANION GAP SERPL CALCULATED.3IONS-SCNC: 15.1 MMOL/L (ref 5–15)
AST SERPL-CCNC: 44 U/L (ref 1–32)
BASOPHILS # BLD AUTO: 0.06 10*3/MM3 (ref 0–0.2)
BASOPHILS NFR BLD AUTO: 0.7 % (ref 0–1.5)
BILIRUB BLD-MCNC: NEGATIVE MG/DL
BILIRUB SERPL-MCNC: 0.6 MG/DL (ref 0–1.2)
BUN SERPL-MCNC: 18 MG/DL (ref 8–23)
BUN/CREAT SERPL: 18.9 (ref 7–25)
CALCIUM SPEC-SCNC: 9.6 MG/DL (ref 8.6–10.5)
CHLORIDE SERPL-SCNC: 101 MMOL/L (ref 98–107)
CLARITY, POC: CLEAR
CO2 SERPL-SCNC: 22.9 MMOL/L (ref 22–29)
COLOR UR: YELLOW
CREAT SERPL-MCNC: 0.95 MG/DL (ref 0.57–1)
DEPRECATED RDW RBC AUTO: 47.2 FL (ref 37–54)
EGFRCR SERPLBLD CKD-EPI 2021: 63.4 ML/MIN/1.73
EOSINOPHIL # BLD AUTO: 0.45 10*3/MM3 (ref 0–0.4)
EOSINOPHIL NFR BLD AUTO: 5.4 % (ref 0.3–6.2)
ERYTHROCYTE [DISTWIDTH] IN BLOOD BY AUTOMATED COUNT: 13.2 % (ref 12.3–15.4)
EXPIRATION DATE: ABNORMAL
GLOBULIN UR ELPH-MCNC: 2.8 GM/DL
GLUCOSE SERPL-MCNC: 169 MG/DL (ref 65–99)
GLUCOSE UR STRIP-MCNC: ABNORMAL MG/DL
HCT VFR BLD AUTO: 38.2 % (ref 34–46.6)
HGB BLD-MCNC: 12.1 G/DL (ref 12–15.9)
IMM GRANULOCYTES # BLD AUTO: 0.07 10*3/MM3 (ref 0–0.05)
IMM GRANULOCYTES NFR BLD AUTO: 0.8 % (ref 0–0.5)
KETONES UR QL: NEGATIVE
LEUKOCYTE EST, POC: NEGATIVE
LYMPHOCYTES # BLD AUTO: 1.55 10*3/MM3 (ref 0.7–3.1)
LYMPHOCYTES NFR BLD AUTO: 18.7 % (ref 19.6–45.3)
Lab: ABNORMAL
MCH RBC QN AUTO: 30.9 PG (ref 26.6–33)
MCHC RBC AUTO-ENTMCNC: 31.7 G/DL (ref 31.5–35.7)
MCV RBC AUTO: 97.7 FL (ref 79–97)
MONOCYTES # BLD AUTO: 0.61 10*3/MM3 (ref 0.1–0.9)
MONOCYTES NFR BLD AUTO: 7.4 % (ref 5–12)
NEUTROPHILS NFR BLD AUTO: 5.54 10*3/MM3 (ref 1.7–7)
NEUTROPHILS NFR BLD AUTO: 67 % (ref 42.7–76)
NITRITE UR-MCNC: NEGATIVE MG/ML
NRBC BLD AUTO-RTO: 0 /100 WBC (ref 0–0.2)
PH UR: 5.5 [PH] (ref 5–8)
PLATELET # BLD AUTO: 247 10*3/MM3 (ref 140–450)
PMV BLD AUTO: 11.6 FL (ref 6–12)
POTASSIUM SERPL-SCNC: 3.9 MMOL/L (ref 3.5–5.2)
PROT SERPL-MCNC: 7.3 G/DL (ref 6–8.5)
PROT UR STRIP-MCNC: ABNORMAL MG/DL
RBC # BLD AUTO: 3.91 10*6/MM3 (ref 3.77–5.28)
RBC # UR STRIP: NEGATIVE /UL
SODIUM SERPL-SCNC: 139 MMOL/L (ref 136–145)
SP GR UR: 1.01 (ref 1–1.03)
T4 FREE SERPL-MCNC: 0.85 NG/DL (ref 0.93–1.7)
TSH SERPL DL<=0.05 MIU/L-ACNC: 3.12 UIU/ML (ref 0.27–4.2)
UROBILINOGEN UR QL: ABNORMAL
WBC NRBC COR # BLD: 8.28 10*3/MM3 (ref 3.4–10.8)

## 2022-10-14 PROCEDURE — 85025 COMPLETE CBC W/AUTO DIFF WBC: CPT | Performed by: FAMILY MEDICINE

## 2022-10-14 PROCEDURE — 84439 ASSAY OF FREE THYROXINE: CPT | Performed by: FAMILY MEDICINE

## 2022-10-14 PROCEDURE — 84443 ASSAY THYROID STIM HORMONE: CPT | Performed by: FAMILY MEDICINE

## 2022-10-14 PROCEDURE — 99213 OFFICE O/P EST LOW 20 MIN: CPT | Performed by: FAMILY MEDICINE

## 2022-10-14 PROCEDURE — 80053 COMPREHEN METABOLIC PANEL: CPT | Performed by: FAMILY MEDICINE

## 2022-10-14 PROCEDURE — 81003 URINALYSIS AUTO W/O SCOPE: CPT | Performed by: FAMILY MEDICINE

## 2022-10-14 RX ORDER — DULOXETIN HYDROCHLORIDE 60 MG/1
60 CAPSULE, DELAYED RELEASE ORAL DAILY
Qty: 30 CAPSULE | Refills: 1 | Status: ON HOLD | OUTPATIENT
Start: 2022-10-14 | End: 2022-11-28

## 2022-10-14 NOTE — PROGRESS NOTES
Chief Complaint  Urinary Tract Infection (F/U)    Subjective          Haylee Gibbs presents to [facility]  History of Present Illness  Pt needs to know when to go off plavix- was supposed to see neurology 3 weeks after DC from hospital in July 2022 due to lacunar infarcts and when she followed-up she would be told how long to be on plavix- needs referral to neurology because she never followed-up after hospitalization    Pt has had uti and completed her levaquin- no longer has symptoms- dysuria has resolved- pt will be seeing urology in November and infectious disease in 10/27/22    Pt was placed on seroquel recently for increased depression to augment prozac but seroquel is causing a lot of drowsiness and dry mouth- will DC prozac and seroquel and start cymbalta 60 mg daily- pt has no SI or HI- pt feels safe- meds had helped depression, per pt but she is not able to tolerate the seroquel    Pt has had some increased bruising on arms      Objective   Allergies   Allergen Reactions   • Tramadol Hcl GI Intolerance     Immunization History   Administered Date(s) Administered   • COVID-19 (ИРИНА) 03/08/2021   • COVID-19 (PFIZER) PURPLE CAP 11/08/2021   • Covid-19 (Pfizer) Gray Cap 08/22/2022   • Fluzone High-Dose 65+yrs 10/19/2021   • Influenza, Unspecified 09/30/2019   • Pneumococcal Conjugate 13-Valent (PCV13) 11/24/2015     Past Medical History:   Diagnosis Date   • Anxiety disorder 06/26/2018   • Benign essential hypertension    • Cataract    • COPD (chronic obstructive pulmonary disease) ([facility])    • CVA (cerebral vascular accident) ([facility])    • Depression    • Diabetes mellitus, type 2 ([facility])    • Hyperlipidemia    • Paresthesia and pain of both upper extremities 06/26/2018   • Seizure disorder ([facility]) 06/26/2018   • Vitamin B 12 deficiency    • Vitamin D deficiency 06/26/2018      Past Surgical History:   Procedure Laterality Date   • APPENDECTOMY     • BACK SURGERY      LOW BACK  DISC   • CATARACT EXTRACTION     • HYSTERECTOMY     • NECK SURGERY      NECK DISC   • TONSILLECTOMY        Social History     Socioeconomic History   • Marital status: Single   Tobacco Use   • Smoking status: Former     Packs/day: 0.50     Years: 20.00     Pack years: 10.00     Types: Cigarettes     Quit date: 2015     Years since quittin.7   • Smokeless tobacco: Former   • Tobacco comments:     SMOKES LESS THAN 1 PACK PER DAY, FOR 20 YEARS NEVER USES OTHER TOBACCO PRODCUTS   Vaping Use   • Vaping Use: Never used   Substance and Sexual Activity   • Alcohol use: Never   • Drug use: Never        Current Outpatient Medications:   •  Aspirin Low Dose 81 MG chewable tablet, CHEW 1 TABLET BY MOUTH DAILY, Disp: 90 tablet, Rfl: 1  •  atorvastatin (LIPITOR) 80 MG tablet, Take 80 mg by mouth Every Night., Disp: , Rfl:   •  Blood Glucose Monitoring Suppl (FreeStyle Lite) w/Device kit, 1 each Daily., Disp: 1 kit, Rfl: 0  •  cholecalciferol (VITAMIN D3) 25 MCG (1000 UT) tablet, Take 1,000 Units by mouth Daily., Disp: , Rfl:   •  clopidogrel (PLAVIX) 75 MG tablet, Take 75 mg by mouth Daily., Disp: , Rfl:   •  empagliflozin (Jardiance) 25 MG tablet tablet, Take 1 tablet by mouth Daily., Disp: 90 tablet, Rfl: 1  •  glucose blood (FREESTYLE LITE) test strip, Test BG daily., Disp: 100 each, Rfl: 5  •  levETIRAcetam (KEPPRA) 500 MG tablet, Take 2 tablets by mouth Every Evening., Disp: 180 tablet, Rfl: 1  •  levETIRAcetam (KEPPRA) 500 MG tablet, Take 1 tablet by mouth Every Morning., Disp: 90 tablet, Rfl: 1  •  linaclotide (Linzess) 145 MCG capsule capsule, Take 1 capsule by mouth Every Morning Before Breakfast., Disp: 90 capsule, Rfl: 0  •  metFORMIN (GLUCOPHAGE) 500 MG tablet, Take 1 tablet by mouth 2 (Two) Times a Day., Disp: 180 tablet, Rfl: 1  •  metoprolol tartrate (LOPRESSOR) 25 MG tablet, Take 1 tablet by mouth 2 (Two) Times a Day., Disp: 180 tablet, Rfl: 1  •  Ventolin  (90 Base) MCG/ACT inhaler, Inhale 1 puff  Every 4 (Four) Hours As Needed., Disp: , Rfl:   •  DULoxetine (CYMBALTA) 60 MG capsule, Take 1 capsule by mouth Daily., Disp: 30 capsule, Rfl: 1  •  lisinopril (PRINIVIL,ZESTRIL) 10 MG tablet, Take 1 tablet by mouth Daily for 30 days., Disp: 90 tablet, Rfl: 1   Family History   Problem Relation Age of Onset   • Seizures Mother    • Stroke Mother    • Diabetes type II Sister    • Hypertension Sister    • Diabetes type II Brother    • Hypertension Brother    • Alcohol abuse Brother    • Heart disease Other         MOTHER, GRANDMOTHER, SISTER; FATHER, GRANDFATHER OR BROTHER DEVELOPED HEART DISEASE BEFORE THE AGE OF 65          Vital Signs:   Vitals:    10/14/22 1556   BP: 126/74   Pulse: 70   Temp: 97.5 °F (36.4 °C)   SpO2: 97%   Weight: 89.4 kg (197 lb)       Review of Systems   Constitutional: Positive for fatigue. Negative for fever.   Eyes: Negative for visual disturbance.   Respiratory: Negative for cough, chest tightness, shortness of breath and wheezing.    Cardiovascular: Negative for chest pain, palpitations and leg swelling.   Gastrointestinal: Negative for abdominal pain, diarrhea, nausea and vomiting.   Genitourinary: Negative for dysuria, frequency, hematuria and urgency.   Skin: Negative for rash.   Neurological: Negative for light-headedness and headaches.      Physical Exam  Vitals reviewed.   Constitutional:       Appearance: Normal appearance. She is well-developed.   HENT:      Head: Normocephalic and atraumatic.      Right Ear: External ear normal.      Left Ear: External ear normal.      Mouth/Throat:      Pharynx: No oropharyngeal exudate.   Eyes:      Conjunctiva/sclera: Conjunctivae normal.      Pupils: Pupils are equal, round, and reactive to light.   Cardiovascular:      Rate and Rhythm: Normal rate and regular rhythm.      Pulses: Normal pulses.      Heart sounds: Normal heart sounds. No murmur heard.    No friction rub. No gallop.   Pulmonary:      Effort: Pulmonary effort is normal.       Breath sounds: Normal breath sounds. No wheezing or rhonchi.   Abdominal:      General: Abdomen is flat. Bowel sounds are normal. There is no distension.      Palpations: Abdomen is soft. There is no mass.      Tenderness: There is no abdominal tenderness. There is no right CVA tenderness, left CVA tenderness, guarding or rebound.      Hernia: No hernia is present.   Musculoskeletal:         General: Normal range of motion.      Cervical back: Normal range of motion and neck supple.   Skin:     General: Skin is warm and dry.      Capillary Refill: Capillary refill takes less than 2 seconds.   Neurological:      General: No focal deficit present.      Mental Status: She is alert and oriented to person, place, and time.      Cranial Nerves: No cranial nerve deficit.   Psychiatric:         Mood and Affect: Mood and affect normal.         Behavior: Behavior normal.         Thought Content: Thought content normal.         Judgment: Judgment normal.        Result Review :                 Assessment and Plan    Diagnoses and all orders for this visit:    1. Acute lacunar stroke (HCC) (Primary)  -     Ambulatory Referral to Neurology    2. Anxiety and depression  -     DULoxetine (CYMBALTA) 60 MG capsule; Take 1 capsule by mouth Daily.  Dispense: 30 capsule; Refill: 1    3. Acute cystitis with hematuria  -     POCT urinalysis dipstick, automated    4. Easy bruising  -     CBC Auto Differential  -     Comprehensive Metabolic Panel  -     TSH+Free T4    5. Fatigue, unspecified type  -     TSH+Free T4            Follow Up   Return if symptoms worsen or fail to improve.  Patient was given instructions and counseling regarding her condition or for health maintenance advice. Please see specific information pulled into the AVS if appropriate.

## 2022-10-27 ENCOUNTER — OFFICE VISIT (OUTPATIENT)
Dept: INFECTIOUS DISEASES | Facility: CLINIC | Age: 72
End: 2022-10-27

## 2022-10-27 VITALS
TEMPERATURE: 96.9 F | WEIGHT: 188.6 LBS | RESPIRATION RATE: 16 BRPM | HEART RATE: 51 BPM | SYSTOLIC BLOOD PRESSURE: 183 MMHG | HEIGHT: 64 IN | DIASTOLIC BLOOD PRESSURE: 75 MMHG | BODY MASS INDEX: 32.2 KG/M2

## 2022-10-27 DIAGNOSIS — N39.0 RECURRENT URINARY TRACT INFECTION: Primary | ICD-10-CM

## 2022-10-27 PROCEDURE — 99204 OFFICE O/P NEW MOD 45 MIN: CPT | Performed by: STUDENT IN AN ORGANIZED HEALTH CARE EDUCATION/TRAINING PROGRAM

## 2022-10-27 NOTE — PROGRESS NOTES
"Chief Complaint  Chronic UTI    Subjective        Haylee Gibbs presents to [facility]  History of Present Illness  Patient is a 72-year-old female with past medical history of urinary tract infection referred to ID for further recommendations.    Patient reports that since July she has had multiple urine cultures and treated with multiple courses of antibiotics for urinary tract infections.  Patient reports that she was largely asymptomatic until the most recent culture which was obtained on 9/27.  At that time she was having some slight dysuria with increased frequency.  She states that this has completely resolved.  She reports the times before that she was not having any urinary symptoms and her family is here who corroborates most of the story.  She states though that she has had some difficulty remembering since her stroke but family seems to agree with her counts.  States that prior to the her difficulty starting the summer she was not having any trouble with urinary tract infections in the past.      Currently she states she does not have any frequency, Hesitancy or burning with urination.  She states she has had chronic problems with incontinence and has difficulty making it to the bathroom but this has not changed at all.      Objective   Vital Signs:  BP (!) 183/75 (BP Location: Left arm, Patient Position: Sitting, Cuff Size: Adult)   Pulse 51   Temp 96.9 °F (36.1 °C)   Resp 16   Ht 162.6 cm (64.02\")   Wt 85.5 kg (188 lb 9.6 oz)   BMI 32.36 kg/m²   Estimated body mass index is 32.36 kg/m² as calculated from the following:    Height as of this encounter: 162.6 cm (64.02\").    Weight as of this encounter: 85.5 kg (188 lb 9.6 oz).        Physical Exam  Constitutional:       General: She is not in acute distress.     Appearance: Normal appearance. She is normal weight. She is not ill-appearing.   HENT:      Head: Normocephalic and atraumatic.      Nose: Nose normal. " No rhinorrhea.      Mouth/Throat:      Mouth: Mucous membranes are moist.      Pharynx: No oropharyngeal exudate.   Eyes:      General: No scleral icterus.     Extraocular Movements: Extraocular movements intact.      Pupils: Pupils are equal, round, and reactive to light.   Cardiovascular:      Rate and Rhythm: Normal rate and regular rhythm.      Pulses: Normal pulses.      Heart sounds: Normal heart sounds.   Pulmonary:      Effort: Pulmonary effort is normal.      Breath sounds: Normal breath sounds.   Abdominal:      General: Abdomen is flat. Bowel sounds are normal.      Palpations: Abdomen is soft.      Tenderness: There is no guarding or rebound.   Musculoskeletal:         General: No swelling or tenderness. Normal range of motion.      Cervical back: Normal range of motion and neck supple. No tenderness.      Right lower leg: No edema.      Left lower leg: No edema.   Skin:     General: Skin is warm and dry.      Findings: No rash.   Neurological:      General: No focal deficit present.      Mental Status: She is alert and oriented to person, place, and time.   Psychiatric:         Mood and Affect: Mood normal.         Behavior: Behavior normal.        Result Review :  The following data was reviewed by: Enrique Vidal DO on 10/27/2022:  Common labs    Common Labs 7/19/22 7/19/22 8/16/22 10/14/22 10/14/22    1757 1757  1651 1651   Glucose  173 (A) 133 (A)  169 (A)   BUN  22 22  18   Creatinine  0.86 1.02 (A)  0.95   Sodium  136 138  139   Potassium  4.8 5.3 (A)  3.9   Chloride  102 106  101   Calcium  9.4 9.6  9.6   Albumin  4.00 4.30  4.50   Total Bilirubin  0.4 0.5  0.6   Alkaline Phosphatase  60 62  60   AST (SGOT)  36 (A) 57 (A)  44 (A)   ALT (SGPT)  31 49 (A)  34 (A)   WBC 7.86   8.28    Hemoglobin 11.2 (A)   12.1    Hematocrit 34.5   38.2    Platelets 324   247    (A) Abnormal value            Data reviewed: PCP notes and urinalysis results with culture/susceptibilities          Assessment and  Plan   Diagnoses and all orders for this visit:    1. Recurrent urinary tract infection (Primary)    Much of today's visit was conducted discussing the diagnosis of urinary tract infection.  She was counseled on using only urinary symptoms to guide treatment of any infection.  She was counseled on asymptomatic bacteriuria and avoiding any antibiotic use unless she is symptomatic from a urinary standpoint.  She is currently having no symptoms and her most recent UA is reassuring.  She was provided a printout detailing interventions to minimize urinary tract infections of the future.  She has an upcoming appointment this week with urology which I counseled her that seeing urology may be helpful for the future.    No indication for further treatment at this time.  At this point I would favor that chronic antibiotic therapy would only increase her rates of resistance and she was counseled on this.       I spent 57 minutes caring for Haylee on this date of service. This time includes time spent by me in the following activities:preparing for the visit, reviewing tests, obtaining and/or reviewing a separately obtained history, performing a medically appropriate examination and/or evaluation , counseling and educating the patient/family/caregiver, referring and communicating with other health care professionals , documenting information in the medical record, independently interpreting results and communicating that information with the patient/family/caregiver and care coordination  Follow Up   No follow-ups on file.  Patient was given instructions and counseling regarding her condition or for health maintenance advice. Please see specific information pulled into the AVS if appropriate.

## 2022-10-28 ENCOUNTER — PATIENT ROUNDING (BHMG ONLY) (OUTPATIENT)
Dept: INFECTIOUS DISEASES | Facility: CLINIC | Age: 72
End: 2022-10-28

## 2022-10-28 NOTE — PROGRESS NOTES
October 28, 2022           We're always looking for ways to make our patients' experiences even better. Do you have recommendations on ways we may improve?  no    Overall were you satisfied with your first visit to our practice? yes

## 2022-11-01 ENCOUNTER — TELEPHONE (OUTPATIENT)
Dept: UROLOGY | Facility: CLINIC | Age: 72
End: 2022-11-01

## 2022-11-26 ENCOUNTER — HOSPITAL ENCOUNTER (INPATIENT)
Facility: HOSPITAL | Age: 72
LOS: 3 days | Discharge: NURSING, REHAB OR LONG-TERM CARE | End: 2022-11-30
Attending: EMERGENCY MEDICINE | Admitting: INTERNAL MEDICINE

## 2022-11-26 ENCOUNTER — APPOINTMENT (OUTPATIENT)
Dept: GENERAL RADIOLOGY | Facility: HOSPITAL | Age: 72
End: 2022-11-26

## 2022-11-26 DIAGNOSIS — R26.2 DIFFICULTY WALKING: ICD-10-CM

## 2022-11-26 DIAGNOSIS — Z78.9 DECREASED ACTIVITIES OF DAILY LIVING (ADL): ICD-10-CM

## 2022-11-26 DIAGNOSIS — K59.00 CONSTIPATION, UNSPECIFIED CONSTIPATION TYPE: ICD-10-CM

## 2022-11-26 DIAGNOSIS — R05.1 ACUTE COUGH: Primary | ICD-10-CM

## 2022-11-26 DIAGNOSIS — R53.1 WEAKNESS: ICD-10-CM

## 2022-11-26 DIAGNOSIS — R13.10 DYSPHAGIA, UNSPECIFIED TYPE: ICD-10-CM

## 2022-11-26 LAB
ALBUMIN SERPL-MCNC: 4.2 G/DL (ref 3.5–5.2)
ALBUMIN/GLOB SERPL: 1.4 G/DL
ALP SERPL-CCNC: 64 U/L (ref 39–117)
ALT SERPL W P-5'-P-CCNC: 48 U/L (ref 1–33)
ANION GAP SERPL CALCULATED.3IONS-SCNC: 14 MMOL/L (ref 5–15)
AST SERPL-CCNC: 101 U/L (ref 1–32)
BACTERIA UR QL AUTO: ABNORMAL /HPF
BASOPHILS # BLD AUTO: 0.03 10*3/MM3 (ref 0–0.2)
BASOPHILS NFR BLD AUTO: 0.4 % (ref 0–1.5)
BILIRUB SERPL-MCNC: 0.5 MG/DL (ref 0–1.2)
BILIRUB UR QL STRIP: NEGATIVE
BUN SERPL-MCNC: 17 MG/DL (ref 8–23)
BUN/CREAT SERPL: 17 (ref 7–25)
CALCIUM SPEC-SCNC: 9.4 MG/DL (ref 8.6–10.5)
CHLORIDE SERPL-SCNC: 101 MMOL/L (ref 98–107)
CLARITY UR: ABNORMAL
CO2 SERPL-SCNC: 20 MMOL/L (ref 22–29)
COLOR UR: YELLOW
CREAT SERPL-MCNC: 1 MG/DL (ref 0.57–1)
DEPRECATED RDW RBC AUTO: 43.9 FL (ref 37–54)
EGFRCR SERPLBLD CKD-EPI 2021: 59.6 ML/MIN/1.73
EOSINOPHIL # BLD AUTO: 0.02 10*3/MM3 (ref 0–0.4)
EOSINOPHIL NFR BLD AUTO: 0.3 % (ref 0.3–6.2)
ERYTHROCYTE [DISTWIDTH] IN BLOOD BY AUTOMATED COUNT: 12.7 % (ref 12.3–15.4)
FLUAV AG NPH QL: NEGATIVE
FLUBV AG NPH QL IA: NEGATIVE
GLOBULIN UR ELPH-MCNC: 3.1 GM/DL
GLUCOSE SERPL-MCNC: 263 MG/DL (ref 65–99)
GLUCOSE UR STRIP-MCNC: ABNORMAL MG/DL
HCT VFR BLD AUTO: 36.5 % (ref 34–46.6)
HGB BLD-MCNC: 11.8 G/DL (ref 12–15.9)
HGB UR QL STRIP.AUTO: ABNORMAL
HOLD SPECIMEN: NORMAL
HOLD SPECIMEN: NORMAL
HYALINE CASTS UR QL AUTO: ABNORMAL /LPF
IMM GRANULOCYTES # BLD AUTO: 0.04 10*3/MM3 (ref 0–0.05)
IMM GRANULOCYTES NFR BLD AUTO: 0.5 % (ref 0–0.5)
KETONES UR QL STRIP: NEGATIVE
LEUKOCYTE ESTERASE UR QL STRIP.AUTO: NEGATIVE
LYMPHOCYTES # BLD AUTO: 0.62 10*3/MM3 (ref 0.7–3.1)
LYMPHOCYTES NFR BLD AUTO: 8.3 % (ref 19.6–45.3)
MCH RBC QN AUTO: 30.6 PG (ref 26.6–33)
MCHC RBC AUTO-ENTMCNC: 32.3 G/DL (ref 31.5–35.7)
MCV RBC AUTO: 94.8 FL (ref 79–97)
MONOCYTES # BLD AUTO: 0.66 10*3/MM3 (ref 0.1–0.9)
MONOCYTES NFR BLD AUTO: 8.9 % (ref 5–12)
NEUTROPHILS NFR BLD AUTO: 6.06 10*3/MM3 (ref 1.7–7)
NEUTROPHILS NFR BLD AUTO: 81.6 % (ref 42.7–76)
NITRITE UR QL STRIP: POSITIVE
NRBC BLD AUTO-RTO: 0 /100 WBC (ref 0–0.2)
NT-PROBNP SERPL-MCNC: 1235 PG/ML (ref 0–900)
PH UR STRIP.AUTO: <=5 [PH] (ref 5–8)
PLATELET # BLD AUTO: 203 10*3/MM3 (ref 140–450)
PMV BLD AUTO: 10.9 FL (ref 6–12)
POTASSIUM SERPL-SCNC: 3.9 MMOL/L (ref 3.5–5.2)
PROT SERPL-MCNC: 7.3 G/DL (ref 6–8.5)
PROT UR QL STRIP: ABNORMAL
RBC # BLD AUTO: 3.85 10*6/MM3 (ref 3.77–5.28)
RBC # UR STRIP: ABNORMAL /HPF
REF LAB TEST METHOD: ABNORMAL
S PYO AG THROAT QL: NEGATIVE
SODIUM SERPL-SCNC: 135 MMOL/L (ref 136–145)
SP GR UR STRIP: >1.03 (ref 1–1.03)
SQUAMOUS #/AREA URNS HPF: ABNORMAL /HPF
TROPONIN T SERPL-MCNC: <0.01 NG/ML (ref 0–0.03)
UROBILINOGEN UR QL STRIP: ABNORMAL
WBC # UR STRIP: ABNORMAL /HPF
WBC NRBC COR # BLD: 7.43 10*3/MM3 (ref 3.4–10.8)
WHOLE BLOOD HOLD COAG: NORMAL
WHOLE BLOOD HOLD SPECIMEN: NORMAL

## 2022-11-26 PROCEDURE — 80053 COMPREHEN METABOLIC PANEL: CPT

## 2022-11-26 PROCEDURE — U0005 INFEC AGEN DETEC AMPLI PROBE: HCPCS

## 2022-11-26 PROCEDURE — 87086 URINE CULTURE/COLONY COUNT: CPT | Performed by: EMERGENCY MEDICINE

## 2022-11-26 PROCEDURE — 99285 EMERGENCY DEPT VISIT HI MDM: CPT

## 2022-11-26 PROCEDURE — 84484 ASSAY OF TROPONIN QUANT: CPT

## 2022-11-26 PROCEDURE — 25010000002 CEFTRIAXONE PER 250 MG: Performed by: EMERGENCY MEDICINE

## 2022-11-26 PROCEDURE — 93005 ELECTROCARDIOGRAM TRACING: CPT

## 2022-11-26 PROCEDURE — 93005 ELECTROCARDIOGRAM TRACING: CPT | Performed by: EMERGENCY MEDICINE

## 2022-11-26 PROCEDURE — 87880 STREP A ASSAY W/OPTIC: CPT

## 2022-11-26 PROCEDURE — 87081 CULTURE SCREEN ONLY: CPT | Performed by: EMERGENCY MEDICINE

## 2022-11-26 PROCEDURE — 83880 ASSAY OF NATRIURETIC PEPTIDE: CPT

## 2022-11-26 PROCEDURE — 87040 BLOOD CULTURE FOR BACTERIA: CPT | Performed by: EMERGENCY MEDICINE

## 2022-11-26 PROCEDURE — 85025 COMPLETE CBC W/AUTO DIFF WBC: CPT

## 2022-11-26 PROCEDURE — 93010 ELECTROCARDIOGRAM REPORT: CPT | Performed by: INTERNAL MEDICINE

## 2022-11-26 PROCEDURE — 71045 X-RAY EXAM CHEST 1 VIEW: CPT

## 2022-11-26 PROCEDURE — 81001 URINALYSIS AUTO W/SCOPE: CPT

## 2022-11-26 PROCEDURE — 87804 INFLUENZA ASSAY W/OPTIC: CPT

## 2022-11-26 PROCEDURE — U0004 COV-19 TEST NON-CDC HGH THRU: HCPCS

## 2022-11-26 PROCEDURE — 36415 COLL VENOUS BLD VENIPUNCTURE: CPT

## 2022-11-26 RX ORDER — CEFTRIAXONE SODIUM 1 G/50ML
1 INJECTION, SOLUTION INTRAVENOUS ONCE
Status: COMPLETED | OUTPATIENT
Start: 2022-11-26 | End: 2022-11-26

## 2022-11-26 RX ORDER — SODIUM CHLORIDE 0.9 % (FLUSH) 0.9 %
10 SYRINGE (ML) INJECTION AS NEEDED
Status: DISCONTINUED | OUTPATIENT
Start: 2022-11-26 | End: 2022-11-30 | Stop reason: HOSPADM

## 2022-11-26 RX ADMIN — CEFTRIAXONE SODIUM 1 G: 1 INJECTION, SOLUTION INTRAVENOUS at 22:45

## 2022-11-27 DIAGNOSIS — F41.9 ANXIETY AND DEPRESSION: ICD-10-CM

## 2022-11-27 DIAGNOSIS — F32.A DEPRESSION, UNSPECIFIED DEPRESSION TYPE: ICD-10-CM

## 2022-11-27 DIAGNOSIS — F32.A ANXIETY AND DEPRESSION: ICD-10-CM

## 2022-11-27 PROBLEM — A49.8 INFECTION DUE TO ENTEROBACTER CLOACAE: Status: ACTIVE | Noted: 2022-11-27

## 2022-11-27 PROBLEM — R05.1 ACUTE COUGH: Status: ACTIVE | Noted: 2022-11-27

## 2022-11-27 LAB
ALBUMIN SERPL-MCNC: 4 G/DL (ref 3.5–5.2)
ALBUMIN/GLOB SERPL: 1.2 G/DL
ALP SERPL-CCNC: 54 U/L (ref 39–117)
ALT SERPL W P-5'-P-CCNC: 49 U/L (ref 1–33)
ANION GAP SERPL CALCULATED.3IONS-SCNC: 10 MMOL/L (ref 5–15)
AST SERPL-CCNC: 104 U/L (ref 1–32)
BACTERIA SPEC AEROBE CULT: NORMAL
BACTERIA UR QL AUTO: ABNORMAL /HPF
BASOPHILS # BLD AUTO: 0.02 10*3/MM3 (ref 0–0.2)
BASOPHILS NFR BLD AUTO: 0.3 % (ref 0–1.5)
BILIRUB SERPL-MCNC: 0.5 MG/DL (ref 0–1.2)
BILIRUB UR QL STRIP: NEGATIVE
BUN SERPL-MCNC: 13 MG/DL (ref 8–23)
BUN/CREAT SERPL: 16.3 (ref 7–25)
CALCIUM SPEC-SCNC: 9.3 MG/DL (ref 8.6–10.5)
CHLORIDE SERPL-SCNC: 102 MMOL/L (ref 98–107)
CLARITY UR: CLEAR
CO2 SERPL-SCNC: 24 MMOL/L (ref 22–29)
COLOR UR: YELLOW
CREAT SERPL-MCNC: 0.8 MG/DL (ref 0.57–1)
DEPRECATED RDW RBC AUTO: 45.5 FL (ref 37–54)
EGFRCR SERPLBLD CKD-EPI 2021: 77.9 ML/MIN/1.73
EOSINOPHIL # BLD AUTO: 0.01 10*3/MM3 (ref 0–0.4)
EOSINOPHIL NFR BLD AUTO: 0.2 % (ref 0.3–6.2)
ERYTHROCYTE [DISTWIDTH] IN BLOOD BY AUTOMATED COUNT: 12.9 % (ref 12.3–15.4)
GLOBULIN UR ELPH-MCNC: 3.3 GM/DL
GLUCOSE BLDC GLUCOMTR-MCNC: 132 MG/DL (ref 70–99)
GLUCOSE BLDC GLUCOMTR-MCNC: 145 MG/DL (ref 70–99)
GLUCOSE BLDC GLUCOMTR-MCNC: 166 MG/DL (ref 70–99)
GLUCOSE SERPL-MCNC: 142 MG/DL (ref 65–99)
GLUCOSE UR STRIP-MCNC: ABNORMAL MG/DL
HCT VFR BLD AUTO: 36.2 % (ref 34–46.6)
HGB BLD-MCNC: 11.6 G/DL (ref 12–15.9)
HGB UR QL STRIP.AUTO: NEGATIVE
HYALINE CASTS UR QL AUTO: ABNORMAL /LPF
IMM GRANULOCYTES # BLD AUTO: 0.03 10*3/MM3 (ref 0–0.05)
IMM GRANULOCYTES NFR BLD AUTO: 0.5 % (ref 0–0.5)
KETONES UR QL STRIP: NEGATIVE
LEUKOCYTE ESTERASE UR QL STRIP.AUTO: ABNORMAL
LYMPHOCYTES # BLD AUTO: 1.31 10*3/MM3 (ref 0.7–3.1)
LYMPHOCYTES NFR BLD AUTO: 21.8 % (ref 19.6–45.3)
MCH RBC QN AUTO: 30.9 PG (ref 26.6–33)
MCHC RBC AUTO-ENTMCNC: 32 G/DL (ref 31.5–35.7)
MCV RBC AUTO: 96.3 FL (ref 79–97)
MONOCYTES # BLD AUTO: 0.91 10*3/MM3 (ref 0.1–0.9)
MONOCYTES NFR BLD AUTO: 15.2 % (ref 5–12)
NEUTROPHILS NFR BLD AUTO: 3.72 10*3/MM3 (ref 1.7–7)
NEUTROPHILS NFR BLD AUTO: 62 % (ref 42.7–76)
NITRITE UR QL STRIP: POSITIVE
NRBC BLD AUTO-RTO: 0 /100 WBC (ref 0–0.2)
PH UR STRIP.AUTO: 6 [PH] (ref 5–8)
PLATELET # BLD AUTO: 195 10*3/MM3 (ref 140–450)
PMV BLD AUTO: 10.9 FL (ref 6–12)
POTASSIUM SERPL-SCNC: 4.2 MMOL/L (ref 3.5–5.2)
PROT SERPL-MCNC: 7.3 G/DL (ref 6–8.5)
PROT UR QL STRIP: NEGATIVE
QT INTERVAL: 395 MS
RBC # BLD AUTO: 3.76 10*6/MM3 (ref 3.77–5.28)
RBC # UR STRIP: ABNORMAL /HPF
REF LAB TEST METHOD: ABNORMAL
SARS-COV-2 RNA PNL SPEC NAA+PROBE: NOT DETECTED
SODIUM SERPL-SCNC: 136 MMOL/L (ref 136–145)
SP GR UR STRIP: >=1.03 (ref 1–1.03)
SQUAMOUS #/AREA URNS HPF: ABNORMAL /HPF
UROBILINOGEN UR QL STRIP: ABNORMAL
WBC # UR STRIP: ABNORMAL /HPF
WBC NRBC COR # BLD: 6 10*3/MM3 (ref 3.4–10.8)

## 2022-11-27 PROCEDURE — 85025 COMPLETE CBC W/AUTO DIFF WBC: CPT | Performed by: INTERNAL MEDICINE

## 2022-11-27 PROCEDURE — 97165 OT EVAL LOW COMPLEX 30 MIN: CPT

## 2022-11-27 PROCEDURE — 81001 URINALYSIS AUTO W/SCOPE: CPT | Performed by: STUDENT IN AN ORGANIZED HEALTH CARE EDUCATION/TRAINING PROGRAM

## 2022-11-27 PROCEDURE — 82962 GLUCOSE BLOOD TEST: CPT

## 2022-11-27 PROCEDURE — 80053 COMPREHEN METABOLIC PANEL: CPT | Performed by: INTERNAL MEDICINE

## 2022-11-27 PROCEDURE — 87086 URINE CULTURE/COLONY COUNT: CPT | Performed by: STUDENT IN AN ORGANIZED HEALTH CARE EDUCATION/TRAINING PROGRAM

## 2022-11-27 PROCEDURE — 25010000002 HEPARIN (PORCINE) PER 1000 UNITS: Performed by: INTERNAL MEDICINE

## 2022-11-27 PROCEDURE — 63710000001 INSULIN LISPRO (HUMAN) PER 5 UNITS: Performed by: INTERNAL MEDICINE

## 2022-11-27 PROCEDURE — 94799 UNLISTED PULMONARY SVC/PX: CPT

## 2022-11-27 PROCEDURE — 99223 1ST HOSP IP/OBS HIGH 75: CPT | Performed by: INTERNAL MEDICINE

## 2022-11-27 PROCEDURE — 25010000002 MEROPENEM PER 100 MG: Performed by: STUDENT IN AN ORGANIZED HEALTH CARE EDUCATION/TRAINING PROGRAM

## 2022-11-27 PROCEDURE — 97161 PT EVAL LOW COMPLEX 20 MIN: CPT

## 2022-11-27 RX ORDER — BISACODYL 10 MG
10 SUPPOSITORY, RECTAL RECTAL DAILY PRN
Status: DISCONTINUED | OUTPATIENT
Start: 2022-11-27 | End: 2022-11-30 | Stop reason: HOSPADM

## 2022-11-27 RX ORDER — ATORVASTATIN CALCIUM 40 MG/1
80 TABLET, FILM COATED ORAL NIGHTLY
Status: DISCONTINUED | OUTPATIENT
Start: 2022-11-27 | End: 2022-11-30 | Stop reason: HOSPADM

## 2022-11-27 RX ORDER — NICOTINE POLACRILEX 4 MG
15 LOZENGE BUCCAL
Status: DISCONTINUED | OUTPATIENT
Start: 2022-11-27 | End: 2022-11-30 | Stop reason: HOSPADM

## 2022-11-27 RX ORDER — SODIUM CHLORIDE 9 MG/ML
40 INJECTION, SOLUTION INTRAVENOUS AS NEEDED
Status: DISCONTINUED | OUTPATIENT
Start: 2022-11-27 | End: 2022-11-30 | Stop reason: HOSPADM

## 2022-11-27 RX ORDER — ACETAMINOPHEN 160 MG/5ML
650 SOLUTION ORAL EVERY 4 HOURS PRN
Status: DISCONTINUED | OUTPATIENT
Start: 2022-11-27 | End: 2022-11-30 | Stop reason: HOSPADM

## 2022-11-27 RX ORDER — HEPARIN SODIUM 5000 [USP'U]/ML
5000 INJECTION, SOLUTION INTRAVENOUS; SUBCUTANEOUS EVERY 12 HOURS SCHEDULED
Status: DISCONTINUED | OUTPATIENT
Start: 2022-11-27 | End: 2022-11-30 | Stop reason: HOSPADM

## 2022-11-27 RX ORDER — ACETAMINOPHEN 325 MG/1
650 TABLET ORAL EVERY 4 HOURS PRN
Status: DISCONTINUED | OUTPATIENT
Start: 2022-11-27 | End: 2022-11-30 | Stop reason: HOSPADM

## 2022-11-27 RX ORDER — ASPIRIN 81 MG/1
81 TABLET, CHEWABLE ORAL DAILY
Status: DISCONTINUED | OUTPATIENT
Start: 2022-11-27 | End: 2022-11-30 | Stop reason: HOSPADM

## 2022-11-27 RX ORDER — LEVETIRACETAM 500 MG/1
500 TABLET ORAL
Status: DISCONTINUED | OUTPATIENT
Start: 2022-11-27 | End: 2022-11-30 | Stop reason: HOSPADM

## 2022-11-27 RX ORDER — BISACODYL 5 MG/1
5 TABLET, DELAYED RELEASE ORAL DAILY PRN
Status: DISCONTINUED | OUTPATIENT
Start: 2022-11-27 | End: 2022-11-30 | Stop reason: HOSPADM

## 2022-11-27 RX ORDER — AMOXICILLIN 250 MG
2 CAPSULE ORAL 2 TIMES DAILY
Status: DISCONTINUED | OUTPATIENT
Start: 2022-11-27 | End: 2022-11-30 | Stop reason: HOSPADM

## 2022-11-27 RX ORDER — NITROGLYCERIN 0.4 MG/1
0.4 TABLET SUBLINGUAL
Status: DISCONTINUED | OUTPATIENT
Start: 2022-11-27 | End: 2022-11-30 | Stop reason: HOSPADM

## 2022-11-27 RX ORDER — INSULIN LISPRO 100 [IU]/ML
2-7 INJECTION, SOLUTION INTRAVENOUS; SUBCUTANEOUS
Status: DISCONTINUED | OUTPATIENT
Start: 2022-11-27 | End: 2022-11-30 | Stop reason: HOSPADM

## 2022-11-27 RX ORDER — DEXTROSE MONOHYDRATE 25 G/50ML
25 INJECTION, SOLUTION INTRAVENOUS
Status: DISCONTINUED | OUTPATIENT
Start: 2022-11-27 | End: 2022-11-30 | Stop reason: HOSPADM

## 2022-11-27 RX ORDER — ACETAMINOPHEN 650 MG/1
650 SUPPOSITORY RECTAL EVERY 4 HOURS PRN
Status: DISCONTINUED | OUTPATIENT
Start: 2022-11-27 | End: 2022-11-30 | Stop reason: HOSPADM

## 2022-11-27 RX ORDER — CLOPIDOGREL BISULFATE 75 MG/1
75 TABLET ORAL DAILY
Status: DISCONTINUED | OUTPATIENT
Start: 2022-11-27 | End: 2022-11-30 | Stop reason: HOSPADM

## 2022-11-27 RX ORDER — POLYETHYLENE GLYCOL 3350 17 G/17G
17 POWDER, FOR SOLUTION ORAL DAILY PRN
Status: DISCONTINUED | OUTPATIENT
Start: 2022-11-27 | End: 2022-11-30 | Stop reason: HOSPADM

## 2022-11-27 RX ORDER — SODIUM CHLORIDE 0.9 % (FLUSH) 0.9 %
10 SYRINGE (ML) INJECTION AS NEEDED
Status: DISCONTINUED | OUTPATIENT
Start: 2022-11-27 | End: 2022-11-30 | Stop reason: HOSPADM

## 2022-11-27 RX ORDER — SODIUM CHLORIDE, SODIUM LACTATE, POTASSIUM CHLORIDE, CALCIUM CHLORIDE 600; 310; 30; 20 MG/100ML; MG/100ML; MG/100ML; MG/100ML
100 INJECTION, SOLUTION INTRAVENOUS CONTINUOUS
Status: ACTIVE | OUTPATIENT
Start: 2022-11-27 | End: 2022-11-27

## 2022-11-27 RX ORDER — SODIUM CHLORIDE 0.9 % (FLUSH) 0.9 %
10 SYRINGE (ML) INJECTION EVERY 12 HOURS SCHEDULED
Status: DISCONTINUED | OUTPATIENT
Start: 2022-11-27 | End: 2022-11-30 | Stop reason: HOSPADM

## 2022-11-27 RX ADMIN — MEROPENEM 500 MG: 500 INJECTION INTRAVENOUS at 21:06

## 2022-11-27 RX ADMIN — SODIUM CHLORIDE, POTASSIUM CHLORIDE, SODIUM LACTATE AND CALCIUM CHLORIDE 100 ML/HR: 600; 310; 30; 20 INJECTION, SOLUTION INTRAVENOUS at 03:12

## 2022-11-27 RX ADMIN — HEPARIN SODIUM 5000 UNITS: 5000 INJECTION INTRAVENOUS; SUBCUTANEOUS at 08:42

## 2022-11-27 RX ADMIN — CLOPIDOGREL BISULFATE 75 MG: 75 TABLET ORAL at 08:42

## 2022-11-27 RX ADMIN — ASPIRIN 81 MG CHEWABLE TABLET 81 MG: 81 TABLET CHEWABLE at 08:42

## 2022-11-27 RX ADMIN — HEPARIN SODIUM 5000 UNITS: 5000 INJECTION INTRAVENOUS; SUBCUTANEOUS at 20:41

## 2022-11-27 RX ADMIN — SENNOSIDES AND DOCUSATE SODIUM 2 TABLET: 8.6; 5 TABLET ORAL at 20:42

## 2022-11-27 RX ADMIN — METOPROLOL TARTRATE 25 MG: 25 TABLET, FILM COATED ORAL at 08:42

## 2022-11-27 RX ADMIN — METOPROLOL TARTRATE 25 MG: 25 TABLET, FILM COATED ORAL at 20:42

## 2022-11-27 RX ADMIN — SENNOSIDES AND DOCUSATE SODIUM 2 TABLET: 8.6; 5 TABLET ORAL at 09:55

## 2022-11-27 RX ADMIN — LEVETIRACETAM 500 MG: 500 TABLET, FILM COATED ORAL at 05:57

## 2022-11-27 RX ADMIN — ATORVASTATIN CALCIUM 80 MG: 40 TABLET, FILM COATED ORAL at 20:41

## 2022-11-27 RX ADMIN — INSULIN LISPRO 2 UNITS: 100 INJECTION, SOLUTION INTRAVENOUS; SUBCUTANEOUS at 12:46

## 2022-11-27 RX ADMIN — MEROPENEM 500 MG: 500 INJECTION INTRAVENOUS at 09:54

## 2022-11-27 RX ADMIN — Medication 10 ML: at 21:06

## 2022-11-27 RX ADMIN — MEROPENEM 500 MG: 500 INJECTION INTRAVENOUS at 15:59

## 2022-11-28 LAB
ALBUMIN SERPL-MCNC: 3.5 G/DL (ref 3.5–5.2)
ALBUMIN/GLOB SERPL: 1 G/DL
ALP SERPL-CCNC: 53 U/L (ref 39–117)
ALT SERPL W P-5'-P-CCNC: 50 U/L (ref 1–33)
ANION GAP SERPL CALCULATED.3IONS-SCNC: 11.4 MMOL/L (ref 5–15)
AST SERPL-CCNC: 99 U/L (ref 1–32)
BACTERIA SPEC AEROBE CULT: NO GROWTH
BACTERIA SPEC AEROBE CULT: NORMAL
BASOPHILS # BLD AUTO: 0.03 10*3/MM3 (ref 0–0.2)
BASOPHILS NFR BLD AUTO: 0.6 % (ref 0–1.5)
BILIRUB SERPL-MCNC: 0.5 MG/DL (ref 0–1.2)
BUN SERPL-MCNC: 15 MG/DL (ref 8–23)
BUN/CREAT SERPL: 18.8 (ref 7–25)
CALCIUM SPEC-SCNC: 9.1 MG/DL (ref 8.6–10.5)
CHLORIDE SERPL-SCNC: 102 MMOL/L (ref 98–107)
CO2 SERPL-SCNC: 20.6 MMOL/L (ref 22–29)
CREAT SERPL-MCNC: 0.8 MG/DL (ref 0.57–1)
DEPRECATED RDW RBC AUTO: 45.6 FL (ref 37–54)
EGFRCR SERPLBLD CKD-EPI 2021: 77.9 ML/MIN/1.73
EOSINOPHIL # BLD AUTO: 0.06 10*3/MM3 (ref 0–0.4)
EOSINOPHIL NFR BLD AUTO: 1.2 % (ref 0.3–6.2)
ERYTHROCYTE [DISTWIDTH] IN BLOOD BY AUTOMATED COUNT: 12.9 % (ref 12.3–15.4)
GLOBULIN UR ELPH-MCNC: 3.4 GM/DL
GLUCOSE BLDC GLUCOMTR-MCNC: 135 MG/DL (ref 70–99)
GLUCOSE BLDC GLUCOMTR-MCNC: 140 MG/DL (ref 70–99)
GLUCOSE BLDC GLUCOMTR-MCNC: 164 MG/DL (ref 70–99)
GLUCOSE SERPL-MCNC: 159 MG/DL (ref 65–99)
HCT VFR BLD AUTO: 36.2 % (ref 34–46.6)
HGB BLD-MCNC: 11.5 G/DL (ref 12–15.9)
IMM GRANULOCYTES # BLD AUTO: 0.03 10*3/MM3 (ref 0–0.05)
IMM GRANULOCYTES NFR BLD AUTO: 0.6 % (ref 0–0.5)
LYMPHOCYTES # BLD AUTO: 1.5 10*3/MM3 (ref 0.7–3.1)
LYMPHOCYTES NFR BLD AUTO: 30 % (ref 19.6–45.3)
MAGNESIUM SERPL-MCNC: 2.2 MG/DL (ref 1.6–2.4)
MCH RBC QN AUTO: 30.3 PG (ref 26.6–33)
MCHC RBC AUTO-ENTMCNC: 31.8 G/DL (ref 31.5–35.7)
MCV RBC AUTO: 95.5 FL (ref 79–97)
MONOCYTES # BLD AUTO: 0.76 10*3/MM3 (ref 0.1–0.9)
MONOCYTES NFR BLD AUTO: 15.2 % (ref 5–12)
NEUTROPHILS NFR BLD AUTO: 2.62 10*3/MM3 (ref 1.7–7)
NEUTROPHILS NFR BLD AUTO: 52.4 % (ref 42.7–76)
NRBC BLD AUTO-RTO: 0 /100 WBC (ref 0–0.2)
PHOSPHATE SERPL-MCNC: 4 MG/DL (ref 2.5–4.5)
PLATELET # BLD AUTO: 197 10*3/MM3 (ref 140–450)
PMV BLD AUTO: 11.2 FL (ref 6–12)
POTASSIUM SERPL-SCNC: 4.2 MMOL/L (ref 3.5–5.2)
PROT SERPL-MCNC: 6.9 G/DL (ref 6–8.5)
RBC # BLD AUTO: 3.79 10*6/MM3 (ref 3.77–5.28)
SODIUM SERPL-SCNC: 134 MMOL/L (ref 136–145)
WBC NRBC COR # BLD: 5 10*3/MM3 (ref 3.4–10.8)

## 2022-11-28 PROCEDURE — 94799 UNLISTED PULMONARY SVC/PX: CPT

## 2022-11-28 PROCEDURE — 25010000002 MEROPENEM PER 100 MG: Performed by: STUDENT IN AN ORGANIZED HEALTH CARE EDUCATION/TRAINING PROGRAM

## 2022-11-28 PROCEDURE — 83735 ASSAY OF MAGNESIUM: CPT | Performed by: STUDENT IN AN ORGANIZED HEALTH CARE EDUCATION/TRAINING PROGRAM

## 2022-11-28 PROCEDURE — 97530 THERAPEUTIC ACTIVITIES: CPT

## 2022-11-28 PROCEDURE — 80053 COMPREHEN METABOLIC PANEL: CPT | Performed by: STUDENT IN AN ORGANIZED HEALTH CARE EDUCATION/TRAINING PROGRAM

## 2022-11-28 PROCEDURE — 63710000001 INSULIN LISPRO (HUMAN) PER 5 UNITS: Performed by: INTERNAL MEDICINE

## 2022-11-28 PROCEDURE — 84100 ASSAY OF PHOSPHORUS: CPT | Performed by: STUDENT IN AN ORGANIZED HEALTH CARE EDUCATION/TRAINING PROGRAM

## 2022-11-28 PROCEDURE — 99233 SBSQ HOSP IP/OBS HIGH 50: CPT | Performed by: STUDENT IN AN ORGANIZED HEALTH CARE EDUCATION/TRAINING PROGRAM

## 2022-11-28 PROCEDURE — 82962 GLUCOSE BLOOD TEST: CPT

## 2022-11-28 PROCEDURE — 92610 EVALUATE SWALLOWING FUNCTION: CPT

## 2022-11-28 PROCEDURE — 97110 THERAPEUTIC EXERCISES: CPT

## 2022-11-28 PROCEDURE — 85025 COMPLETE CBC W/AUTO DIFF WBC: CPT | Performed by: STUDENT IN AN ORGANIZED HEALTH CARE EDUCATION/TRAINING PROGRAM

## 2022-11-28 PROCEDURE — 25010000002 HEPARIN (PORCINE) PER 1000 UNITS: Performed by: INTERNAL MEDICINE

## 2022-11-28 RX ORDER — QUETIAPINE 200 MG/1
TABLET, FILM COATED, EXTENDED RELEASE ORAL
Qty: 30 TABLET | Refills: 1 | Status: SHIPPED | OUTPATIENT
Start: 2022-11-28

## 2022-11-28 RX ORDER — ALBUTEROL SULFATE 2.5 MG/3ML
2.5 SOLUTION RESPIRATORY (INHALATION) EVERY 6 HOURS PRN
Status: DISCONTINUED | OUTPATIENT
Start: 2022-11-28 | End: 2022-11-30 | Stop reason: HOSPADM

## 2022-11-28 RX ORDER — DULOXETIN HYDROCHLORIDE 60 MG/1
CAPSULE, DELAYED RELEASE ORAL
Qty: 30 CAPSULE | Refills: 1 | Status: SHIPPED | OUTPATIENT
Start: 2022-11-28 | End: 2023-02-03

## 2022-11-28 RX ADMIN — HEPARIN SODIUM 5000 UNITS: 5000 INJECTION INTRAVENOUS; SUBCUTANEOUS at 20:32

## 2022-11-28 RX ADMIN — MEROPENEM 500 MG: 500 INJECTION INTRAVENOUS at 21:05

## 2022-11-28 RX ADMIN — SENNOSIDES AND DOCUSATE SODIUM 2 TABLET: 8.6; 5 TABLET ORAL at 09:00

## 2022-11-28 RX ADMIN — MEROPENEM 500 MG: 500 INJECTION INTRAVENOUS at 18:02

## 2022-11-28 RX ADMIN — ATORVASTATIN CALCIUM 80 MG: 40 TABLET, FILM COATED ORAL at 20:33

## 2022-11-28 RX ADMIN — MEROPENEM 500 MG: 500 INJECTION INTRAVENOUS at 04:59

## 2022-11-28 RX ADMIN — LEVETIRACETAM 500 MG: 500 TABLET, FILM COATED ORAL at 05:00

## 2022-11-28 RX ADMIN — ACETAMINOPHEN 650 MG: 325 TABLET ORAL at 04:59

## 2022-11-28 RX ADMIN — SENNOSIDES AND DOCUSATE SODIUM 2 TABLET: 8.6; 5 TABLET ORAL at 20:33

## 2022-11-28 RX ADMIN — CLOPIDOGREL BISULFATE 75 MG: 75 TABLET ORAL at 09:00

## 2022-11-28 RX ADMIN — MEROPENEM 500 MG: 500 INJECTION INTRAVENOUS at 09:01

## 2022-11-28 RX ADMIN — INSULIN LISPRO 2 UNITS: 100 INJECTION, SOLUTION INTRAVENOUS; SUBCUTANEOUS at 14:06

## 2022-11-28 RX ADMIN — HEPARIN SODIUM 5000 UNITS: 5000 INJECTION INTRAVENOUS; SUBCUTANEOUS at 08:59

## 2022-11-28 RX ADMIN — METOPROLOL TARTRATE 25 MG: 25 TABLET, FILM COATED ORAL at 08:59

## 2022-11-28 RX ADMIN — Medication 10 ML: at 09:00

## 2022-11-28 RX ADMIN — METOPROLOL TARTRATE 25 MG: 25 TABLET, FILM COATED ORAL at 20:33

## 2022-11-28 RX ADMIN — ASPIRIN 81 MG CHEWABLE TABLET 81 MG: 81 TABLET CHEWABLE at 09:00

## 2022-11-29 LAB
ALBUMIN SERPL-MCNC: 3.7 G/DL (ref 3.5–5.2)
ALBUMIN/GLOB SERPL: 1.2 G/DL
ALP SERPL-CCNC: 56 U/L (ref 39–117)
ALT SERPL W P-5'-P-CCNC: 45 U/L (ref 1–33)
ANION GAP SERPL CALCULATED.3IONS-SCNC: 10.8 MMOL/L (ref 5–15)
AST SERPL-CCNC: 68 U/L (ref 1–32)
BASOPHILS # BLD AUTO: 0.03 10*3/MM3 (ref 0–0.2)
BASOPHILS NFR BLD AUTO: 0.5 % (ref 0–1.5)
BILIRUB SERPL-MCNC: 0.4 MG/DL (ref 0–1.2)
BUN SERPL-MCNC: 21 MG/DL (ref 8–23)
BUN/CREAT SERPL: 23.1 (ref 7–25)
CALCIUM SPEC-SCNC: 8.9 MG/DL (ref 8.6–10.5)
CHLORIDE SERPL-SCNC: 102 MMOL/L (ref 98–107)
CO2 SERPL-SCNC: 21.2 MMOL/L (ref 22–29)
CREAT SERPL-MCNC: 0.91 MG/DL (ref 0.57–1)
DEPRECATED RDW RBC AUTO: 44.2 FL (ref 37–54)
EGFRCR SERPLBLD CKD-EPI 2021: 66.8 ML/MIN/1.73
EOSINOPHIL # BLD AUTO: 0.14 10*3/MM3 (ref 0–0.4)
EOSINOPHIL NFR BLD AUTO: 2.5 % (ref 0.3–6.2)
ERYTHROCYTE [DISTWIDTH] IN BLOOD BY AUTOMATED COUNT: 12.7 % (ref 12.3–15.4)
GLOBULIN UR ELPH-MCNC: 3.1 GM/DL
GLUCOSE BLDC GLUCOMTR-MCNC: 153 MG/DL (ref 70–99)
GLUCOSE BLDC GLUCOMTR-MCNC: 156 MG/DL (ref 70–99)
GLUCOSE BLDC GLUCOMTR-MCNC: 170 MG/DL (ref 70–99)
GLUCOSE SERPL-MCNC: 162 MG/DL (ref 65–99)
HCT VFR BLD AUTO: 34.7 % (ref 34–46.6)
HGB BLD-MCNC: 11.2 G/DL (ref 12–15.9)
IMM GRANULOCYTES # BLD AUTO: 0.03 10*3/MM3 (ref 0–0.05)
IMM GRANULOCYTES NFR BLD AUTO: 0.5 % (ref 0–0.5)
LYMPHOCYTES # BLD AUTO: 1.7 10*3/MM3 (ref 0.7–3.1)
LYMPHOCYTES NFR BLD AUTO: 30 % (ref 19.6–45.3)
MAGNESIUM SERPL-MCNC: 2.3 MG/DL (ref 1.6–2.4)
MCH RBC QN AUTO: 30.4 PG (ref 26.6–33)
MCHC RBC AUTO-ENTMCNC: 32.3 G/DL (ref 31.5–35.7)
MCV RBC AUTO: 94.3 FL (ref 79–97)
MONOCYTES # BLD AUTO: 0.77 10*3/MM3 (ref 0.1–0.9)
MONOCYTES NFR BLD AUTO: 13.6 % (ref 5–12)
NEUTROPHILS NFR BLD AUTO: 2.99 10*3/MM3 (ref 1.7–7)
NEUTROPHILS NFR BLD AUTO: 52.9 % (ref 42.7–76)
NRBC BLD AUTO-RTO: 0 /100 WBC (ref 0–0.2)
PHOSPHATE SERPL-MCNC: 3.7 MG/DL (ref 2.5–4.5)
PLATELET # BLD AUTO: 201 10*3/MM3 (ref 140–450)
PMV BLD AUTO: 11.2 FL (ref 6–12)
POTASSIUM SERPL-SCNC: 4.4 MMOL/L (ref 3.5–5.2)
PROT SERPL-MCNC: 6.8 G/DL (ref 6–8.5)
RBC # BLD AUTO: 3.68 10*6/MM3 (ref 3.77–5.28)
SODIUM SERPL-SCNC: 134 MMOL/L (ref 136–145)
WBC NRBC COR # BLD: 5.66 10*3/MM3 (ref 3.4–10.8)

## 2022-11-29 PROCEDURE — 63710000001 INSULIN LISPRO (HUMAN) PER 5 UNITS: Performed by: INTERNAL MEDICINE

## 2022-11-29 PROCEDURE — 82962 GLUCOSE BLOOD TEST: CPT

## 2022-11-29 PROCEDURE — 94799 UNLISTED PULMONARY SVC/PX: CPT

## 2022-11-29 PROCEDURE — 83735 ASSAY OF MAGNESIUM: CPT | Performed by: STUDENT IN AN ORGANIZED HEALTH CARE EDUCATION/TRAINING PROGRAM

## 2022-11-29 PROCEDURE — 80053 COMPREHEN METABOLIC PANEL: CPT | Performed by: STUDENT IN AN ORGANIZED HEALTH CARE EDUCATION/TRAINING PROGRAM

## 2022-11-29 PROCEDURE — 25010000002 MEROPENEM PER 100 MG: Performed by: STUDENT IN AN ORGANIZED HEALTH CARE EDUCATION/TRAINING PROGRAM

## 2022-11-29 PROCEDURE — 84100 ASSAY OF PHOSPHORUS: CPT | Performed by: STUDENT IN AN ORGANIZED HEALTH CARE EDUCATION/TRAINING PROGRAM

## 2022-11-29 PROCEDURE — 85025 COMPLETE CBC W/AUTO DIFF WBC: CPT | Performed by: STUDENT IN AN ORGANIZED HEALTH CARE EDUCATION/TRAINING PROGRAM

## 2022-11-29 PROCEDURE — 97110 THERAPEUTIC EXERCISES: CPT

## 2022-11-29 PROCEDURE — 99232 SBSQ HOSP IP/OBS MODERATE 35: CPT | Performed by: INTERNAL MEDICINE

## 2022-11-29 PROCEDURE — 97530 THERAPEUTIC ACTIVITIES: CPT

## 2022-11-29 PROCEDURE — 25010000002 HEPARIN (PORCINE) PER 1000 UNITS: Performed by: INTERNAL MEDICINE

## 2022-11-29 RX ADMIN — INSULIN LISPRO 2 UNITS: 100 INJECTION, SOLUTION INTRAVENOUS; SUBCUTANEOUS at 17:37

## 2022-11-29 RX ADMIN — BISACODYL 5 MG: 5 TABLET, COATED ORAL at 13:55

## 2022-11-29 RX ADMIN — INSULIN LISPRO 2 UNITS: 100 INJECTION, SOLUTION INTRAVENOUS; SUBCUTANEOUS at 08:13

## 2022-11-29 RX ADMIN — CLOPIDOGREL BISULFATE 75 MG: 75 TABLET ORAL at 08:15

## 2022-11-29 RX ADMIN — HEPARIN SODIUM 5000 UNITS: 5000 INJECTION INTRAVENOUS; SUBCUTANEOUS at 20:20

## 2022-11-29 RX ADMIN — MEROPENEM 500 MG: 500 INJECTION INTRAVENOUS at 17:37

## 2022-11-29 RX ADMIN — MEROPENEM 500 MG: 500 INJECTION INTRAVENOUS at 05:05

## 2022-11-29 RX ADMIN — LEVETIRACETAM 500 MG: 500 TABLET, FILM COATED ORAL at 05:05

## 2022-11-29 RX ADMIN — ATORVASTATIN CALCIUM 80 MG: 40 TABLET, FILM COATED ORAL at 20:20

## 2022-11-29 RX ADMIN — SENNOSIDES AND DOCUSATE SODIUM 2 TABLET: 8.6; 5 TABLET ORAL at 08:14

## 2022-11-29 RX ADMIN — HEPARIN SODIUM 5000 UNITS: 5000 INJECTION INTRAVENOUS; SUBCUTANEOUS at 08:14

## 2022-11-29 RX ADMIN — Medication 10 ML: at 08:15

## 2022-11-29 RX ADMIN — MEROPENEM 500 MG: 500 INJECTION INTRAVENOUS at 21:07

## 2022-11-29 RX ADMIN — ACETAMINOPHEN 650 MG: 325 TABLET ORAL at 06:39

## 2022-11-29 RX ADMIN — METOPROLOL TARTRATE 25 MG: 25 TABLET, FILM COATED ORAL at 20:20

## 2022-11-29 RX ADMIN — ASPIRIN 81 MG CHEWABLE TABLET 81 MG: 81 TABLET CHEWABLE at 08:13

## 2022-11-29 RX ADMIN — MEROPENEM 500 MG: 500 INJECTION INTRAVENOUS at 10:54

## 2022-11-30 VITALS
SYSTOLIC BLOOD PRESSURE: 151 MMHG | BODY MASS INDEX: 33.88 KG/M2 | DIASTOLIC BLOOD PRESSURE: 67 MMHG | WEIGHT: 184.08 LBS | RESPIRATION RATE: 18 BRPM | HEIGHT: 62 IN | OXYGEN SATURATION: 97 % | TEMPERATURE: 98 F | HEART RATE: 57 BPM

## 2022-11-30 LAB
GLUCOSE BLDC GLUCOMTR-MCNC: 135 MG/DL (ref 70–99)
GLUCOSE BLDC GLUCOMTR-MCNC: 176 MG/DL (ref 70–99)

## 2022-11-30 PROCEDURE — 25010000002 HEPARIN (PORCINE) PER 1000 UNITS: Performed by: INTERNAL MEDICINE

## 2022-11-30 PROCEDURE — 25010000002 MEROPENEM PER 100 MG: Performed by: STUDENT IN AN ORGANIZED HEALTH CARE EDUCATION/TRAINING PROGRAM

## 2022-11-30 PROCEDURE — 99239 HOSP IP/OBS DSCHRG MGMT >30: CPT | Performed by: INTERNAL MEDICINE

## 2022-11-30 PROCEDURE — 63710000001 INSULIN LISPRO (HUMAN) PER 5 UNITS: Performed by: INTERNAL MEDICINE

## 2022-11-30 PROCEDURE — 82962 GLUCOSE BLOOD TEST: CPT

## 2022-11-30 PROCEDURE — 94799 UNLISTED PULMONARY SVC/PX: CPT

## 2022-11-30 RX ADMIN — ASPIRIN 81 MG CHEWABLE TABLET 81 MG: 81 TABLET CHEWABLE at 08:50

## 2022-11-30 RX ADMIN — MEROPENEM 500 MG: 500 INJECTION INTRAVENOUS at 04:21

## 2022-11-30 RX ADMIN — INSULIN LISPRO 2 UNITS: 100 INJECTION, SOLUTION INTRAVENOUS; SUBCUTANEOUS at 08:51

## 2022-11-30 RX ADMIN — Medication 10 ML: at 08:51

## 2022-11-30 RX ADMIN — LEVETIRACETAM 500 MG: 500 TABLET, FILM COATED ORAL at 05:41

## 2022-11-30 RX ADMIN — CLOPIDOGREL BISULFATE 75 MG: 75 TABLET ORAL at 08:50

## 2022-11-30 RX ADMIN — METOPROLOL TARTRATE 25 MG: 25 TABLET, FILM COATED ORAL at 08:51

## 2022-11-30 RX ADMIN — ACETAMINOPHEN 650 MG: 325 TABLET ORAL at 08:50

## 2022-11-30 RX ADMIN — HEPARIN SODIUM 5000 UNITS: 5000 INJECTION INTRAVENOUS; SUBCUTANEOUS at 08:50

## 2022-12-01 LAB
BACTERIA SPEC AEROBE CULT: NORMAL
BACTERIA SPEC AEROBE CULT: NORMAL

## 2022-12-10 ENCOUNTER — READMISSION MANAGEMENT (OUTPATIENT)
Dept: CALL CENTER | Facility: HOSPITAL | Age: 72
End: 2022-12-10

## 2022-12-11 NOTE — OUTREACH NOTE
Prep Survey    Flowsheet Row Responses   [facility] facility patient discharged from? Non-BH   Is LACE score < 7 ? Non-BH Discharge   Emergency Room discharge w/ pulse ox? No   Eligibility [facility]   Date of Discharge 12/09/22   Discharge Disposition Home-Health Care Sv   Discharge diagnosis Other malaise    Does the patient have one of the following disease processes/diagnoses(primary or secondary)? Other   Does the patient have Home health ordered? Yes   What is the Home health agency?  Home with home health    Prep survey completed? Yes          LEWIS ALCANTAR - Registered Nurse

## 2022-12-12 ENCOUNTER — TRANSITIONAL CARE MANAGEMENT TELEPHONE ENCOUNTER (OUTPATIENT)
Dept: CALL CENTER | Facility: HOSPITAL | Age: 72
End: 2022-12-12

## 2022-12-12 ENCOUNTER — TELEPHONE (OUTPATIENT)
Dept: FAMILY MEDICINE CLINIC | Facility: CLINIC | Age: 72
End: 2022-12-12

## 2022-12-12 NOTE — OUTREACH NOTE
Call Center TCM Note    Flowsheet Row Responses   [facility] patient discharged from? Non-   Does the patient have one of the following disease processes/diagnoses(primary or secondary)? Other   TCM attempt successful? Yes   Call start time 1618   Call end time 1630   Discharge diagnosis Other malaise    Medication alerts for this patient Rehab did not change meds per pt reports.   Comments hosp f/u 12-15-22   Does the patient have an appointment with their PCP within 7 days of discharge? Yes   What is the Home health agency?  Home with home health    Has home health visited the patient within 72 hours of discharge? Yes   Home health comments working with PT.    Psychosocial issues? No   Comments No dysuria, fever but still having fatigue.    Did the patient receive a copy of their discharge instructions? Yes   Nursing interventions Reviewed instructions with patient   What is the patient's perception of their health status since discharge? Improving   Is the patient/caregiver able to teach back signs and symptoms related to disease process for when to call PCP? Yes   If the patient is a current smoker, are they able to teach back resources for cessation? Not a smoker   Additional teach back comments Pt has several questions about ADA diet. She is not knowledgable about carbs/proteins and which foods constitute either. She would benefit from dietician/diabetic educator appt. She will ask PCP for referral at f/u She did not know she had to change her diet, she thought the medication would correct her glucose levels.   TCM call completed? Yes   Call end time 1630   Would this patient benefit from a Referral to [facility] Social Work? No   Is the patient interested in additional calls from an ambulatory ?  NOTE:  applies to high risk patients requiring additional follow-up. No          Haydee Hodges RN    12/12/2022, 16:32 EST

## 2022-12-12 NOTE — TELEPHONE ENCOUNTER
Caller: TIMO    Relationship to patient: [facility]    Best call back number: [phone]    Patient is needing: TIMO FROM [facility] STATES THAT PATIENT HAS BEEN ADMITTED TO SKILLED NURSING FOR 1 WEEK 1, 2 WEEK 1, 1 WEEK 7. PHYSICAL AND OCCUPATIONAL THERAPY TO EVALUATE. PLEASE CALL WITH ANY QUESTIONS.

## 2022-12-12 NOTE — OUTREACH NOTE
Call Center TCM Note    Flowsheet Row Responses   [facility] patient discharged from? Non-BH   Does the patient have one of the following disease processes/diagnoses(primary or secondary)? Other   TCM attempt successful? No  [VR listing son, sister or neice.]   Unsuccessful attempts Attempt 1   Call Status Left message          Haydee Hodges RN    12/12/2022, 13:26 EST

## 2022-12-15 ENCOUNTER — OFFICE VISIT (OUTPATIENT)
Dept: FAMILY MEDICINE CLINIC | Facility: CLINIC | Age: 72
End: 2022-12-15

## 2022-12-15 VITALS
TEMPERATURE: 97 F | WEIGHT: 184.4 LBS | HEART RATE: 75 BPM | DIASTOLIC BLOOD PRESSURE: 78 MMHG | SYSTOLIC BLOOD PRESSURE: 122 MMHG | BODY MASS INDEX: 33.73 KG/M2 | OXYGEN SATURATION: 93 %

## 2022-12-15 DIAGNOSIS — R41.82 ALTERED MENTAL STATUS, UNSPECIFIED ALTERED MENTAL STATUS TYPE: ICD-10-CM

## 2022-12-15 DIAGNOSIS — J45.40 MODERATE PERSISTENT ASTHMA, UNSPECIFIED WHETHER COMPLICATED: ICD-10-CM

## 2022-12-15 DIAGNOSIS — I63.81 ACUTE LACUNAR STROKE: ICD-10-CM

## 2022-12-15 DIAGNOSIS — E11.65 TYPE 2 DIABETES MELLITUS WITH HYPERGLYCEMIA, WITHOUT LONG-TERM CURRENT USE OF INSULIN: Primary | ICD-10-CM

## 2022-12-15 PROCEDURE — 87186 SC STD MICRODIL/AGAR DIL: CPT | Performed by: FAMILY MEDICINE

## 2022-12-15 PROCEDURE — 99495 TRANSJ CARE MGMT MOD F2F 14D: CPT | Performed by: FAMILY MEDICINE

## 2022-12-15 PROCEDURE — 81003 URINALYSIS AUTO W/O SCOPE: CPT | Performed by: FAMILY MEDICINE

## 2022-12-15 PROCEDURE — 87077 CULTURE AEROBIC IDENTIFY: CPT | Performed by: FAMILY MEDICINE

## 2022-12-15 PROCEDURE — 87086 URINE CULTURE/COLONY COUNT: CPT | Performed by: FAMILY MEDICINE

## 2022-12-15 PROCEDURE — 1111F DSCHRG MED/CURRENT MED MERGE: CPT | Performed by: FAMILY MEDICINE

## 2022-12-15 RX ORDER — DULAGLUTIDE 0.75 MG/.5ML
0.75 INJECTION, SOLUTION SUBCUTANEOUS WEEKLY
Qty: 2 ML | Refills: 5 | Status: SHIPPED | OUTPATIENT
Start: 2022-12-15 | End: 2023-01-11 | Stop reason: SDUPTHER

## 2022-12-15 RX ORDER — ALBUTEROL SULFATE 90 UG/1
1 AEROSOL, METERED RESPIRATORY (INHALATION) EVERY 6 HOURS PRN
Qty: 18 G | Refills: 1 | Status: SHIPPED | OUTPATIENT
Start: 2022-12-15 | End: 2023-02-01

## 2022-12-15 RX ORDER — LANCETS 28 GAUGE
EACH MISCELLANEOUS
Qty: 100 EACH | Refills: 5 | Status: SHIPPED | OUTPATIENT
Start: 2022-12-15 | End: 2023-01-17 | Stop reason: SDUPTHER

## 2022-12-15 RX ORDER — CLOPIDOGREL BISULFATE 75 MG/1
75 TABLET ORAL DAILY
Qty: 30 TABLET | Refills: 1 | Status: SHIPPED | OUTPATIENT
Start: 2022-12-15 | End: 2023-02-03

## 2022-12-15 NOTE — PROGRESS NOTES
Transitional Care Follow Up Visit  Subjective     Haylee Gibbs is a 72 y.o. female who presents for a transitional care management visit.    Within 48 business hours after discharge our office contacted her via telephone to coordinate her care and needs.      I reviewed and discussed the details of that call along with the discharge summary, hospital problems, inpatient lab results, inpatient diagnostic studies, and consultation reports with Haylee.     Current outpatient and discharge medications have been reconciled for the patient.  Reviewed by: Kj Canales MD      Date of TCM Phone Call 12/10/2022   [facility]   Date of Discharge 12/9/2022   Discharge Disposition [facility]     Risk for Readmission (LACE) Score: 10 (11/30/2022  6:00 AM)      History of Present Illness  Pt was released from rehab last Friday.  Pt taking meds as supposed to  Pt's DM II 's-180's fasting    Pt has chronic cough since July when had covid.     Course During Hospital Stay:  Pt admitted for UTI- was treated with meropenam and urine cx grew out nothing.  Pt sent to SNF for rehab.     The following portions of the patient's history were reviewed and updated as appropriate:   She  has a past medical history of Anxiety disorder (06/26/2018), Benign essential hypertension, Cataract, COPD (chronic obstructive pulmonary disease) ([facility]), CVA (cerebral vascular accident) ([facility]), Depression, Diabetes mellitus, type 2 ([facility]), Hyperlipidemia, Paresthesia and pain of both upper extremities (06/26/2018), Seizure disorder ([facility]) (06/26/2018), Vitamin B 12 deficiency, and Vitamin D deficiency (06/26/2018).  She does not have any pertinent problems on file.  She  has a past surgical history that includes Appendectomy; Cataract extraction; Hysterectomy; Back surgery; Neck surgery; and Tonsillectomy.  Her family history includes Alcohol abuse in her brother; Diabetes type II in her brother  and sister; Heart disease in an other family member; Hypertension in her brother and sister; Seizures in her mother; Stroke in her mother.  She  reports that she quit smoking about 7 years ago. Her smoking use included cigarettes. She has a 10.00 pack-year smoking history. She has quit using smokeless tobacco. She reports that she does not drink alcohol and does not use drugs.  Current Outpatient Medications   Medication Sig Dispense Refill   • clopidogrel (PLAVIX) 75 MG tablet Take 1 tablet by mouth Daily. 30 tablet 1   • Ventolin  (90 Base) MCG/ACT inhaler Inhale 1 puff Every 6 (Six) Hours As Needed for Wheezing. 18 g 1   • Aspirin Low Dose 81 MG chewable tablet CHEW 1 TABLET BY MOUTH DAILY 90 tablet 1   • atorvastatin (LIPITOR) 80 MG tablet Take 80 mg by mouth Every Night.     • Blood Glucose Monitoring Suppl (FreeStyle Lite) w/Device kit 1 each Daily. 1 kit 0   • cholecalciferol (VITAMIN D3) 25 MCG (1000 UT) tablet Take 1,000 Units by mouth Daily.     • Dulaglutide (Trulicity) 0.75 MG/0.5ML solution pen-injector Inject 0.75 mg under the skin into the appropriate area as directed 1 (One) Time Per Week. 2 mL 5   • DULoxetine (CYMBALTA) 60 MG capsule TAKE 1 CAPSULE BY MOUTH DAILY **THIS MEDICATION HAS BEEN SHORT FILLED TO LINE UP WITH YOUR OTHER MEDICATIONS** (Patient taking differently: Take 60 mg by mouth Daily.) 30 capsule 1   • empagliflozin (Jardiance) 25 MG tablet tablet Take 1 tablet by mouth Daily. 90 tablet 1   • glucose blood (FREESTYLE LITE) test strip Test BG daily. 100 each 5   • Lancets (freestyle) lancets Test BG daily 100 each 5   • levETIRAcetam (KEPPRA) 500 MG tablet Take 2 tablets by mouth Every Evening. 180 tablet 1   • levETIRAcetam (KEPPRA) 500 MG tablet Take 1 tablet by mouth Every Morning. 90 tablet 1   • linaclotide (Linzess) 145 MCG capsule capsule Take 1 capsule by mouth Every Morning Before Breakfast.     • lisinopril (PRINIVIL,ZESTRIL) 10 MG tablet Take 1 tablet by mouth Daily  for 30 days. 90 tablet 1   • metFORMIN (GLUCOPHAGE) 500 MG tablet Take 1 tablet by mouth 2 (Two) Times a Day. 180 tablet 1   • metoprolol tartrate (LOPRESSOR) 25 MG tablet Take 1 tablet by mouth 2 (Two) Times a Day. 180 tablet 1   • QUEtiapine XR (SEROquel XR) 200 MG 24 hr tablet TAKE 1 TABLET BY MOUTH EVERY NIGHT AT BEDTIME **THIS MEDICATION HAS BEEN SHORT FILLED TO LINE UP WITH YOUR OTHER MEDICATIONS** (Patient taking differently: Take 200 mg by mouth Every Night.) 30 tablet 1     No current facility-administered medications for this visit.     Current Outpatient Medications on File Prior to Visit   Medication Sig   • Aspirin Low Dose 81 MG chewable tablet CHEW 1 TABLET BY MOUTH DAILY   • atorvastatin (LIPITOR) 80 MG tablet Take 80 mg by mouth Every Night.   • Blood Glucose Monitoring Suppl (FreeStyle Lite) w/Device kit 1 each Daily.   • cholecalciferol (VITAMIN D3) 25 MCG (1000 UT) tablet Take 1,000 Units by mouth Daily.   • DULoxetine (CYMBALTA) 60 MG capsule TAKE 1 CAPSULE BY MOUTH DAILY **THIS MEDICATION HAS BEEN SHORT FILLED TO LINE UP WITH YOUR OTHER MEDICATIONS** (Patient taking differently: Take 60 mg by mouth Daily.)   • empagliflozin (Jardiance) 25 MG tablet tablet Take 1 tablet by mouth Daily.   • glucose blood (FREESTYLE LITE) test strip Test BG daily.   • levETIRAcetam (KEPPRA) 500 MG tablet Take 2 tablets by mouth Every Evening.   • levETIRAcetam (KEPPRA) 500 MG tablet Take 1 tablet by mouth Every Morning.   • linaclotide (Linzess) 145 MCG capsule capsule Take 1 capsule by mouth Every Morning Before Breakfast.   • lisinopril (PRINIVIL,ZESTRIL) 10 MG tablet Take 1 tablet by mouth Daily for 30 days.   • metFORMIN (GLUCOPHAGE) 500 MG tablet Take 1 tablet by mouth 2 (Two) Times a Day.   • metoprolol tartrate (LOPRESSOR) 25 MG tablet Take 1 tablet by mouth 2 (Two) Times a Day.   • QUEtiapine XR (SEROquel XR) 200 MG 24 hr tablet TAKE 1 TABLET BY MOUTH EVERY NIGHT AT BEDTIME **THIS MEDICATION HAS BEEN SHORT  FILLED TO LINE UP WITH YOUR OTHER MEDICATIONS** (Patient taking differently: Take 200 mg by mouth Every Night.)   • [DISCONTINUED] clopidogrel (PLAVIX) 75 MG tablet Take 75 mg by mouth Daily.   • [DISCONTINUED] Ventolin  (90 Base) MCG/ACT inhaler Inhale 1 puff Every 4 (Four) Hours As Needed.     No current facility-administered medications on file prior to visit.     She is allergic to tramadol hcl..    Review of Systems   Constitutional: Negative for fatigue and fever.   HENT: Negative for sore throat.    Eyes: Negative for visual disturbance.   Respiratory: Negative for cough, chest tightness, shortness of breath and wheezing.    Cardiovascular: Negative for chest pain, palpitations and leg swelling.   Gastrointestinal: Negative for abdominal pain, constipation, diarrhea, nausea and vomiting.   Skin: Negative for rash.   Neurological: Negative for light-headedness and headaches.       Objective   Physical Exam  Vitals reviewed.   Constitutional:       Appearance: Normal appearance. She is well-developed.   HENT:      Head: Normocephalic and atraumatic.      Right Ear: External ear normal.      Left Ear: External ear normal.      Mouth/Throat:      Pharynx: No oropharyngeal exudate.   Eyes:      Conjunctiva/sclera: Conjunctivae normal.      Pupils: Pupils are equal, round, and reactive to light.   Cardiovascular:      Rate and Rhythm: Normal rate and regular rhythm.      Pulses: Normal pulses.      Heart sounds: Normal heart sounds. No murmur heard.    No friction rub. No gallop.   Pulmonary:      Effort: Pulmonary effort is normal.      Breath sounds: Normal breath sounds. No wheezing or rhonchi.   Abdominal:      General: Abdomen is flat. Bowel sounds are normal. There is no distension.      Palpations: Abdomen is soft. There is no mass.      Tenderness: There is no abdominal tenderness. There is no right CVA tenderness, left CVA tenderness, guarding or rebound.      Hernia: No hernia is present.    Musculoskeletal:         General: Normal range of motion.      Cervical back: Normal range of motion and neck supple.   Skin:     General: Skin is warm and dry.      Capillary Refill: Capillary refill takes less than 2 seconds.   Neurological:      General: No focal deficit present.      Mental Status: She is alert and oriented to person, place, and time.      Cranial Nerves: No cranial nerve deficit.   Psychiatric:         Mood and Affect: Mood and affect normal.         Behavior: Behavior normal.         Thought Content: Thought content normal.         Judgment: Judgment normal.         Assessment & Plan   Problems Addressed this Visit        Endocrine and Metabolic    Type 2 diabetes mellitus with hyperglycemia, without long-term current use of insulin ([facility]) - Primary    Relevant Medications    Lancets (freestyle) lancets    Dulaglutide (Trulicity) 0.75 MG/0.5ML solution pen-injector       Neuro    Acute lacunar stroke ([facility])    Relevant Medications    clopidogrel (PLAVIX) 75 MG tablet   Other Visit Diagnoses     Altered mental status, unspecified altered mental status type        Relevant Orders    Urinalysis without microscopic (no culture) - Urine, Clean Catch    Urine Culture - Urine, Urine, Clean Catch    Moderate persistent asthma, unspecified whether complicated        Relevant Medications    Ventolin  (90 Base) MCG/ACT inhaler      Diagnoses       Codes Comments    Type 2 diabetes mellitus with hyperglycemia, without long-term current use of insulin ([facility])    -  Primary ICD-10-CM: E11.65  ICD-9-CM: 250.00, 790.29     Acute lacunar stroke ([facility])     ICD-10-CM: I63.81  ICD-9-CM: 434.91     Altered mental status, unspecified altered mental status type     ICD-10-CM: R41.82  ICD-9-CM: 780.97     Moderate persistent asthma, unspecified whether complicated     ICD-10-CM: J45.40  ICD-9-CM: 493.90

## 2022-12-16 LAB
BILIRUB UR QL STRIP: NEGATIVE
CLARITY UR: CLEAR
COLOR UR: YELLOW
GLUCOSE UR STRIP-MCNC: ABNORMAL MG/DL
HGB UR QL STRIP.AUTO: NEGATIVE
KETONES UR QL STRIP: NEGATIVE
LEUKOCYTE ESTERASE UR QL STRIP.AUTO: NEGATIVE
NITRITE UR QL STRIP: NEGATIVE
PH UR STRIP.AUTO: 6 [PH] (ref 5–8)
PROT UR QL STRIP: NEGATIVE
SP GR UR STRIP: 1.01 (ref 1–1.03)
UROBILINOGEN UR QL STRIP: ABNORMAL

## 2022-12-18 LAB — BACTERIA SPEC AEROBE CULT: ABNORMAL

## 2022-12-19 DIAGNOSIS — N30.00 ACUTE CYSTITIS WITHOUT HEMATURIA: Primary | ICD-10-CM

## 2022-12-19 RX ORDER — NITROFURANTOIN 25; 75 MG/1; MG/1
100 CAPSULE ORAL 2 TIMES DAILY
Qty: 14 CAPSULE | Refills: 0 | Status: SHIPPED | OUTPATIENT
Start: 2022-12-19 | End: 2022-12-26

## 2022-12-19 NOTE — PROGRESS NOTES
Call pt:  Urine cx shows UTI.  I sent macrobid into your pharmacy for this UTI.  Follow-up if not better after finishing the antibiotic.

## 2023-01-09 ENCOUNTER — LAB REQUISITION (OUTPATIENT)
Dept: LAB | Facility: HOSPITAL | Age: 73
End: 2023-01-09
Payer: MEDICARE

## 2023-01-09 DIAGNOSIS — N39.0 URINARY TRACT INFECTION, SITE NOT SPECIFIED: ICD-10-CM

## 2023-01-09 LAB
BACTERIA UR QL AUTO: ABNORMAL /HPF
BILIRUB UR QL STRIP: NEGATIVE
CLARITY UR: CLEAR
COLOR UR: YELLOW
GLUCOSE UR STRIP-MCNC: ABNORMAL MG/DL
HGB UR QL STRIP.AUTO: NEGATIVE
HYALINE CASTS UR QL AUTO: ABNORMAL /LPF
KETONES UR QL STRIP: NEGATIVE
LEUKOCYTE ESTERASE UR QL STRIP.AUTO: ABNORMAL
NITRITE UR QL STRIP: POSITIVE
PH UR STRIP.AUTO: 5.5 [PH] (ref 5–8)
PROT UR QL STRIP: NEGATIVE
RBC # UR STRIP: ABNORMAL /HPF
REF LAB TEST METHOD: ABNORMAL
SP GR UR STRIP: 1.03 (ref 1–1.03)
SQUAMOUS #/AREA URNS HPF: ABNORMAL /HPF
UROBILINOGEN UR QL STRIP: ABNORMAL
WBC # UR STRIP: ABNORMAL /HPF

## 2023-01-09 PROCEDURE — 87077 CULTURE AEROBIC IDENTIFY: CPT | Performed by: FAMILY MEDICINE

## 2023-01-09 PROCEDURE — 87086 URINE CULTURE/COLONY COUNT: CPT | Performed by: FAMILY MEDICINE

## 2023-01-09 PROCEDURE — 87186 SC STD MICRODIL/AGAR DIL: CPT | Performed by: FAMILY MEDICINE

## 2023-01-09 PROCEDURE — 81001 URINALYSIS AUTO W/SCOPE: CPT | Performed by: FAMILY MEDICINE

## 2023-01-10 ENCOUNTER — TELEPHONE (OUTPATIENT)
Dept: FAMILY MEDICINE CLINIC | Facility: CLINIC | Age: 73
End: 2023-01-10

## 2023-01-10 NOTE — TELEPHONE ENCOUNTER
Caller: PAMELLA RENTERIA    Relationship: Emergency Contact    Best call back number: [phone]    Requested Prescriptions:   Dulaglutide (Trulicity) 0.75 MG/0.5ML solution pen-injector    Pharmacy where request should be sent: [id number] [place] - [address] AT [facility] - [phone]  - [phone] FX     Additional details provided by patient: PATIENT TOOK LAST DOSE ON 01/09/2023, SHE WILL NEED THIS BY 01/16/2023.     Does the patient have less than a 3 day supply:  [x] Yes  [] No    Would you like a call back once the refill request has been completed: [x] Yes [] No    If the office needs to give you a call back, can they leave a voicemail: [x] Yes [] No    Alexander Salgado Rep   01/10/23 16:55 EST

## 2023-01-11 DIAGNOSIS — N30.00 ACUTE CYSTITIS WITHOUT HEMATURIA: Primary | ICD-10-CM

## 2023-01-11 DIAGNOSIS — E11.65 TYPE 2 DIABETES MELLITUS WITH HYPERGLYCEMIA, WITHOUT LONG-TERM CURRENT USE OF INSULIN: ICD-10-CM

## 2023-01-11 LAB — BACTERIA SPEC AEROBE CULT: ABNORMAL

## 2023-01-11 RX ORDER — DULAGLUTIDE 0.75 MG/.5ML
0.75 INJECTION, SOLUTION SUBCUTANEOUS WEEKLY
Qty: 2 ML | Refills: 5 | Status: SHIPPED | OUTPATIENT
Start: 2023-01-11 | End: 2023-01-13 | Stop reason: SDUPTHER

## 2023-01-11 RX ORDER — NITROFURANTOIN 25; 75 MG/1; MG/1
100 CAPSULE ORAL 2 TIMES DAILY
Qty: 14 CAPSULE | Refills: 0 | Status: SHIPPED | OUTPATIENT
Start: 2023-01-11 | End: 2023-01-17

## 2023-01-11 RX ORDER — CEFDINIR 300 MG/1
300 CAPSULE ORAL 2 TIMES DAILY
Qty: 14 CAPSULE | Refills: 0 | Status: SHIPPED | OUTPATIENT
Start: 2023-01-11 | End: 2023-01-17

## 2023-01-11 RX ORDER — SACCHAROMYCES BOULARDII 250 MG
250 CAPSULE ORAL 2 TIMES DAILY
Qty: 14 CAPSULE | Refills: 0 | Status: SHIPPED | OUTPATIENT
Start: 2023-01-11 | End: 2023-01-18

## 2023-01-11 NOTE — PROGRESS NOTES
Call pt:  Urine culture shows a UTI.  I sent in macrobid for this.  Follow-up if not better after finishing antibiotic.

## 2023-01-11 NOTE — PROGRESS NOTES
Please call pt and pharmacy and let them know that culture now shows that macrobid will not work as well so I sent in cefdinir for the UTI.  Please cancel the macrobid that I recently sent in.  Thanks.

## 2023-01-13 DIAGNOSIS — E11.65 TYPE 2 DIABETES MELLITUS WITH HYPERGLYCEMIA, WITHOUT LONG-TERM CURRENT USE OF INSULIN: ICD-10-CM

## 2023-01-13 RX ORDER — DULAGLUTIDE 0.75 MG/.5ML
0.75 INJECTION, SOLUTION SUBCUTANEOUS WEEKLY
Qty: 2 ML | Refills: 5 | Status: SHIPPED | OUTPATIENT
Start: 2023-01-13

## 2023-01-17 ENCOUNTER — OFFICE VISIT (OUTPATIENT)
Dept: FAMILY MEDICINE CLINIC | Facility: CLINIC | Age: 73
End: 2023-01-17
Payer: MEDICARE

## 2023-01-17 VITALS
HEART RATE: 78 BPM | TEMPERATURE: 96.9 F | HEIGHT: 65 IN | DIASTOLIC BLOOD PRESSURE: 88 MMHG | OXYGEN SATURATION: 99 % | SYSTOLIC BLOOD PRESSURE: 122 MMHG | WEIGHT: 183.6 LBS | BODY MASS INDEX: 30.59 KG/M2

## 2023-01-17 DIAGNOSIS — G40.909 SEIZURE DISORDER: ICD-10-CM

## 2023-01-17 DIAGNOSIS — E11.65 TYPE 2 DIABETES MELLITUS WITH HYPERGLYCEMIA, WITHOUT LONG-TERM CURRENT USE OF INSULIN: ICD-10-CM

## 2023-01-17 DIAGNOSIS — J45.40 MODERATE PERSISTENT ASTHMA, UNSPECIFIED WHETHER COMPLICATED: ICD-10-CM

## 2023-01-17 DIAGNOSIS — R30.0 DYSURIA: ICD-10-CM

## 2023-01-17 DIAGNOSIS — Z23 NEED FOR PNEUMOCOCCAL VACCINATION: ICD-10-CM

## 2023-01-17 DIAGNOSIS — Z00.00 MEDICARE ANNUAL WELLNESS VISIT, SUBSEQUENT: Primary | ICD-10-CM

## 2023-01-17 PROCEDURE — 81001 URINALYSIS AUTO W/SCOPE: CPT | Performed by: FAMILY MEDICINE

## 2023-01-17 PROCEDURE — 1159F MED LIST DOCD IN RCRD: CPT | Performed by: FAMILY MEDICINE

## 2023-01-17 PROCEDURE — 96372 THER/PROPH/DIAG INJ SC/IM: CPT | Performed by: FAMILY MEDICINE

## 2023-01-17 PROCEDURE — 1170F FXNL STATUS ASSESSED: CPT | Performed by: FAMILY MEDICINE

## 2023-01-17 PROCEDURE — 1126F AMNT PAIN NOTED NONE PRSNT: CPT | Performed by: FAMILY MEDICINE

## 2023-01-17 PROCEDURE — G0439 PPPS, SUBSEQ VISIT: HCPCS | Performed by: FAMILY MEDICINE

## 2023-01-17 PROCEDURE — 90677 PCV20 VACCINE IM: CPT | Performed by: FAMILY MEDICINE

## 2023-01-17 PROCEDURE — 1125F AMNT PAIN NOTED PAIN PRSNT: CPT | Performed by: FAMILY MEDICINE

## 2023-01-17 PROCEDURE — 87086 URINE CULTURE/COLONY COUNT: CPT | Performed by: FAMILY MEDICINE

## 2023-01-17 PROCEDURE — 1160F RVW MEDS BY RX/DR IN RCRD: CPT | Performed by: FAMILY MEDICINE

## 2023-01-17 RX ORDER — EMPAGLIFLOZIN 25 MG/1
TABLET, FILM COATED ORAL
Qty: 90 TABLET | Refills: 1 | OUTPATIENT
Start: 2023-01-17

## 2023-01-17 RX ORDER — LEVETIRACETAM 500 MG/1
TABLET ORAL
Qty: 90 TABLET | Refills: 1 | OUTPATIENT
Start: 2023-01-17

## 2023-01-17 RX ORDER — FLUTICASONE PROPIONATE AND SALMETEROL XINAFOATE 45; 21 UG/1; UG/1
2 AEROSOL, METERED RESPIRATORY (INHALATION)
Qty: 12 G | Refills: 1 | Status: SHIPPED | OUTPATIENT
Start: 2023-01-17 | End: 2023-03-10

## 2023-01-17 RX ORDER — LANCETS 28 GAUGE
EACH MISCELLANEOUS
Qty: 100 EACH | Refills: 5 | Status: SHIPPED | OUTPATIENT
Start: 2023-01-17

## 2023-01-17 RX ORDER — GUAIFENESIN 600 MG/1
1200 TABLET, EXTENDED RELEASE ORAL 2 TIMES DAILY
Qty: 60 TABLET | Refills: 1 | Status: SHIPPED | OUTPATIENT
Start: 2023-01-17

## 2023-01-17 NOTE — PROGRESS NOTES
The ABCs of the Annual Wellness Visit  Subsequent Medicare Wellness Visit    Subjective    Haylee Gibbs is a 72 y.o. female who presents for a Subsequent Medicare Wellness Visit.    The following portions of the patient's history were reviewed and   updated as appropriate:   She  has a past medical history of Anxiety disorder (06/26/2018), Benign essential hypertension, Cataract, COPD (chronic obstructive pulmonary disease) ([facility]), CVA (cerebral vascular accident) ([facility]), Depression, Diabetes mellitus, type 2 ([facility]), Hyperlipidemia, Paresthesia and pain of both upper extremities (06/26/2018), Seizure disorder ([facility]) (06/26/2018), Vitamin B 12 deficiency, and Vitamin D deficiency (06/26/2018).  She does not have any pertinent problems on file.  She  has a past surgical history that includes Appendectomy; Cataract extraction; Hysterectomy; Back surgery; Neck surgery; and Tonsillectomy.  Her family history includes Alcohol abuse in her brother; Diabetes type II in her brother and sister; Heart disease in an other family member; Hypertension in her brother and sister; Seizures in her mother; Stroke in her mother.  She  reports that she quit smoking about 8 years ago. Her smoking use included cigarettes. She has a 10.00 pack-year smoking history. She has quit using smokeless tobacco. She reports that she does not drink alcohol and does not use drugs.  Current Outpatient Medications   Medication Sig Dispense Refill   • Aspirin Low Dose 81 MG chewable tablet CHEW 1 TABLET BY MOUTH DAILY 90 tablet 1   • atorvastatin (LIPITOR) 80 MG tablet Take 80 mg by mouth Every Night.     • cholecalciferol (VITAMIN D3) 25 MCG (1000 UT) tablet Take 1,000 Units by mouth Daily.     • clopidogrel (PLAVIX) 75 MG tablet Take 1 tablet by mouth Daily. 30 tablet 1   • Dulaglutide (Trulicity) 0.75 MG/0.5ML solution pen-injector Inject 0.75 mg under the skin into the appropriate area as directed 1 (One) Time Per Week. 2 mL 5   • DULoxetine (CYMBALTA)  60 MG capsule TAKE 1 CAPSULE BY MOUTH DAILY **THIS MEDICATION HAS BEEN SHORT FILLED TO LINE UP WITH YOUR OTHER MEDICATIONS** (Patient taking differently: Take 60 mg by mouth Daily.) 30 capsule 1   • empagliflozin (Jardiance) 25 MG tablet tablet Take 1 tablet by mouth Daily. 90 tablet 1   • Lancets (freestyle) lancets Test BG daily 100 each 5   • levETIRAcetam (KEPPRA) 500 MG tablet Take 2 tablets by mouth Every Evening. 180 tablet 1   • levETIRAcetam (KEPPRA) 500 MG tablet Take 1 tablet by mouth Every Morning. 90 tablet 1   • linaclotide (Linzess) 145 MCG capsule capsule Take 1 capsule by mouth Every Morning Before Breakfast.     • metFORMIN (GLUCOPHAGE) 500 MG tablet Take 1 tablet by mouth 2 (Two) Times a Day. 180 tablet 1   • metoprolol tartrate (LOPRESSOR) 25 MG tablet Take 1 tablet by mouth 2 (Two) Times a Day. 180 tablet 1   • Probiotic Product (PROBIOTIC PO) TAKE 1 CAPSULE BY MOUTH TWICE DAILY FOR 7 DAYS     • QUEtiapine XR (SEROquel XR) 200 MG 24 hr tablet TAKE 1 TABLET BY MOUTH EVERY NIGHT AT BEDTIME **THIS MEDICATION HAS BEEN SHORT FILLED TO LINE UP WITH YOUR OTHER MEDICATIONS** (Patient taking differently: Take 200 mg by mouth Every Night.) 30 tablet 1   • saccharomyces boulardii (Florastor) 250 MG capsule Take 1 capsule by mouth 2 (Two) Times a Day for 7 days. 14 capsule 0   • Ventolin  (90 Base) MCG/ACT inhaler Inhale 1 puff Every 6 (Six) Hours As Needed for Wheezing. 18 g 1   • fluticasone-salmeterol (Advair HFA) 45-21 MCG/ACT inhaler Inhale 2 puffs 2 (Two) Times a Day. 12 g 1   • glucose blood (Contour Next Test) test strip Use as instructed 100 each 3   • guaiFENesin (Mucinex) 600 MG 12 hr tablet Take 2 tablets by mouth 2 (Two) Times a Day. 60 tablet 1   • lisinopril (PRINIVIL,ZESTRIL) 10 MG tablet Take 1 tablet by mouth Daily for 30 days. 90 tablet 1     No current facility-administered medications for this visit.     Current Outpatient Medications on File Prior to Visit   Medication Sig   •  Aspirin Low Dose 81 MG chewable tablet CHEW 1 TABLET BY MOUTH DAILY   • atorvastatin (LIPITOR) 80 MG tablet Take 80 mg by mouth Every Night.   • cholecalciferol (VITAMIN D3) 25 MCG (1000 UT) tablet Take 1,000 Units by mouth Daily.   • clopidogrel (PLAVIX) 75 MG tablet Take 1 tablet by mouth Daily.   • Dulaglutide (Trulicity) 0.75 MG/0.5ML solution pen-injector Inject 0.75 mg under the skin into the appropriate area as directed 1 (One) Time Per Week.   • DULoxetine (CYMBALTA) 60 MG capsule TAKE 1 CAPSULE BY MOUTH DAILY **THIS MEDICATION HAS BEEN SHORT FILLED TO LINE UP WITH YOUR OTHER MEDICATIONS** (Patient taking differently: Take 60 mg by mouth Daily.)   • empagliflozin (Jardiance) 25 MG tablet tablet Take 1 tablet by mouth Daily.   • levETIRAcetam (KEPPRA) 500 MG tablet Take 2 tablets by mouth Every Evening.   • levETIRAcetam (KEPPRA) 500 MG tablet Take 1 tablet by mouth Every Morning.   • linaclotide (Linzess) 145 MCG capsule capsule Take 1 capsule by mouth Every Morning Before Breakfast.   • metFORMIN (GLUCOPHAGE) 500 MG tablet Take 1 tablet by mouth 2 (Two) Times a Day.   • metoprolol tartrate (LOPRESSOR) 25 MG tablet Take 1 tablet by mouth 2 (Two) Times a Day.   • Probiotic Product (PROBIOTIC PO) TAKE 1 CAPSULE BY MOUTH TWICE DAILY FOR 7 DAYS   • QUEtiapine XR (SEROquel XR) 200 MG 24 hr tablet TAKE 1 TABLET BY MOUTH EVERY NIGHT AT BEDTIME **THIS MEDICATION HAS BEEN SHORT FILLED TO LINE UP WITH YOUR OTHER MEDICATIONS** (Patient taking differently: Take 200 mg by mouth Every Night.)   • saccharomyces boulardii (Florastor) 250 MG capsule Take 1 capsule by mouth 2 (Two) Times a Day for 7 days.   • Ventolin  (90 Base) MCG/ACT inhaler Inhale 1 puff Every 6 (Six) Hours As Needed for Wheezing.   • lisinopril (PRINIVIL,ZESTRIL) 10 MG tablet Take 1 tablet by mouth Daily for 30 days.     No current facility-administered medications on file prior to visit.     She is allergic to tramadol hcl..    Compared to one  year ago, the patient feels her physical   health is better.    Compared to one year ago, the patient feels her mental   health is better.    Recent Hospitalizations:  This patient has had a [facility] admission record on file within the last 365 days.    Current Medical Providers:  Patient Care Team:  Kj Canales MD as PCP - General (Family Medicine)    Outpatient Medications Prior to Visit   Medication Sig Dispense Refill   • Aspirin Low Dose 81 MG chewable tablet CHEW 1 TABLET BY MOUTH DAILY 90 tablet 1   • atorvastatin (LIPITOR) 80 MG tablet Take 80 mg by mouth Every Night.     • cholecalciferol (VITAMIN D3) 25 MCG (1000 UT) tablet Take 1,000 Units by mouth Daily.     • clopidogrel (PLAVIX) 75 MG tablet Take 1 tablet by mouth Daily. 30 tablet 1   • Dulaglutide (Trulicity) 0.75 MG/0.5ML solution pen-injector Inject 0.75 mg under the skin into the appropriate area as directed 1 (One) Time Per Week. 2 mL 5   • DULoxetine (CYMBALTA) 60 MG capsule TAKE 1 CAPSULE BY MOUTH DAILY **THIS MEDICATION HAS BEEN SHORT FILLED TO LINE UP WITH YOUR OTHER MEDICATIONS** (Patient taking differently: Take 60 mg by mouth Daily.) 30 capsule 1   • empagliflozin (Jardiance) 25 MG tablet tablet Take 1 tablet by mouth Daily. 90 tablet 1   • levETIRAcetam (KEPPRA) 500 MG tablet Take 2 tablets by mouth Every Evening. 180 tablet 1   • levETIRAcetam (KEPPRA) 500 MG tablet Take 1 tablet by mouth Every Morning. 90 tablet 1   • linaclotide (Linzess) 145 MCG capsule capsule Take 1 capsule by mouth Every Morning Before Breakfast.     • metFORMIN (GLUCOPHAGE) 500 MG tablet Take 1 tablet by mouth 2 (Two) Times a Day. 180 tablet 1   • metoprolol tartrate (LOPRESSOR) 25 MG tablet Take 1 tablet by mouth 2 (Two) Times a Day. 180 tablet 1   • Probiotic Product (PROBIOTIC PO) TAKE 1 CAPSULE BY MOUTH TWICE DAILY FOR 7 DAYS     • QUEtiapine XR (SEROquel XR) 200 MG 24 hr tablet TAKE 1 TABLET BY MOUTH EVERY NIGHT AT BEDTIME **THIS MEDICATION  HAS BEEN SHORT FILLED TO LINE UP WITH YOUR OTHER MEDICATIONS** (Patient taking differently: Take 200 mg by mouth Every Night.) 30 tablet 1   • saccharomyces boulardii (Florastor) 250 MG capsule Take 1 capsule by mouth 2 (Two) Times a Day for 7 days. 14 capsule 0   • Ventolin  (90 Base) MCG/ACT inhaler Inhale 1 puff Every 6 (Six) Hours As Needed for Wheezing. 18 g 1   • Blood Glucose Monitoring Suppl (FreeStyle Lite) w/Device kit 1 each Daily. 1 kit 0   • glucose blood (FREESTYLE LITE) test strip Test BG daily. 100 each 5   • Lancets (freestyle) lancets Test BG daily 100 each 5   • lisinopril (PRINIVIL,ZESTRIL) 10 MG tablet Take 1 tablet by mouth Daily for 30 days. 90 tablet 1   • cefdinir (OMNICEF) 300 MG capsule Take 1 capsule by mouth 2 (Two) Times a Day for 7 days. 14 capsule 0   • nitrofurantoin, macrocrystal-monohydrate, (Macrobid) 100 MG capsule Take 1 capsule by mouth 2 (Two) Times a Day for 7 days. 14 capsule 0     No facility-administered medications prior to visit.       No opioid medication identified on active medication list. I have reviewed chart for other potential  high risk medication/s and harmful drug interactions in the elderly.          Aspirin is on active medication list. Aspirin use is indicated based on review of current medical condition/s. Pros and cons of this therapy have been discussed today. Benefits of this medication outweigh potential harm.  Patient has been encouraged to continue taking this medication.  .      Patient Active Problem List   Diagnosis   • Type 2 diabetes mellitus with hyperglycemia, without long-term current use of insulin (HCC)   • Seizure disorder (HCC)   • Arthritis   • Anxiety and depression   • Primary hypertension   • Acute lacunar stroke (HCC)   • Acute cough   • Infection due to Enterobacter cloacae     Advance Care Planning  Advance Directive is on file.  ACP discussion was held with the patient during this visit. Patient has an advance directive in  "EMR which is still valid.      Objective    Vitals:    01/17/23 1632 01/17/23 1706   BP: 122/90 122/88   Pulse: 78    Temp: 96.9 °F (36.1 °C)    SpO2: 99%    Weight: 83.3 kg (183 lb 9.6 oz)    Height: 165.1 cm (65\")      Estimated body mass index is 30.55 kg/m² as calculated from the following:    Height as of this encounter: 165.1 cm (65\").    Weight as of this encounter: 83.3 kg (183 lb 9.6 oz).    BMI is >= 30 and <35. (Class 1 Obesity). The following options were offered after discussion;: exercise counseling/recommendations and nutrition counseling/recommendations      Does the patient have evidence of cognitive impairment?   No               ROS: pt has had no recent fevers, soa, cough, vision changes, headaches, chest pains, palpitations, syncope, dizziness, nausea, vomiting, diarrhea, abdominal pain, rashes, joint pain, depression, anxiety, memory problems, leg swelling, or fatigue.        Physical Exam  Vitals reviewed.   Constitutional:       Appearance: Normal appearance. She is well-developed.   HENT:      Head: Normocephalic and atraumatic.      Right Ear: External ear normal.      Left Ear: External ear normal.      Mouth/Throat:      Pharynx: No oropharyngeal exudate.   Eyes:      Conjunctiva/sclera: Conjunctivae normal.      Pupils: Pupils are equal, round, and reactive to light.   Cardiovascular:      Rate and Rhythm: Normal rate and regular rhythm.      Pulses: Normal pulses.      Heart sounds: Normal heart sounds. No murmur heard.    No friction rub. No gallop.   Pulmonary:      Effort: Pulmonary effort is normal.      Breath sounds: Normal breath sounds. No wheezing or rhonchi.   Abdominal:      General: Abdomen is flat. Bowel sounds are normal. There is no distension.      Palpations: Abdomen is soft. There is no mass.      Tenderness: There is no abdominal tenderness. There is no right CVA tenderness, left CVA tenderness, guarding or rebound.      Hernia: No hernia is present. "   Musculoskeletal:         General: Normal range of motion.   Skin:     General: Skin is warm and dry.      Capillary Refill: Capillary refill takes less than 2 seconds.   Neurological:      General: No focal deficit present.      Mental Status: She is alert and oriented to person, place, and time.      Cranial Nerves: No cranial nerve deficit.   Psychiatric:         Mood and Affect: Mood and affect normal.         Behavior: Behavior normal.         Thought Content: Thought content normal.         Judgment: Judgment normal.               HEALTH RISK ASSESSMENT    Smoking Status:  Social History     Tobacco Use   Smoking Status Former   • Packs/day: 0.50   • Years: 20.00   • Pack years: 10.00   • Types: Cigarettes   • Quit date: 2015   • Years since quittin.0   Smokeless Tobacco Former   Tobacco Comments    SMOKES LESS THAN 1 PACK PER DAY, FOR 20 YEARS NEVER USES OTHER TOBACCO PRODCUTS     Alcohol Consumption:  Social History     Substance and Sexual Activity   Alcohol Use Never     Fall Risk Screen:    ASHISH Fall Risk Assessment was completed, and patient is at HIGH risk for falls. Assessment completed on:2023    Depression Screening:  PHQ-2/PHQ-9 Depression Screening 2023   Little Interest or Pleasure in Doing Things 0-->not at all   Feeling Down, Depressed or Hopeless 0-->not at all   PHQ-9: Brief Depression Severity Measure Score 0       Health Habits and Functional and Cognitive Screening:  Functional & Cognitive Status 2023   Do you have difficulty preparing food and eating? Yes   Do you have difficulty bathing yourself, getting dressed or grooming yourself? No   Do you have difficulty using the toilet? Yes   Do you have difficulty moving around from place to place? Yes   Do you have trouble with steps or getting out of a bed or a chair? Yes   Current Diet Diabetic Diet   Dental Exam Not up to date   Eye Exam Not up to date   Exercise (times per week) 2 times per week   Current Exercises  Include Other;House Cleaning   Do you need help using the phone?  Yes   Are you deaf or do you have serious difficulty hearing?  No   Do you need help with transportation? Yes   Do you need help shopping? Yes   Do you need help preparing meals?  Yes   Do you need help with housework?  No   Do you need help with laundry? No   Do you need help taking your medications? Yes   Do you need help managing money? Yes   Do you ever drive or ride in a car without wearing a seat belt? Yes   Have you felt unusual stress, anger or loneliness in the last month? No   Who do you live with? Alone   Have you been bothered in the last four weeks by sexual problems? No   Do you have difficulty concentrating, remembering or making decisions? Yes       Age-appropriate Screening Schedule:  Refer to the list below for future screening recommendations based on patient's age, sex and/or medical conditions. Orders for these recommended tests are listed in the plan section. The patient has been provided with a written plan.    Health Maintenance   Topic Date Due   • DXA SCAN  Never done   • TDAP/TD VACCINES (1 - Tdap) Never done   • ZOSTER VACCINE (1 of 2) Never done   • DIABETIC EYE EXAM  07/14/2021   • HEMOGLOBIN A1C  01/02/2023   • MAMMOGRAM  01/17/2024 (Originally 1950)   • DIABETIC FOOT EXAM  01/31/2023   • URINE MICROALBUMIN  01/31/2023   • LIPID PANEL  07/02/2023   • INFLUENZA VACCINE  Completed                CMS Preventative Services Quick Reference  Risk Factors Identified During Encounter:    Immunizations Discussed/Encouraged: Tdap, Prevnar 20 (Pneumococcal 20-valent conjugate) and Shingrix  Inactivity/Sedentary: Patient was advised to exercise at least 150 minutes a week per CDC recommendations.    The above risks/problems have been discussed with the patient.  Pertinent information has been shared with the patient in the After Visit Summary.    Diagnoses and all orders for this visit:    1. Medicare annual wellness visit,  subsequent (Primary)    2. Type 2 diabetes mellitus with hyperglycemia, without long-term current use of insulin (HCC)  -     glucose blood (Contour Next Test) test strip; Use as instructed  Dispense: 100 each; Refill: 3  -     Lancets (freestyle) lancets; Test BG daily  Dispense: 100 each; Refill: 5  -     Cancel: Hemoglobin A1c  -     Cancel: Comprehensive Metabolic Panel  -     Cancel: Lipid Panel  -     Comprehensive Metabolic Panel; Future  -     Hemoglobin A1c; Future  -     Lipid Panel; Future    3. Need for pneumococcal vaccination  -     Pneumococcal Conjugate Vaccine 20-Valent (PCV20)    4. Dysuria  -     Urinalysis With Microscopic If Indicated (No Culture) - Urine, Clean Catch  -     Urine Culture - Urine, Urine, Clean Catch    5. Moderate persistent asthma, unspecified whether complicated  -     guaiFENesin (Mucinex) 600 MG 12 hr tablet; Take 2 tablets by mouth 2 (Two) Times a Day.  Dispense: 60 tablet; Refill: 1  -     fluticasone-salmeterol (Advair HFA) 45-21 MCG/ACT inhaler; Inhale 2 puffs 2 (Two) Times a Day.  Dispense: 12 g; Refill: 1        Follow Up:   Next Medicare Wellness visit to be scheduled in 1 year.      An After Visit Summary and PPPS were made available to the patient.    Pt told to rinse mouth with water and swallow it each time she uses advair in order to prevent thrush.

## 2023-01-18 LAB
BACTERIA UR QL AUTO: NORMAL /HPF
BILIRUB UR QL STRIP: NEGATIVE
CLARITY UR: CLEAR
COLOR UR: YELLOW
GLUCOSE UR STRIP-MCNC: ABNORMAL MG/DL
HGB UR QL STRIP.AUTO: NEGATIVE
HYALINE CASTS UR QL AUTO: NORMAL /LPF
KETONES UR QL STRIP: ABNORMAL
LEUKOCYTE ESTERASE UR QL STRIP.AUTO: ABNORMAL
NITRITE UR QL STRIP: NEGATIVE
PH UR STRIP.AUTO: 6 [PH] (ref 5–8)
PROT UR QL STRIP: NEGATIVE
RBC # UR STRIP: NORMAL /HPF
REF LAB TEST METHOD: NORMAL
SP GR UR STRIP: 1.03 (ref 1–1.03)
SQUAMOUS #/AREA URNS HPF: NORMAL /HPF
UROBILINOGEN UR QL STRIP: ABNORMAL
WBC # UR STRIP: NORMAL /HPF
YEAST URNS QL MICRO: NORMAL /HPF

## 2023-01-19 ENCOUNTER — TELEPHONE (OUTPATIENT)
Dept: FAMILY MEDICINE CLINIC | Facility: CLINIC | Age: 73
End: 2023-01-19
Payer: MEDICARE

## 2023-01-19 LAB — BACTERIA SPEC AEROBE CULT: NO GROWTH

## 2023-01-19 NOTE — TELEPHONE ENCOUNTER
Caller: PAMELLA RENTERIA    Relationship: Emergency Contact    Best call back number: [phone]    Requested Prescriptions:   TEST STRIPS  TEST TWICE DAILY    Pharmacy where request should be sent: [id number]  [place], [place] - [address] AT [facility] - [phone]  - [phone] FX     Additional details provided by patient: PATIENT CURRENTLY HAS 4 TEST STRIPS LEFT. SHE IS NEEDING THE SCRIPT TO SAY TEST TWICE DAILY IN ORDER FOR INSURANCE TO COVER THE REFILL.     Does the patient have less than a 3 day supply:  [x] Yes  [] No    Would you like a call back once the refill request has been completed: [x] Yes [] No    If the office needs to give you a call back, can they leave a voicemail: [] Yes [x] No    Alexander Salgado Rep   01/19/23 17:53 EST

## 2023-01-20 ENCOUNTER — CLINICAL SUPPORT (OUTPATIENT)
Dept: FAMILY MEDICINE CLINIC | Facility: CLINIC | Age: 73
End: 2023-01-20
Payer: MEDICARE

## 2023-01-20 ENCOUNTER — DOCUMENTATION (OUTPATIENT)
Dept: FAMILY MEDICINE CLINIC | Facility: CLINIC | Age: 73
End: 2023-01-20
Payer: MEDICARE

## 2023-01-20 DIAGNOSIS — G40.909 SEIZURE DISORDER: ICD-10-CM

## 2023-01-20 DIAGNOSIS — E11.65 TYPE 2 DIABETES MELLITUS WITH HYPERGLYCEMIA, WITHOUT LONG-TERM CURRENT USE OF INSULIN: ICD-10-CM

## 2023-01-20 DIAGNOSIS — F03.90 DEMENTIA WITHOUT BEHAVIORAL DISTURBANCE: ICD-10-CM

## 2023-01-20 LAB
ALBUMIN SERPL-MCNC: 4.6 G/DL (ref 3.5–5.2)
ALBUMIN/GLOB SERPL: 1.4 G/DL
ALP SERPL-CCNC: 63 U/L (ref 39–117)
ALT SERPL W P-5'-P-CCNC: 40 U/L (ref 1–33)
ANION GAP SERPL CALCULATED.3IONS-SCNC: 11 MMOL/L (ref 5–15)
AST SERPL-CCNC: 52 U/L (ref 1–32)
BILIRUB SERPL-MCNC: 0.7 MG/DL (ref 0–1.2)
BUN SERPL-MCNC: 25 MG/DL (ref 8–23)
BUN/CREAT SERPL: 21.9 (ref 7–25)
CALCIUM SPEC-SCNC: 10.3 MG/DL (ref 8.6–10.5)
CHLORIDE SERPL-SCNC: 103 MMOL/L (ref 98–107)
CHOLEST SERPL-MCNC: 110 MG/DL (ref 0–200)
CO2 SERPL-SCNC: 22 MMOL/L (ref 22–29)
CREAT SERPL-MCNC: 1.14 MG/DL (ref 0.57–1)
EGFRCR SERPLBLD CKD-EPI 2021: 50.9 ML/MIN/1.73
GLOBULIN UR ELPH-MCNC: 3.3 GM/DL
GLUCOSE SERPL-MCNC: 163 MG/DL (ref 65–99)
HBA1C MFR BLD: 7.8 % (ref 4.8–5.6)
HDLC SERPL-MCNC: 25 MG/DL (ref 40–60)
LDLC SERPL CALC-MCNC: 56 MG/DL (ref 0–100)
LDLC/HDLC SERPL: 2.06 {RATIO}
POTASSIUM SERPL-SCNC: 5.4 MMOL/L (ref 3.5–5.2)
PROT SERPL-MCNC: 7.9 G/DL (ref 6–8.5)
SODIUM SERPL-SCNC: 136 MMOL/L (ref 136–145)
TRIGL SERPL-MCNC: 168 MG/DL (ref 0–150)
VLDLC SERPL-MCNC: 29 MG/DL (ref 5–40)

## 2023-01-20 PROCEDURE — 80061 LIPID PANEL: CPT | Performed by: FAMILY MEDICINE

## 2023-01-20 PROCEDURE — 36415 COLL VENOUS BLD VENIPUNCTURE: CPT | Performed by: FAMILY MEDICINE

## 2023-01-20 PROCEDURE — 80053 COMPREHEN METABOLIC PANEL: CPT | Performed by: FAMILY MEDICINE

## 2023-01-20 PROCEDURE — 83036 HEMOGLOBIN GLYCOSYLATED A1C: CPT | Performed by: FAMILY MEDICINE

## 2023-01-20 RX ORDER — DONEPEZIL HYDROCHLORIDE 5 MG/1
TABLET, FILM COATED ORAL
Qty: 90 TABLET | Refills: 1 | Status: SHIPPED | OUTPATIENT
Start: 2023-01-20 | End: 2023-02-06

## 2023-01-20 RX ORDER — LEVETIRACETAM 500 MG/1
500 TABLET ORAL
Qty: 90 TABLET | Refills: 1 | Status: SHIPPED | OUTPATIENT
Start: 2023-01-20

## 2023-01-20 RX ORDER — LEVETIRACETAM 500 MG/1
1000 TABLET ORAL EVERY EVENING
Qty: 180 TABLET | Refills: 1 | Status: SHIPPED | OUTPATIENT
Start: 2023-01-20 | End: 2023-02-01

## 2023-01-20 NOTE — TELEPHONE ENCOUNTER
[facility] MADE A MISTAKE FILLING KEPPRA. THEY FILLED THE KEPPRA IN 2 SEPARATE SCRIPTS 1 IN THE AM & 1 IN THE PM.  PATIENT IS 30 SHORT SINCE IT IS SUPPOSED TO BE 2 IN THE PM.     PLEASE WRITE 1 SCRIPT, SAYING 1 IN THE AM, AND 2 IN THE PM.     BRAXTON ROSA

## 2023-01-20 NOTE — PROGRESS NOTES
Venipuncture Blood Specimen Collection  Venipuncture performed in left arm by Lexie Acuna with good hemostasis. Patient tolerated the procedure well without complications.   01/20/23   Lexie Acuna

## 2023-01-24 NOTE — PROGRESS NOTES
Labs show decreased kidney function and slightly increased potassium.  Stop any oral potassium.  Drink plenty of water.  Recheck and follow-up in 1 week.

## 2023-01-26 ENCOUNTER — TELEPHONE (OUTPATIENT)
Dept: FAMILY MEDICINE CLINIC | Facility: CLINIC | Age: 73
End: 2023-01-26
Payer: MEDICARE

## 2023-01-26 DIAGNOSIS — E11.65 TYPE 2 DIABETES MELLITUS WITH HYPERGLYCEMIA, WITHOUT LONG-TERM CURRENT USE OF INSULIN: ICD-10-CM

## 2023-01-26 DIAGNOSIS — E87.5 HYPERKALEMIA: Primary | ICD-10-CM

## 2023-01-26 RX ORDER — EMPAGLIFLOZIN 25 MG/1
TABLET, FILM COATED ORAL
Qty: 90 TABLET | Refills: 1 | OUTPATIENT
Start: 2023-01-26

## 2023-01-27 DIAGNOSIS — E11.65 TYPE 2 DIABETES MELLITUS WITH HYPERGLYCEMIA, WITHOUT LONG-TERM CURRENT USE OF INSULIN: ICD-10-CM

## 2023-02-01 ENCOUNTER — CLINICAL SUPPORT (OUTPATIENT)
Dept: FAMILY MEDICINE CLINIC | Facility: CLINIC | Age: 73
End: 2023-02-01
Payer: MEDICARE

## 2023-02-01 ENCOUNTER — OFFICE VISIT (OUTPATIENT)
Dept: UROLOGY | Facility: CLINIC | Age: 73
End: 2023-02-01
Payer: MEDICARE

## 2023-02-01 VITALS — HEIGHT: 66 IN | BODY MASS INDEX: 29.44 KG/M2 | WEIGHT: 183.2 LBS

## 2023-02-01 DIAGNOSIS — Z87.440 HISTORY OF RECURRENT UTIS: ICD-10-CM

## 2023-02-01 DIAGNOSIS — E87.5 HYPERKALEMIA: ICD-10-CM

## 2023-02-01 DIAGNOSIS — N39.0 URINARY TRACT INFECTION WITHOUT HEMATURIA, SITE UNSPECIFIED: Primary | ICD-10-CM

## 2023-02-01 DIAGNOSIS — N95.2 ATROPHIC VAGINITIS: ICD-10-CM

## 2023-02-01 PROBLEM — M79.602 PARESTHESIA AND PAIN OF BOTH UPPER EXTREMITIES: Status: ACTIVE | Noted: 2018-06-26

## 2023-02-01 PROBLEM — E11.9 DIABETES MELLITUS: Status: ACTIVE | Noted: 2022-01-31

## 2023-02-01 PROBLEM — G40.909 SEIZURE DISORDER: Status: ACTIVE | Noted: 2018-06-26

## 2023-02-01 PROBLEM — E53.8 VITAMIN B12 DEFICIENCY: Status: ACTIVE | Noted: 2023-02-01

## 2023-02-01 PROBLEM — E55.9 VITAMIN D DEFICIENCY: Status: ACTIVE | Noted: 2018-06-26

## 2023-02-01 PROBLEM — J44.9 COPD (CHRONIC OBSTRUCTIVE PULMONARY DISEASE): Status: ACTIVE | Noted: 2023-02-01

## 2023-02-01 PROBLEM — H26.9 CATARACT: Status: ACTIVE | Noted: 2023-02-01

## 2023-02-01 PROBLEM — E78.5 HYPERLIPIDEMIA: Status: ACTIVE | Noted: 2023-02-01

## 2023-02-01 PROBLEM — F32.A DEPRESSION: Status: ACTIVE | Noted: 2018-06-26

## 2023-02-01 PROBLEM — R20.2 PARESTHESIA AND PAIN OF BOTH UPPER EXTREMITIES: Status: ACTIVE | Noted: 2018-06-26

## 2023-02-01 PROBLEM — M79.601 PARESTHESIA AND PAIN OF BOTH UPPER EXTREMITIES: Status: ACTIVE | Noted: 2018-06-26

## 2023-02-01 LAB
ALBUMIN SERPL-MCNC: 4.8 G/DL (ref 3.5–5.2)
ALBUMIN/GLOB SERPL: 1.4 G/DL
ALP SERPL-CCNC: 61 U/L (ref 39–117)
ALT SERPL W P-5'-P-CCNC: 36 U/L (ref 1–33)
ANION GAP SERPL CALCULATED.3IONS-SCNC: 11.1 MMOL/L (ref 5–15)
AST SERPL-CCNC: 43 U/L (ref 1–32)
BILIRUB BLD-MCNC: NEGATIVE MG/DL
BILIRUB SERPL-MCNC: 0.8 MG/DL (ref 0–1.2)
BUN SERPL-MCNC: 24 MG/DL (ref 8–23)
BUN/CREAT SERPL: 20.5 (ref 7–25)
CALCIUM SPEC-SCNC: 10.5 MG/DL (ref 8.6–10.5)
CHLORIDE SERPL-SCNC: 102 MMOL/L (ref 98–107)
CLARITY, POC: CLEAR
CO2 SERPL-SCNC: 23.9 MMOL/L (ref 22–29)
COLOR UR: YELLOW
CREAT SERPL-MCNC: 1.17 MG/DL (ref 0.57–1)
EGFRCR SERPLBLD CKD-EPI 2021: 49.4 ML/MIN/1.73
EXPIRATION DATE: 424
GLOBULIN UR ELPH-MCNC: 3.5 GM/DL
GLUCOSE SERPL-MCNC: 149 MG/DL (ref 65–99)
GLUCOSE UR STRIP-MCNC: ABNORMAL MG/DL
KETONES UR QL: NEGATIVE
LEUKOCYTE EST, POC: ABNORMAL
Lab: ABNORMAL
NITRITE UR-MCNC: NEGATIVE MG/ML
PH UR: 5.5 [PH] (ref 5–8)
POTASSIUM SERPL-SCNC: 5.2 MMOL/L (ref 3.5–5.2)
PROT SERPL-MCNC: 8.3 G/DL (ref 6–8.5)
PROT UR STRIP-MCNC: NEGATIVE MG/DL
RBC # UR STRIP: NEGATIVE /UL
SODIUM SERPL-SCNC: 137 MMOL/L (ref 136–145)
SP GR UR: 1.01 (ref 1–1.03)
URINE VOLUME: 0
UROBILINOGEN UR QL: ABNORMAL

## 2023-02-01 PROCEDURE — 80053 COMPREHEN METABOLIC PANEL: CPT | Performed by: FAMILY MEDICINE

## 2023-02-01 PROCEDURE — 81003 URINALYSIS AUTO W/O SCOPE: CPT | Performed by: NURSE PRACTITIONER

## 2023-02-01 PROCEDURE — 51798 US URINE CAPACITY MEASURE: CPT | Performed by: NURSE PRACTITIONER

## 2023-02-01 PROCEDURE — 36415 COLL VENOUS BLD VENIPUNCTURE: CPT | Performed by: FAMILY MEDICINE

## 2023-02-01 PROCEDURE — 99213 OFFICE O/P EST LOW 20 MIN: CPT | Performed by: NURSE PRACTITIONER

## 2023-02-01 RX ORDER — ESTRADIOL 0.1 MG/G
1 CREAM VAGINAL 2 TIMES WEEKLY
Qty: 42.5 G | Refills: 6 | Status: SHIPPED | OUTPATIENT
Start: 2023-02-02 | End: 2023-05-03

## 2023-02-01 RX ORDER — BLOOD-GLUCOSE METER
KIT MISCELLANEOUS
COMMUNITY
Start: 2023-01-20

## 2023-02-01 NOTE — PROGRESS NOTES
Venipuncture Blood Specimen Collection  Venipuncture performed in left arm by Lexie Acuna with good hemostasis. Patient tolerated the procedure well without complications.   02/01/23   Lexie Acuna

## 2023-02-01 NOTE — PROGRESS NOTES
Chief Complaint: Acute Cystitis    Subjective         History of Present Illness  Haylee Gibbs is a 72 y.o. female presents to [facility] to be seen for acute cystitis.    Patient reports she had a stroke in July, 2022. Since that time she has had numerous UTI. Finished antibiotics last week.     She reports with the first few infections she did not have symptoms, but the last 2 she has had burning, itching and frequency.    She reports she does drink a lot of water.     She has some urge incontinence.     She does wear Always Discreet     She was seen by infectious disease specialist in October for recurrent UTI. He advised that she only be treated if she is having symptoms.         Urine culture  1/17/2023 no growth  1/9/2023 greater than 100,000 colony-forming units per mL of Klebsiella pneumonia I resistant to ampicillin and intermediate to nitrofurantoin  12/15/2022 greater than 100,000 colony-forming units per mL of Klebsiella pneumoniae resistant to ampicillin  11/27/2022 no growth  11/26/2022 greater than 100,000 colony-forming units per mL of mixed jasbir   9/27/2020 to greater than 100,000 colony-forming units per mL Enterobacter cloacae multi resistance  9/9/2022 greater than 100,000 colony-forming units per mL of Enterobacter cloacae I multiple resistance  8/16/2022 greater than 100,000 colony-forming units per mL of E. coli susceptible to ceftriaxone levofloxacin  8/1/2022 greater than 100,000 colony-forming units per mL of Klebsiella pneumoniae  7/4/2022 greater than 100,000 colony-forming units of E. coli resistant to levofloxacin  Objective     Past Medical History:   Diagnosis Date   • Anxiety disorder 06/26/2018   • Benign essential hypertension    • Cataract    • COPD (chronic obstructive pulmonary disease) ([facility])    • CVA (cerebral vascular accident) ([facility])    • Depression    • Diabetes mellitus, type 2 ([facility])    • Hyperlipidemia    • Paresthesia and pain of both upper  extremities 06/26/2018   • Seizure disorder (HCC) 06/26/2018   • Vitamin B 12 deficiency    • Vitamin D deficiency 06/26/2018       Past Surgical History:   Procedure Laterality Date   • APPENDECTOMY     • BACK SURGERY      LOW BACK DISC   • CATARACT EXTRACTION     • HYSTERECTOMY     • NECK SURGERY      NECK DISC   • TONSILLECTOMY           Current Outpatient Medications:   •  Aspirin Low Dose 81 MG chewable tablet, CHEW 1 TABLET BY MOUTH DAILY, Disp: 90 tablet, Rfl: 1  •  atorvastatin (LIPITOR) 80 MG tablet, Take 80 mg by mouth Every Night., Disp: , Rfl:   •  Blood Glucose Monitoring Suppl (FreeStyle Lite) w/Device kit, USE TO TEST GLUCOSE DAILY, Disp: , Rfl:   •  cholecalciferol (VITAMIN D3) 25 MCG (1000 UT) tablet, Take 1,000 Units by mouth Daily., Disp: , Rfl:   •  clopidogrel (PLAVIX) 75 MG tablet, Take 1 tablet by mouth Daily., Disp: 30 tablet, Rfl: 1  •  donepezil (ARICEPT) 5 MG tablet, TAKE 1 TABLET BY MOUTH EVERY NIGHT AT BEDTIME., Disp: 90 tablet, Rfl: 1  •  Dulaglutide (Trulicity) 0.75 MG/0.5ML solution pen-injector, Inject 0.75 mg under the skin into the appropriate area as directed 1 (One) Time Per Week., Disp: 2 mL, Rfl: 5  •  DULoxetine (CYMBALTA) 60 MG capsule, TAKE 1 CAPSULE BY MOUTH DAILY **THIS MEDICATION HAS BEEN SHORT FILLED TO LINE UP WITH YOUR OTHER MEDICATIONS** (Patient taking differently: Take 60 mg by mouth Daily.), Disp: 30 capsule, Rfl: 1  •  empagliflozin (Jardiance) 25 MG tablet tablet, Take 1 tablet by mouth Daily., Disp: 90 tablet, Rfl: 1  •  [START ON 2/2/2023] estradiol (ESTRACE) 0.1 MG/GM vaginal cream, Insert 1 g into the vagina 2 (Two) Times a Week for 90 days., Disp: 42.5 g, Rfl: 6  •  fluticasone-salmeterol (Advair HFA) 45-21 MCG/ACT inhaler, Inhale 2 puffs 2 (Two) Times a Day., Disp: 12 g, Rfl: 1  •  glucose blood (Contour Next Test) test strip, Test BG BID, Disp: 200 each, Rfl: 3  •  guaiFENesin (Mucinex) 600 MG 12 hr tablet, Take 2 tablets by mouth 2 (Two) Times a Day.,  Disp: 60 tablet, Rfl: 1  •  Lancets (freestyle) lancets, Test BG daily, Disp: 100 each, Rfl: 5  •  levETIRAcetam (KEPPRA) 500 MG tablet, Take 1 tablet by mouth Every Morning., Disp: 90 tablet, Rfl: 1  •  linaclotide (Linzess) 145 MCG capsule capsule, Take 1 capsule by mouth Every Morning Before Breakfast., Disp: , Rfl:   •  lisinopril (PRINIVIL,ZESTRIL) 10 MG tablet, Take 1 tablet by mouth Daily for 30 days., Disp: 90 tablet, Rfl: 1  •  metFORMIN (GLUCOPHAGE) 500 MG tablet, Take 1 tablet by mouth 2 (Two) Times a Day., Disp: 180 tablet, Rfl: 1  •  metoprolol tartrate (LOPRESSOR) 25 MG tablet, Take 1 tablet by mouth 2 (Two) Times a Day., Disp: 180 tablet, Rfl: 1  •  Probiotic Product (PROBIOTIC PO), TAKE 1 CAPSULE BY MOUTH TWICE DAILY FOR 7 DAYS, Disp: , Rfl:   •  QUEtiapine XR (SEROquel XR) 200 MG 24 hr tablet, TAKE 1 TABLET BY MOUTH EVERY NIGHT AT BEDTIME **THIS MEDICATION HAS BEEN SHORT FILLED TO LINE UP WITH YOUR OTHER MEDICATIONS** (Patient taking differently: Take 200 mg by mouth Every Night.), Disp: 30 tablet, Rfl: 1    Allergies   Allergen Reactions   • Tramadol Hcl GI Intolerance        Family History   Problem Relation Age of Onset   • Seizures Mother    • Stroke Mother    • Diabetes type II Sister    • Hypertension Sister    • Diabetes type II Brother    • Hypertension Brother    • Alcohol abuse Brother    • Heart disease Other         MOTHER, GRANDMOTHER, SISTER; FATHER, GRANDFATHER OR BROTHER DEVELOPED HEART DISEASE BEFORE THE AGE OF 65       Social History     Socioeconomic History   • Marital status: Single   Tobacco Use   • Smoking status: Former     Packs/day: 0.50     Years: 20.00     Pack years: 10.00     Types: Cigarettes     Quit date: 2015     Years since quittin.0   • Smokeless tobacco: Former   • Tobacco comments:     SMOKES LESS THAN 1 PACK PER DAY, FOR 20 YEARS NEVER USES OTHER TOBACCO PRODCUTS   Vaping Use   • Vaping Use: Never used   Substance and Sexual Activity   • Alcohol use:  "Never   • Drug use: Never   • Sexual activity: Defer       Vital Signs:   Ht 167.6 cm (66\")   Wt 83.1 kg (183 lb 3.2 oz)   BMI 29.57 kg/m²      Physical Exam     Result Review :   The following data was reviewed by: EZEKIEL Mejia on 02/01/2023:  Results for orders placed or performed in visit on 02/01/23   Bladder Scan   Result Value Ref Range    Urine Volume 0    POC Urinalysis Dipstick, Automated    Specimen: Urine   Result Value Ref Range    Color Yellow Yellow, Straw, Dark Yellow, Sidra    Clarity, UA Clear Clear    Specific Gravity  1.015 1.005 - 1.030    pH, Urine 5.5 5.0 - 8.0    Leukocytes Small (1+) (A) Negative    Nitrite, UA Negative Negative    Protein, POC Negative Negative mg/dL    Glucose,  mg/dL (A) Negative mg/dL    Ketones, UA Negative Negative    Urobilinogen, UA 0.2 E.U./dL Normal, 0.2 E.U./dL    Bilirubin Negative Negative    Blood, UA Negative Negative    Lot Number [id number]     Expiration Date 424        Bladder Scan interpretation 02/01/2023    Estimation of residual urine via BVI 3000 [address] Bladder Scan  MA/nurse performing: Gracie FRANCOIS RN  Residual Urine: 0 ml  Indication: Urinary tract infection without hematuria, site unspecified    Atrophic vaginitis    History of recurrent UTIs   Position: Supine  Examination: Incremental scanning of the suprapubic area using 2.0 MHz transducer using copious amounts of acoustic gel.   Findings: An anechoic area was demonstrated which represented the bladder, with measurement of residual urine as noted. I inspected this myself. In that the residual urine was  insignificant, refer to plan for treatment and plan necessary at this time.       Results for orders placed or performed in visit on 02/01/23   Bladder Scan   Result Value Ref Range    Urine Volume 0    POC Urinalysis Dipstick, Automated    Specimen: Urine   Result Value Ref Range    Color Yellow Yellow, Straw, Dark Yellow, Sidra    Clarity, UA Clear Clear    Specific [place]  " 1.015 1.005 - 1.030    pH, Urine 5.5 5.0 - 8.0    Leukocytes Small (1+) (A) Negative    Nitrite, UA Negative Negative    Protein, POC Negative Negative mg/dL    Glucose,  mg/dL (A) Negative mg/dL    Ketones, UA Negative Negative    Urobilinogen, UA 0.2 E.U./dL Normal, 0.2 E.U./dL    Bilirubin Negative Negative    Blood, UA Negative Negative    Lot Number [id number]     Expiration Date 424            Procedures        Assessment and Plan    Diagnoses and all orders for this visit:    1. Urinary tract infection without hematuria, site unspecified (Primary)  -     Bladder Scan  -     POC Urinalysis Dipstick, Automated    2. Atrophic vaginitis  -     estradiol (ESTRACE) 0.1 MG/GM vaginal cream; Insert 1 g into the vagina 2 (Two) Times a Week for 90 days.  Dispense: 42.5 g; Refill: 6    3. History of recurrent UTIs           Persistent urinary tract infection-no evidence on urine dip of infection today.  No symptoms today.  Believe patient to have asymptomatic bacteriuria (high bacterial colony count from urine specimen without symptoms) rather than persistent urinary tract infection given history.     Discussed that asymptomatic bacteriuria is a commonly encounter diagnosis which does not require antibiotic treatment.  Discussed recommendation of the American urologic Association that treatment for asymptomatic bacteriuria should be avoided in the general population.     Discussed with patient the importance of obtaining urine culture prior to treatment with antibiotics for urinary tract infection when symptoms of urinary tract infection arise.     Discussed that routine screening UA for infection is not recommended as in postmenopausal women asymptomatic bacteruria is common and does not warrant treatment     Discussed that obtaining urine culture after treatment for UTI is also not recommended unless a patient has persistent symptoms of UTI given prevalence of asymptomatic bacteriuria.     Recommend obtaining urine  culture when experiencing UTI symptoms and on-demand treatment per culture sensitivities.     Handouts given on prevention of UTI and Bladder Matters.     Information given regarding [facility] [place] for urine cultures.      Treatment with vaginal estrogen has been shown to be beneficial in restoring vaginal tissue to a healthier state.  Vaginal estrogen thickens the vaginal tissue and increase his blood flow to the area.  Lower estrogen levels within the vagina can lead to thin and dry vaginal tissue.  By applying the medication vaginally, the estrogen acts primarily on the vaginal tissue causing a direct effect on these tissues.  Along with thickening the tissue, women will also experience increased vaginal lubrication leading to less vaginal irritation and pain. Discussed Black Box Warning.     Encouraged to follow-up 3 months or sooner for new concerns  All questions addressed.     Patient discussed with Dr. Winkler who agrees with treatment.  Follow Up   Return in about 3 months (around 5/1/2023).  Patient was given instructions and counseling regarding her condition or for health maintenance advice. Please see specific information pulled into the AVS if appropriate.         This document has been electronically signed by EZEKIEL Mejia  February 1, 2023 14:40 EST

## 2023-02-02 NOTE — PROGRESS NOTES
Labs are showing slightly further decreased kidney function.  Normal GFR is above 60.  Drink plenty of fluids and you might want to see a kidney doctor.  Let me know.  Otherwise we might want to recheck this lab in 2-3 weeks.

## 2023-02-03 DIAGNOSIS — F32.A ANXIETY AND DEPRESSION: ICD-10-CM

## 2023-02-03 DIAGNOSIS — F41.9 ANXIETY AND DEPRESSION: ICD-10-CM

## 2023-02-03 DIAGNOSIS — I63.81 ACUTE LACUNAR STROKE: ICD-10-CM

## 2023-02-03 RX ORDER — DULOXETIN HYDROCHLORIDE 60 MG/1
CAPSULE, DELAYED RELEASE ORAL
Qty: 30 CAPSULE | Refills: 1 | Status: SHIPPED | OUTPATIENT
Start: 2023-02-03

## 2023-02-03 RX ORDER — CLOPIDOGREL BISULFATE 75 MG/1
75 TABLET ORAL DAILY
Qty: 30 TABLET | Refills: 1 | Status: SHIPPED | OUTPATIENT
Start: 2023-02-03 | End: 2023-02-06 | Stop reason: SDUPTHER

## 2023-02-06 ENCOUNTER — OFFICE VISIT (OUTPATIENT)
Dept: NEUROLOGY | Facility: CLINIC | Age: 73
End: 2023-02-06
Payer: MEDICARE

## 2023-02-06 VITALS
DIASTOLIC BLOOD PRESSURE: 54 MMHG | SYSTOLIC BLOOD PRESSURE: 150 MMHG | WEIGHT: 183.2 LBS | BODY MASS INDEX: 29.44 KG/M2 | HEART RATE: 69 BPM | HEIGHT: 66 IN

## 2023-02-06 DIAGNOSIS — Z86.73 HISTORY OF STROKE: Primary | ICD-10-CM

## 2023-02-06 DIAGNOSIS — R41.3 MEMORY DIFFICULTY: ICD-10-CM

## 2023-02-06 DIAGNOSIS — E53.8 B12 DEFICIENCY: ICD-10-CM

## 2023-02-06 DIAGNOSIS — R73.9 HEMOGLOBIN A1C BETWEEN 7.0% AND 9.0%: ICD-10-CM

## 2023-02-06 PROCEDURE — 99215 OFFICE O/P EST HI 40 MIN: CPT | Performed by: NURSE PRACTITIONER

## 2023-02-06 RX ORDER — CLOPIDOGREL BISULFATE 75 MG/1
75 TABLET ORAL DAILY
Qty: 90 TABLET | Refills: 1 | Status: SHIPPED | OUTPATIENT
Start: 2023-02-06

## 2023-02-06 RX ORDER — ATORVASTATIN CALCIUM 80 MG/1
80 TABLET, FILM COATED ORAL NIGHTLY
Qty: 90 TABLET | Refills: 1 | Status: SHIPPED | OUTPATIENT
Start: 2023-02-06

## 2023-02-06 NOTE — PROGRESS NOTES
"Chief Complaint  Neurologic Problem ([facility] 7/2022)    Subjective          Haylee Gibbs presents to [facility]  History of Present Illness  Following up for hospital follow-up for stroke.  Presented to ER in July 2022 with left sided weakness and was found to have had a stroke.  Presents to the office with niece today who is primary historian.  Dr. Connelly consulted while inpatient.  Left sided weakness has resolved.  Niece is concerned about short term memory difficulty.        Objective   Vital Signs:   /54   Pulse 69   Ht 166.4 cm (65.5\")   Wt 83.1 kg (183 lb 3.2 oz)   BMI 30.02 kg/m²     Physical Exam  Neurological:      Mental Status: She is oriented to person, place, and time.      Cranial Nerves: Cranial nerves 2-12 are intact.      Motor: Motor strength is normal.      Deep Tendon Reflexes:      Reflex Scores:       Brachioradialis reflexes are 2+ on the right side and 2+ on the left side.       Patellar reflexes are 1+ on the right side and 1+ on the left side.       Neurologic Exam     Mental Status   Oriented to person, place, and time.     Cranial Nerves   Cranial nerves II through XII intact.     Motor Exam   Muscle bulk: normal    Strength   Strength 5/5 throughout.     Gait, Coordination, and Reflexes     Reflexes   Right brachioradialis: 2+  Left brachioradialis: 2+  Right patellar: 1+  Left patellar: 1+       Result Review :   CBC:  Lab Results   Component Value Date    WBC 6.84 12/05/2022    RBC 3.74 (L) 12/05/2022    HGB 11.4 (L) 12/05/2022    HCT 35.4 12/05/2022    MCV 94.7 12/05/2022    MCH 30.5 12/05/2022    MCHC 32.2 12/05/2022    RDW 12.8 12/05/2022     12/05/2022     CMP:  Lab Results   Component Value Date    BUN 24 (H) 02/01/2023    CREATININE 1.17 (H) 02/01/2023    EGFRIFNONA 52 (L) 01/31/2022     02/01/2023    K 5.2 02/01/2023     02/01/2023    CALCIUM 10.5 02/01/2023    ALBUMIN 4.8 02/01/2023    BILITOT 0.8 " 02/01/2023    ALKPHOS 61 02/01/2023    AST 43 (H) 02/01/2023    ALT 36 (H) 02/01/2023     LIPID PANEL:  Lab Results   Component Value Date    CHLPL 212 (H) 08/07/2020    TRIG 168 (H) 01/20/2023    HDL 25 (L) 01/20/2023    VLDL 29 01/20/2023    LDL 56 01/20/2023    LDLHDL 2.06 01/20/2023     HGBA1C(MOST RECENT):  Lab Results   Component Value Date    HGBA1C 7.80 (H) 01/20/2023            MoCA: 24/30      MRI Brain:   1. 9 mm focal area of restricted diffusion in the posterior limb of the right internal capsule   consistent with an acute lacunar infarct.  2. Volume loss secondary to cerebral atrophy.  3. Chronic microvascular ischemia.     CTA Head/Neck:   1. 57% stenosis of the proximal right ICA.  2. Narrowing of the V4 segments of both vertebral arteries.  3. Narrowing of the distal M1 segment of the right MCA.  Narrowing of the proximal M1 segment of   the left MCA.  4. Narrowing of bilateral posterior cerebral arteries.          Assessment and Plan    Diagnoses and all orders for this visit:    1. History of stroke (Primary)  Assessment & Plan:  Discontinue aspirin.  Continue plavix and atorvastatin for secondary stroke risk reduction. Discussed importance of managing glucose.  Discussed signs and symptoms of stroke including, but not limited to, visual disturbances, weakness of one side of the body, facial droop, cognitive changes, slurred speech, imbalance and dizziness.  Instructed patient to call 911 and get emergent medical treatment immediately at the onset of those symptoms.   Patient verbalized understanding.     Orders:  -     clopidogrel (PLAVIX) 75 MG tablet; Take 1 tablet by mouth Daily.  Dispense: 90 tablet; Refill: 1  -     Vitamin B12 & Folate; Future    2. Memory difficulty  Assessment & Plan:  Will check labs to ensure vitamin deficiency is not negatively contributing to memory.  Discussed correlation between stroke and memory loss.  Can consider memory augmenting medication in the future if  needed.       3. Hemoglobin A1c between 7.0% and 9.0%    4. B12 deficiency  -     Vitamin B12 & Folate; Future    Other orders  -     atorvastatin (LIPITOR) 80 MG tablet; Take 1 tablet by mouth Every Night.  Dispense: 90 tablet; Refill: 1    I spent 55 minutes caring for Haylee on this date of service. This time includes time spent by me in the following activities:preparing for the visit, reviewing tests, obtaining and/or reviewing a separately obtained history, performing a medically appropriate examination and/or evaluation , counseling and educating the patient/family/caregiver, ordering medications, tests, or procedures, documenting information in the medical record and independently interpreting results and communicating that information with the patient/family/caregiver  Follow Up   Return in about 4 months (around 6/6/2023) for stroke f/u.  Patient was given instructions and counseling regarding her condition or for health maintenance advice. Please see specific information pulled into the AVS if appropriate.

## 2023-02-08 ENCOUNTER — TELEPHONE (OUTPATIENT)
Dept: SURGERY | Facility: CLINIC | Age: 73
End: 2023-02-08
Payer: MEDICARE

## 2023-02-08 NOTE — TELEPHONE ENCOUNTER
Per Gracie FRANCOIS: Shannan Albarado, we don't address kidney function, that would be a nephrologist. They need to speak with Dr. Canales and see what he says and get a referral to a nephrologist should he choose to do that.     I called the patient's sister, Ruby, and relayed this message.

## 2023-02-08 NOTE — TELEPHONE ENCOUNTER
I spoke to the patient and she gave verbal permission to speak to her sister, Ruby.    I spoke to Ruby.  Patient had first visit with Etta on 02/01/23.    She said the patient's PCP, Kj Canales, order labs and her kidney function was low, and her potassium was high.  She repeated labs on 02/01/23, and the potassium was normal, but kidney function was still below normal range.  She said Dr. Canales has mentioned the low kidney function at other times, also.    She wants to know if Etta can treat her for the low kidney function or make recommendation on who patient needs to see for this.    Sister said that she or patient have not discussed with Dr. Canales.  She saw the results in My Chart.

## 2023-02-09 PROBLEM — R41.3 MEMORY DIFFICULTY: Status: ACTIVE | Noted: 2023-02-09

## 2023-02-09 PROBLEM — Z86.73 HISTORY OF STROKE: Status: ACTIVE | Noted: 2023-02-09

## 2023-02-09 PROBLEM — R73.9 HEMOGLOBIN A1C BETWEEN 7.0% AND 9.0%: Status: ACTIVE | Noted: 2023-02-09

## 2023-02-09 NOTE — ASSESSMENT & PLAN NOTE
Will check labs to ensure vitamin deficiency is not negatively contributing to memory.  Discussed correlation between stroke and memory loss.  Can consider memory augmenting medication in the future if needed.

## 2023-02-09 NOTE — ASSESSMENT & PLAN NOTE
Discontinue aspirin.  Continue plavix and atorvastatin for secondary stroke risk reduction. Discussed importance of managing glucose.  Discussed signs and symptoms of stroke including, but not limited to, visual disturbances, weakness of one side of the body, facial droop, cognitive changes, slurred speech, imbalance and dizziness.  Instructed patient to call 911 and get emergent medical treatment immediately at the onset of those symptoms.   Patient verbalized understanding.

## 2023-02-17 ENCOUNTER — LAB (OUTPATIENT)
Dept: LAB | Facility: HOSPITAL | Age: 73
End: 2023-02-17
Payer: MEDICARE

## 2023-02-17 DIAGNOSIS — E53.8 B12 DEFICIENCY: ICD-10-CM

## 2023-02-17 DIAGNOSIS — Z86.73 HISTORY OF STROKE: ICD-10-CM

## 2023-02-17 LAB
FOLATE SERPL-MCNC: 17.5 NG/ML (ref 4.78–24.2)
VIT B12 BLD-MCNC: 256 PG/ML (ref 211–946)

## 2023-02-17 PROCEDURE — 82607 VITAMIN B-12: CPT

## 2023-02-17 PROCEDURE — 82746 ASSAY OF FOLIC ACID SERUM: CPT

## 2023-02-17 PROCEDURE — 36415 COLL VENOUS BLD VENIPUNCTURE: CPT

## 2023-02-20 RX ORDER — MAGNESIUM 200 MG
1000 TABLET ORAL DAILY
Qty: 30 EACH | Refills: 3 | Status: SHIPPED | OUTPATIENT
Start: 2023-02-20

## 2023-03-02 DIAGNOSIS — I10 PRIMARY HYPERTENSION: ICD-10-CM

## 2023-03-02 RX ORDER — LISINOPRIL 10 MG/1
TABLET ORAL
Qty: 90 TABLET | Refills: 1 | Status: SHIPPED | OUTPATIENT
Start: 2023-03-02

## 2023-03-10 DIAGNOSIS — J45.40 MODERATE PERSISTENT ASTHMA, UNSPECIFIED WHETHER COMPLICATED: ICD-10-CM

## 2023-03-10 RX ORDER — FLUTICASONE PROPIONATE AND SALMETEROL XINAFOATE 45; 21 UG/1; UG/1
AEROSOL, METERED RESPIRATORY (INHALATION)
Qty: 12 G | Refills: 1 | Status: SHIPPED | OUTPATIENT
Start: 2023-03-10

## 2023-04-11 DIAGNOSIS — I10 ESSENTIAL HYPERTENSION: ICD-10-CM

## 2023-04-12 DIAGNOSIS — F41.9 ANXIETY AND DEPRESSION: ICD-10-CM

## 2023-04-12 DIAGNOSIS — F32.A ANXIETY AND DEPRESSION: ICD-10-CM

## 2023-04-12 RX ORDER — DULOXETIN HYDROCHLORIDE 60 MG/1
CAPSULE, DELAYED RELEASE ORAL
Qty: 30 CAPSULE | Refills: 1 | Status: SHIPPED | OUTPATIENT
Start: 2023-04-12

## 2023-04-25 NOTE — PROGRESS NOTES
Chief Complaint: Urinary Tract Infection and Urinary Incontinence    Subjective         History of Present Illness  Haylee Gibbs is a 73 y.o. female presents to [facility] to be seen for recurrent UTIs.    Patient was last seen by me on 2/1/2023 for recurrent UTIs.  She is here for follow-up.  She was started on vaginal estrogen cream at that visit.    She reports she has had no UTIs since her last visit with me.  She currently has no symptoms of UTI.  She has been using the estrogen cream, though she is not a fan she will continue this.      Previous 2/1/2023:  Patient reports she had a stroke in July, 2022. Since that time she has had numerous UTI. Finished antibiotics last week.      She reports with the first few infections she did not have symptoms, but the last 2 she has had burning, itching and frequency.     She reports she does drink a lot of water.      She has some urge incontinence.      She does wear Always Discreet      She was seen by infectious disease specialist in October for recurrent UTI. He advised that she only be treated if she is having symptoms.            Urine culture  1/17/2023 no growth  1/9/2023 greater than 100,000 colony-forming units per mL of Klebsiella pneumonia I resistant to ampicillin and intermediate to nitrofurantoin  12/15/2022 greater than 100,000 colony-forming units per mL of Klebsiella pneumoniae resistant to ampicillin  11/27/2022 no growth  11/26/2022 greater than 100,000 colony-forming units per mL of mixed jasbir   9/27/2020 to greater than 100,000 colony-forming units per mL Enterobacter cloacae multi resistance  9/9/2022 greater than 100,000 colony-forming units per mL of Enterobacter cloacae I multiple resistance  8/16/2022 greater than 100,000 colony-forming units per mL of E. coli susceptible to ceftriaxone levofloxacin  8/1/2022 greater than 100,000 colony-forming units per mL of Klebsiella pneumoniae  7/4/2022 greater than 100,000  colony-forming units of E. coli resistant to levofloxacin    Objective     Past Medical History:   Diagnosis Date   • Anxiety disorder 06/26/2018   • Benign essential hypertension    • Cataract    • COPD (chronic obstructive pulmonary disease)    • CVA (cerebral vascular accident)    • Depression    • Diabetes mellitus, type 2    • Hyperlipidemia    • Paresthesia and pain of both upper extremities 06/26/2018   • Seizure disorder 06/26/2018   • Vitamin B 12 deficiency    • Vitamin D deficiency 06/26/2018       Past Surgical History:   Procedure Laterality Date   • APPENDECTOMY     • BACK SURGERY      LOW BACK DISC   • CATARACT EXTRACTION     • HYSTERECTOMY     • NECK SURGERY      NECK DISC   • TONSILLECTOMY           Current Outpatient Medications:   •  atorvastatin (LIPITOR) 80 MG tablet, Take 1 tablet by mouth Every Night., Disp: 90 tablet, Rfl: 1  •  Blood Glucose Monitoring Suppl (FreeStyle Lite) w/Device kit, USE TO TEST GLUCOSE DAILY, Disp: , Rfl:   •  clopidogrel (PLAVIX) 75 MG tablet, Take 1 tablet by mouth Daily., Disp: 90 tablet, Rfl: 1  •  Cyanocobalamin (Vitamin B-12) 1000 MCG sublingual tablet, Place 1 tablet under the tongue Daily., Disp: 30 each, Rfl: 3  •  Dulaglutide (Trulicity) 0.75 MG/0.5ML solution pen-injector, Inject 0.75 mg under the skin into the appropriate area as directed 1 (One) Time Per Week., Disp: 2 mL, Rfl: 5  •  DULoxetine (CYMBALTA) 60 MG capsule, TAKE 1 CAPSULE BY MOUTH DAILY, Disp: 30 capsule, Rfl: 1  •  empagliflozin (Jardiance) 25 MG tablet tablet, Take 1 tablet by mouth Daily., Disp: 90 tablet, Rfl: 1  •  estradiol (ESTRACE) 0.1 MG/GM vaginal cream, Insert 1 g into the vagina 2 (Two) Times a Week for 90 days., Disp: 42.5 g, Rfl: 6  •  glucose blood (Contour Next Test) test strip, Test BG BID, Disp: 200 each, Rfl: 3  •  Lancets (freestyle) lancets, Test BG daily, Disp: 100 each, Rfl: 5  •  levETIRAcetam (KEPPRA) 500 MG tablet, Take 1 tablet by mouth Every Morning. (Patient taking  differently: Take 1 tablet by mouth Every Morning. 1 in am and 2 in pm), Disp: 90 tablet, Rfl: 1  •  linaclotide (Linzess) 145 MCG capsule capsule, Take 1 capsule by mouth Every Morning Before Breakfast., Disp: , Rfl:   •  lisinopril (PRINIVIL,ZESTRIL) 10 MG tablet, TAKE 1 TABLET BY MOUTH DAILY, Disp: 90 tablet, Rfl: 1  •  metFORMIN (GLUCOPHAGE) 500 MG tablet, Take 1 tablet by mouth 2 (Two) Times a Day., Disp: 180 tablet, Rfl: 1  •  metoprolol tartrate (LOPRESSOR) 25 MG tablet, TAKE 1 TABLET BY MOUTH TWICE DAILY, Disp: 180 tablet, Rfl: 1  •  QUEtiapine XR (SEROquel XR) 200 MG 24 hr tablet, TAKE 1 TABLET BY MOUTH EVERY NIGHT AT BEDTIME **THIS MEDICATION HAS BEEN SHORT FILLED TO LINE UP WITH YOUR OTHER MEDICATIONS** (Patient taking differently: Take 1 tablet by mouth Every Night.), Disp: 30 tablet, Rfl: 1  •  Advair HFA 45-21 MCG/ACT inhaler, INHALE 2 PUFFS BY MOUTH TWICE DAILY (Patient not taking: Reported on 5/2/2023), Disp: 12 g, Rfl: 1  •  cholecalciferol (VITAMIN D3) 25 MCG (1000 UT) tablet, Take 1,000 Units by mouth Daily. (Patient not taking: Reported on 5/2/2023), Disp: , Rfl:   •  guaiFENesin (Mucinex) 600 MG 12 hr tablet, Take 2 tablets by mouth 2 (Two) Times a Day. (Patient not taking: Reported on 5/2/2023), Disp: 60 tablet, Rfl: 1  •  Probiotic Product (PROBIOTIC PO), TAKE 1 CAPSULE BY MOUTH TWICE DAILY FOR 7 DAYS (Patient not taking: Reported on 5/2/2023), Disp: , Rfl:     Allergies   Allergen Reactions   • Tramadol Hcl GI Intolerance        Family History   Problem Relation Age of Onset   • Seizures Mother    • Stroke Mother    • Diabetes type II Sister    • Hypertension Sister    • Diabetes type II Brother    • Hypertension Brother    • Alcohol abuse Brother    • Heart disease Other         MOTHER, GRANDMOTHER, SISTER; FATHER, GRANDFATHER OR BROTHER DEVELOPED HEART DISEASE BEFORE THE AGE OF 65       Social History     Socioeconomic History   • Marital status: Single   Tobacco Use   • Smoking status:  "Former     Packs/day: 0.50     Years: 20.00     Pack years: 10.00     Types: Cigarettes     Quit date: 2015     Years since quittin.3     Passive exposure: Past   • Smokeless tobacco: Former   • Tobacco comments:     SMOKES LESS THAN 1 PACK PER DAY, FOR 20 YEARS NEVER USES OTHER TOBACCO PRODCUTS   Vaping Use   • Vaping Use: Never used   Substance and Sexual Activity   • Alcohol use: Never   • Drug use: Never   • Sexual activity: Defer       Vital Signs:   Resp 14   Ht 166.4 cm (65.51\")   Wt 83.1 kg (183 lb 3.2 oz)   BMI 30.01 kg/m²      Physical Exam  Vitals reviewed.   Constitutional:       Appearance: Normal appearance.   Neurological:      General: No focal deficit present.      Mental Status: She is alert and oriented to person, place, and time.   Psychiatric:         Mood and Affect: Mood normal.         Behavior: Behavior normal.          Result Review :   The following data was reviewed by: EZEKIEL Mejia on 2023:      Bladder Scan interpretation 2023    Estimation of residual urine via SpartooI 3000 Verathon Bladder Scan  MA/nurse performing: Lyndsey MICHEL MA  Residual Urine: 0 ml  Indication: History of recurrent UTIs    Atrophic vaginitis   Position: Supine  Examination: Incremental scanning of the suprapubic area using 2.0 MHz transducer using copious amounts of acoustic gel.   Findings: An anechoic area was demonstrated which represented the bladder, with measurement of residual urine as noted. I inspected this myself. In that the residual urine was  insignificant, refer to plan for treatment and plan necessary at this time.             Procedures        Assessment and Plan    Diagnoses and all orders for this visit:    1. History of recurrent UTIs (Primary)  -     Bladder Scan  -     POC Urinalysis Dipstick, Automated    2. Atrophic vaginitis    She will continue the estrogen cream.  If she needs refills prior to her follow-up she will let us know.    Since she is currently " doing well and does not want to do routine follow-up, we will see her back in 1 year or sooner for any new concerns.  We did discuss that she can call if she has any symptoms of UTI we would order a urine culture at that point.      Follow Up   Return in about 1 year (around 5/2/2024).  Patient was given instructions and counseling regarding her condition or for health maintenance advice. Please see specific information pulled into the AVS if appropriate.         This document has been electronically signed by EZEKIEL Mejia  May 2, 2023 11:26 EDT

## 2023-05-02 ENCOUNTER — OFFICE VISIT (OUTPATIENT)
Dept: UROLOGY | Facility: CLINIC | Age: 73
End: 2023-05-02
Payer: MEDICARE

## 2023-05-02 VITALS — WEIGHT: 183.2 LBS | HEIGHT: 66 IN | RESPIRATION RATE: 14 BRPM | BODY MASS INDEX: 29.44 KG/M2

## 2023-05-02 DIAGNOSIS — Z87.440 HISTORY OF RECURRENT UTIS: Primary | ICD-10-CM

## 2023-05-02 DIAGNOSIS — N95.2 ATROPHIC VAGINITIS: ICD-10-CM

## 2023-05-02 LAB
BILIRUB BLD-MCNC: NEGATIVE MG/DL
CLARITY, POC: ABNORMAL
COLOR UR: YELLOW
EXPIRATION DATE: 624
GLUCOSE UR STRIP-MCNC: ABNORMAL MG/DL
KETONES UR QL: NEGATIVE
LEUKOCYTE EST, POC: ABNORMAL
Lab: ABNORMAL
NITRITE UR-MCNC: POSITIVE MG/ML
PH UR: 5 [PH] (ref 5–8)
PROT UR STRIP-MCNC: NEGATIVE MG/DL
RBC # UR STRIP: NEGATIVE /UL
SP GR UR: 1.01 (ref 1–1.03)
URINE VOLUME: 0
UROBILINOGEN UR QL: ABNORMAL

## 2023-06-04 DIAGNOSIS — F41.9 ANXIETY AND DEPRESSION: ICD-10-CM

## 2023-06-04 DIAGNOSIS — F32.A ANXIETY AND DEPRESSION: ICD-10-CM

## 2023-06-04 DIAGNOSIS — G40.909 SEIZURE DISORDER: ICD-10-CM

## 2023-06-05 RX ORDER — CHOLECALCIFEROL (VITAMIN D3) 25 MCG
TABLET,CHEWABLE ORAL
Qty: 30 LOZENGE | Refills: 3 | OUTPATIENT
Start: 2023-06-05

## 2023-06-05 RX ORDER — LEVETIRACETAM 500 MG/1
TABLET ORAL
Qty: 180 TABLET | Refills: 1 | Status: SHIPPED | OUTPATIENT
Start: 2023-06-05 | End: 2023-06-12 | Stop reason: SDUPTHER

## 2023-06-05 RX ORDER — DULOXETIN HYDROCHLORIDE 60 MG/1
CAPSULE, DELAYED RELEASE ORAL
Qty: 30 CAPSULE | Refills: 1 | Status: SHIPPED | OUTPATIENT
Start: 2023-06-05 | End: 2023-06-12 | Stop reason: SDUPTHER

## 2023-06-06 ENCOUNTER — TELEPHONE (OUTPATIENT)
Dept: FAMILY MEDICINE CLINIC | Facility: CLINIC | Age: 73
End: 2023-06-06

## 2023-06-06 ENCOUNTER — OFFICE VISIT (OUTPATIENT)
Dept: NEUROLOGY | Facility: CLINIC | Age: 73
End: 2023-06-06
Payer: MEDICARE

## 2023-06-06 VITALS
DIASTOLIC BLOOD PRESSURE: 56 MMHG | HEART RATE: 66 BPM | HEIGHT: 66 IN | BODY MASS INDEX: 28.17 KG/M2 | SYSTOLIC BLOOD PRESSURE: 127 MMHG | WEIGHT: 175.3 LBS

## 2023-06-06 DIAGNOSIS — I10 PRIMARY HYPERTENSION: ICD-10-CM

## 2023-06-06 DIAGNOSIS — Z86.73 HISTORY OF STROKE: Primary | ICD-10-CM

## 2023-06-06 DIAGNOSIS — E11.65 TYPE 2 DIABETES MELLITUS WITH HYPERGLYCEMIA, WITHOUT LONG-TERM CURRENT USE OF INSULIN: Primary | ICD-10-CM

## 2023-06-06 DIAGNOSIS — R73.9 HEMOGLOBIN A1C BETWEEN 7.0% AND 9.0%: ICD-10-CM

## 2023-06-06 DIAGNOSIS — E53.8 VITAMIN B12 DEFICIENCY: ICD-10-CM

## 2023-06-06 RX ORDER — ATORVASTATIN CALCIUM 80 MG/1
80 TABLET, FILM COATED ORAL NIGHTLY
Qty: 90 TABLET | Refills: 1 | Status: SHIPPED | OUTPATIENT
Start: 2023-06-06 | End: 2023-06-12 | Stop reason: SDUPTHER

## 2023-06-06 RX ORDER — CLOPIDOGREL BISULFATE 75 MG/1
75 TABLET ORAL DAILY
Qty: 90 TABLET | Refills: 1 | Status: SHIPPED | OUTPATIENT
Start: 2023-06-06 | End: 2023-06-12 | Stop reason: SDUPTHER

## 2023-06-06 RX ORDER — MAGNESIUM 200 MG
1000 TABLET ORAL DAILY
Qty: 90 EACH | Refills: 1 | Status: SHIPPED | OUTPATIENT
Start: 2023-06-06 | End: 2023-06-12 | Stop reason: SDUPTHER

## 2023-06-06 NOTE — TELEPHONE ENCOUNTER
Caller: Haylee Gibbs    Relationship to patient: Self    Best call back number: [phone]    Patient is needing:PATIENT'S NIECE CALLED IN AND PATIENT WAS IN THE BACKGROUND ON SPEAKER AND PATIENT IS WANTING TO HAVE ORDERS PLACED TO HAVE BLOOD DRAWN AHEAD OF APPOINTMENT ON MONDAY. PATIENT IS REQUESTING A CALL BACK  AFTER ORDERS ARE PLACE PLEASE

## 2023-06-06 NOTE — PROGRESS NOTES
"Chief Complaint  Neurologic Problem    Subjective          Haylee Gibbs presents to [facility]  History of Present Illness  Following up for stroke.  Remains on plavix and atorvastatin.  Does feel she's had some improvement in short term memory and energy levels with B12 supplement.  Denies recurrent stroke like symptoms.  Feels balance has improved, is able to walk more without her cane.       Interval History:   Following up for hospital follow-up for stroke.  Presented to ER in July 2022 with left sided weakness and was found to have had a stroke.  Presents to the office with niece today who is primary historian.  Dr. Connelly consulted while inpatient.  Left sided weakness has resolved.  Niece is concerned about short term memory difficulty.      Objective   Vital Signs:   /56   Pulse 66   Ht 166.4 cm (65.51\")   Wt 79.5 kg (175 lb 4.8 oz)   BMI 28.72 kg/m²     Physical Exam  Neurological:      Mental Status: She is oriented to person, place, and time.      Cranial Nerves: Cranial nerves 2-12 are intact.      Motor: Motor strength is normal.      Deep Tendon Reflexes:      Reflex Scores:       Brachioradialis reflexes are 2+ on the right side and 2+ on the left side.       Patellar reflexes are 1+ on the right side and 1+ on the left side.     Neurologic Exam     Mental Status   Oriented to person, place, and time.     Cranial Nerves   Cranial nerves II through XII intact.     Motor Exam     Strength   Strength 5/5 throughout.     Gait, Coordination, and Reflexes     Reflexes   Right brachioradialis: 2+  Left brachioradialis: 2+  Right patellar: 1+  Left patellar: 1+     Result Review :   CBC:  Lab Results   Component Value Date    WBC 9.49 06/07/2023    RBC 4.13 06/07/2023    HGB 12.7 06/07/2023    HCT 39.5 06/07/2023    MCV 95.6 06/07/2023    MCH 30.8 06/07/2023    MCHC 32.2 06/07/2023    RDW 13.3 06/07/2023     06/07/2023     CMP:  Lab Results "   Component Value Date    BUN 21 06/07/2023    CREATININE 1.14 (H) 06/07/2023    EGFRIFNONA 52 (L) 01/31/2022     (L) 06/07/2023    K 5.3 (H) 06/07/2023     06/07/2023    CALCIUM 10.6 (H) 06/07/2023    ALBUMIN 4.4 06/07/2023    BILITOT 0.5 06/07/2023    ALKPHOS 51 06/07/2023    AST 18 06/07/2023    ALT 21 06/07/2023     HGBA1C(MOST RECENT):  Lab Results   Component Value Date    HGBA1C 7.20 (H) 06/07/2023     B12:   Lab Results   Component Value Date    YFLKGOUF66 1,442 (H) 06/07/2023      FOLATE:   Lab Results   Component Value Date    FOLATE 7.06 06/07/2023               Assessment and Plan    Diagnoses and all orders for this visit:    1. History of stroke (Primary)  Assessment & Plan:  Continue plavix and atorvastatin for secondary stroke risk reduction. Discussed importance of managing glucose.  Discussed signs and symptoms of stroke including, but not limited to, visual disturbances, weakness of one side of the body, facial droop, cognitive changes, slurred speech, imbalance and dizziness.  Instructed patient to call 911 and get emergent medical treatment immediately at the onset of those symptoms.   Patient verbalized understanding.     Orders:  -     Vitamin B12 & Folate; Future  -     clopidogrel (PLAVIX) 75 MG tablet; Take 1 tablet by mouth Daily.  Dispense: 90 tablet; Refill: 1    2. Vitamin B12 deficiency  Assessment & Plan:  Continue SL B12.  Will repeat labs.    Orders:  -     Vitamin B12 & Folate; Future    3. Hemoglobin A1c between 7.0% and 9.0%  -     Vitamin B12 & Folate; Future    Other orders  -     Cyanocobalamin (Vitamin B-12) 1000 MCG sublingual tablet; Place 1 tablet under the tongue Daily.  Dispense: 90 each; Refill: 1  -     atorvastatin (LIPITOR) 80 MG tablet; Take 1 tablet by mouth Every Night.  Dispense: 90 tablet; Refill: 1        Follow Up   Return in about 1 year (around 6/6/2024) for stroke f/u.  Patient was given instructions and counseling regarding her condition or for  health maintenance advice. Please see specific information pulled into the AVS if appropriate.

## 2023-06-07 ENCOUNTER — CLINICAL SUPPORT (OUTPATIENT)
Dept: FAMILY MEDICINE CLINIC | Facility: CLINIC | Age: 73
End: 2023-06-07
Payer: MEDICARE

## 2023-06-07 DIAGNOSIS — Z86.73 HISTORY OF STROKE: ICD-10-CM

## 2023-06-07 DIAGNOSIS — E53.8 VITAMIN B12 DEFICIENCY: ICD-10-CM

## 2023-06-07 DIAGNOSIS — R73.9 HEMOGLOBIN A1C BETWEEN 7.0% AND 9.0%: ICD-10-CM

## 2023-06-07 LAB
ALBUMIN SERPL-MCNC: 4.4 G/DL (ref 3.5–5.2)
ALBUMIN UR-MCNC: <1.2 MG/DL
ALBUMIN/GLOB SERPL: 1.4 G/DL
ALP SERPL-CCNC: 51 U/L (ref 39–117)
ALT SERPL W P-5'-P-CCNC: 21 U/L (ref 1–33)
ANION GAP SERPL CALCULATED.3IONS-SCNC: 10.2 MMOL/L (ref 5–15)
AST SERPL-CCNC: 18 U/L (ref 1–32)
BASOPHILS # BLD AUTO: 0.07 10*3/MM3 (ref 0–0.2)
BASOPHILS NFR BLD AUTO: 0.7 % (ref 0–1.5)
BILIRUB SERPL-MCNC: 0.5 MG/DL (ref 0–1.2)
BUN SERPL-MCNC: 21 MG/DL (ref 8–23)
BUN/CREAT SERPL: 18.4 (ref 7–25)
CALCIUM SPEC-SCNC: 10.6 MG/DL (ref 8.6–10.5)
CHLORIDE SERPL-SCNC: 102 MMOL/L (ref 98–107)
CHOLEST SERPL-MCNC: 112 MG/DL (ref 0–200)
CO2 SERPL-SCNC: 22.8 MMOL/L (ref 22–29)
CREAT SERPL-MCNC: 1.14 MG/DL (ref 0.57–1)
DEPRECATED RDW RBC AUTO: 46.5 FL (ref 37–54)
EGFRCR SERPLBLD CKD-EPI 2021: 50.6 ML/MIN/1.73
EOSINOPHIL # BLD AUTO: 0.46 10*3/MM3 (ref 0–0.4)
EOSINOPHIL NFR BLD AUTO: 4.8 % (ref 0.3–6.2)
ERYTHROCYTE [DISTWIDTH] IN BLOOD BY AUTOMATED COUNT: 13.3 % (ref 12.3–15.4)
FOLATE SERPL-MCNC: 7.06 NG/ML (ref 4.78–24.2)
GLOBULIN UR ELPH-MCNC: 3.1 GM/DL
GLUCOSE SERPL-MCNC: 152 MG/DL (ref 65–99)
HBA1C MFR BLD: 7.2 % (ref 4.8–5.6)
HCT VFR BLD AUTO: 39.5 % (ref 34–46.6)
HDLC SERPL-MCNC: 34 MG/DL (ref 40–60)
HGB BLD-MCNC: 12.7 G/DL (ref 12–15.9)
IMM GRANULOCYTES # BLD AUTO: 0.03 10*3/MM3 (ref 0–0.05)
IMM GRANULOCYTES NFR BLD AUTO: 0.3 % (ref 0–0.5)
LDLC SERPL CALC-MCNC: 56 MG/DL (ref 0–100)
LDLC/HDLC SERPL: 1.59 {RATIO}
LYMPHOCYTES # BLD AUTO: 2.48 10*3/MM3 (ref 0.7–3.1)
LYMPHOCYTES NFR BLD AUTO: 26.1 % (ref 19.6–45.3)
MCH RBC QN AUTO: 30.8 PG (ref 26.6–33)
MCHC RBC AUTO-ENTMCNC: 32.2 G/DL (ref 31.5–35.7)
MCV RBC AUTO: 95.6 FL (ref 79–97)
MONOCYTES # BLD AUTO: 0.77 10*3/MM3 (ref 0.1–0.9)
MONOCYTES NFR BLD AUTO: 8.1 % (ref 5–12)
NEUTROPHILS NFR BLD AUTO: 5.68 10*3/MM3 (ref 1.7–7)
NEUTROPHILS NFR BLD AUTO: 60 % (ref 42.7–76)
NRBC BLD AUTO-RTO: 0 /100 WBC (ref 0–0.2)
PLATELET # BLD AUTO: 278 10*3/MM3 (ref 140–450)
PMV BLD AUTO: 11.5 FL (ref 6–12)
POTASSIUM SERPL-SCNC: 5.3 MMOL/L (ref 3.5–5.2)
PROT SERPL-MCNC: 7.5 G/DL (ref 6–8.5)
RBC # BLD AUTO: 4.13 10*6/MM3 (ref 3.77–5.28)
SODIUM SERPL-SCNC: 135 MMOL/L (ref 136–145)
T4 FREE SERPL-MCNC: 0.92 NG/DL (ref 0.93–1.7)
TRIGL SERPL-MCNC: 119 MG/DL (ref 0–150)
TSH SERPL DL<=0.05 MIU/L-ACNC: 2.15 UIU/ML (ref 0.27–4.2)
VIT B12 BLD-MCNC: 1442 PG/ML (ref 211–946)
VLDLC SERPL-MCNC: 22 MG/DL (ref 5–40)
WBC NRBC COR # BLD: 9.49 10*3/MM3 (ref 3.4–10.8)

## 2023-06-07 PROCEDURE — 36415 COLL VENOUS BLD VENIPUNCTURE: CPT | Performed by: FAMILY MEDICINE

## 2023-06-07 PROCEDURE — 83036 HEMOGLOBIN GLYCOSYLATED A1C: CPT | Performed by: FAMILY MEDICINE

## 2023-06-07 PROCEDURE — 82607 VITAMIN B-12: CPT | Performed by: NURSE PRACTITIONER

## 2023-06-07 PROCEDURE — 80061 LIPID PANEL: CPT | Performed by: FAMILY MEDICINE

## 2023-06-07 PROCEDURE — 80053 COMPREHEN METABOLIC PANEL: CPT | Performed by: FAMILY MEDICINE

## 2023-06-07 PROCEDURE — 82043 UR ALBUMIN QUANTITATIVE: CPT | Performed by: FAMILY MEDICINE

## 2023-06-07 PROCEDURE — 84439 ASSAY OF FREE THYROXINE: CPT | Performed by: FAMILY MEDICINE

## 2023-06-07 PROCEDURE — 82746 ASSAY OF FOLIC ACID SERUM: CPT | Performed by: NURSE PRACTITIONER

## 2023-06-07 PROCEDURE — 85025 COMPLETE CBC W/AUTO DIFF WBC: CPT | Performed by: FAMILY MEDICINE

## 2023-06-07 PROCEDURE — 84443 ASSAY THYROID STIM HORMONE: CPT | Performed by: FAMILY MEDICINE

## 2023-06-09 NOTE — PROGRESS NOTES
Labs show slightly elevated potassium and decreased kidney function.  Stop any oral potassium and follow-up in 1-2 weeks to recheck labs.

## 2023-06-09 NOTE — ASSESSMENT & PLAN NOTE
Continue plavix and atorvastatin for secondary stroke risk reduction. Discussed importance of managing glucose.  Discussed signs and symptoms of stroke including, but not limited to, visual disturbances, weakness of one side of the body, facial droop, cognitive changes, slurred speech, imbalance and dizziness.  Instructed patient to call 911 and get emergent medical treatment immediately at the onset of those symptoms.   Patient verbalized understanding.

## 2023-06-12 ENCOUNTER — OFFICE VISIT (OUTPATIENT)
Dept: FAMILY MEDICINE CLINIC | Facility: CLINIC | Age: 73
End: 2023-06-12
Payer: MEDICARE

## 2023-06-12 VITALS
SYSTOLIC BLOOD PRESSURE: 130 MMHG | OXYGEN SATURATION: 99 % | HEIGHT: 66 IN | WEIGHT: 177.4 LBS | BODY MASS INDEX: 28.51 KG/M2 | TEMPERATURE: 97.3 F | DIASTOLIC BLOOD PRESSURE: 68 MMHG | HEART RATE: 63 BPM

## 2023-06-12 DIAGNOSIS — E53.8 VITAMIN B12 DEFICIENCY: Primary | ICD-10-CM

## 2023-06-12 DIAGNOSIS — E11.65 TYPE 2 DIABETES MELLITUS WITH HYPERGLYCEMIA, WITHOUT LONG-TERM CURRENT USE OF INSULIN: ICD-10-CM

## 2023-06-12 DIAGNOSIS — G40.909 SEIZURE DISORDER: ICD-10-CM

## 2023-06-12 DIAGNOSIS — Z86.73 HISTORY OF STROKE: ICD-10-CM

## 2023-06-12 DIAGNOSIS — F32.A DEPRESSION, UNSPECIFIED DEPRESSION TYPE: ICD-10-CM

## 2023-06-12 DIAGNOSIS — F41.9 ANXIETY AND DEPRESSION: ICD-10-CM

## 2023-06-12 DIAGNOSIS — I10 PRIMARY HYPERTENSION: ICD-10-CM

## 2023-06-12 DIAGNOSIS — F32.A ANXIETY AND DEPRESSION: ICD-10-CM

## 2023-06-12 DIAGNOSIS — E87.5 HYPERKALEMIA: ICD-10-CM

## 2023-06-12 DIAGNOSIS — N18.31 STAGE 3A CHRONIC KIDNEY DISEASE: ICD-10-CM

## 2023-06-12 DIAGNOSIS — J45.40 MODERATE PERSISTENT ASTHMA, UNSPECIFIED WHETHER COMPLICATED: ICD-10-CM

## 2023-06-12 DIAGNOSIS — K59.00 CONSTIPATION, UNSPECIFIED CONSTIPATION TYPE: ICD-10-CM

## 2023-06-12 DIAGNOSIS — I10 ESSENTIAL HYPERTENSION: ICD-10-CM

## 2023-06-12 LAB
ANION GAP SERPL CALCULATED.3IONS-SCNC: 12 MMOL/L (ref 5–15)
BUN SERPL-MCNC: 22 MG/DL (ref 8–23)
BUN/CREAT SERPL: 24.7 (ref 7–25)
CALCIUM SPEC-SCNC: 9.5 MG/DL (ref 8.6–10.5)
CHLORIDE SERPL-SCNC: 101 MMOL/L (ref 98–107)
CO2 SERPL-SCNC: 25 MMOL/L (ref 22–29)
CREAT SERPL-MCNC: 0.89 MG/DL (ref 0.57–1)
EGFRCR SERPLBLD CKD-EPI 2021: 68.1 ML/MIN/1.73
GLUCOSE SERPL-MCNC: 153 MG/DL (ref 65–99)
POTASSIUM SERPL-SCNC: 4.9 MMOL/L (ref 3.5–5.2)
SODIUM SERPL-SCNC: 138 MMOL/L (ref 136–145)

## 2023-06-12 PROCEDURE — 80048 BASIC METABOLIC PNL TOTAL CA: CPT | Performed by: FAMILY MEDICINE

## 2023-06-12 RX ORDER — DULOXETIN HYDROCHLORIDE 60 MG/1
60 CAPSULE, DELAYED RELEASE ORAL DAILY
Qty: 90 CAPSULE | Refills: 1 | Status: SHIPPED | OUTPATIENT
Start: 2023-06-12

## 2023-06-12 RX ORDER — LISINOPRIL 10 MG/1
10 TABLET ORAL DAILY
Qty: 90 TABLET | Refills: 1 | Status: SHIPPED | OUTPATIENT
Start: 2023-06-12

## 2023-06-12 RX ORDER — FLUTICASONE PROPIONATE AND SALMETEROL XINAFOATE 45; 21 UG/1; UG/1
2 AEROSOL, METERED RESPIRATORY (INHALATION) 2 TIMES DAILY
Qty: 12 G | Refills: 1 | Status: SHIPPED | OUTPATIENT
Start: 2023-06-12

## 2023-06-12 RX ORDER — CLOPIDOGREL BISULFATE 75 MG/1
75 TABLET ORAL DAILY
Qty: 90 TABLET | Refills: 1 | Status: SHIPPED | OUTPATIENT
Start: 2023-06-12

## 2023-06-12 RX ORDER — QUETIAPINE 200 MG/1
200 TABLET, FILM COATED, EXTENDED RELEASE ORAL NIGHTLY
Qty: 90 TABLET | Refills: 1 | Status: SHIPPED | OUTPATIENT
Start: 2023-06-12

## 2023-06-12 RX ORDER — MAGNESIUM 200 MG
1000 TABLET ORAL DAILY
Qty: 90 EACH | Refills: 1 | Status: SHIPPED | OUTPATIENT
Start: 2023-06-12

## 2023-06-12 RX ORDER — DULAGLUTIDE 0.75 MG/.5ML
0.75 INJECTION, SOLUTION SUBCUTANEOUS WEEKLY
Qty: 6 ML | Refills: 1 | Status: SHIPPED | OUTPATIENT
Start: 2023-06-12

## 2023-06-12 RX ORDER — LEVETIRACETAM 500 MG/1
500 TABLET ORAL SEE ADMIN INSTRUCTIONS
Qty: 270 TABLET | Refills: 1 | Status: SHIPPED | OUTPATIENT
Start: 2023-06-12

## 2023-06-12 RX ORDER — ATORVASTATIN CALCIUM 80 MG/1
80 TABLET, FILM COATED ORAL NIGHTLY
Qty: 90 TABLET | Refills: 1 | Status: SHIPPED | OUTPATIENT
Start: 2023-06-12

## 2023-06-12 NOTE — PROGRESS NOTES
Chief Complaint  Med Refill and Results (Has labs drawn 23)    Subjective          Haylee Gibbs presents to [facility]  History of Present Illness  Discussed labs  Hyperkalemia  Ckd stage 3  Constipation- jessica start miralax    Objective   Allergies   Allergen Reactions    Tramadol Hcl GI Intolerance     Immunization History   Administered Date(s) Administered    31-influenza Vac Quardvalent Preservativ 2019    COVID-19 (ИРИНА) 2021    COVID-19 (PFIZER) BIVALENT 12+YRS 2022    COVID-19 (PFIZER) Purple Cap Monovalent 2021    Covid-19 (Pfizer) Gray Cap Monovalent 2022    Fluad Quad 65+ 10/10/2022    Fluzone High-Dose 65+yrs 10/19/2021    Influenza, Unspecified 2019    Pneumococcal Conjugate 13-Valent (PCV13) 2015    Pneumococcal Conjugate 20-Valent (PCV20) 2023    Tdap 2023     Past Medical History:   Diagnosis Date    Anxiety disorder 2018    Benign essential hypertension     Cataract     COPD (chronic obstructive pulmonary disease)     CVA (cerebral vascular accident)     Depression     Diabetes mellitus, type 2     Hyperlipidemia     Paresthesia and pain of both upper extremities 2018    Seizure disorder 2018    Vitamin B 12 deficiency     Vitamin D deficiency 2018      Past Surgical History:   Procedure Laterality Date    APPENDECTOMY      BACK SURGERY      LOW BACK DISC    CATARACT EXTRACTION      HYSTERECTOMY      NECK SURGERY      NECK DISC    TONSILLECTOMY        Social History     Socioeconomic History    Marital status: Single   Tobacco Use    Smoking status: Former     Packs/day: 0.50     Years: 20.00     Pack years: 10.00     Types: Cigarettes     Start date:      Quit date: 2015     Years since quittin.4     Passive exposure: Past    Smokeless tobacco: Former    Tobacco comments:     SMOKED LESS THAN 1 PACK PER DAY, FOR 20 YEARS NEVER USES OTHER TOBACCO PRODCUTS   Vaping Use     Vaping Use: Never used   Substance and Sexual Activity    Alcohol use: Never    Drug use: Never    Sexual activity: Defer        Current Outpatient Medications:     atorvastatin (LIPITOR) 80 MG tablet, Take 1 tablet by mouth Every Night., Disp: 90 tablet, Rfl: 1    Blood Glucose Monitoring Suppl (FreeStyle Lite) w/Device kit, USE TO TEST GLUCOSE DAILY, Disp: , Rfl:     cholecalciferol (VITAMIN D3) 25 MCG (1000 UT) tablet, Take 1 tablet by mouth Daily., Disp: , Rfl:     clopidogrel (PLAVIX) 75 MG tablet, Take 1 tablet by mouth Daily., Disp: 90 tablet, Rfl: 1    Cyanocobalamin (Vitamin B-12) 1000 MCG sublingual tablet, Place 1 tablet under the tongue Daily., Disp: 90 each, Rfl: 1    Dulaglutide (Trulicity) 0.75 MG/0.5ML solution pen-injector, Inject 0.75 mg under the skin into the appropriate area as directed 1 (One) Time Per Week., Disp: 6 mL, Rfl: 1    DULoxetine (CYMBALTA) 60 MG capsule, Take 1 capsule by mouth Daily., Disp: 90 capsule, Rfl: 1    empagliflozin (Jardiance) 25 MG tablet tablet, Take 1 tablet by mouth Daily., Disp: 90 tablet, Rfl: 1    fluticasone-salmeterol (Advair HFA) 45-21 MCG/ACT inhaler, Inhale 2 puffs 2 (Two) Times a Day., Disp: 12 g, Rfl: 1    glucose blood (Contour Next Test) test strip, Test BG BID, Disp: 200 each, Rfl: 3    guaiFENesin (Mucinex) 600 MG 12 hr tablet, Take 2 tablets by mouth 2 (Two) Times a Day., Disp: 60 tablet, Rfl: 1    Lancets (freestyle) lancets, Test BG daily, Disp: 100 each, Rfl: 5    levETIRAcetam (KEPPRA) 500 MG tablet, Take 1 tablet by mouth See Admin Instructions. Take 1 tablet by mouth every morning and 2 tablets by mouth every night at bedtime., Disp: 270 tablet, Rfl: 1    linaclotide (Linzess) 145 MCG capsule capsule, Take 1 capsule by mouth Every Morning Before Breakfast., Disp: 90 capsule, Rfl: 1    lisinopril (PRINIVIL,ZESTRIL) 10 MG tablet, Take 1 tablet by mouth Daily., Disp: 90 tablet, Rfl: 1    metFORMIN (GLUCOPHAGE) 500 MG tablet, Take 1 tablet by  "mouth 2 (Two) Times a Day., Disp: 180 tablet, Rfl: 1    metoprolol tartrate (LOPRESSOR) 25 MG tablet, Take 1 tablet by mouth 2 (Two) Times a Day., Disp: 180 tablet, Rfl: 1    Probiotic Product (PROBIOTIC PO), , Disp: , Rfl:     QUEtiapine XR (SEROquel XR) 200 MG 24 hr tablet, Take 1 tablet by mouth Every Night., Disp: 90 tablet, Rfl: 1   Family History   Problem Relation Age of Onset    Seizures Mother     Stroke Mother     Diabetes type II Sister     Hypertension Sister     Diabetes type II Brother     Hypertension Brother     Alcohol abuse Brother     Heart disease Other         MOTHER, GRANDMOTHER, SISTER; FATHER, GRANDFATHER OR BROTHER DEVELOPED HEART DISEASE BEFORE THE AGE OF 65          Vital Signs:   Vitals:    06/12/23 1253   BP: 130/68   BP Location: Left arm   Patient Position: Sitting   Cuff Size: Adult   Pulse: 63   Temp: 97.3 °F (36.3 °C)   TempSrc: Temporal   SpO2: 99%   Weight: 80.5 kg (177 lb 6.4 oz)   Height: 166.4 cm (65.51\")       Review of Systems   Constitutional:  Negative for fatigue and fever.   HENT:  Negative for sore throat.    Eyes:  Negative for visual disturbance.   Respiratory:  Negative for cough, chest tightness, shortness of breath and wheezing.    Cardiovascular:  Negative for chest pain, palpitations and leg swelling.   Gastrointestinal:  Negative for abdominal pain, diarrhea, nausea and vomiting.   Neurological:  Negative for light-headedness and headaches.    Physical Exam  Vitals reviewed.   Constitutional:       Appearance: Normal appearance. She is well-developed.   HENT:      Head: Normocephalic and atraumatic.      Right Ear: External ear normal.      Left Ear: External ear normal.      Mouth/Throat:      Pharynx: No oropharyngeal exudate.   Eyes:      Conjunctiva/sclera: Conjunctivae normal.      Pupils: Pupils are equal, round, and reactive to light.   Cardiovascular:      Rate and Rhythm: Normal rate and regular rhythm.      Pulses: Normal pulses.      Heart sounds: " Normal heart sounds. No murmur heard.    No friction rub. No gallop.   Pulmonary:      Effort: Pulmonary effort is normal.      Breath sounds: Normal breath sounds. No wheezing or rhonchi.   Abdominal:      General: Abdomen is flat. Bowel sounds are normal. There is no distension.      Palpations: Abdomen is soft. There is no mass.      Tenderness: There is no abdominal tenderness. There is no guarding or rebound.      Hernia: No hernia is present.   Musculoskeletal:         General: Normal range of motion.   Skin:     General: Skin is warm and dry.      Capillary Refill: Capillary refill takes less than 2 seconds.   Neurological:      General: No focal deficit present.      Mental Status: She is alert and oriented to person, place, and time.      Cranial Nerves: No cranial nerve deficit.   Psychiatric:         Mood and Affect: Mood and affect normal.         Behavior: Behavior normal.         Thought Content: Thought content normal.         Judgment: Judgment normal.      Result Review :   The following data was reviewed by: Kj Canales MD on 06/12/2023:  CMP          1/20/2023    08:13 2/1/2023    11:04 6/7/2023    08:15   CMP   Glucose 163  149  152    BUN 25  24  21    Creatinine 1.14  1.17  1.14    EGFR 50.9  49.4  50.6    Sodium 136  137  135    Potassium 5.4  5.2  5.3    Chloride 103  102  102    Calcium 10.3  10.5  10.6    Total Protein 7.9  8.3  7.5    Albumin 4.6  4.8  4.4    Globulin 3.3  3.5  3.1    Total Bilirubin 0.7  0.8  0.5    Alkaline Phosphatase 63  61  51    AST (SGOT) 52  43  18    ALT (SGPT) 40  36  21    Albumin/Globulin Ratio 1.4  1.4  1.4    BUN/Creatinine Ratio 21.9  20.5  18.4    Anion Gap 11.0  11.1  10.2      CBC          12/1/2022    04:00 12/5/2022    03:30 6/7/2023    08:15   CBC   WBC 5.75  6.84  9.49    RBC 3.69  3.74  4.13    Hemoglobin 11.3  11.4  12.7    Hematocrit 35.3  35.4  39.5    MCV 95.7  94.7  95.6    MCH 30.6  30.5  30.8    MCHC 32.0  32.2  32.2    RDW 12.6  12.8   13.3    Platelets 216  272  278      Lipid Panel          7/2/2022    04:57 1/20/2023    08:13 6/7/2023    08:15   Lipid Panel   Total Cholesterol 201  110  112    Triglycerides 207  168  119    HDL Cholesterol 24  25  34    VLDL Cholesterol 38  29  22    LDL Cholesterol  139  56  56    LDL/HDL Ratio 5.65  2.06  1.59      TSH          10/14/2022    16:51 6/7/2023    08:15   TSH   TSH 3.120  2.150      Most Recent A1C          6/7/2023    08:15   HGBA1C Most Recent   Hemoglobin A1C 7.20      Microalbumin          6/7/2023    08:30   Microalbumin   Microalbumin, Urine <1.2                Assessment and Plan    Diagnoses and all orders for this visit:    1. Vitamin B12 deficiency (Primary)  -     Cyanocobalamin (Vitamin B-12) 1000 MCG sublingual tablet; Place 1 tablet under the tongue Daily.  Dispense: 90 each; Refill: 1    2. Moderate persistent asthma, unspecified whether complicated  -     fluticasone-salmeterol (Advair HFA) 45-21 MCG/ACT inhaler; Inhale 2 puffs 2 (Two) Times a Day.  Dispense: 12 g; Refill: 1    3. History of stroke  -     clopidogrel (PLAVIX) 75 MG tablet; Take 1 tablet by mouth Daily.  Dispense: 90 tablet; Refill: 1    4. Type 2 diabetes mellitus with hyperglycemia, without long-term current use of insulin  -     Dulaglutide (Trulicity) 0.75 MG/0.5ML solution pen-injector; Inject 0.75 mg under the skin into the appropriate area as directed 1 (One) Time Per Week.  Dispense: 6 mL; Refill: 1  -     empagliflozin (Jardiance) 25 MG tablet tablet; Take 1 tablet by mouth Daily.  Dispense: 90 tablet; Refill: 1  -     metFORMIN (GLUCOPHAGE) 500 MG tablet; Take 1 tablet by mouth 2 (Two) Times a Day.  Dispense: 180 tablet; Refill: 1    5. Anxiety and depression  -     DULoxetine (CYMBALTA) 60 MG capsule; Take 1 capsule by mouth Daily.  Dispense: 90 capsule; Refill: 1    6. Seizure disorder  -     levETIRAcetam (KEPPRA) 500 MG tablet; Take 1 tablet by mouth See Admin Instructions. Take 1 tablet by mouth every  morning and 2 tablets by mouth every night at bedtime.  Dispense: 270 tablet; Refill: 1    7. Constipation, unspecified constipation type  -     linaclotide (Linzess) 145 MCG capsule capsule; Take 1 capsule by mouth Every Morning Before Breakfast.  Dispense: 90 capsule; Refill: 1    8. Primary hypertension  -     lisinopril (PRINIVIL,ZESTRIL) 10 MG tablet; Take 1 tablet by mouth Daily.  Dispense: 90 tablet; Refill: 1    9. Essential hypertension  -     metoprolol tartrate (LOPRESSOR) 25 MG tablet; Take 1 tablet by mouth 2 (Two) Times a Day.  Dispense: 180 tablet; Refill: 1    10. Depression, unspecified depression type  -     QUEtiapine XR (SEROquel XR) 200 MG 24 hr tablet; Take 1 tablet by mouth Every Night.  Dispense: 90 tablet; Refill: 1    11. Hyperkalemia    12. Stage 3a chronic kidney disease  -     Basic Metabolic Panel  -     Cancel: Ambulatory Referral to Nephrology  -     Ambulatory Referral to Nephrology    Other orders  -     atorvastatin (LIPITOR) 80 MG tablet; Take 1 tablet by mouth Every Night.  Dispense: 90 tablet; Refill: 1            Follow Up   Return in about 6 months (around 12/12/2023) for Recheck.  Patient was given instructions and counseling regarding her condition or for health maintenance advice. Please see specific information pulled into the AVS if appropriate.

## 2023-06-12 NOTE — PROGRESS NOTES
Venipuncture Blood Specimen Collection  Venipuncture performed in left arm by hortencia matta with good hemostasis. Patient tolerated the procedure well without complications.   06/12/23   Lexie Acuna

## 2023-06-13 ENCOUNTER — TELEPHONE (OUTPATIENT)
Dept: FAMILY MEDICINE CLINIC | Facility: CLINIC | Age: 73
End: 2023-06-13
Payer: MEDICARE

## 2023-06-13 NOTE — TELEPHONE ENCOUNTER
Caller: YAMILET ARRIAGA    Relationship to patient: Emergency Contact    Best call back number: [phone]     Patient is needing: PATIENT'S SISTER IS WANTING TO KNOW IF PATIENT CAN TAKE BENEFIBER INSTEAD OF MIRALAX. PLEASE CALL AND ADVISE.

## 2023-06-14 ENCOUNTER — TELEPHONE (OUTPATIENT)
Dept: FAMILY MEDICINE CLINIC | Facility: CLINIC | Age: 73
End: 2023-06-14

## 2023-06-14 NOTE — TELEPHONE ENCOUNTER
Caller: YAMILET ARRIAGA    Relationship: Emergency Contact    Best call back number: [phone]     What was the call regarding: YAMILET STATES THE PATIENT HASN'T BEEN TAKING QUETIAPINE XR BECAUSE IT HASN'T BEEN GETTING REFILLED. YAMILET STATES SHE WOULD LIKE TO KNOW IF THE PATIENT SHOULD STILL BE TAKING THIS MEDICATION WITH HER OTHER MEDICATIONS BECAUSE IT WAS SENT IN WITH HER LAST ROUND OF REFILLS.

## 2023-08-21 ENCOUNTER — TRANSCRIBE ORDERS (OUTPATIENT)
Dept: FAMILY MEDICINE CLINIC | Facility: CLINIC | Age: 73
End: 2023-08-21
Payer: MEDICARE

## 2023-08-21 ENCOUNTER — CLINICAL SUPPORT (OUTPATIENT)
Dept: FAMILY MEDICINE CLINIC | Facility: CLINIC | Age: 73
End: 2023-08-21
Payer: MEDICARE

## 2023-08-21 DIAGNOSIS — E11.65 TYPE 2 DIABETES MELLITUS WITH HYPERGLYCEMIA, WITHOUT LONG-TERM CURRENT USE OF INSULIN: ICD-10-CM

## 2023-08-21 DIAGNOSIS — I10 ESSENTIAL HYPERTENSION, BENIGN: ICD-10-CM

## 2023-08-21 DIAGNOSIS — N18.30 STAGE 3 CHRONIC KIDNEY DISEASE, UNSPECIFIED WHETHER STAGE 3A OR 3B CKD: ICD-10-CM

## 2023-08-21 DIAGNOSIS — N18.30 STAGE 3 CHRONIC KIDNEY DISEASE, UNSPECIFIED WHETHER STAGE 3A OR 3B CKD: Primary | ICD-10-CM

## 2023-08-21 LAB
CREAT UR-MCNC: 64.6 MG/DL
PROT ?TM UR-MCNC: 11 MG/DL
PROT/CREAT UR: 0.17 MG/G{CREAT}

## 2023-08-21 PROCEDURE — 83036 HEMOGLOBIN GLYCOSYLATED A1C: CPT | Performed by: INTERNAL MEDICINE

## 2023-08-21 PROCEDURE — 81001 URINALYSIS AUTO W/SCOPE: CPT | Performed by: INTERNAL MEDICINE

## 2023-08-21 PROCEDURE — 36415 COLL VENOUS BLD VENIPUNCTURE: CPT | Performed by: FAMILY MEDICINE

## 2023-08-21 PROCEDURE — 80053 COMPREHEN METABOLIC PANEL: CPT | Performed by: INTERNAL MEDICINE

## 2023-08-21 PROCEDURE — 84156 ASSAY OF PROTEIN URINE: CPT | Performed by: INTERNAL MEDICINE

## 2023-08-21 PROCEDURE — 82570 ASSAY OF URINE CREATININE: CPT | Performed by: INTERNAL MEDICINE

## 2023-08-21 NOTE — PROGRESS NOTES
Venipuncture Blood Specimen Collection  Venipuncture performed in left arm by hortencia matta with good hemostasis. Patient tolerated the procedure well without complications.   08/21/23   Lexie Acuna

## 2023-08-22 LAB
ALBUMIN SERPL-MCNC: 4.3 G/DL (ref 3.5–5.2)
ALBUMIN/GLOB SERPL: 1.5 G/DL
ALP SERPL-CCNC: 46 U/L (ref 39–117)
ALT SERPL W P-5'-P-CCNC: 18 U/L (ref 1–33)
ANION GAP SERPL CALCULATED.3IONS-SCNC: 14.2 MMOL/L (ref 5–15)
AST SERPL-CCNC: 21 U/L (ref 1–32)
BACTERIA UR QL AUTO: ABNORMAL /HPF
BILIRUB SERPL-MCNC: 0.6 MG/DL (ref 0–1.2)
BILIRUB UR QL STRIP: NEGATIVE
BUN SERPL-MCNC: 17 MG/DL (ref 8–23)
BUN/CREAT SERPL: 18.3 (ref 7–25)
CALCIUM SPEC-SCNC: 9.9 MG/DL (ref 8.6–10.5)
CHLORIDE SERPL-SCNC: 101 MMOL/L (ref 98–107)
CLARITY UR: ABNORMAL
CO2 SERPL-SCNC: 22.8 MMOL/L (ref 22–29)
COLOR UR: YELLOW
CREAT SERPL-MCNC: 0.93 MG/DL (ref 0.57–1)
EGFRCR SERPLBLD CKD-EPI 2021: 64.6 ML/MIN/1.73
GLOBULIN UR ELPH-MCNC: 2.9 GM/DL
GLUCOSE SERPL-MCNC: 181 MG/DL (ref 65–99)
GLUCOSE UR STRIP-MCNC: ABNORMAL MG/DL
HBA1C MFR BLD: 7.4 % (ref 4.8–5.6)
HGB UR QL STRIP.AUTO: NEGATIVE
HYALINE CASTS UR QL AUTO: ABNORMAL /LPF
KETONES UR QL STRIP: NEGATIVE
LEUKOCYTE ESTERASE UR QL STRIP.AUTO: ABNORMAL
NITRITE UR QL STRIP: POSITIVE
PH UR STRIP.AUTO: 5.5 [PH] (ref 5–8)
POTASSIUM SERPL-SCNC: 4.1 MMOL/L (ref 3.5–5.2)
PROT SERPL-MCNC: 7.2 G/DL (ref 6–8.5)
PROT UR QL STRIP: NEGATIVE
RBC # UR STRIP: ABNORMAL /HPF
REF LAB TEST METHOD: ABNORMAL
SODIUM SERPL-SCNC: 138 MMOL/L (ref 136–145)
SP GR UR STRIP: 1.02 (ref 1–1.03)
SQUAMOUS #/AREA URNS HPF: ABNORMAL /HPF
UROBILINOGEN UR QL STRIP: ABNORMAL
WBC # UR STRIP: ABNORMAL /HPF

## 2023-11-27 ENCOUNTER — TELEPHONE (OUTPATIENT)
Dept: FAMILY MEDICINE CLINIC | Facility: CLINIC | Age: 73
End: 2023-11-27

## 2023-11-27 DIAGNOSIS — E11.65 TYPE 2 DIABETES MELLITUS WITH HYPERGLYCEMIA, WITHOUT LONG-TERM CURRENT USE OF INSULIN: ICD-10-CM

## 2023-11-27 NOTE — TELEPHONE ENCOUNTER
Caller: Haylee Gibbs    Relationship: Self    Best call back number: [phone]     Requested Prescriptions:   PATIENT STATES SHE NEEDS ALL OF HER MEDICATIONS REFILLED. PATIENT STATES IF SHE NEEDS LABS TO PLACE THE ORDERS BEFORE THE APPOINTMENT.       Pharmacy where request should be sent: [address] - [phone]  - [phone] FX     Last office visit with prescribing clinician: 6/12/2023   Last telemedicine visit with prescribing clinician: Visit date not found   Next office visit with prescribing clinician: Visit date not found     Does the patient have less than a 3 day supply:  [] Yes  [x] No    Alexander Jon Rep   11/27/23 16:56 EST

## 2023-11-28 DIAGNOSIS — F32.A DEPRESSION, UNSPECIFIED DEPRESSION TYPE: ICD-10-CM

## 2023-11-28 DIAGNOSIS — G40.909 SEIZURE DISORDER: ICD-10-CM

## 2023-11-28 DIAGNOSIS — Z86.73 HISTORY OF STROKE: ICD-10-CM

## 2023-11-28 DIAGNOSIS — F32.A ANXIETY AND DEPRESSION: ICD-10-CM

## 2023-11-28 DIAGNOSIS — K59.00 CONSTIPATION, UNSPECIFIED CONSTIPATION TYPE: ICD-10-CM

## 2023-11-28 DIAGNOSIS — I10 PRIMARY HYPERTENSION: ICD-10-CM

## 2023-11-28 DIAGNOSIS — E11.65 TYPE 2 DIABETES MELLITUS WITH HYPERGLYCEMIA, WITHOUT LONG-TERM CURRENT USE OF INSULIN: ICD-10-CM

## 2023-11-28 DIAGNOSIS — F41.9 ANXIETY AND DEPRESSION: ICD-10-CM

## 2023-11-28 DIAGNOSIS — J45.40 MODERATE PERSISTENT ASTHMA, UNSPECIFIED WHETHER COMPLICATED: ICD-10-CM

## 2023-11-28 DIAGNOSIS — I10 ESSENTIAL HYPERTENSION: ICD-10-CM

## 2023-11-28 RX ORDER — QUETIAPINE 200 MG/1
200 TABLET, FILM COATED, EXTENDED RELEASE ORAL NIGHTLY
Qty: 90 TABLET | Refills: 1 | Status: SHIPPED | OUTPATIENT
Start: 2023-11-28

## 2023-11-28 RX ORDER — LEVETIRACETAM 500 MG/1
500 TABLET ORAL SEE ADMIN INSTRUCTIONS
Qty: 270 TABLET | Refills: 1 | Status: SHIPPED | OUTPATIENT
Start: 2023-11-28

## 2023-11-28 RX ORDER — CLOPIDOGREL BISULFATE 75 MG/1
75 TABLET ORAL DAILY
Qty: 90 TABLET | Refills: 0 | Status: SHIPPED | OUTPATIENT
Start: 2023-11-28

## 2023-11-28 RX ORDER — DULOXETIN HYDROCHLORIDE 60 MG/1
60 CAPSULE, DELAYED RELEASE ORAL DAILY
Qty: 90 CAPSULE | Refills: 0 | Status: SHIPPED | OUTPATIENT
Start: 2023-11-28

## 2023-11-28 RX ORDER — LISINOPRIL 10 MG/1
10 TABLET ORAL DAILY
Qty: 90 TABLET | Refills: 0 | Status: SHIPPED | OUTPATIENT
Start: 2023-11-28

## 2023-11-28 RX ORDER — ATORVASTATIN CALCIUM 80 MG/1
80 TABLET, FILM COATED ORAL NIGHTLY
Qty: 90 TABLET | Refills: 0 | Status: SHIPPED | OUTPATIENT
Start: 2023-11-28

## 2023-11-28 RX ORDER — FLUTICASONE PROPIONATE AND SALMETEROL XINAFOATE 45; 21 UG/1; UG/1
2 AEROSOL, METERED RESPIRATORY (INHALATION) 2 TIMES DAILY
Qty: 12 G | Refills: 1 | Status: SHIPPED | OUTPATIENT
Start: 2023-11-28

## 2023-11-28 RX ORDER — GUAIFENESIN 600 MG/1
1200 TABLET, EXTENDED RELEASE ORAL 2 TIMES DAILY
Qty: 60 TABLET | Refills: 1 | Status: SHIPPED | OUTPATIENT
Start: 2023-11-28

## 2023-11-28 RX ORDER — DULAGLUTIDE 0.75 MG/.5ML
0.75 INJECTION, SOLUTION SUBCUTANEOUS WEEKLY
Qty: 6 ML | Refills: 1 | Status: SHIPPED | OUTPATIENT
Start: 2023-11-28

## 2023-12-01 ENCOUNTER — CLINICAL SUPPORT (OUTPATIENT)
Dept: FAMILY MEDICINE CLINIC | Facility: CLINIC | Age: 73
End: 2023-12-01
Payer: MEDICARE

## 2023-12-01 DIAGNOSIS — Z86.73 HISTORY OF STROKE: Primary | ICD-10-CM

## 2023-12-01 LAB
ALBUMIN SERPL-MCNC: 4.5 G/DL (ref 3.5–5.2)
ALBUMIN/GLOB SERPL: 1.5 G/DL
ALP SERPL-CCNC: 50 U/L (ref 39–117)
ALT SERPL W P-5'-P-CCNC: 14 U/L (ref 1–33)
ANION GAP SERPL CALCULATED.3IONS-SCNC: 13.3 MMOL/L (ref 5–15)
AST SERPL-CCNC: 20 U/L (ref 1–32)
BASOPHILS # BLD AUTO: 0.06 10*3/MM3 (ref 0–0.2)
BASOPHILS NFR BLD AUTO: 0.7 % (ref 0–1.5)
BILIRUB SERPL-MCNC: 0.8 MG/DL (ref 0–1.2)
BUN SERPL-MCNC: 22 MG/DL (ref 8–23)
BUN/CREAT SERPL: 21 (ref 7–25)
CALCIUM SPEC-SCNC: 9.9 MG/DL (ref 8.6–10.5)
CHLORIDE SERPL-SCNC: 103 MMOL/L (ref 98–107)
CHOLEST SERPL-MCNC: 123 MG/DL (ref 0–200)
CO2 SERPL-SCNC: 21.7 MMOL/L (ref 22–29)
CREAT SERPL-MCNC: 1.05 MG/DL (ref 0.57–1)
DEPRECATED RDW RBC AUTO: 44.3 FL (ref 37–54)
EGFRCR SERPLBLD CKD-EPI 2021: 55.9 ML/MIN/1.73
EOSINOPHIL # BLD AUTO: 0.2 10*3/MM3 (ref 0–0.4)
EOSINOPHIL NFR BLD AUTO: 2.3 % (ref 0.3–6.2)
ERYTHROCYTE [DISTWIDTH] IN BLOOD BY AUTOMATED COUNT: 12.9 % (ref 12.3–15.4)
GLOBULIN UR ELPH-MCNC: 3 GM/DL
GLUCOSE SERPL-MCNC: 109 MG/DL (ref 65–99)
HBA1C MFR BLD: 7 % (ref 4.8–5.6)
HCT VFR BLD AUTO: 41.2 % (ref 34–46.6)
HDLC SERPL-MCNC: 31 MG/DL (ref 40–60)
HGB BLD-MCNC: 13.3 G/DL (ref 12–15.9)
IMM GRANULOCYTES # BLD AUTO: 0.03 10*3/MM3 (ref 0–0.05)
IMM GRANULOCYTES NFR BLD AUTO: 0.3 % (ref 0–0.5)
LDLC SERPL CALC-MCNC: 66 MG/DL (ref 0–100)
LDLC/HDLC SERPL: 2.03 {RATIO}
LYMPHOCYTES # BLD AUTO: 2.37 10*3/MM3 (ref 0.7–3.1)
LYMPHOCYTES NFR BLD AUTO: 27 % (ref 19.6–45.3)
MCH RBC QN AUTO: 30.6 PG (ref 26.6–33)
MCHC RBC AUTO-ENTMCNC: 32.3 G/DL (ref 31.5–35.7)
MCV RBC AUTO: 94.9 FL (ref 79–97)
MONOCYTES # BLD AUTO: 0.7 10*3/MM3 (ref 0.1–0.9)
MONOCYTES NFR BLD AUTO: 8 % (ref 5–12)
NEUTROPHILS NFR BLD AUTO: 5.43 10*3/MM3 (ref 1.7–7)
NEUTROPHILS NFR BLD AUTO: 61.7 % (ref 42.7–76)
NRBC BLD AUTO-RTO: 0 /100 WBC (ref 0–0.2)
PLATELET # BLD AUTO: 303 10*3/MM3 (ref 140–450)
PMV BLD AUTO: 11.6 FL (ref 6–12)
POTASSIUM SERPL-SCNC: 4.5 MMOL/L (ref 3.5–5.2)
PROT SERPL-MCNC: 7.5 G/DL (ref 6–8.5)
RBC # BLD AUTO: 4.34 10*6/MM3 (ref 3.77–5.28)
SODIUM SERPL-SCNC: 138 MMOL/L (ref 136–145)
T4 FREE SERPL-MCNC: 0.9 NG/DL (ref 0.93–1.7)
TRIGL SERPL-MCNC: 146 MG/DL (ref 0–150)
TSH SERPL DL<=0.05 MIU/L-ACNC: 2.13 UIU/ML (ref 0.27–4.2)
VLDLC SERPL-MCNC: 26 MG/DL (ref 5–40)
WBC NRBC COR # BLD AUTO: 8.79 10*3/MM3 (ref 3.4–10.8)

## 2023-12-01 PROCEDURE — 80053 COMPREHEN METABOLIC PANEL: CPT | Performed by: FAMILY MEDICINE

## 2023-12-01 PROCEDURE — 36415 COLL VENOUS BLD VENIPUNCTURE: CPT | Performed by: FAMILY MEDICINE

## 2023-12-01 PROCEDURE — 84443 ASSAY THYROID STIM HORMONE: CPT | Performed by: FAMILY MEDICINE

## 2023-12-01 PROCEDURE — 84439 ASSAY OF FREE THYROXINE: CPT | Performed by: FAMILY MEDICINE

## 2023-12-01 PROCEDURE — 80061 LIPID PANEL: CPT | Performed by: FAMILY MEDICINE

## 2023-12-01 PROCEDURE — 85025 COMPLETE CBC W/AUTO DIFF WBC: CPT | Performed by: FAMILY MEDICINE

## 2023-12-01 PROCEDURE — 83036 HEMOGLOBIN GLYCOSYLATED A1C: CPT | Performed by: FAMILY MEDICINE

## 2023-12-01 NOTE — PROGRESS NOTES
Venipuncture Blood Specimen Collection  Venipuncture performed in left arm by Lexie Acuna with good hemostasis. Patient tolerated the procedure well without complications.   12/01/23   Lexie Acuna

## 2023-12-04 ENCOUNTER — CLINICAL SUPPORT (OUTPATIENT)
Dept: FAMILY MEDICINE CLINIC | Facility: CLINIC | Age: 73
End: 2023-12-04
Payer: MEDICARE

## 2023-12-04 DIAGNOSIS — E13.9 DIABETES MELLITUS OF OTHER TYPE WITHOUT COMPLICATION, UNSPECIFIED WHETHER LONG TERM INSULIN USE: Primary | ICD-10-CM

## 2023-12-04 LAB — ALBUMIN UR-MCNC: 1.9 MG/DL

## 2023-12-04 PROCEDURE — 82043 UR ALBUMIN QUANTITATIVE: CPT | Performed by: FAMILY MEDICINE

## 2023-12-20 ENCOUNTER — OFFICE VISIT (OUTPATIENT)
Dept: NEUROLOGY | Facility: CLINIC | Age: 73
End: 2023-12-20
Payer: MEDICARE

## 2023-12-20 VITALS
HEART RATE: 64 BPM | HEIGHT: 66 IN | BODY MASS INDEX: 28.66 KG/M2 | WEIGHT: 178.3 LBS | SYSTOLIC BLOOD PRESSURE: 131 MMHG | DIASTOLIC BLOOD PRESSURE: 57 MMHG

## 2023-12-20 DIAGNOSIS — Z86.73 HISTORY OF STROKE: Primary | ICD-10-CM

## 2023-12-20 RX ORDER — ESTRADIOL 0.1 MG/G
1 CREAM VAGINAL AS NEEDED
COMMUNITY

## 2023-12-20 NOTE — PATIENT INSTRUCTIONS
Stroke Prevention  Some medical conditions and behaviors can lead to a higher chance of having a stroke. You can help prevent a stroke by eating healthy, exercising, not smoking, and managing any medical conditions you have.  Stroke is a leading cause of functional impairment. Primary prevention is particularly important because a majority of strokes are first-time events. Stroke changes the lives of not only those who experience a stroke but also their family and other caregivers.  How can this condition affect me?  A stroke is a medical emergency and should be treated right away. A stroke can lead to brain damage and can sometimes be life-threatening. If a person gets medical treatment right away, there is a better chance of surviving and recovering from a stroke.  What can increase my risk?  The following medical conditions may increase your risk of a stroke:  Cardiovascular disease.  High blood pressure (hypertension).  Diabetes.  High cholesterol.  Sickle cell disease.  Blood clotting disorders (hypercoagulable state).  Obesity.  Sleep disorders (obstructive sleep apnea).  Other risk factors include:  Being older than age 60.  Having a history of blood clots, stroke, or mini-stroke (transient ischemic attack, TIA).  Genetic factors, such as race, ethnicity, or a family history of stroke.  Smoking cigarettes or using other tobacco products.  Taking birth control pills, especially if you also use tobacco.  Heavy use of alcohol or drugs, especially cocaine and methamphetamine.  Physical inactivity.  What actions can I take to prevent this?  Manage your health conditions  High cholesterol levels.  Eating a healthy diet is important for preventing high cholesterol. If cholesterol cannot be managed through diet alone, you may need to take medicines.  Take any prescribed medicines to control your cholesterol as told by your health care provider.  Hypertension.  To reduce your risk of stroke, try to keep your blood  pressure below 130/80.  Eating a healthy diet and exercising regularly are important for controlling blood pressure. If these steps are not enough to manage your blood pressure, you may need to take medicines.  Take any prescribed medicines to control hypertension as told by your health care provider.  Ask your health care provider if you should monitor your blood pressure at home.  Have your blood pressure checked every year, even if your blood pressure is normal. Blood pressure increases with age and some medical conditions.  Diabetes.  Eating a healthy diet and exercising regularly are important parts of managing your blood sugar (glucose). If your blood sugar cannot be managed through diet and exercise, you may need to take medicines.  Take any prescribed medicines to control your diabetes as told by your health care provider.  Get evaluated for obstructive sleep apnea. Talk to your health care provider about getting a sleep evaluation if you snore a lot or have excessive sleepiness.  Make sure that any other medical conditions you have, such as atrial fibrillation or atherosclerosis, are managed.  Nutrition  Follow instructions from your health care provider about what to eat or drink to help manage your health condition. These instructions may include:  Reducing your daily calorie intake.  Limiting how much salt (sodium) you use to 1,500 milligrams (mg) each day.  Using only healthy fats for cooking, such as olive oil, canola oil, or sunflower oil.  Eating healthy foods. You can do this by:  Choosing foods that are high in fiber, such as whole grains, and fresh fruits and vegetables.  Eating at least 5 servings of fruits and vegetables a day. Try to fill one-half of your plate with fruits and vegetables at each meal.  Choosing lean protein foods, such as lean cuts of meat, poultry without skin, fish, tofu, beans, and nuts.  Eating low-fat dairy products.  Avoiding foods that are high in sodium. This can help  "lower blood pressure.  Avoiding foods that have saturated fat, trans fat, and cholesterol. This can help prevent high cholesterol.  Avoiding processed and prepared foods.  Counting your daily carbohydrate intake.    Lifestyle  If you drink alcohol:  Limit how much you have to:  0-1 drink a day for women who are not pregnant.  0-2 drinks a day for men.  Know how much alcohol is in your drink. In the [place] one drink equals one 12 oz bottle of beer (355mL), one 5 oz glass of wine (148mL), or one 1½ oz glass of hard liquor (44mL).  Do not use any products that contain nicotine or tobacco. These products include cigarettes, chewing tobacco, and vaping devices, such as e-cigarettes. If you need help quitting, ask your health care provider.  Avoid secondhand smoke.  Do not use drugs.  Activity    Try to stay at a healthy weight.  Get at least 30 minutes of exercise on most days, such as:  Fast walking.  Biking.  Swimming.  Medicines  Take over-the-counter and prescription medicines only as told by your health care provider. Aspirin or blood thinners (antiplatelets or anticoagulants) may be recommended to reduce your risk of forming blood clots that can lead to stroke.  Avoid taking birth control pills. Talk to your health care provider about the risks of taking birth control pills if:  You are over 35 years old.  You smoke.  You get very bad headaches.  You have had a blood clot.  Where to find more information  American Stroke Association: www.strokeassociation.org  Get help right away if:  You or a loved one has any symptoms of a stroke. \"BE FAST\" is an easy way to remember the main warning signs of a stroke:  B - Balance. Signs are dizziness, sudden trouble walking, or loss of balance.  E - Eyes. Signs are trouble seeing or a sudden change in vision.  F - Face. Signs are sudden weakness or numbness of the face, or the face or eyelid drooping on one side.  A - Arms. Signs are weakness or numbness in an arm. This happens " "suddenly and usually on one side of the body.  S - Speech. Signs are sudden trouble speaking, slurred speech, or trouble understanding what people say.  T - Time. Time to call emergency services. Write down what time symptoms started.  You or a loved one has other signs of a stroke, such as:  A sudden, severe headache with no known cause.  Nausea or vomiting.  Seizure.  These symptoms may represent a serious problem that is an emergency. Do not wait to see if the symptoms will go away. Get medical help right away. Call your local emergency services (911 in the [place] Do not drive yourself to the hospital.  Summary  You can help to prevent a stroke by eating healthy, exercising, not smoking, limiting alcohol intake, and managing any medical conditions you may have.  Do not use any products that contain nicotine or tobacco. These include cigarettes, chewing tobacco, and vaping devices, such as e-cigarettes. If you need help quitting, ask your health care provider.  Remember \"BE FAST\" for warning signs of a stroke. Get help right away if you or a loved one has any of these signs.  This information is not intended to replace advice given to you by your health care provider. Make sure you discuss any questions you have with your health care provider.  Document Revised: 07/19/2021 Document Reviewed: 07/19/2021  Elsevier Patient Education © 2023 [employer]vier Inc.    "

## 2023-12-20 NOTE — PROGRESS NOTES
"Chief Complaint  Neurologic Problem    Subjective          Haylee Gibbs presents to [facility]  History of Present Illness  Following up for stroke.  Remains on plavix and atorvastatin.  Does feel she's had some improvement in short term memory and energy levels with B12 supplement.  Denies recurrent stroke like symptoms.  Feels balance has improved, is able to walk more without her cane.       Interval History:   Following up for hospital follow-up for stroke.  Presented to ER in July 2022 with left sided weakness and was found to have had a stroke.  Presents to the office with niece today who is primary historian.  Dr. Connelly consulted while inpatient.  Left sided weakness has resolved.  Niece is concerned about short term memory difficulty.        Objective   Vital Signs:   /57   Pulse 64   Ht 166.4 cm (65.5\")   Wt 80.9 kg (178 lb 4.8 oz)   BMI 29.22 kg/m²     Physical Exam  HENT:      Head: Normocephalic.   Pulmonary:      Effort: Pulmonary effort is normal.   Neurological:      Mental Status: She is alert and oriented to person, place, and time.      Sensory: Sensation is intact.      Motor: Motor function is intact.      Coordination: Coordination is intact.      Deep Tendon Reflexes: Reflexes are normal and symmetric.        Neurologic Exam     Mental Status   Oriented to person, place, and time.        Result Review :   CBC:  Lab Results   Component Value Date    WBC 8.79 12/01/2023    RBC 4.34 12/01/2023    HGB 13.3 12/01/2023    HCT 41.2 12/01/2023    MCV 94.9 12/01/2023    MCH 30.6 12/01/2023    MCHC 32.3 12/01/2023    RDW 12.9 12/01/2023     12/01/2023     CMP:  Lab Results   Component Value Date    BUN 22 12/01/2023    CREATININE 1.05 (H) 12/01/2023    EGFRIFNONA 52 (L) 01/31/2022     12/01/2023    K 4.5 12/01/2023     12/01/2023    CALCIUM 9.9 12/01/2023    ALBUMIN 4.5 12/01/2023    BILITOT 0.8 12/01/2023    ALKPHOS 50 " 12/01/2023    AST 20 12/01/2023    ALT 14 12/01/2023     LIPID PANEL:  Lab Results   Component Value Date    CHLPL 212 (H) 08/07/2020    TRIG 146 12/01/2023    HDL 31 (L) 12/01/2023    VLDL 26 12/01/2023    LDL 66 12/01/2023    LDLHDL 2.03 12/01/2023     B12:   Lab Results   Component Value Date    OUXBFEBU52 1,442 (H) 06/07/2023      FOLATE:   Lab Results   Component Value Date    FOLATE 7.06 06/07/2023                            Assessment and Plan    Diagnoses and all orders for this visit:    1. History of stroke (Primary)  Assessment & Plan:  Continue plavix and atorvastatin for secondary stroke risk reduction. Discussed importance of managing glucose.  Discussed signs and symptoms of stroke including, but not limited to, visual disturbances, weakness of one side of the body, facial droop, cognitive changes, slurred speech, imbalance and dizziness.  Instructed patient to call 911 and get emergent medical treatment immediately at the onset of those symptoms.   Patient verbalized understanding.           Follow Up   Return in about 1 year (around 12/20/2024) for stroke f/u.  Patient was given instructions and counseling regarding her condition or for health maintenance advice. Please see specific information pulled into the AVS if appropriate.

## 2024-01-30 ENCOUNTER — TELEPHONE (OUTPATIENT)
Dept: FAMILY MEDICINE CLINIC | Facility: CLINIC | Age: 74
End: 2024-01-30

## 2024-01-30 NOTE — TELEPHONE ENCOUNTER
I called and spoke to patient, she is aware. I talked to Isha at  and she was getting patient scheduled, she is also due for [facility]

## 2024-01-30 NOTE — TELEPHONE ENCOUNTER
Caller: Haylee Gibbs    Relationship: Self    Best call back number: [phone]     What orders are you requesting (i.e. lab or imaging): LABS    In what timeframe would the patient need to come in: WITHIN THE NEXT COUPLE WEEKS    Where will you receive your lab/imaging services: [facility]    Additional notes: PATIENT WANTS TO KNOW IF SHE NEEDS TO HAVE LAB WORK DONE TO HAVE MEDICATIONS REFILLED. PATIENT STATES THAT SHE BELIEVES SHE HAS ENOUGH MEDICATION ON HAND TO LAST UNTIL SHE CAN GET HER LABS DONE. PATIENT NOT SURE IF SHE NEEDS AN APPOINTMENT OR NOT TO HAVE MEDICATIONS REFILLED. PATIENT ALSO HAS LAB ORDERS FOR A PROVIDER FROM OUTSIDE OF [facility] THAT NEED TO BE COMPLETED. PATIENT JUST WANTING TO SPEAK TO SOMEONE IN THE OFFICE ABOUT WHAT TO DO.

## 2024-02-16 ENCOUNTER — TRANSCRIBE ORDERS (OUTPATIENT)
Dept: FAMILY MEDICINE CLINIC | Facility: CLINIC | Age: 74
End: 2024-02-16
Payer: MEDICARE

## 2024-02-16 DIAGNOSIS — N18.30 STAGE 3 CHRONIC KIDNEY DISEASE, UNSPECIFIED WHETHER STAGE 3A OR 3B CKD: Primary | ICD-10-CM

## 2024-02-20 ENCOUNTER — OFFICE VISIT (OUTPATIENT)
Dept: FAMILY MEDICINE CLINIC | Facility: CLINIC | Age: 74
End: 2024-02-20
Payer: MEDICARE

## 2024-02-20 VITALS
HEIGHT: 66 IN | WEIGHT: 174.6 LBS | DIASTOLIC BLOOD PRESSURE: 80 MMHG | TEMPERATURE: 97.1 F | SYSTOLIC BLOOD PRESSURE: 150 MMHG | BODY MASS INDEX: 28.06 KG/M2 | OXYGEN SATURATION: 97 % | HEART RATE: 72 BPM

## 2024-02-20 DIAGNOSIS — E11.65 TYPE 2 DIABETES MELLITUS WITH HYPERGLYCEMIA, WITHOUT LONG-TERM CURRENT USE OF INSULIN: ICD-10-CM

## 2024-02-20 DIAGNOSIS — I10 PRIMARY HYPERTENSION: ICD-10-CM

## 2024-02-20 DIAGNOSIS — E53.8 VITAMIN B12 DEFICIENCY: ICD-10-CM

## 2024-02-20 DIAGNOSIS — F32.A ANXIETY AND DEPRESSION: ICD-10-CM

## 2024-02-20 DIAGNOSIS — M79.641 PAIN IN BOTH HANDS: Primary | ICD-10-CM

## 2024-02-20 DIAGNOSIS — F41.9 ANXIETY AND DEPRESSION: ICD-10-CM

## 2024-02-20 DIAGNOSIS — G40.909 SEIZURE DISORDER: ICD-10-CM

## 2024-02-20 DIAGNOSIS — M79.642 PAIN IN BOTH HANDS: Primary | ICD-10-CM

## 2024-02-20 DIAGNOSIS — I10 ESSENTIAL HYPERTENSION: ICD-10-CM

## 2024-02-20 DIAGNOSIS — Z86.73 HISTORY OF STROKE: ICD-10-CM

## 2024-02-20 LAB
ALBUMIN SERPL-MCNC: 4.5 G/DL (ref 3.5–5.2)
ALBUMIN UR-MCNC: <1.2 MG/DL
ALBUMIN/GLOB SERPL: 1.5 G/DL
ALP SERPL-CCNC: 45 U/L (ref 39–117)
ALT SERPL W P-5'-P-CCNC: 15 U/L (ref 1–33)
ANION GAP SERPL CALCULATED.3IONS-SCNC: 10.8 MMOL/L (ref 5–15)
AST SERPL-CCNC: 14 U/L (ref 1–32)
BASOPHILS # BLD AUTO: 0.04 10*3/MM3 (ref 0–0.2)
BASOPHILS NFR BLD AUTO: 0.5 % (ref 0–1.5)
BILIRUB SERPL-MCNC: 0.6 MG/DL (ref 0–1.2)
BUN SERPL-MCNC: 20 MG/DL (ref 8–23)
BUN/CREAT SERPL: 20.6 (ref 7–25)
CALCIUM SPEC-SCNC: 9.7 MG/DL (ref 8.6–10.5)
CHLORIDE SERPL-SCNC: 107 MMOL/L (ref 98–107)
CHOLEST SERPL-MCNC: 110 MG/DL (ref 0–200)
CHROMATIN AB SERPL-ACNC: <10 IU/ML (ref 0–14)
CO2 SERPL-SCNC: 19.2 MMOL/L (ref 22–29)
CREAT SERPL-MCNC: 0.97 MG/DL (ref 0.57–1)
DEPRECATED RDW RBC AUTO: 42 FL (ref 37–54)
EGFRCR SERPLBLD CKD-EPI 2021: 61.4 ML/MIN/1.73
EOSINOPHIL # BLD AUTO: 0.19 10*3/MM3 (ref 0–0.4)
EOSINOPHIL NFR BLD AUTO: 2.6 % (ref 0.3–6.2)
ERYTHROCYTE [DISTWIDTH] IN BLOOD BY AUTOMATED COUNT: 12.5 % (ref 12.3–15.4)
GLOBULIN UR ELPH-MCNC: 3 GM/DL
GLUCOSE SERPL-MCNC: 112 MG/DL (ref 65–99)
HBA1C MFR BLD: 7 % (ref 4.8–5.6)
HCT VFR BLD AUTO: 40.7 % (ref 34–46.6)
HDLC SERPL-MCNC: 33 MG/DL (ref 40–60)
HGB BLD-MCNC: 12.9 G/DL (ref 12–15.9)
IMM GRANULOCYTES # BLD AUTO: 0.04 10*3/MM3 (ref 0–0.05)
IMM GRANULOCYTES NFR BLD AUTO: 0.5 % (ref 0–0.5)
LDLC SERPL CALC-MCNC: 60 MG/DL (ref 0–100)
LDLC/HDLC SERPL: 1.82 {RATIO}
LYMPHOCYTES # BLD AUTO: 1.88 10*3/MM3 (ref 0.7–3.1)
LYMPHOCYTES NFR BLD AUTO: 25.6 % (ref 19.6–45.3)
MCH RBC QN AUTO: 29.3 PG (ref 26.6–33)
MCHC RBC AUTO-ENTMCNC: 31.7 G/DL (ref 31.5–35.7)
MCV RBC AUTO: 92.3 FL (ref 79–97)
MONOCYTES # BLD AUTO: 0.68 10*3/MM3 (ref 0.1–0.9)
MONOCYTES NFR BLD AUTO: 9.3 % (ref 5–12)
NEUTROPHILS NFR BLD AUTO: 4.52 10*3/MM3 (ref 1.7–7)
NEUTROPHILS NFR BLD AUTO: 61.5 % (ref 42.7–76)
NRBC BLD AUTO-RTO: 0 /100 WBC (ref 0–0.2)
PLATELET # BLD AUTO: 309 10*3/MM3 (ref 140–450)
PMV BLD AUTO: 11.3 FL (ref 6–12)
POTASSIUM SERPL-SCNC: 4.8 MMOL/L (ref 3.5–5.2)
PROT SERPL-MCNC: 7.5 G/DL (ref 6–8.5)
RBC # BLD AUTO: 4.41 10*6/MM3 (ref 3.77–5.28)
SODIUM SERPL-SCNC: 137 MMOL/L (ref 136–145)
T4 FREE SERPL-MCNC: 1.01 NG/DL (ref 0.93–1.7)
TRIGL SERPL-MCNC: 84 MG/DL (ref 0–150)
TSH SERPL DL<=0.05 MIU/L-ACNC: 1.73 UIU/ML (ref 0.27–4.2)
VLDLC SERPL-MCNC: 17 MG/DL (ref 5–40)
WBC NRBC COR # BLD AUTO: 7.35 10*3/MM3 (ref 3.4–10.8)

## 2024-02-20 PROCEDURE — 80053 COMPREHEN METABOLIC PANEL: CPT | Performed by: FAMILY MEDICINE

## 2024-02-20 PROCEDURE — 80061 LIPID PANEL: CPT | Performed by: FAMILY MEDICINE

## 2024-02-20 PROCEDURE — 86038 ANTINUCLEAR ANTIBODIES: CPT | Performed by: FAMILY MEDICINE

## 2024-02-20 PROCEDURE — 86431 RHEUMATOID FACTOR QUANT: CPT | Performed by: FAMILY MEDICINE

## 2024-02-20 PROCEDURE — 86200 CCP ANTIBODY: CPT | Performed by: FAMILY MEDICINE

## 2024-02-20 PROCEDURE — 83036 HEMOGLOBIN GLYCOSYLATED A1C: CPT | Performed by: FAMILY MEDICINE

## 2024-02-20 PROCEDURE — 84443 ASSAY THYROID STIM HORMONE: CPT | Performed by: FAMILY MEDICINE

## 2024-02-20 PROCEDURE — 84439 ASSAY OF FREE THYROXINE: CPT | Performed by: FAMILY MEDICINE

## 2024-02-20 PROCEDURE — 82043 UR ALBUMIN QUANTITATIVE: CPT | Performed by: FAMILY MEDICINE

## 2024-02-20 PROCEDURE — 85025 COMPLETE CBC W/AUTO DIFF WBC: CPT | Performed by: FAMILY MEDICINE

## 2024-02-20 RX ORDER — CLOPIDOGREL BISULFATE 75 MG/1
75 TABLET ORAL DAILY
Qty: 90 TABLET | Refills: 1 | Status: SHIPPED | OUTPATIENT
Start: 2024-02-20

## 2024-02-20 RX ORDER — DULOXETIN HYDROCHLORIDE 60 MG/1
60 CAPSULE, DELAYED RELEASE ORAL DAILY
Qty: 90 CAPSULE | Refills: 1 | Status: SHIPPED | OUTPATIENT
Start: 2024-02-20

## 2024-02-20 RX ORDER — LISINOPRIL 10 MG/1
10 TABLET ORAL DAILY
Qty: 90 TABLET | Refills: 1 | Status: SHIPPED | OUTPATIENT
Start: 2024-02-20

## 2024-02-20 RX ORDER — PERPHENAZINE 16 MG/1
TABLET, FILM COATED ORAL
Qty: 200 EACH | Refills: 3 | Status: SHIPPED | OUTPATIENT
Start: 2024-02-20

## 2024-02-20 RX ORDER — LANCETS 28 GAUGE
EACH MISCELLANEOUS
Qty: 100 EACH | Refills: 5 | Status: SHIPPED | OUTPATIENT
Start: 2024-02-20

## 2024-02-20 RX ORDER — DULAGLUTIDE 0.75 MG/.5ML
0.75 INJECTION, SOLUTION SUBCUTANEOUS WEEKLY
Qty: 6 ML | Refills: 1 | Status: SHIPPED | OUTPATIENT
Start: 2024-02-20

## 2024-02-20 RX ORDER — LEVETIRACETAM 500 MG/1
500 TABLET ORAL SEE ADMIN INSTRUCTIONS
Qty: 270 TABLET | Refills: 1 | Status: SHIPPED | OUTPATIENT
Start: 2024-02-20

## 2024-02-20 RX ORDER — ATORVASTATIN CALCIUM 80 MG/1
80 TABLET, FILM COATED ORAL NIGHTLY
Qty: 90 TABLET | Refills: 1 | Status: SHIPPED | OUTPATIENT
Start: 2024-02-20

## 2024-02-20 RX ORDER — MAGNESIUM 200 MG
1000 TABLET ORAL DAILY
Qty: 90 EACH | Refills: 1 | Status: SHIPPED | OUTPATIENT
Start: 2024-02-20

## 2024-02-20 NOTE — PROGRESS NOTES
The ABCs of the Annual Wellness Visit  Subsequent Medicare Wellness Visit    Subjective    Haylee Gibbs is a 73 y.o. female who presents for a Subsequent Medicare Wellness Visit.    The following portions of the patient's history were reviewed and   updated as appropriate: She  has a past medical history of Anxiety disorder (06/26/2018), Benign essential hypertension, Cataract, COPD (chronic obstructive pulmonary disease), CVA (cerebral vascular accident), Depression, Diabetes mellitus, type 2, Hyperlipidemia, Paresthesia and pain of both upper extremities (06/26/2018), Seizure disorder (06/26/2018), Vitamin B 12 deficiency, and Vitamin D deficiency (06/26/2018).  She does not have any pertinent problems on file.  She  has a past surgical history that includes Appendectomy; Cataract extraction; Hysterectomy; Back surgery; Neck surgery; and Tonsillectomy.  Her family history includes Alcohol abuse in her brother; Cancer in her mother and sister; Diabetes type II in her brother and sister; Heart attack in her brother and father; Heart disease in her sister, sister and another family member; Hyperlipidemia in her sister; Hypertension in her brother, sister, and sister; Multiple myeloma in her mother; Seizures in her mother; Stomach cancer in her brother; Stroke in her mother.  She  reports that she quit smoking about 9 years ago. Her smoking use included cigarettes. She started smoking about 29 years ago. She has a 10.00 pack-year smoking history. She has been exposed to tobacco smoke. She has never used smokeless tobacco. She reports current alcohol use. She reports that she does not use drugs.  Current Outpatient Medications   Medication Sig Dispense Refill    atorvastatin (LIPITOR) 80 MG tablet Take 1 tablet by mouth Every Night. 90 tablet 1    Blood Glucose Monitoring Suppl (FreeStyle Lite) w/Device kit USE TO TEST GLUCOSE DAILY      clopidogrel (PLAVIX) 75 MG tablet Take 1 tablet by mouth Daily. 90 tablet 1     Cyanocobalamin (Vitamin B-12) 1000 MCG sublingual tablet Place 1 tablet under the tongue Daily. 90 each 1    Dulaglutide (Trulicity) 0.75 MG/0.5ML solution pen-injector Inject 0.75 mg under the skin into the appropriate area as directed 1 (One) Time Per Week. 6 mL 1    DULoxetine (CYMBALTA) 60 MG capsule Take 1 capsule by mouth Daily. 90 capsule 1    empagliflozin (Jardiance) 25 MG tablet tablet Take 1 tablet by mouth Daily. 90 tablet 1    estradiol (ESTRACE) 0.1 MG/GM vaginal cream Insert 1 applicator into the vagina As Needed.      glucose blood (Contour Next Test) test strip Test BG  each 3    Lancets (freestyle) lancets Test BG daily 100 each 5    levETIRAcetam (KEPPRA) 500 MG tablet Take 1 tablet by mouth See Admin Instructions. Take 1 tablet by mouth every morning and 2 tablets by mouth every night at bedtime. 270 tablet 1    lisinopril (PRINIVIL,ZESTRIL) 10 MG tablet Take 1 tablet by mouth Daily. 90 tablet 1    metFORMIN (GLUCOPHAGE) 500 MG tablet Take 1 tablet by mouth 2 (Two) Times a Day. 180 tablet 1    metoprolol tartrate (LOPRESSOR) 25 MG tablet Take 1 tablet by mouth 2 (Two) Times a Day. 180 tablet 1    Diclofenac Sodium (VOLTAREN) 1 % gel gel Apply 4 g topically to the appropriate area as directed 4 (Four) Times a Day As Needed (pain). 350 g 1     No current facility-administered medications for this visit.     Current Outpatient Medications on File Prior to Visit   Medication Sig    Blood Glucose Monitoring Suppl (FreeStyle Lite) w/Device kit USE TO TEST GLUCOSE DAILY    estradiol (ESTRACE) 0.1 MG/GM vaginal cream Insert 1 applicator into the vagina As Needed.    [DISCONTINUED] atorvastatin (LIPITOR) 80 MG tablet Take 1 tablet by mouth Every Night.    [DISCONTINUED] clopidogrel (PLAVIX) 75 MG tablet Take 1 tablet by mouth Daily.    [DISCONTINUED] Cyanocobalamin (Vitamin B-12) 1000 MCG sublingual tablet Place 1 tablet under the tongue Daily.    [DISCONTINUED] Dulaglutide (Trulicity) 0.75  MG/0.5ML solution pen-injector Inject 0.75 mg under the skin into the appropriate area as directed 1 (One) Time Per Week.    [DISCONTINUED] DULoxetine (CYMBALTA) 60 MG capsule Take 1 capsule by mouth Daily.    [DISCONTINUED] empagliflozin (Jardiance) 25 MG tablet tablet Take 1 tablet by mouth Daily.    [DISCONTINUED] glucose blood (Contour Next Test) test strip Test BG BID    [DISCONTINUED] Lancets (freestyle) lancets Test BG daily    [DISCONTINUED] levETIRAcetam (KEPPRA) 500 MG tablet Take 1 tablet by mouth See Admin Instructions. Take 1 tablet by mouth every morning and 2 tablets by mouth every night at bedtime.    [DISCONTINUED] lisinopril (PRINIVIL,ZESTRIL) 10 MG tablet Take 1 tablet by mouth Daily.    [DISCONTINUED] metFORMIN (GLUCOPHAGE) 500 MG tablet Take 1 tablet by mouth 2 (Two) Times a Day.    [DISCONTINUED] metoprolol tartrate (LOPRESSOR) 25 MG tablet Take 1 tablet by mouth 2 (Two) Times a Day.    [DISCONTINUED] cholecalciferol (VITAMIN D3) 25 MCG (1000 UT) tablet Take 1 tablet by mouth Daily. (Patient not taking: Reported on 12/20/2023)    [DISCONTINUED] fluticasone-salmeterol (Advair HFA) 45-21 MCG/ACT inhaler Inhale 2 puffs 2 (Two) Times a Day. (Patient not taking: Reported on 12/20/2023)    [DISCONTINUED] guaiFENesin (Mucinex) 600 MG 12 hr tablet Take 2 tablets by mouth 2 (Two) Times a Day. (Patient not taking: Reported on 12/20/2023)    [DISCONTINUED] linaclotide (Linzess) 145 MCG capsule capsule Take 1 capsule by mouth Every Morning Before Breakfast. (Patient not taking: Reported on 12/20/2023)    [DISCONTINUED] Probiotic Product (PROBIOTIC PO)  (Patient not taking: Reported on 12/20/2023)    [DISCONTINUED] QUEtiapine XR (SEROquel XR) 200 MG 24 hr tablet Take 1 tablet by mouth Every Night. (Patient not taking: Reported on 12/20/2023)     No current facility-administered medications on file prior to visit.     She is allergic to tramadol hcl..    Compared to one year ago, the patient feels her  physical   health is better.    Compared to one year ago, the patient feels her mental   health is better.    Recent Hospitalizations:  She was not admitted to the hospital during the last year.       Current Medical Providers:  Patient Care Team:  Kj Canales MD as PCP - General (Family Medicine)  Millie May APRN as Nurse Practitioner (Urology)    Outpatient Medications Prior to Visit   Medication Sig Dispense Refill    Blood Glucose Monitoring Suppl (FreeStyle Lite) w/Device kit USE TO TEST GLUCOSE DAILY      estradiol (ESTRACE) 0.1 MG/GM vaginal cream Insert 1 applicator into the vagina As Needed.      atorvastatin (LIPITOR) 80 MG tablet Take 1 tablet by mouth Every Night. 90 tablet 0    clopidogrel (PLAVIX) 75 MG tablet Take 1 tablet by mouth Daily. 90 tablet 0    Cyanocobalamin (Vitamin B-12) 1000 MCG sublingual tablet Place 1 tablet under the tongue Daily. 90 each 1    Dulaglutide (Trulicity) 0.75 MG/0.5ML solution pen-injector Inject 0.75 mg under the skin into the appropriate area as directed 1 (One) Time Per Week. 6 mL 1    DULoxetine (CYMBALTA) 60 MG capsule Take 1 capsule by mouth Daily. 90 capsule 0    empagliflozin (Jardiance) 25 MG tablet tablet Take 1 tablet by mouth Daily. 90 tablet 0    glucose blood (Contour Next Test) test strip Test BG  each 3    Lancets (freestyle) lancets Test BG daily 100 each 5    levETIRAcetam (KEPPRA) 500 MG tablet Take 1 tablet by mouth See Admin Instructions. Take 1 tablet by mouth every morning and 2 tablets by mouth every night at bedtime. 270 tablet 1    lisinopril (PRINIVIL,ZESTRIL) 10 MG tablet Take 1 tablet by mouth Daily. 90 tablet 0    metFORMIN (GLUCOPHAGE) 500 MG tablet Take 1 tablet by mouth 2 (Two) Times a Day. 180 tablet 0    metoprolol tartrate (LOPRESSOR) 25 MG tablet Take 1 tablet by mouth 2 (Two) Times a Day. 180 tablet 0    cholecalciferol (VITAMIN D3) 25 MCG (1000 UT) tablet Take 1 tablet by mouth Daily. (Patient not  taking: Reported on 12/20/2023)      fluticasone-salmeterol (Advair HFA) 45-21 MCG/ACT inhaler Inhale 2 puffs 2 (Two) Times a Day. (Patient not taking: Reported on 12/20/2023) 12 g 1    guaiFENesin (Mucinex) 600 MG 12 hr tablet Take 2 tablets by mouth 2 (Two) Times a Day. (Patient not taking: Reported on 12/20/2023) 60 tablet 1    linaclotide (Linzess) 145 MCG capsule capsule Take 1 capsule by mouth Every Morning Before Breakfast. (Patient not taking: Reported on 12/20/2023)      Probiotic Product (PROBIOTIC PO)  (Patient not taking: Reported on 12/20/2023)      QUEtiapine XR (SEROquel XR) 200 MG 24 hr tablet Take 1 tablet by mouth Every Night. (Patient not taking: Reported on 12/20/2023) 90 tablet 1     No facility-administered medications prior to visit.       No opioid medication identified on active medication list. I have reviewed chart for other potential  high risk medication/s and harmful drug interactions in the elderly.        Aspirin is not on active medication list.  Aspirin use is not indicated based on review of current medical condition/s. Risk of harm outweighs potential benefits.  .    Patient Active Problem List   Diagnosis    Diabetes mellitus    Seizure disorder    Arthritis    Depression    Hypertension    Acute cough    Infection due to Enterobacter cloacae    Cataract    COPD (chronic obstructive pulmonary disease)    Dysarthria    Facial numbness    Hyperlipidemia    Mixed dyslipidemia    Paresthesia and pain of both upper extremities    Vitamin B12 deficiency    Vitamin D deficiency    History of stroke    Memory difficulty    Hemoglobin A1c between 7.0% and 9.0%     Advance Care Planning   Advance Care Planning     Advance Directive is on file.  ACP discussion was held with the patient during this visit. Patient has an advance directive in EMR which is still valid.      Objective    Vitals:    02/20/24 1000 02/20/24 1108   BP: 160/80 150/80   BP Location: Left arm    Patient Position:  "Sitting    Cuff Size: Adult    Pulse: 72    Temp: 97.1 °F (36.2 °C)    SpO2: 97%    Weight: 79.2 kg (174 lb 9.6 oz)    Height: 166.4 cm (65.5\")      Estimated body mass index is 28.61 kg/m² as calculated from the following:    Height as of this encounter: 166.4 cm (65.5\").    Weight as of this encounter: 79.2 kg (174 lb 9.6 oz).           Does the patient have evidence of cognitive impairment? Yes    Lab Results   Component Value Date    TRIG 146 2023    HDL 31 (L) 2023    LDL 66 2023    VLDL 26 2023    HGBA1C 7.00 (H) 2023        HEALTH RISK ASSESSMENT    Smoking Status:  Social History     Tobacco Use   Smoking Status Former    Packs/day: 0.50    Years: 20.00    Additional pack years: 0.00    Total pack years: 10.00    Types: Cigarettes    Start date:     Quit date: 2015    Years since quittin.1    Passive exposure: Past   Smokeless Tobacco Never     Alcohol Consumption:  Social History     Substance and Sexual Activity   Alcohol Use Yes    Comment: occ     Fall Risk Screen:    STEADI Fall Risk Assessment was completed, and patient is at LOW risk for falls.Assessment completed on:2024    Depression Screenin/20/2024    10:00 AM   PHQ-2/PHQ-9 Depression Screening   Little Interest or Pleasure in Doing Things 0-->not at all   Feeling Down, Depressed or Hopeless 0-->not at all   PHQ-9: Brief Depression Severity Measure Score 0       Health Habits and Functional and Cognitive Screenin/20/2024    10:00 AM   Functional & Cognitive Status   Do you have difficulty preparing food and eating? Yes   Do you have difficulty bathing yourself, getting dressed or grooming yourself? No   Do you have difficulty using the toilet? No   Do you have difficulty moving around from place to place? No   Do you have trouble with steps or getting out of a bed or a chair? No   Current Diet Well Balanced Diet   Dental Exam Not up to date   Eye Exam Not up to date   Exercise (times " per week) 1 times per week   Current Exercises Include Walking   Do you need help using the phone?  No   Are you deaf or do you have serious difficulty hearing?  No   Do you need help to go to places out of walking distance? No   Do you need help shopping? Yes   Do you need help preparing meals?  Yes   Do you need help with housework?  No   Do you need help with laundry? No   Do you need help taking your medications? No   Do you need help managing money? No   Do you ever drive or ride in a car without wearing a seat belt? No   Have you felt unusual stress, anger or loneliness in the last month? No   Who do you live with? Alone   If you need help, do you have trouble finding someone available to you? No   Have you been bothered in the last four weeks by sexual problems? No   Do you have difficulty concentrating, remembering or making decisions? Yes       Age-appropriate Screening Schedule:  Refer to the list below for future screening recommendations based on patient's age, sex and/or medical conditions. Orders for these recommended tests are listed in the plan section. The patient has been provided with a written plan.    Health Maintenance   Topic Date Due    ZOSTER VACCINE (1 of 2) Never done    COLORECTAL CANCER SCREENING  11/28/2016    DIABETIC EYE EXAM  07/14/2021    DXA SCAN  02/20/2024 (Originally 1950)    MAMMOGRAM  02/20/2025 (Originally 1950)    HEMOGLOBIN A1C  06/01/2024    DIABETIC FOOT EXAM  06/12/2024    LIPID PANEL  12/01/2024    URINE MICROALBUMIN  12/04/2024    ANNUAL WELLNESS VISIT  02/20/2025    BMI FOLLOWUP  02/20/2025    TDAP/TD VACCINES (2 - Td or Tdap) 01/20/2033    HEPATITIS C SCREENING  Completed    COVID-19 Vaccine  Completed    RSV Vaccine - Adults  Completed    INFLUENZA VACCINE  Completed    Pneumococcal Vaccine 65+  Completed                  CMS Preventative Services Quick Reference  Risk Factors Identified During Encounter  Immunizations Discussed/Encouraged:  "Shingrix  Inactivity/Sedentary: Patient was advised to exercise at least 150 minutes a week per CDC recommendations.  The above risks/problems have been discussed with the patient.  Pertinent information has been shared with the patient in the After Visit Summary.  An After Visit Summary and PPPS were made available to the patient.    Follow Up:   Next Medicare Wellness visit to be scheduled in 1 year.       Additional E&M Note during same encounter follows:  Patient has multiple medical problems which are significant and separately identifiable that require additional work above and beyond the Medicare Wellness Visit.      Chief Complaint  Medicare Wellness-subsequent, Diabetes, Hypertension, Hyperlipidemia, and Hand Pain    Subjective        I recommended mammogram and colonoscopy- pt understands risk of not getting mammogram or colonoscopy and need to find cancer early to get best tx but she still refuses test at this time- she will call if she changes her mind    Pt has bilateral hand pain x 2+ months- gradual onset- worsening symptoms      Haylee Gibbs is also being seen today for hand pain    Review of Systems   Constitutional:  Negative for fatigue and fever.   HENT:  Negative for sore throat.    Eyes:  Negative for visual disturbance.   Respiratory:  Negative for cough, chest tightness, shortness of breath and wheezing.    Cardiovascular:  Negative for chest pain, palpitations and leg swelling.   Gastrointestinal:  Negative for abdominal pain, diarrhea, nausea and vomiting.   Neurological:  Negative for light-headedness.   Psychiatric/Behavioral:  Negative for decreased concentration, dysphoric mood, sleep disturbance and suicidal ideas. The patient is not nervous/anxious.        Objective   Vital Signs:  /80   Pulse 72   Temp 97.1 °F (36.2 °C)   Ht 166.4 cm (65.5\")   Wt 79.2 kg (174 lb 9.6 oz)   SpO2 97%   BMI 28.61 kg/m²     Physical Exam  Vitals reviewed.   Constitutional:       Appearance: " Normal appearance. She is well-developed.   HENT:      Head: Normocephalic and atraumatic.      Right Ear: External ear normal.      Left Ear: External ear normal.      Mouth/Throat:      Pharynx: No oropharyngeal exudate.   Eyes:      Conjunctiva/sclera: Conjunctivae normal.      Pupils: Pupils are equal, round, and reactive to light.   Cardiovascular:      Rate and Rhythm: Normal rate and regular rhythm.      Pulses: Normal pulses.      Heart sounds: Normal heart sounds. No murmur heard.     No friction rub. No gallop.   Pulmonary:      Effort: Pulmonary effort is normal.      Breath sounds: Normal breath sounds. No wheezing or rhonchi.   Abdominal:      General: Abdomen is flat. Bowel sounds are normal. There is no distension.      Palpations: Abdomen is soft. There is no mass.      Tenderness: There is no abdominal tenderness. There is no guarding or rebound.      Hernia: No hernia is present.   Musculoskeletal:         General: Normal range of motion.      Comments: Bilateral hand multiple joint tenderness and fingers have dec ROM, stable, no redness, warmth, swelling, or bruising.   Skin:     General: Skin is warm and dry.      Capillary Refill: Capillary refill takes less than 2 seconds.   Neurological:      General: No focal deficit present.      Mental Status: She is alert and oriented to person, place, and time.      Cranial Nerves: No cranial nerve deficit.   Psychiatric:         Mood and Affect: Mood and affect normal.         Behavior: Behavior normal.         Thought Content: Thought content normal.         Judgment: Judgment normal.          The following data was reviewed by: Kj Canales MD on 02/20/2024:  CMP          6/12/2023    13:54 8/21/2023    15:19 12/1/2023    09:26   CMP   Glucose 153  181  109    BUN 22  17  22    Creatinine 0.89  0.93  1.05    EGFR 68.1  64.6  55.9    Sodium 138  138  138    Potassium 4.9  4.1  4.5    Chloride 101  101  103    Calcium 9.5  9.9  9.9    Total Protein   7.2  7.5    Albumin  4.3  4.5    Globulin  2.9  3.0    Total Bilirubin  0.6  0.8    Alkaline Phosphatase  46  50    AST (SGOT)  21  20    ALT (SGPT)  18  14    Albumin/Globulin Ratio  1.5  1.5    BUN/Creatinine Ratio 24.7  18.3  21.0    Anion Gap 12.0  14.2  13.3      CBC          6/7/2023    08:15 12/1/2023    09:26   CBC   WBC 9.49  8.79    RBC 4.13  4.34    Hemoglobin 12.7  13.3    Hematocrit 39.5  41.2    MCV 95.6  94.9    MCH 30.8  30.6    MCHC 32.2  32.3    RDW 13.3  12.9    Platelets 278  303      Lipid Panel          6/7/2023    08:15 12/1/2023    09:26   Lipid Panel   Total Cholesterol 112  123    Triglycerides 119  146    HDL Cholesterol 34  31    VLDL Cholesterol 22  26    LDL Cholesterol  56  66    LDL/HDL Ratio 1.59  2.03      TSH          6/7/2023    08:15 12/1/2023    09:26   TSH   TSH 2.150  2.130      Most Recent A1C          12/1/2023    09:26   HGBA1C Most Recent   Hemoglobin A1C 7.00      Microalbumin          6/7/2023    08:30 12/4/2023    15:58   Microalbumin   Microalbumin, Urine <1.2  1.9      Data reviewed : Radiologic studies I viewed and interpreted 3 views of x-rays of both hands:no fxs.           Assessment and Plan   Diagnoses and all orders for this visit:    1. Pain in both hands (Primary)  -     XR Hand 3+ View Bilateral (In Office)  -     Ambulatory Referral to Hand Surgery  -     JORDIN With / DsDNA, RNP, Sjogrens A / B, Smith  -     Rheumatoid Factor  -     Cyclic Citrul Peptide Antibody, IgG / IgA  -     Diclofenac Sodium (VOLTAREN) 1 % gel gel; Apply 4 g topically to the appropriate area as directed 4 (Four) Times a Day As Needed (pain).  Dispense: 350 g; Refill: 1    2. History of stroke  -     clopidogrel (PLAVIX) 75 MG tablet; Take 1 tablet by mouth Daily.  Dispense: 90 tablet; Refill: 1    3. Vitamin B12 deficiency  -     Cyanocobalamin (Vitamin B-12) 1000 MCG sublingual tablet; Place 1 tablet under the tongue Daily.  Dispense: 90 each; Refill: 1    4. Type 2 diabetes mellitus  with hyperglycemia, without long-term current use of insulin  -     Dulaglutide (Trulicity) 0.75 MG/0.5ML solution pen-injector; Inject 0.75 mg under the skin into the appropriate area as directed 1 (One) Time Per Week.  Dispense: 6 mL; Refill: 1  -     empagliflozin (Jardiance) 25 MG tablet tablet; Take 1 tablet by mouth Daily.  Dispense: 90 tablet; Refill: 1  -     glucose blood (Contour Next Test) test strip; Test BG BID  Dispense: 200 each; Refill: 3  -     Lancets (freestyle) lancets; Test BG daily  Dispense: 100 each; Refill: 5  -     metFORMIN (GLUCOPHAGE) 500 MG tablet; Take 1 tablet by mouth 2 (Two) Times a Day.  Dispense: 180 tablet; Refill: 1  -     CBC Auto Differential  -     Comprehensive Metabolic Panel  -     Hemoglobin A1c  -     Lipid Panel  -     TSH+Free T4  -     MicroAlbumin, Urine, Random - Urine, Clean Catch  -     Ambulatory Referral for Diabetic Eye Exam-Ophthalmology    5. Anxiety and depression  -     DULoxetine (CYMBALTA) 60 MG capsule; Take 1 capsule by mouth Daily.  Dispense: 90 capsule; Refill: 1    6. Seizure disorder  -     levETIRAcetam (KEPPRA) 500 MG tablet; Take 1 tablet by mouth See Admin Instructions. Take 1 tablet by mouth every morning and 2 tablets by mouth every night at bedtime.  Dispense: 270 tablet; Refill: 1    7. Primary hypertension  -     lisinopril (PRINIVIL,ZESTRIL) 10 MG tablet; Take 1 tablet by mouth Daily.  Dispense: 90 tablet; Refill: 1  -     CBC Auto Differential  -     Comprehensive Metabolic Panel  -     Hemoglobin A1c  -     Lipid Panel  -     TSH+Free T4  -     MicroAlbumin, Urine, Random - Urine, Clean Catch    8. Essential hypertension  -     metoprolol tartrate (LOPRESSOR) 25 MG tablet; Take 1 tablet by mouth 2 (Two) Times a Day.  Dispense: 180 tablet; Refill: 1    Other orders  -     atorvastatin (LIPITOR) 80 MG tablet; Take 1 tablet by mouth Every Night.  Dispense: 90 tablet; Refill: 1             Follow Up   Return in about 6 months (around  8/20/2024), or if symptoms worsen or fail to improve, for Recheck.  Patient was given instructions and counseling regarding her condition or for health maintenance advice. Please see specific information pulled into the AVS if appropriate.

## 2024-02-21 LAB
ANA SER QL: NEGATIVE
CCP IGA+IGG SERPL IA-ACNC: 6 UNITS (ref 0–19)

## 2024-03-05 DIAGNOSIS — E11.65 TYPE 2 DIABETES MELLITUS WITH HYPERGLYCEMIA, WITHOUT LONG-TERM CURRENT USE OF INSULIN: Primary | ICD-10-CM

## 2024-03-05 RX ORDER — SEMAGLUTIDE 0.68 MG/ML
0.5 INJECTION, SOLUTION SUBCUTANEOUS WEEKLY
Qty: 3 ML | Refills: 1 | Status: SHIPPED | OUTPATIENT
Start: 2024-03-05

## 2024-04-05 ENCOUNTER — TELEPHONE (OUTPATIENT)
Dept: FAMILY MEDICINE CLINIC | Facility: CLINIC | Age: 74
End: 2024-04-05

## 2024-04-05 DIAGNOSIS — E11.65 TYPE 2 DIABETES MELLITUS WITH HYPERGLYCEMIA, WITHOUT LONG-TERM CURRENT USE OF INSULIN: ICD-10-CM

## 2024-04-05 RX ORDER — SEMAGLUTIDE 0.68 MG/ML
0.5 INJECTION, SOLUTION SUBCUTANEOUS WEEKLY
Qty: 3 ML | Refills: 1 | Status: SHIPPED | OUTPATIENT
Start: 2024-04-05

## 2024-05-01 ENCOUNTER — TELEPHONE (OUTPATIENT)
Dept: UROLOGY | Facility: CLINIC | Age: 74
End: 2024-05-01

## 2024-05-01 ENCOUNTER — TELEPHONE (OUTPATIENT)
Dept: FAMILY MEDICINE CLINIC | Facility: CLINIC | Age: 74
End: 2024-05-01

## 2024-05-01 NOTE — TELEPHONE ENCOUNTER
Caller: Haylee Gibbs    Relationship: Self    Best call back number: [phone]     Requested Prescriptions:   [facility]     Pharmacy where request should be sent: [facility] - [facility] [address] - [phone]  - [phone] FX     Last office visit with prescribing clinician: 2/20/2024   Last telemedicine visit with prescribing clinician: Visit date not found   Next office visit with prescribing clinician: Visit date not found     Additional details provided by patient: PATIENT STATES HER INSURANCES MAIL ORDER PHARMACY CAN GET THE MEDICATION. PATIENT STATES OZEMPIC IS MAKING HER SICK SO SHE WOULD LIKE TO SWITCH BACK TO [facility].     Does the patient have less than a 3 day supply:  [x] Yes  [] No    Alexander Jon Rep   05/01/24 12:56 EDT

## 2024-05-02 DIAGNOSIS — E11.65 TYPE 2 DIABETES MELLITUS WITH HYPERGLYCEMIA, WITHOUT LONG-TERM CURRENT USE OF INSULIN: Primary | ICD-10-CM

## 2024-05-02 RX ORDER — DULAGLUTIDE 0.75 MG/.5ML
0.75 INJECTION, SOLUTION SUBCUTANEOUS WEEKLY
Qty: 6 ML | Refills: 1 | Status: SHIPPED | OUTPATIENT
Start: 2024-05-02

## 2024-08-05 ENCOUNTER — TELEPHONE (OUTPATIENT)
Dept: FAMILY MEDICINE CLINIC | Facility: CLINIC | Age: 74
End: 2024-08-05
Payer: MEDICARE

## 2024-08-05 DIAGNOSIS — E11.65 TYPE 2 DIABETES MELLITUS WITH HYPERGLYCEMIA, WITHOUT LONG-TERM CURRENT USE OF INSULIN: ICD-10-CM

## 2024-08-05 NOTE — TELEPHONE ENCOUNTER
Caller: Kaci Copeland    Relationship to patient: Emergency Contact    Best call back number: [phone]    Patient is needing: PATIENT IS NEEDING ALL OF HER MEDICATIONS SENT IN TO   [address] - [phone]  - [phone]  [phone]     HUB ASKED FOR SPECIFIC NAMES BUT THEY STATED ALL.     SHE ALSO NEEDS     Dulaglutide (Trulicity) 0.75 MG/0.5ML solution pen-injector     PATIENT NEEDS THIS SENT TO [facility].     PATIENT NEEDS 3 MONTH SUPPLY OF ALL HER MEDICATIONS.     SHE IS UNSURE IF SHE NEEDS AN APPOINTMENT OR BLOOD WORK TO GET HER REFILLS. PLEASE CALL TO LET HER KNOW. PATIENT HAS TO HAVE BLOOD WORK BY 08.27.2024 FOR A DIFFERENT DOCTOR AND SHE'D LIKE TO COMBINE THE BLOOD WORK ORDERS IF POSSIBLE SO SHE DOESN'T HAVE TO BE STUCK MULTIPLE TIMES.

## 2024-08-13 ENCOUNTER — TELEPHONE (OUTPATIENT)
Dept: FAMILY MEDICINE CLINIC | Facility: CLINIC | Age: 74
End: 2024-08-13

## 2024-08-13 DIAGNOSIS — I10 PRIMARY HYPERTENSION: ICD-10-CM

## 2024-08-13 DIAGNOSIS — M79.641 PAIN IN BOTH HANDS: ICD-10-CM

## 2024-08-13 DIAGNOSIS — Z86.73 HISTORY OF STROKE: ICD-10-CM

## 2024-08-13 DIAGNOSIS — E11.65 TYPE 2 DIABETES MELLITUS WITH HYPERGLYCEMIA, WITHOUT LONG-TERM CURRENT USE OF INSULIN: ICD-10-CM

## 2024-08-13 DIAGNOSIS — G40.909 SEIZURE DISORDER: ICD-10-CM

## 2024-08-13 DIAGNOSIS — F32.A ANXIETY AND DEPRESSION: ICD-10-CM

## 2024-08-13 DIAGNOSIS — I10 ESSENTIAL HYPERTENSION: ICD-10-CM

## 2024-08-13 DIAGNOSIS — F41.9 ANXIETY AND DEPRESSION: ICD-10-CM

## 2024-08-13 DIAGNOSIS — E55.9 VITAMIN D DEFICIENCY: ICD-10-CM

## 2024-08-13 DIAGNOSIS — M79.642 PAIN IN BOTH HANDS: ICD-10-CM

## 2024-08-13 DIAGNOSIS — E53.8 VITAMIN B12 DEFICIENCY: ICD-10-CM

## 2024-08-13 DIAGNOSIS — E11.65 TYPE 2 DIABETES MELLITUS WITH HYPERGLYCEMIA, WITHOUT LONG-TERM CURRENT USE OF INSULIN: Primary | ICD-10-CM

## 2024-08-13 RX ORDER — DULOXETIN HYDROCHLORIDE 60 MG/1
60 CAPSULE, DELAYED RELEASE ORAL DAILY
Qty: 90 CAPSULE | Refills: 1 | Status: SHIPPED | OUTPATIENT
Start: 2024-08-13

## 2024-08-13 RX ORDER — LEVETIRACETAM 500 MG/1
TABLET ORAL
Qty: 270 TABLET | Refills: 1 | Status: SHIPPED | OUTPATIENT
Start: 2024-08-13

## 2024-08-13 RX ORDER — ATORVASTATIN CALCIUM 80 MG/1
80 TABLET, FILM COATED ORAL
Qty: 90 TABLET | Refills: 1 | Status: SHIPPED | OUTPATIENT
Start: 2024-08-13

## 2024-08-13 RX ORDER — CLOPIDOGREL BISULFATE 75 MG/1
75 TABLET ORAL DAILY
Qty: 90 TABLET | Refills: 1 | Status: SHIPPED | OUTPATIENT
Start: 2024-08-13

## 2024-08-13 RX ORDER — LISINOPRIL 10 MG/1
10 TABLET ORAL DAILY
Qty: 90 TABLET | Refills: 1 | Status: SHIPPED | OUTPATIENT
Start: 2024-08-13

## 2024-08-13 RX ORDER — EMPAGLIFLOZIN 25 MG/1
25 TABLET, FILM COATED ORAL DAILY
Qty: 90 TABLET | Refills: 1 | Status: SHIPPED | OUTPATIENT
Start: 2024-08-13

## 2024-08-13 NOTE — TELEPHONE ENCOUNTER
Caller: YAMILET ARRIAGA    Relationship: Emergency Contact    Best call back number: [phone]    What orders are you requesting (i.e. lab or imaging): BLOOD LABS FOR HER UPCOMING APPT    In what timeframe would the patient need to come in: A WEEK BEFORE HER APPT 08/27/2024    Where will you receive your lab/imaging services: IN OFFICE    Additional notes: PATIENT HAS ORDERS FROM ANOTHER PROVIDER SHE WILL BRING TO HER APPT SHE WOULD ALSO LIKE TO DO HER LABS FOR HER VISIT WITH DR HOWELL A WEEK BEFORE HER APPT. PLEASE PUT ORDERS IN SYSTEM AND CALL PATIENT WHEN THIS IS DONE. SHE WILL BRING THIS ORDER WHEN SHE DOES HER LABS FOR DR HOWELL.

## 2024-08-20 ENCOUNTER — CLINICAL SUPPORT (OUTPATIENT)
Dept: FAMILY MEDICINE CLINIC | Facility: CLINIC | Age: 74
End: 2024-08-20
Payer: MEDICARE

## 2024-08-20 ENCOUNTER — TRANSCRIBE ORDERS (OUTPATIENT)
Dept: FAMILY MEDICINE CLINIC | Facility: CLINIC | Age: 74
End: 2024-08-20
Payer: MEDICARE

## 2024-08-20 DIAGNOSIS — E55.9 VITAMIN D DEFICIENCY: ICD-10-CM

## 2024-08-20 DIAGNOSIS — I10 ESSENTIAL HYPERTENSION: ICD-10-CM

## 2024-08-20 DIAGNOSIS — I10 HYPERTENSION, UNSPECIFIED TYPE: ICD-10-CM

## 2024-08-20 DIAGNOSIS — E53.8 VITAMIN B12 DEFICIENCY: ICD-10-CM

## 2024-08-20 DIAGNOSIS — E11.65 TYPE 2 DIABETES MELLITUS WITH HYPERGLYCEMIA, WITHOUT LONG-TERM CURRENT USE OF INSULIN: ICD-10-CM

## 2024-08-20 DIAGNOSIS — E53.8 VITAMIN B12 DEFICIENCY: Primary | ICD-10-CM

## 2024-08-20 DIAGNOSIS — E13.9 DIABETES MELLITUS OF OTHER TYPE WITHOUT COMPLICATION, UNSPECIFIED WHETHER LONG TERM INSULIN USE: Primary | ICD-10-CM

## 2024-08-20 LAB
25(OH)D3 SERPL-MCNC: 33.7 NG/ML (ref 30–100)
ALBUMIN SERPL-MCNC: 4.5 G/DL (ref 3.5–5.2)
ALBUMIN UR-MCNC: <1.2 MG/DL
ALBUMIN/GLOB SERPL: 1.5 G/DL
ALP SERPL-CCNC: 47 U/L (ref 39–117)
ALT SERPL W P-5'-P-CCNC: 14 U/L (ref 1–33)
ANION GAP SERPL CALCULATED.3IONS-SCNC: 11 MMOL/L (ref 5–15)
AST SERPL-CCNC: 16 U/L (ref 1–32)
BACTERIA UR QL AUTO: ABNORMAL /HPF
BASOPHILS # BLD AUTO: 0.05 10*3/MM3 (ref 0–0.2)
BASOPHILS NFR BLD AUTO: 0.7 % (ref 0–1.5)
BILIRUB SERPL-MCNC: 0.7 MG/DL (ref 0–1.2)
BILIRUB UR QL STRIP: NEGATIVE
BUN SERPL-MCNC: 17 MG/DL (ref 8–23)
BUN/CREAT SERPL: 18.3 (ref 7–25)
CALCIUM SPEC-SCNC: 10.5 MG/DL (ref 8.6–10.5)
CHLORIDE SERPL-SCNC: 103 MMOL/L (ref 98–107)
CHOLEST SERPL-MCNC: 116 MG/DL (ref 0–200)
CLARITY UR: ABNORMAL
CO2 SERPL-SCNC: 22 MMOL/L (ref 22–29)
COLOR UR: YELLOW
CREAT SERPL-MCNC: 0.93 MG/DL (ref 0.57–1)
CREAT UR-MCNC: 24.8 MG/DL
DEPRECATED RDW RBC AUTO: 45.6 FL (ref 37–54)
EGFRCR SERPLBLD CKD-EPI 2021: 64.2 ML/MIN/1.73
EOSINOPHIL # BLD AUTO: 0.18 10*3/MM3 (ref 0–0.4)
EOSINOPHIL NFR BLD AUTO: 2.4 % (ref 0.3–6.2)
ERYTHROCYTE [DISTWIDTH] IN BLOOD BY AUTOMATED COUNT: 12.9 % (ref 12.3–15.4)
FOLATE SERPL-MCNC: 8.59 NG/ML (ref 4.78–24.2)
GLOBULIN UR ELPH-MCNC: 3 GM/DL
GLUCOSE SERPL-MCNC: 119 MG/DL (ref 65–99)
GLUCOSE UR STRIP-MCNC: ABNORMAL MG/DL
HBA1C MFR BLD: 7 % (ref 4.8–5.6)
HCT VFR BLD AUTO: 39.6 % (ref 34–46.6)
HDLC SERPL-MCNC: 35 MG/DL (ref 40–60)
HGB BLD-MCNC: 12.8 G/DL (ref 12–15.9)
HGB UR QL STRIP.AUTO: NEGATIVE
HYALINE CASTS UR QL AUTO: ABNORMAL /LPF
IMM GRANULOCYTES # BLD AUTO: 0.05 10*3/MM3 (ref 0–0.05)
IMM GRANULOCYTES NFR BLD AUTO: 0.7 % (ref 0–0.5)
KETONES UR QL STRIP: NEGATIVE
LDLC SERPL CALC-MCNC: 59 MG/DL (ref 0–100)
LDLC/HDLC SERPL: 1.63 {RATIO}
LEUKOCYTE ESTERASE UR QL STRIP.AUTO: ABNORMAL
LYMPHOCYTES # BLD AUTO: 1.78 10*3/MM3 (ref 0.7–3.1)
LYMPHOCYTES NFR BLD AUTO: 23.7 % (ref 19.6–45.3)
MAGNESIUM SERPL-MCNC: 2.3 MG/DL (ref 1.6–2.4)
MCH RBC QN AUTO: 31.2 PG (ref 26.6–33)
MCHC RBC AUTO-ENTMCNC: 32.3 G/DL (ref 31.5–35.7)
MCV RBC AUTO: 96.6 FL (ref 79–97)
MONOCYTES # BLD AUTO: 0.75 10*3/MM3 (ref 0.1–0.9)
MONOCYTES NFR BLD AUTO: 10 % (ref 5–12)
NEUTROPHILS NFR BLD AUTO: 4.7 10*3/MM3 (ref 1.7–7)
NEUTROPHILS NFR BLD AUTO: 62.5 % (ref 42.7–76)
NITRITE UR QL STRIP: POSITIVE
NRBC BLD AUTO-RTO: 0 /100 WBC (ref 0–0.2)
PH UR STRIP.AUTO: 5.5 [PH] (ref 5–8)
PLATELET # BLD AUTO: 277 10*3/MM3 (ref 140–450)
PMV BLD AUTO: 11.3 FL (ref 6–12)
POTASSIUM SERPL-SCNC: 5.1 MMOL/L (ref 3.5–5.2)
PROT ?TM UR-MCNC: 5.4 MG/DL
PROT SERPL-MCNC: 7.5 G/DL (ref 6–8.5)
PROT UR QL STRIP: NEGATIVE
PROT/CREAT UR: 0.22 MG/G{CREAT}
RBC # BLD AUTO: 4.1 10*6/MM3 (ref 3.77–5.28)
RBC # UR STRIP: ABNORMAL /HPF
REF LAB TEST METHOD: ABNORMAL
SODIUM SERPL-SCNC: 136 MMOL/L (ref 136–145)
SP GR UR STRIP: 1.01 (ref 1–1.03)
SQUAMOUS #/AREA URNS HPF: ABNORMAL /HPF
T4 FREE SERPL-MCNC: 1.06 NG/DL (ref 0.92–1.68)
TRIGL SERPL-MCNC: 120 MG/DL (ref 0–150)
TSH SERPL DL<=0.05 MIU/L-ACNC: 2.39 UIU/ML (ref 0.27–4.2)
UROBILINOGEN UR QL STRIP: ABNORMAL
VIT B12 BLD-MCNC: >2000 PG/ML (ref 211–946)
VLDLC SERPL-MCNC: 22 MG/DL (ref 5–40)
WBC # UR STRIP: ABNORMAL /HPF
WBC NRBC COR # BLD AUTO: 7.51 10*3/MM3 (ref 3.4–10.8)
YEAST URNS QL MICRO: ABNORMAL /HPF

## 2024-08-20 PROCEDURE — 80061 LIPID PANEL: CPT | Performed by: FAMILY MEDICINE

## 2024-08-20 PROCEDURE — 36415 COLL VENOUS BLD VENIPUNCTURE: CPT | Performed by: FAMILY MEDICINE

## 2024-08-20 PROCEDURE — 84443 ASSAY THYROID STIM HORMONE: CPT | Performed by: FAMILY MEDICINE

## 2024-08-20 PROCEDURE — 80053 COMPREHEN METABOLIC PANEL: CPT | Performed by: FAMILY MEDICINE

## 2024-08-20 PROCEDURE — 84439 ASSAY OF FREE THYROXINE: CPT | Performed by: FAMILY MEDICINE

## 2024-08-20 PROCEDURE — 83735 ASSAY OF MAGNESIUM: CPT | Performed by: FAMILY MEDICINE

## 2024-08-20 PROCEDURE — 82306 VITAMIN D 25 HYDROXY: CPT | Performed by: FAMILY MEDICINE

## 2024-08-20 PROCEDURE — 82746 ASSAY OF FOLIC ACID SERUM: CPT | Performed by: FAMILY MEDICINE

## 2024-08-20 PROCEDURE — 82607 VITAMIN B-12: CPT | Performed by: FAMILY MEDICINE

## 2024-08-20 PROCEDURE — 85025 COMPLETE CBC W/AUTO DIFF WBC: CPT | Performed by: NURSE PRACTITIONER

## 2024-08-20 PROCEDURE — 82043 UR ALBUMIN QUANTITATIVE: CPT | Performed by: FAMILY MEDICINE

## 2024-08-20 PROCEDURE — 82570 ASSAY OF URINE CREATININE: CPT | Performed by: NURSE PRACTITIONER

## 2024-08-20 PROCEDURE — 83036 HEMOGLOBIN GLYCOSYLATED A1C: CPT | Performed by: FAMILY MEDICINE

## 2024-08-20 PROCEDURE — 84156 ASSAY OF PROTEIN URINE: CPT | Performed by: NURSE PRACTITIONER

## 2024-08-20 PROCEDURE — 81001 URINALYSIS AUTO W/SCOPE: CPT | Performed by: FAMILY MEDICINE

## 2024-08-27 ENCOUNTER — OFFICE VISIT (OUTPATIENT)
Dept: FAMILY MEDICINE CLINIC | Facility: CLINIC | Age: 74
End: 2024-08-27
Payer: MEDICARE

## 2024-08-27 VITALS
DIASTOLIC BLOOD PRESSURE: 80 MMHG | SYSTOLIC BLOOD PRESSURE: 130 MMHG | OXYGEN SATURATION: 95 % | HEART RATE: 64 BPM | TEMPERATURE: 97.5 F | BODY MASS INDEX: 27.84 KG/M2 | WEIGHT: 169.9 LBS

## 2024-08-27 DIAGNOSIS — E11.65 TYPE 2 DIABETES MELLITUS WITH HYPERGLYCEMIA, WITHOUT LONG-TERM CURRENT USE OF INSULIN: ICD-10-CM

## 2024-08-27 DIAGNOSIS — E78.2 MIXED HYPERLIPIDEMIA: Primary | ICD-10-CM

## 2024-08-27 PROCEDURE — 1126F AMNT PAIN NOTED NONE PRSNT: CPT | Performed by: FAMILY MEDICINE

## 2024-08-27 PROCEDURE — 3079F DIAST BP 80-89 MM HG: CPT | Performed by: FAMILY MEDICINE

## 2024-08-27 PROCEDURE — 1160F RVW MEDS BY RX/DR IN RCRD: CPT | Performed by: FAMILY MEDICINE

## 2024-08-27 PROCEDURE — 1159F MED LIST DOCD IN RCRD: CPT | Performed by: FAMILY MEDICINE

## 2024-08-27 PROCEDURE — 3075F SYST BP GE 130 - 139MM HG: CPT | Performed by: FAMILY MEDICINE

## 2024-08-27 PROCEDURE — 99213 OFFICE O/P EST LOW 20 MIN: CPT | Performed by: FAMILY MEDICINE

## 2024-08-27 PROCEDURE — 3051F HG A1C>EQUAL 7.0%<8.0%: CPT | Performed by: FAMILY MEDICINE

## 2024-08-27 RX ORDER — DULAGLUTIDE 0.75 MG/.5ML
0.75 INJECTION, SOLUTION SUBCUTANEOUS WEEKLY
Qty: 6 ML | Refills: 1 | Status: SHIPPED | OUTPATIENT
Start: 2024-08-27

## 2024-08-27 NOTE — PROGRESS NOTES
Chief Complaint  Hypertension, Hyperlipidemia, and Diabetes    Subjective          Haylee Gibbs presents to [facility]  Hyperlipidemia  This is a chronic problem. The current episode started more than 1 year ago. The problem is controlled. Recent lipid tests were reviewed and are variable. Pertinent negatives include no chest pain or shortness of breath. Current antihyperlipidemic treatment includes statins. The current treatment provides significant improvement of lipids. There are no compliance problems.    Diabetes  She presents for her follow-up diabetic visit. She has type 2 diabetes mellitus. Her disease course has been stable. Pertinent negatives for hypoglycemia include no dizziness or headaches. Pertinent negatives for diabetes include no chest pain, no fatigue, no foot paresthesias, no foot ulcerations, no polydipsia, no polyphagia, no polyuria, no visual change, no weakness and no weight loss. Symptoms are stable. Risk factors for coronary artery disease include diabetes mellitus, dyslipidemia, family history, hypertension and post-menopausal. Current diabetic treatment includes oral agent (triple therapy). She is compliant with treatment all of the time. Her weight is stable. She is following a generally healthy diet. An ACE inhibitor/angiotensin II receptor blocker is being taken.  Eye exam is current.                Objective   Allergies   Allergen Reactions    Tramadol Hcl GI Intolerance     Immunization History   Administered Date(s) Administered    31-influenza Vac Quardvalent Preservativ 09/30/2019    ABRYSVO (RSV, 60+ or pregnant women 32-36 wks) 10/20/2023    COVID-19 (ИРИНА) 03/08/2021    COVID-19 (PFIZER) BIVALENT 12+YRS 11/21/2022    COVID-19 (PFIZER) Purple Cap Monovalent 11/08/2021    COVID-19 F23 (PFIZER) 12YRS+ (COMIRNATY) 10/05/2023    Covid-19 (Pfizer) Gray Cap Monovalent 08/22/2022    Fluad Quad 65+ 10/10/2022    Fluzone High-Dose 65+yrs 10/19/2021     Influenza, Unspecified 2019, 09/15/2023    Pneumococcal Conjugate 13-Valent (PCV13) 2015    Pneumococcal Conjugate 20-Valent (PCV20) 2023    Tdap 2023     Past Medical History:   Diagnosis Date    Anxiety disorder 2018    Benign essential hypertension     Cataract     COPD (chronic obstructive pulmonary disease)     CVA (cerebral vascular accident)     Depression     Diabetes mellitus, type 2     Hyperlipidemia     Paresthesia and pain of both upper extremities 2018    Seizure disorder 2018    Vitamin B 12 deficiency     Vitamin D deficiency 2018      Past Surgical History:   Procedure Laterality Date    APPENDECTOMY      BACK SURGERY      LOW BACK DISC    CATARACT EXTRACTION      HYSTERECTOMY      NECK SURGERY      NECK DISC    TONSILLECTOMY        Social History     Socioeconomic History    Marital status: Single   Tobacco Use    Smoking status: Former     Current packs/day: 0.00     Average packs/day: 0.5 packs/day for 20.0 years (10.0 ttl pk-yrs)     Types: Cigarettes     Start date:      Quit date: 2015     Years since quittin.6     Passive exposure: Past    Smokeless tobacco: Never   Vaping Use    Vaping status: Never Used   Substance and Sexual Activity    Alcohol use: Yes     Comment: occ    Drug use: Never    Sexual activity: Defer        Current Outpatient Medications:     atorvastatin (LIPITOR) 80 MG tablet, TAKE 1 TABLET BY MOUTH EVERY NIGHT AT BEDTIME, Disp: 90 tablet, Rfl: 1    Blood Glucose Monitoring Suppl (FreeStyle Lite) w/Device kit, USE TO TEST GLUCOSE DAILY, Disp: , Rfl:     clopidogrel (PLAVIX) 75 MG tablet, TAKE 1 TABLET BY MOUTH EVERY DAY, Disp: 90 tablet, Rfl: 1    Cyanocobalamin (Vitamin B-12) 1000 MCG sublingual tablet, Place 1 tablet under the tongue Daily., Disp: 90 each, Rfl: 1    Diclofenac Sodium (VOLTAREN) 1 % gel gel, apply 4 grams topically to the appropriate area as directed FOUR TIMES DAILY AS NEEDED FOR PAIN, Disp:  400 g, Rfl: 1    Dulaglutide (Trulicity) 0.75 MG/0.5ML solution pen-injector, Inject 0.75 mg under the skin into the appropriate area as directed 1 (One) Time Per Week., Disp: 6 mL, Rfl: 1    DULoxetine (CYMBALTA) 60 MG capsule, TAKE 1 CAPSULE BY MOUTH EVERY DAY, Disp: 90 capsule, Rfl: 1    Ergocalciferol 50 MCG (2000 UT) capsule, Take 2,000 Units by mouth Daily., Disp: , Rfl:     estradiol (ESTRACE) 0.1 MG/GM vaginal cream, Insert 1 g into the vagina As Needed., Disp: , Rfl:     glucose blood (Contour Next Test) test strip, Test BG BID, Disp: 200 each, Rfl: 3    Jardiance 25 MG tablet tablet, TAKE 1 TABLET BY MOUTH EVERY DAY, Disp: 90 tablet, Rfl: 1    Lancets (freestyle) lancets, Test BG daily, Disp: 100 each, Rfl: 5    levETIRAcetam (KEPPRA) 500 MG tablet, TAKE 1 TABLET BY MOUTH EVERY MORNING and two tablets BY MOUTH EVERY NIGHT AT BEDTIME, Disp: 270 tablet, Rfl: 1    lisinopril (PRINIVIL,ZESTRIL) 10 MG tablet, TAKE 1 TABLET BY MOUTH EVERY DAY, Disp: 90 tablet, Rfl: 1    metFORMIN (GLUCOPHAGE) 500 MG tablet, TAKE 1 TABLET BY MOUTH TWICE DAILY, Disp: 180 tablet, Rfl: 1    metoprolol tartrate (LOPRESSOR) 25 MG tablet, TAKE 1 TABLET BY MOUTH TWICE DAILY, Disp: 180 tablet, Rfl: 1   Family History   Problem Relation Age of Onset    Cancer Mother     Seizures Mother     Stroke Mother     Multiple myeloma Mother     Heart attack Father     Diabetes type II Sister     Hypertension Sister     Cancer Sister     Heart disease Sister     Hyperlipidemia Sister     Hypertension Sister     Heart disease Sister     Hypertension Brother     Alcohol abuse Brother     Diabetes type II Brother     Stomach cancer Brother     Heart attack Brother     Heart disease Other         MOTHER, GRANDMOTHER, SISTER; FATHER, GRANDFATHER OR BROTHER DEVELOPED HEART DISEASE BEFORE THE AGE OF 65          Vital Signs:   Vitals:    08/27/24 0912   BP: 130/80   Pulse: 64   Temp: 97.5 °F (36.4 °C)   SpO2: 95%   Weight: 77.1 kg (169 lb 14.4 oz)        Review of Systems   Constitutional:  Negative for fatigue, fever and weight loss.   HENT:  Negative for sore throat.    Eyes:  Negative for visual disturbance.   Respiratory:  Negative for cough, chest tightness, shortness of breath and wheezing.    Cardiovascular:  Negative for chest pain, palpitations and leg swelling.   Gastrointestinal:  Negative for abdominal pain, diarrhea, nausea and vomiting.   Endocrine: Negative for polydipsia, polyphagia and polyuria.   Neurological:  Negative for dizziness, syncope, weakness, light-headedness and headaches.      Physical Exam  Vitals reviewed.   Constitutional:       Appearance: Normal appearance. She is well-developed.   HENT:      Head: Normocephalic and atraumatic.      Right Ear: External ear normal.      Left Ear: External ear normal.      Mouth/Throat:      Pharynx: No oropharyngeal exudate.   Eyes:      Conjunctiva/sclera: Conjunctivae normal.      Pupils: Pupils are equal, round, and reactive to light.   Cardiovascular:      Rate and Rhythm: Normal rate and regular rhythm.      Pulses: Normal pulses.      Heart sounds: Normal heart sounds. No murmur heard.     No friction rub. No gallop.   Pulmonary:      Effort: Pulmonary effort is normal.      Breath sounds: Normal breath sounds. No wheezing or rhonchi.   Abdominal:      General: Abdomen is flat. Bowel sounds are normal. There is no distension.      Palpations: Abdomen is soft. There is no mass.      Tenderness: There is no abdominal tenderness. There is no guarding or rebound.      Hernia: No hernia is present.   Musculoskeletal:         General: Normal range of motion.   Skin:     General: Skin is warm and dry.      Capillary Refill: Capillary refill takes less than 2 seconds.   Neurological:      General: No focal deficit present.      Mental Status: She is alert and oriented to person, place, and time.      Cranial Nerves: No cranial nerve deficit.   Psychiatric:         Mood and Affect: Mood and  affect normal.         Behavior: Behavior normal.         Thought Content: Thought content normal.         Judgment: Judgment normal.        Result Review :   The following data was reviewed by: Kj Canales MD on 08/27/2024:  CMP          12/1/2023    09:26 2/20/2024    10:48 8/20/2024    08:42   CMP   Glucose 109  112  119    BUN 22  20  17    Creatinine 1.05  0.97  0.93    EGFR 55.9  61.4  64.2    Sodium 138  137  136    Potassium 4.5  4.8  5.1    Chloride 103  107  103    Calcium 9.9  9.7  10.5    Total Protein 7.5  7.5  7.5    Albumin 4.5  4.5  4.5    Globulin 3.0  3.0  3.0    Total Bilirubin 0.8  0.6  0.7    Alkaline Phosphatase 50  45  47    AST (SGOT) 20  14  16    ALT (SGPT) 14  15  14    Albumin/Globulin Ratio 1.5  1.5  1.5    BUN/Creatinine Ratio 21.0  20.6  18.3    Anion Gap 13.3  10.8  11.0      CBC          12/1/2023    09:26 2/20/2024    10:48 8/20/2024    08:42   CBC   WBC 8.79  7.35  7.51    RBC 4.34  4.41  4.10    Hemoglobin 13.3  12.9  12.8    Hematocrit 41.2  40.7  39.6    MCV 94.9  92.3  96.6    MCH 30.6  29.3  31.2    MCHC 32.3  31.7  32.3    RDW 12.9  12.5  12.9    Platelets 303  309  277      Lipid Panel          12/1/2023    09:26 2/20/2024    10:48 8/20/2024    08:42   Lipid Panel   Total Cholesterol 123  110  116    Triglycerides 146  84  120    HDL Cholesterol 31  33  35    VLDL Cholesterol 26  17  22    LDL Cholesterol  66  60  59    LDL/HDL Ratio 2.03  1.82  1.63      TSH          12/1/2023    09:26 2/20/2024    10:48 8/20/2024    08:42   TSH   TSH 2.130  1.730  2.390      Most Recent A1C          8/20/2024    08:42   HGBA1C Most Recent   Hemoglobin A1C 7.00      Microalbumin          12/4/2023    15:58 2/20/2024    10:48 8/20/2024    08:42   Microalbumin   Microalbumin, Urine 1.9  <1.2  <1.2                Assessment and Plan    Diagnoses and all orders for this visit:    1. Mixed hyperlipidemia (Primary)    2. Type 2 diabetes mellitus with hyperglycemia, without long-term current  use of insulin  -     Dulaglutide (Trulicity) 0.75 MG/0.5ML solution pen-injector; Inject 0.75 mg under the skin into the appropriate area as directed 1 (One) Time Per Week.  Dispense: 6 mL; Refill: 1            Follow Up   Return in about 6 months (around 2/27/2025) for Recheck.  Patient was given instructions and counseling regarding her condition or for health maintenance advice. Please see specific information pulled into the AVS if appropriate.

## 2024-09-24 ENCOUNTER — APPOINTMENT (OUTPATIENT)
Dept: GENERAL RADIOLOGY | Facility: HOSPITAL | Age: 74
End: 2024-09-24
Payer: MEDICARE

## 2024-09-24 ENCOUNTER — APPOINTMENT (OUTPATIENT)
Dept: CT IMAGING | Facility: HOSPITAL | Age: 74
End: 2024-09-24
Payer: MEDICARE

## 2024-09-24 ENCOUNTER — HOSPITAL ENCOUNTER (INPATIENT)
Facility: HOSPITAL | Age: 74
LOS: 3 days | Discharge: NURSING, REHAB OR LONG-TERM CARE | End: 2024-09-27
Attending: EMERGENCY MEDICINE | Admitting: HOSPITALIST
Payer: MEDICARE

## 2024-09-24 DIAGNOSIS — S72.002A CLOSED FRACTURE OF LEFT HIP, INITIAL ENCOUNTER: Primary | ICD-10-CM

## 2024-09-24 DIAGNOSIS — R26.2 DIFFICULTY IN WALKING: ICD-10-CM

## 2024-09-24 DIAGNOSIS — Z78.9 DECREASED ACTIVITIES OF DAILY LIVING (ADL): ICD-10-CM

## 2024-09-24 PROBLEM — T14.8XXA FRACTURE: Status: ACTIVE | Noted: 2024-09-24

## 2024-09-24 LAB
ALBUMIN SERPL-MCNC: 4.4 G/DL (ref 3.5–5.2)
ALBUMIN/GLOB SERPL: 1.3 G/DL
ALP SERPL-CCNC: 50 U/L (ref 39–117)
ALT SERPL W P-5'-P-CCNC: 18 U/L (ref 1–33)
ANION GAP SERPL CALCULATED.3IONS-SCNC: 16.8 MMOL/L (ref 5–15)
AST SERPL-CCNC: 25 U/L (ref 1–32)
BACTERIA UR QL AUTO: ABNORMAL /HPF
BASOPHILS # BLD AUTO: 0.03 10*3/MM3 (ref 0–0.2)
BASOPHILS NFR BLD AUTO: 0.2 % (ref 0–1.5)
BILIRUB SERPL-MCNC: 1.6 MG/DL (ref 0–1.2)
BILIRUB UR QL STRIP: NEGATIVE
BUN SERPL-MCNC: 23 MG/DL (ref 8–23)
BUN/CREAT SERPL: 30.3 (ref 7–25)
CALCIUM SPEC-SCNC: 9.9 MG/DL (ref 8.6–10.5)
CHLORIDE SERPL-SCNC: 100 MMOL/L (ref 98–107)
CK SERPL-CCNC: 309 U/L (ref 20–180)
CLARITY UR: ABNORMAL
CO2 SERPL-SCNC: 21.2 MMOL/L (ref 22–29)
COLOR UR: YELLOW
CREAT SERPL-MCNC: 0.76 MG/DL (ref 0.57–1)
DEPRECATED RDW RBC AUTO: 47.8 FL (ref 37–54)
EGFRCR SERPLBLD CKD-EPI 2021: 81.8 ML/MIN/1.73
EOSINOPHIL # BLD AUTO: 0.01 10*3/MM3 (ref 0–0.4)
EOSINOPHIL NFR BLD AUTO: 0.1 % (ref 0.3–6.2)
ERYTHROCYTE [DISTWIDTH] IN BLOOD BY AUTOMATED COUNT: 13.6 % (ref 12.3–15.4)
GLOBULIN UR ELPH-MCNC: 3.3 GM/DL
GLUCOSE BLDC GLUCOMTR-MCNC: 157 MG/DL (ref 70–99)
GLUCOSE BLDC GLUCOMTR-MCNC: 157 MG/DL (ref 70–99)
GLUCOSE BLDC GLUCOMTR-MCNC: 181 MG/DL (ref 70–99)
GLUCOSE BLDC GLUCOMTR-MCNC: 183 MG/DL (ref 70–99)
GLUCOSE SERPL-MCNC: 180 MG/DL (ref 65–99)
GLUCOSE UR STRIP-MCNC: ABNORMAL MG/DL
HCT VFR BLD AUTO: 39.6 % (ref 34–46.6)
HGB BLD-MCNC: 12.5 G/DL (ref 12–15.9)
HGB UR QL STRIP.AUTO: ABNORMAL
HOLD SPECIMEN: NORMAL
HOLD SPECIMEN: NORMAL
HYALINE CASTS UR QL AUTO: ABNORMAL /LPF
IMM GRANULOCYTES # BLD AUTO: 0.07 10*3/MM3 (ref 0–0.05)
IMM GRANULOCYTES NFR BLD AUTO: 0.4 % (ref 0–0.5)
INR PPP: 1.09 (ref 0.86–1.15)
KETONES UR QL STRIP: ABNORMAL
LEUKOCYTE ESTERASE UR QL STRIP.AUTO: ABNORMAL
LYMPHOCYTES # BLD AUTO: 0.43 10*3/MM3 (ref 0.7–3.1)
LYMPHOCYTES NFR BLD AUTO: 2.7 % (ref 19.6–45.3)
MCH RBC QN AUTO: 30.3 PG (ref 26.6–33)
MCHC RBC AUTO-ENTMCNC: 31.6 G/DL (ref 31.5–35.7)
MCV RBC AUTO: 96.1 FL (ref 79–97)
MONOCYTES # BLD AUTO: 1.07 10*3/MM3 (ref 0.1–0.9)
MONOCYTES NFR BLD AUTO: 6.8 % (ref 5–12)
NEUTROPHILS NFR BLD AUTO: 14.22 10*3/MM3 (ref 1.7–7)
NEUTROPHILS NFR BLD AUTO: 89.8 % (ref 42.7–76)
NITRITE UR QL STRIP: POSITIVE
NRBC BLD AUTO-RTO: 0 /100 WBC (ref 0–0.2)
PH UR STRIP.AUTO: <=5 [PH] (ref 5–8)
PLATELET # BLD AUTO: 233 10*3/MM3 (ref 140–450)
PMV BLD AUTO: 11.6 FL (ref 6–12)
POTASSIUM SERPL-SCNC: 4.2 MMOL/L (ref 3.5–5.2)
PROT SERPL-MCNC: 7.7 G/DL (ref 6–8.5)
PROT UR QL STRIP: ABNORMAL
PROTHROMBIN TIME: 14.3 SECONDS (ref 11.8–14.9)
QT INTERVAL: 409 MS
QTC INTERVAL: 481 MS
RBC # BLD AUTO: 4.12 10*6/MM3 (ref 3.77–5.28)
RBC # UR STRIP: ABNORMAL /HPF
REF LAB TEST METHOD: ABNORMAL
SODIUM SERPL-SCNC: 138 MMOL/L (ref 136–145)
SP GR UR STRIP: 1.03 (ref 1–1.03)
SQUAMOUS #/AREA URNS HPF: ABNORMAL /HPF
UROBILINOGEN UR QL STRIP: ABNORMAL
WBC # UR STRIP: ABNORMAL /HPF
WBC NRBC COR # BLD AUTO: 15.83 10*3/MM3 (ref 3.4–10.8)
WHOLE BLOOD HOLD COAG: NORMAL
WHOLE BLOOD HOLD SPECIMEN: NORMAL
YEAST URNS QL MICRO: ABNORMAL /HPF

## 2024-09-24 PROCEDURE — 87077 CULTURE AEROBIC IDENTIFY: CPT | Performed by: EMERGENCY MEDICINE

## 2024-09-24 PROCEDURE — 71045 X-RAY EXAM CHEST 1 VIEW: CPT

## 2024-09-24 PROCEDURE — 25010000002 ONDANSETRON PER 1 MG: Performed by: EMERGENCY MEDICINE

## 2024-09-24 PROCEDURE — 73562 X-RAY EXAM OF KNEE 3: CPT

## 2024-09-24 PROCEDURE — 81001 URINALYSIS AUTO W/SCOPE: CPT | Performed by: EMERGENCY MEDICINE

## 2024-09-24 PROCEDURE — 82550 ASSAY OF CK (CPK): CPT | Performed by: EMERGENCY MEDICINE

## 2024-09-24 PROCEDURE — 85025 COMPLETE CBC W/AUTO DIFF WBC: CPT | Performed by: EMERGENCY MEDICINE

## 2024-09-24 PROCEDURE — 63710000001 INSULIN LISPRO (HUMAN) PER 5 UNITS: Performed by: HOSPITALIST

## 2024-09-24 PROCEDURE — 87186 SC STD MICRODIL/AGAR DIL: CPT | Performed by: EMERGENCY MEDICINE

## 2024-09-24 PROCEDURE — 87086 URINE CULTURE/COLONY COUNT: CPT | Performed by: EMERGENCY MEDICINE

## 2024-09-24 PROCEDURE — 25010000002 CEFTRIAXONE PER 250 MG: Performed by: EMERGENCY MEDICINE

## 2024-09-24 PROCEDURE — 25810000003 SODIUM CHLORIDE 0.9 % SOLUTION: Performed by: EMERGENCY MEDICINE

## 2024-09-24 PROCEDURE — 85610 PROTHROMBIN TIME: CPT | Performed by: EMERGENCY MEDICINE

## 2024-09-24 PROCEDURE — 25010000002 HYDROMORPHONE 1 MG/ML SOLUTION: Performed by: EMERGENCY MEDICINE

## 2024-09-24 PROCEDURE — 70450 CT HEAD/BRAIN W/O DYE: CPT

## 2024-09-24 PROCEDURE — 82948 REAGENT STRIP/BLOOD GLUCOSE: CPT

## 2024-09-24 PROCEDURE — 99285 EMERGENCY DEPT VISIT HI MDM: CPT

## 2024-09-24 PROCEDURE — 99223 1ST HOSP IP/OBS HIGH 75: CPT | Performed by: HOSPITALIST

## 2024-09-24 PROCEDURE — 93005 ELECTROCARDIOGRAM TRACING: CPT | Performed by: EMERGENCY MEDICINE

## 2024-09-24 PROCEDURE — 80053 COMPREHEN METABOLIC PANEL: CPT | Performed by: EMERGENCY MEDICINE

## 2024-09-24 PROCEDURE — 73502 X-RAY EXAM HIP UNI 2-3 VIEWS: CPT

## 2024-09-24 RX ORDER — ONDANSETRON 2 MG/ML
4 INJECTION INTRAMUSCULAR; INTRAVENOUS ONCE
Status: COMPLETED | OUTPATIENT
Start: 2024-09-24 | End: 2024-09-24

## 2024-09-24 RX ORDER — NICOTINE POLACRILEX 4 MG
15 LOZENGE BUCCAL
Status: DISCONTINUED | OUTPATIENT
Start: 2024-09-24 | End: 2024-09-28 | Stop reason: HOSPADM

## 2024-09-24 RX ORDER — IBUPROFEN 600 MG/1
1 TABLET ORAL
Status: DISCONTINUED | OUTPATIENT
Start: 2024-09-24 | End: 2024-09-28 | Stop reason: HOSPADM

## 2024-09-24 RX ORDER — INSULIN LISPRO 100 [IU]/ML
2-7 INJECTION, SOLUTION INTRAVENOUS; SUBCUTANEOUS
Status: DISCONTINUED | OUTPATIENT
Start: 2024-09-24 | End: 2024-09-28 | Stop reason: HOSPADM

## 2024-09-24 RX ORDER — DEXTROSE MONOHYDRATE 25 G/50ML
25 INJECTION, SOLUTION INTRAVENOUS
Status: DISCONTINUED | OUTPATIENT
Start: 2024-09-24 | End: 2024-09-28 | Stop reason: HOSPADM

## 2024-09-24 RX ORDER — METOPROLOL TARTRATE 25 MG/1
25 TABLET, FILM COATED ORAL 2 TIMES DAILY
Status: DISCONTINUED | OUTPATIENT
Start: 2024-09-24 | End: 2024-09-28 | Stop reason: HOSPADM

## 2024-09-24 RX ORDER — DULOXETIN HYDROCHLORIDE 30 MG/1
60 CAPSULE, DELAYED RELEASE ORAL DAILY
Status: DISCONTINUED | OUTPATIENT
Start: 2024-09-24 | End: 2024-09-28 | Stop reason: HOSPADM

## 2024-09-24 RX ORDER — HYDROCODONE BITARTRATE AND ACETAMINOPHEN 5; 325 MG/1; MG/1
1 TABLET ORAL EVERY 6 HOURS PRN
Status: DISCONTINUED | OUTPATIENT
Start: 2024-09-24 | End: 2024-09-25

## 2024-09-24 RX ORDER — LEVETIRACETAM 500 MG/1
1000 TABLET ORAL NIGHTLY
Status: DISCONTINUED | OUTPATIENT
Start: 2024-09-24 | End: 2024-09-28 | Stop reason: HOSPADM

## 2024-09-24 RX ORDER — LEVETIRACETAM 500 MG/1
1000 TABLET ORAL NIGHTLY
COMMUNITY

## 2024-09-24 RX ORDER — LISINOPRIL 10 MG/1
10 TABLET ORAL DAILY
Status: DISCONTINUED | OUTPATIENT
Start: 2024-09-24 | End: 2024-09-28 | Stop reason: HOSPADM

## 2024-09-24 RX ORDER — ATORVASTATIN CALCIUM 40 MG/1
80 TABLET, FILM COATED ORAL DAILY
Status: DISCONTINUED | OUTPATIENT
Start: 2024-09-24 | End: 2024-09-28 | Stop reason: HOSPADM

## 2024-09-24 RX ADMIN — HYDROMORPHONE HYDROCHLORIDE 1 MG: 1 INJECTION, SOLUTION INTRAMUSCULAR; INTRAVENOUS; SUBCUTANEOUS at 16:00

## 2024-09-24 RX ADMIN — DULOXETINE HYDROCHLORIDE 60 MG: 30 CAPSULE, DELAYED RELEASE ORAL at 18:33

## 2024-09-24 RX ADMIN — INSULIN LISPRO 2 UNITS: 100 INJECTION, SOLUTION INTRAVENOUS; SUBCUTANEOUS at 21:53

## 2024-09-24 RX ADMIN — LISINOPRIL 10 MG: 10 TABLET ORAL at 18:33

## 2024-09-24 RX ADMIN — SODIUM CHLORIDE 500 ML: 9 INJECTION, SOLUTION INTRAVENOUS at 11:25

## 2024-09-24 RX ADMIN — ATORVASTATIN CALCIUM 80 MG: 40 TABLET, FILM COATED ORAL at 18:33

## 2024-09-24 RX ADMIN — CEFTRIAXONE SODIUM 1000 MG: 1 INJECTION, POWDER, FOR SOLUTION INTRAMUSCULAR; INTRAVENOUS at 11:25

## 2024-09-24 RX ADMIN — METOPROLOL TARTRATE 25 MG: 25 TABLET, FILM COATED ORAL at 21:47

## 2024-09-24 RX ADMIN — HYDROMORPHONE HYDROCHLORIDE 1 MG: 1 INJECTION, SOLUTION INTRAMUSCULAR; INTRAVENOUS; SUBCUTANEOUS at 11:21

## 2024-09-24 RX ADMIN — ONDANSETRON 4 MG: 2 INJECTION INTRAMUSCULAR; INTRAVENOUS at 16:00

## 2024-09-24 RX ADMIN — ONDANSETRON 4 MG: 2 INJECTION INTRAMUSCULAR; INTRAVENOUS at 11:21

## 2024-09-24 RX ADMIN — LEVETIRACETAM 1000 MG: 500 TABLET, FILM COATED ORAL at 21:47

## 2024-09-24 NOTE — CASE MANAGEMENT/SOCIAL WORK
Discharge Planning Assessment  GANESH Shearer     Patient Name: Haylee Gibbs  MRN: [id number]  Today's Date: 9/24/2024    Admit Date: 9/24/2024        Discharge Needs Assessment       Row Name 09/24/24 1435       Living Environment    People in Home alone    Current Living Arrangements apartment    Potentially Unsafe Housing Conditions none    In the past 12 months has the electric, gas, oil, or water company threatened to shut off services in your home? No    Primary Care Provided by self    Provides Primary Care For no one, unable/limited ability to care for self    Family Caregiver if Needed other relative(s)    Quality of Family Relationships supportive;helpful    Able to Return to Prior Arrangements yes       Resource/Environmental Concerns    Resource/Environmental Concerns none    Transportation Concerns none       Transportation Needs    In the past 12 months, has lack of transportation kept you from medical appointments or from getting medications? no    In the past 12 months, has lack of transportation kept you from meetings, work, or from getting things needed for daily living? No       Food Insecurity    Within the past 12 months, you worried that your food would run out before you got the money to buy more. Never true    Within the past 12 months, the food you bought just didn't last and you didn't have money to get more. Never true       Transition Planning    Patient/Family Anticipates Transition to home;inpatient rehabilitation facility    Patient/Family Anticipated Services at Transition rehabilitation services    Transportation Anticipated family or friend will provide;health plan transportation       Discharge Needs Assessment    Readmission Within the Last 30 Days no previous admission in last 30 days    Equipment Currently Used at Home none    Concerns to be Addressed denies needs/concerns at this time    Anticipated Changes Related to Illness inability to care for self    Equipment Needed After  Discharge none                   Discharge Plan    No documentation.                 Continued Care and Services - Admitted Since 9/24/2024    No active coordination exists for this encounter.          Demographic Summary       Row Name 09/24/24 1434       General Information    Admission Type inpatient    Arrived From home    Referral Source emergency department    Reason for Consult discharge planning    Preferred Language English                   Functional Status       Row Name 09/24/24 1434       Functional Status    Usual Activity Tolerance moderate    Current Activity Tolerance poor       Physical Activity    On average, how many days per week do you engage in moderate to strenuous exercise (like a brisk walk)? 0 days    On average, how many minutes do you engage in exercise at this level? 0 min    Number of minutes of exercise per week 0       Assessment of Health Literacy    How often do you have someone help you read hospital materials? Sometimes    How often do you have problems learning about your medical condition because of difficulty understanding written information? Sometimes    How often do you have a problem understanding what is told to you about your medical condition? Sometimes    How confident are you filling out medical forms by yourself? Somewhat    Health Literacy Moderate       Functional Status, IADL    Medications independent    Meal Preparation independent    Housekeeping independent    Laundry independent    Shopping independent       Mental Status    General Appearance WDL WDL       Mental Status Summary    Recent Changes in Mental Status/Cognitive Functioning no changes       Employment/    Employment Status disabled;retired                   Psychosocial    No documentation.                  Abuse/Neglect       Row Name 09/24/24 1434       Personal Safety    Feels Unsafe at Home or Work/School no    Feels Threatened by Someone no    Does Anyone Try to Keep You From Having  "Contact with Others or Doing Things Outside Your Home? no    Physical Signs of Abuse Present no                   Legal       Row Name 09/24/24 3189       Financial Resource Strain    How hard is it for you to pay for the very basics like food, housing, medical care, and heating? Not very       Financial/Legal    Source of Income disability;pension/USP                   Substance Abuse    No documentation.                  Patient Forms    No documentation.                 SW met with pt at bedside this date to complete discharge needs assessment. Pt reports that she resides independently and is able to meet her own ADLs for the most part. Pt does report relying on family/friends for transportation as she does not drive. Pt reports that she does have DME at home, states she has \"all of it\"; however, she states that she does not use any of it. Pt reports that she is not on oxygen at home. Pt reports that she does receive housing benefits through [facility] and she is disabled/retired. She reports that her PCP is Kj Canales in [place] and her pharmacy of choice is [facility] in [place]. Pt does reports a living will/POA and believes it is on file at [facility]. Pt does report being open to possible inpatient rehab if recommended.     HANSEL Elias    "

## 2024-09-24 NOTE — NURSING NOTE
Pt is A&O X 4 and bedrest due to hip fracture. Pt scheduled for surgery tomorrow. Safety checks maintained throughout shift with pt resting in bed, bed in lowest position, wheels to bed locked, and personal items and call light within reach.

## 2024-09-24 NOTE — Clinical Note
Level of Care: Med/Surg [1]   Diagnosis: Fracture [049801]   Admitting Physician: BACILIO DURAND [[id number]]   Certification: I Certify That Inpatient Hospital Services Are Medically Necessary For Greater Than 2 Midnights

## 2024-09-24 NOTE — H&P
[facility] HISTORY AND PHYSICAL  Date: 2024   Patient Name: Haylee Gibbs  : 1950  MRN: [id number]  Primary Care Physician:  Kj Canales MD  Date of admission: 2024    Subjective fall with hip pain  Subjective   Chief Complaint: fall with hip pain     HPI:  Haylee Gibbs is a 74 y.o. female with fall and hip pain.  Patient hit her head. No LOC. She is unable to ambulate today.      Patient's VSS.    Head CT: Impression:   1.Moderate small vessel ischemic changes in the white matter.   2.No calvarial fracture or intracranial hemorrhage     Xray: Comminuted displaced left intertrochanteric fracture.     Chest xray clear.     Found to have a UTI.  WBC-15.83.      Personal History     Past Medical History:  Past Medical History:   Diagnosis Date    Anxiety disorder 2018    Benign essential hypertension     Cataract     COPD (chronic obstructive pulmonary disease)     CVA (cerebral vascular accident)     Depression     Diabetes mellitus, type 2     Hyperlipidemia     Paresthesia and pain of both upper extremities 2018    Seizure disorder 2018    Vitamin B 12 deficiency     Vitamin D deficiency 2018       Past Surgical History:  Past Surgical History:   Procedure Laterality Date    APPENDECTOMY      BACK SURGERY      LOW BACK DISC    CATARACT EXTRACTION      HYSTERECTOMY      NECK SURGERY      NECK DISC    TONSILLECTOMY         Family History:   Family History   Problem Relation Age of Onset    Cancer Mother     Seizures Mother     Stroke Mother     Multiple myeloma Mother     Heart attack Father     Diabetes type II Sister     Hypertension Sister     Cancer Sister     Heart disease Sister     Hyperlipidemia Sister     Hypertension Sister     Heart disease Sister     Hypertension Brother     Alcohol abuse Brother     Diabetes type II Brother     Stomach cancer Brother     Heart attack Brother     Heart disease Other         MOTHER, GRANDMOTHER, SISTER;  FATHER, GRANDFATHER OR BROTHER DEVELOPED HEART DISEASE BEFORE THE AGE OF 65       Social History:   Social History     Socioeconomic History    Marital status: Single   Tobacco Use    Smoking status: Former     Current packs/day: 0.00     Average packs/day: 0.5 packs/day for 20.0 years (10.0 ttl pk-yrs)     Types: Cigarettes     Start date:      Quit date: 2015     Years since quittin.7     Passive exposure: Past    Smokeless tobacco: Never   Vaping Use    Vaping status: Never Used   Substance and Sexual Activity    Alcohol use: Yes     Comment: occ    Drug use: Never    Sexual activity: Defer       Home Medications:  DULoxetine, Diclofenac Sodium, Dulaglutide, Ergocalciferol, Vitamin B-12, atorvastatin, clopidogrel, empagliflozin, levETIRAcetam, lisinopril, metFORMIN, and metoprolol tartrate    Allergies:  Allergies   Allergen Reactions    Tramadol Hcl GI Intolerance       Review of Systems   All systems were reviewed and negative except for: hip pain     Objective   Objective     Vitals:   Temp:  [98.4 °F (36.9 °C)] 98.4 °F (36.9 °C)  Heart Rate:  [75-87] 75  Resp:  [18] 18  BP: (143-169)/(66-71) 143/71    Physical Exam    Constitutional: Awake, alert, no acute distress   Eyes: Pupils equal, sclerae anicteric, no conjunctival injection   HENT: NCAT, mucous membranes moist   Neck: Supple, no thyromegaly, no lymphadenopathy, trachea midline   Respiratory: Clear to auscultation bilaterally, nonlabored respirations    Cardiovascular: RRR, no murmurs, rubs, or gallops, palpable pedal pulses bilaterally   Gastrointestinal: Positive bowel sounds, soft, nontender, nondistended   Musculoskeletal: No bilateral ankle edema, no clubbing or cyanosis to extremities   Psychiatric: Appropriate affect, cooperative   Neurologic: Oriented x 3, strength symmetric in all extremities, Cranial Nerves grossly intact to confrontation, speech clear   Skin: No rashes     Result Review    Result Review:  I have personally  reviewed the results from the time of this admission to 9/24/2024 15:10 EDT and agree with these findings:  [x]  Laboratory  [x]  Microbiology  [x]  Radiology  [x]  EKG/Telemetry   [x]  Cardiology/Vascular   [x]  Pathology  [x]  Old records  []  Other:      Assessment & Plan   Assessment / Plan   Comminuted displaced left intertrochanteric fracture.  UTI  HTN  NIIDM  Depression  Seizures    Plan:  Ortho consulted. Surgery planned for tomorrow.   Rocephin for UTI  ISS  Resumed home medications. Holding plavix.            VTE Prophylaxis:  Mechanical VTE prophylaxis orders are present.        CODE STATUS:    Level Of Support Discussed With: Patient  Code Status (Patient has no pulse and is not breathing): CPR (Attempt to Resuscitate)  Medical Interventions (Patient has pulse or is breathing): Full Support      Admission Status:  I believe this patient meets inpatient status.    Electronically signed by So Beth DO, 09/24/24, 3:01 PM EDT.

## 2024-09-24 NOTE — ED PROVIDER NOTES
Time: 10:22 AM EDT  Date of encounter:  9/24/2024  Independent Historian/Clinical History and Information was obtained by:   Patient, Family, and EMS    History is limited by: N/A    Chief Complaint: Fall with hip pain      History of Present Illness:  Patient is a 74 y.o. year old female who presents to the emergency department for evaluation of a fall with left hip pain.  This patient has a history of COPD, previous stroke (currently taking Plavix), diabetes and hypertension.  She was going to the bathroom last night and she fell and injured her left hip.  The patient did hit her head however she did not lose consciousness.  She was unable to get up and family found her this morning.  The patient's complaint is primarily left hip but she also complains of some mild left knee pain.  She has had no vomiting or mental status changes and she has not been ill recently.      Patient Care Team  Primary Care Provider: Kj Canales MD    Past Medical History:     Allergies   Allergen Reactions    Tramadol Hcl GI Intolerance     Past Medical History:   Diagnosis Date    Anxiety disorder 06/26/2018    Benign essential hypertension     Cataract     COPD (chronic obstructive pulmonary disease)     CVA (cerebral vascular accident)     Depression     Diabetes mellitus, type 2     Hyperlipidemia     Paresthesia and pain of both upper extremities 06/26/2018    Seizure disorder 06/26/2018    Vitamin B 12 deficiency     Vitamin D deficiency 06/26/2018     Past Surgical History:   Procedure Laterality Date    APPENDECTOMY      BACK SURGERY      LOW BACK DISC    CATARACT EXTRACTION      HYSTERECTOMY      NECK SURGERY      NECK DISC    TONSILLECTOMY       Family History   Problem Relation Age of Onset    Cancer Mother     Seizures Mother     Stroke Mother     Multiple myeloma Mother     Heart attack Father     Diabetes type II Sister     Hypertension Sister     Cancer Sister     Heart disease Sister     Hyperlipidemia Sister      Hypertension Sister     Heart disease Sister     Hypertension Brother     Alcohol abuse Brother     Diabetes type II Brother     Stomach cancer Brother     Heart attack Brother     Heart disease Other         MOTHER, GRANDMOTHER, SISTER; FATHER, GRANDFATHER OR BROTHER DEVELOPED HEART DISEASE BEFORE THE AGE OF 65       Home Medications:  Prior to Admission medications    Medication Sig Start Date End Date Taking? Authorizing Provider   atorvastatin (LIPITOR) 80 MG tablet TAKE 1 TABLET BY MOUTH EVERY NIGHT AT BEDTIME 8/13/24   jK Canales MD   Blood Glucose Monitoring Suppl (FreeStyle Lite) w/Device kit USE TO TEST GLUCOSE DAILY 1/20/23   Juan Avilez MD   clopidogrel (PLAVIX) 75 MG tablet TAKE 1 TABLET BY MOUTH EVERY DAY 8/13/24   Kj Canales MD   Cyanocobalamin (Vitamin B-12) 1000 MCG sublingual tablet Place 1 tablet under the tongue Daily. 2/20/24   Kj Canales MD   Diclofenac Sodium (VOLTAREN) 1 % gel gel apply 4 grams topically to the appropriate area as directed FOUR TIMES DAILY AS NEEDED FOR PAIN 8/13/24   Kj Canales MD   Dulaglutide (Trulicity) 0.75 MG/0.5ML solution pen-injector Inject 0.75 mg under the skin into the appropriate area as directed 1 (One) Time Per Week. 8/27/24   Kj Canales MD   DULoxetine (CYMBALTA) 60 MG capsule TAKE 1 CAPSULE BY MOUTH EVERY DAY 8/13/24   Kj Canales MD   Ergocalciferol 50 MCG (2000 UT) capsule Take 2,000 Units by mouth Daily. 2/27/24   Juan Avilez MD   estradiol (ESTRACE) 0.1 MG/GM vaginal cream Insert 1 g into the vagina As Needed.    Juan Avilez MD   glucose blood (Contour Next Test) test strip Test BG BID 2/20/24   Kj Canales MD   Jardiance 25 MG tablet tablet TAKE 1 TABLET BY MOUTH EVERY DAY 8/13/24   Kj Canales MD   Lancets (freestyle) lancets Test BG daily 2/20/24   Kj Canales MD   levETIRAcetam (KEPPRA) 500 MG tablet TAKE 1 TABLET BY MOUTH  "EVERY MORNING and two tablets BY MOUTH EVERY NIGHT AT BEDTIME 24   Kj Canales MD   lisinopril (PRINIVIL,ZESTRIL) 10 MG tablet TAKE 1 TABLET BY MOUTH EVERY DAY 24   Kj Canales MD   metFORMIN (GLUCOPHAGE) 500 MG tablet TAKE 1 TABLET BY MOUTH TWICE DAILY 24   Kj Canales MD   metoprolol tartrate (LOPRESSOR) 25 MG tablet TAKE 1 TABLET BY MOUTH TWICE DAILY 24   Kj Canales MD        Social History:   Social History     Tobacco Use    Smoking status: Former     Current packs/day: 0.00     Average packs/day: 0.5 packs/day for 20.0 years (10.0 ttl pk-yrs)     Types: Cigarettes     Start date:      Quit date: 2015     Years since quittin.7     Passive exposure: Past    Smokeless tobacco: Never   Vaping Use    Vaping status: Never Used   Substance Use Topics    Alcohol use: Yes     Comment: occ    Drug use: Never         Review of Systems:  Review of Systems   Constitutional:  Negative for chills and fever.   HENT:  Negative for congestion, ear pain and sore throat.    Eyes:  Negative for pain.   Respiratory:  Negative for cough, chest tightness and shortness of breath.    Cardiovascular:  Negative for chest pain.   Gastrointestinal:  Negative for abdominal pain, diarrhea, nausea and vomiting.   Genitourinary:  Negative for flank pain and hematuria.   Musculoskeletal:  Positive for arthralgias, gait problem and myalgias. Negative for joint swelling.        Left hip pain   Skin:  Negative for pallor.   Neurological:  Positive for weakness. Negative for seizures and headaches.   All other systems reviewed and are negative.       Physical Exam:  /71   Pulse 75   Temp 98.4 °F (36.9 °C) (Oral)   Resp 18   Ht 166.4 cm (65.51\")   Wt 79.8 kg (175 lb 14.8 oz)   SpO2 94%   BMI 28.82 kg/m²     Physical Exam  Vitals and nursing note reviewed.   Constitutional:       General: She is not in acute distress.     Appearance: Normal appearance. She is not " toxic-appearing.   HENT:      Head: Normocephalic and atraumatic.      Right Ear: External ear normal.      Left Ear: External ear normal.      Nose: Nose normal.      Mouth/Throat:      Mouth: Mucous membranes are moist.      Pharynx: Oropharynx is clear.   Eyes:      General: No scleral icterus.     Extraocular Movements: Extraocular movements intact.      Pupils: Pupils are equal, round, and reactive to light.   Cardiovascular:      Rate and Rhythm: Normal rate and regular rhythm.      Pulses: Normal pulses.      Heart sounds: Normal heart sounds.   Pulmonary:      Effort: Pulmonary effort is normal. No respiratory distress.      Breath sounds: Normal breath sounds.   Abdominal:      General: Abdomen is flat.      Palpations: Abdomen is soft.      Tenderness: There is no abdominal tenderness.   Musculoskeletal:         General: Tenderness, deformity and signs of injury present.      Cervical back: Normal range of motion and neck supple.      Comments: Obvious tenderness within internal rotation of the left hip.  Distal neurovascular function is intact.    Tenderness to the left knee with normal range of motion.   Skin:     General: Skin is warm and dry.      Capillary Refill: Capillary refill takes less than 2 seconds.   Neurological:      General: No focal deficit present.      Mental Status: She is alert and oriented to person, place, and time. Mental status is at baseline.                  Procedures:  Procedures      Medical Decision Making:      Comorbidities that affect care:    Previous stroke.    External Notes reviewed:    Previous Clinic Note: Office visit for chronic kidney disease.      The following orders were placed and all results were independently analyzed by me:  Orders Placed This Encounter   Procedures    Urine Culture - Urine,    XR Hip With or Without Pelvis 2 - 3 View Left    XR Chest 1 View    CT Head Without Contrast    XR Knee 3 View Left    [place] Draw    Comprehensive Metabolic Panel     Protime-INR    Urinalysis With Culture If Indicated - Urine, Clean Catch    CBC Auto Differential    Urinalysis, Microscopic Only - Urine, Clean Catch    CK    NPO Diet NPO Type: Strict NPO    POC Glucose Once    ECG 12 Lead Pre-Op / Pre-Procedure    Green Top (Gel)    Lavender Top    Gold Top - SST    Light Blue Top    CBC & Differential       Medications Given in the Emergency Department:  Medications   HYDROmorphone (DILAUDID) injection 1 mg (1 mg Intravenous Given 9/24/24 1121)   ondansetron (ZOFRAN) injection 4 mg (4 mg Intravenous Given 9/24/24 1121)   sodium chloride 0.9 % bolus 500 mL (0 mL Intravenous Stopped 9/24/24 1207)   cefTRIAXone (ROCEPHIN) 1,000 mg in sodium chloride 0.9 % 100 mL IVPB-VTB (0 mg Intravenous Stopped 9/24/24 1207)        ED Course:       EKG: Sinus rhythm rate 82 beats per  PACs  Nonspecific ST changes  No acute ischemia.  Labs:    Lab Results (last 24 hours)       Procedure Component Value Units Date/Time    POC Glucose Once [[id number]]  (Abnormal) Collected: 09/24/24 1017    Specimen: Blood Updated: 09/24/24 1023     Glucose 181 mg/dL      Comment: Serial Number: [id number]:  [id number]       CBC & Differential [[id number]]  (Abnormal) Collected: 09/24/24 1024    Specimen: Blood Updated: 09/24/24 1032    Narrative:      The following orders were created for panel order CBC & Differential.  Procedure                               Abnormality         Status                     ---------                               -----------         ------                     CBC Auto Differential[[id number]]        Abnormal            Final result                 Please view results for these tests on the individual orders.    Comprehensive Metabolic Panel [[id number]]  (Abnormal) Collected: 09/24/24 1024    Specimen: Blood Updated: 09/24/24 1051     Glucose 180 mg/dL      BUN 23 mg/dL      Creatinine 0.76 mg/dL      Sodium 138 mmol/L      Potassium 4.2 mmol/L      Comment: Slight hemolysis  detected by analyzer. Result may be falsely elevated.        Chloride 100 mmol/L      CO2 21.2 mmol/L      Calcium 9.9 mg/dL      Total Protein 7.7 g/dL      Albumin 4.4 g/dL      ALT (SGPT) 18 U/L      AST (SGOT) 25 U/L      Alkaline Phosphatase 50 U/L      Total Bilirubin 1.6 mg/dL      Globulin 3.3 gm/dL      A/G Ratio 1.3 g/dL      BUN/Creatinine Ratio 30.3     Anion Gap 16.8 mmol/L      eGFR 81.8 mL/min/1.73     Narrative:      GFR Normal >60  Chronic Kidney Disease <60  Kidney Failure <15    The GFR formula is only valid for adults with stable renal function between ages 18 and 70.    Protime-INR [[id number]]  (Normal) Collected: 09/24/24 1024    Specimen: Blood Updated: 09/24/24 1043     Protime 14.3 Seconds      INR 1.09    Narrative:      Suggested Therapeutic Ranges For Oral Anticoagulant Therapy:  Level of Therapy                      INR Target Range  Standard Dose                            2.0-3.0  High Dose                                2.5-3.5  Patients not receiving anticoagulant  Therapy Normal Range                     0.86-1.15    CBC Auto Differential [[id number]]  (Abnormal) Collected: 09/24/24 1024    Specimen: Blood Updated: 09/24/24 1032     WBC 15.83 10*3/mm3      RBC 4.12 10*6/mm3      Hemoglobin 12.5 g/dL      Hematocrit 39.6 %      MCV 96.1 fL      MCH 30.3 pg      MCHC 31.6 g/dL      RDW 13.6 %      RDW-SD 47.8 fl      MPV 11.6 fL      Platelets 233 10*3/mm3      Neutrophil % 89.8 %      Lymphocyte % 2.7 %      Monocyte % 6.8 %      Eosinophil % 0.1 %      Basophil % 0.2 %      Immature Grans % 0.4 %      Neutrophils, Absolute 14.22 10*3/mm3      Lymphocytes, Absolute 0.43 10*3/mm3      Monocytes, Absolute 1.07 10*3/mm3      Eosinophils, Absolute 0.01 10*3/mm3      Basophils, Absolute 0.03 10*3/mm3      Immature Grans, Absolute 0.07 10*3/mm3      nRBC 0.0 /100 WBC     CK [[id number]]  (Abnormal) Collected: 09/24/24 1024    Specimen: Blood Updated: 09/24/24 1120     Creatine Kinase 309 U/L      Urinalysis With Culture If Indicated - Urine, Clean Catch [[id number]]  (Abnormal) Collected: 09/24/24 1052    Specimen: Urine, Clean Catch Updated: 09/24/24 1109     Color, UA Yellow     Appearance, UA Cloudy     pH, UA <=5.0     Specific Gravity, UA 1.027     Glucose, UA >=1000 mg/dL (3+)     Ketones, UA 15 mg/dL (1+)     Bilirubin, UA Negative     Blood, UA Small (1+)     Protein, UA Trace     Leuk Esterase, UA Small (1+)     Nitrite, UA Positive     Urobilinogen, UA 0.2 E.U./dL    Narrative:      In absence of clinical symptoms, the presence of pyuria, bacteria, and/or nitrites on the urinalysis result does not correlate with infection.    Urinalysis, Microscopic Only - Urine, Clean Catch [[id number]]  (Abnormal) Collected: 09/24/24 1052    Specimen: Urine, Clean Catch Updated: 09/24/24 1121     RBC, UA None Seen /HPF      WBC, UA 6-10 /HPF      Bacteria, UA 1+ /HPF      Squamous Epithelial Cells, UA 0-2 /HPF      Yeast, UA Large/3+ Budding Yeast /HPF      Hyaline Casts, UA None Seen /LPF      Methodology Manual Light Microscopy    Urine Culture - Urine, Urine, Clean Catch [[id number]] Collected: 09/24/24 1052    Specimen: Urine, Clean Catch Updated: 09/24/24 1101             Imaging:    CT Head Without Contrast    Result Date: 9/24/2024  CT HEAD WO CONTRAST Date of Exam: 9/24/2024 12:07 PM EDT Indication: Trauma. Comparison: None available. Technique: Axial CT images were obtained of the head without contrast administration.  Reconstructed coronal and sagittal images were also obtained. Automated exposure control and iterative construction methods were used. Findings: There is moderate low-density in the cerebral white matter consistent with small vessel ischemic disease. In the lateral right frontal lobe is a small chronic appearing subcortical infarct. A chronic lacunar infarct is noted in the left caudate nucleus head. No specific CT evidence of acute infarction, mass or intracranial hemorrhage is seen.  Moderate diffuse age-appropriate cerebral and cerebellar atrophy is noted. Severe atherosclerotic calcification of the intracranial ICAs is seen. Visualized extracranial soft tissues appear normal. Mucosal inflammatory thickening is noted in the right sphenoid sinus. No focal calvarial abnormality is seen.     Impression: 1.Moderate small vessel ischemic changes in the white matter. 2.No calvarial fracture or intracranial hemorrhage Electronically Signed: Joe Briseno MD  9/24/2024 12:54 PM EDT  Workstation ID: [id number]    XR Knee 3 View Left    Result Date: 9/24/2024  XR KNEE 3 VW LEFT Date of Exam: 9/24/2024 11:42 AM EDT Indication: Injury Comparison: Left knee radiographs dated 3/30/2022 Findings: There is no acute fracture or dislocation. The joint spaces are normally aligned. There is mild medial compartment joint space narrowing. There is chondrocalcinosis within the medial and lateral compartment. There is a trace joint effusion. There is peripheral vascular atherosclerotic calcification. Bone realization is normal. There is no osseous erosion.     Impression: 1. No acute osseous abnormality. 2. Mild medial compartment joint space narrowing. 3. Chondrocalcinosis within the medial and lateral compartment of the left knee which can be seen with CPPD arthropathy. Electronically Signed: Ammon Ling  9/24/2024 11:58 AM EDT  Workstation ID: [id number]    XR Hip With or Without Pelvis 2 - 3 View Left    Result Date: 9/24/2024  XR HIP W OR WO PELVIS 2-3 VIEW LEFT Date of Exam: 9/24/2024 10:37 AM EDT Indication: Fall Comparison: None available. Findings: 3 films. There is an oblique comminuted fracture through the left intertrochanteric region. There is up to approximately 15 mm of separation of fracture fragments. The left femoral head remains seated within the left acetabulum. The bony pelvic ring is intact. The right hip joint is maintained.     Impression: Comminuted displaced left intertrochanteric fracture.  Electronically Signed: Cecilia Ibarra MD  9/24/2024 10:58 AM EDT  Workstation ID: [id number]    XR Chest 1 View    Result Date: 9/24/2024  XR CHEST 1 VW Date of Exam: 9/24/2024 10:37 AM EDT Indication: Preop Comparison: 11/26/2022. Findings: Heart and pulmonary vessels appear within normal limits. Lung fields are clear of acute infiltrates or effusions. There is no pneumothorax.     Impression: Negative. Electronically Signed: Cecilia Ibarra MD  9/24/2024 10:57 AM EDT  Workstation ID: [id number]       Differential Diagnosis and Discussion:    Extremity Pain: Differential diagnosis includes but is not limited to soft tissue sprain, tendonitis, tendon injury, dislocation, fracture, deep vein thrombosis, arterial insufficiency, osteoarthritis, bursitis, and ligamentous damage.    All labs were reviewed and interpreted by me.  All X-rays impressions were independently interpreted by me.  EKG was interpreted by me.    MDM     Amount and/or Complexity of Data Reviewed  Clinical lab tests: reviewed  Tests in the radiology section of CPT®: reviewed  Tests in the medicine section of CPT®: reviewed                       Patient Care Considerations:    CT ABDOMEN AND PELVIS: I considered ordering a CT scan of the abdomen and pelvis however the patient has no abdominal pain or tenderness.      Consultants/Shared Management Plan:    Hospitalist: I have discussed the case with hospitalist who agrees to accept the patient for admission.  Consultant: I have discussed the case with Dr. Singleton who states please hold Plavix and he will take the patient to the OR tomorrow for repair of the hip.    Social Determinants of Health:    Patient is unable to carry out activities of daily life. Escalation of care is necessary.       Disposition and Care Coordination:    Admit:   Through independent evaluation of the patient's history, physical, and imperical data, the patient meets criteria for inpatient admission to the hospital.        Medication List        Changed      Diclofenac Sodium 1 % gel gel  Commonly known as: VOLTAREN  apply 4 grams topically to the appropriate area as directed FOUR TIMES DAILY AS NEEDED FOR PAIN  What changed: See the new instructions.     Trulicity 0.75 MG/0.5ML solution pen-injector  Generic drug: Dulaglutide  Inject 0.75 mg under the skin into the appropriate area as directed 1 (One) Time Per Week.  What changed: additional instructions     Vitamin B-12 1000 MCG sublingual tablet  Place 1 tablet under the tongue Daily.  What changed:   when to take this  additional instructions            ASK your doctor about these medications      * levETIRAcetam 500 MG tablet  Commonly known as: KEPPRA  Ask about: Which instructions should I use?     * levETIRAcetam 500 MG tablet  Commonly known as: KEPPRA  Ask about: Which instructions should I use?           * This list has 2 medication(s) that are the same as other medications prescribed for you. Read the directions carefully, and ask your doctor or other care provider to review them with you.               No follow-up provider specified.     Final diagnoses:   Closed fracture of left hip, initial encounter        ED Disposition       ED Disposition   Intended Admit    Condition   --    Comment   --               This medical record created using voice recognition software.             Abdoul Alfred, DO  09/24/24 0939

## 2024-09-25 ENCOUNTER — ANESTHESIA (OUTPATIENT)
Dept: PERIOP | Facility: HOSPITAL | Age: 74
End: 2024-09-25
Payer: MEDICARE

## 2024-09-25 ENCOUNTER — APPOINTMENT (OUTPATIENT)
Dept: GENERAL RADIOLOGY | Facility: HOSPITAL | Age: 74
End: 2024-09-25
Payer: MEDICARE

## 2024-09-25 ENCOUNTER — ANESTHESIA EVENT (OUTPATIENT)
Dept: PERIOP | Facility: HOSPITAL | Age: 74
End: 2024-09-25
Payer: MEDICARE

## 2024-09-25 LAB
ABO GROUP BLD: NORMAL
ABO GROUP BLD: NORMAL
ALBUMIN SERPL-MCNC: 4 G/DL (ref 3.5–5.2)
ALP SERPL-CCNC: 48 U/L (ref 39–117)
ALT SERPL W P-5'-P-CCNC: 15 U/L (ref 1–33)
ANION GAP SERPL CALCULATED.3IONS-SCNC: 11.6 MMOL/L (ref 5–15)
AST SERPL-CCNC: 20 U/L (ref 1–32)
BASOPHILS # BLD AUTO: 0.03 10*3/MM3 (ref 0–0.2)
BASOPHILS NFR BLD AUTO: 0.2 % (ref 0–1.5)
BILIRUB CONJ SERPL-MCNC: 0.4 MG/DL (ref 0–0.3)
BILIRUB INDIRECT SERPL-MCNC: 0.8 MG/DL
BILIRUB SERPL-MCNC: 1.2 MG/DL (ref 0–1.2)
BLD GP AB SCN SERPL QL: NEGATIVE
BUN SERPL-MCNC: 25 MG/DL (ref 8–23)
BUN/CREAT SERPL: 32.1 (ref 7–25)
CALCIUM SPEC-SCNC: 9.3 MG/DL (ref 8.6–10.5)
CHLORIDE SERPL-SCNC: 104 MMOL/L (ref 98–107)
CO2 SERPL-SCNC: 21.4 MMOL/L (ref 22–29)
CREAT SERPL-MCNC: 0.78 MG/DL (ref 0.57–1)
DEPRECATED RDW RBC AUTO: 48.5 FL (ref 37–54)
EGFRCR SERPLBLD CKD-EPI 2021: 79.3 ML/MIN/1.73
EOSINOPHIL # BLD AUTO: 0.01 10*3/MM3 (ref 0–0.4)
EOSINOPHIL NFR BLD AUTO: 0.1 % (ref 0.3–6.2)
ERYTHROCYTE [DISTWIDTH] IN BLOOD BY AUTOMATED COUNT: 13.6 % (ref 12.3–15.4)
GLUCOSE BLDC GLUCOMTR-MCNC: 144 MG/DL (ref 70–99)
GLUCOSE BLDC GLUCOMTR-MCNC: 150 MG/DL (ref 70–99)
GLUCOSE BLDC GLUCOMTR-MCNC: 157 MG/DL (ref 70–99)
GLUCOSE BLDC GLUCOMTR-MCNC: 158 MG/DL (ref 70–99)
GLUCOSE BLDC GLUCOMTR-MCNC: 164 MG/DL (ref 70–99)
GLUCOSE SERPL-MCNC: 166 MG/DL (ref 65–99)
HCT VFR BLD AUTO: 35.9 % (ref 34–46.6)
HGB BLD-MCNC: 11.2 G/DL (ref 12–15.9)
IMM GRANULOCYTES # BLD AUTO: 0.07 10*3/MM3 (ref 0–0.05)
IMM GRANULOCYTES NFR BLD AUTO: 0.5 % (ref 0–0.5)
LYMPHOCYTES # BLD AUTO: 1.08 10*3/MM3 (ref 0.7–3.1)
LYMPHOCYTES NFR BLD AUTO: 8.3 % (ref 19.6–45.3)
MCH RBC QN AUTO: 30.1 PG (ref 26.6–33)
MCHC RBC AUTO-ENTMCNC: 31.2 G/DL (ref 31.5–35.7)
MCV RBC AUTO: 96.5 FL (ref 79–97)
MONOCYTES # BLD AUTO: 1.1 10*3/MM3 (ref 0.1–0.9)
MONOCYTES NFR BLD AUTO: 8.4 % (ref 5–12)
NEUTROPHILS NFR BLD AUTO: 10.75 10*3/MM3 (ref 1.7–7)
NEUTROPHILS NFR BLD AUTO: 82.5 % (ref 42.7–76)
NRBC BLD AUTO-RTO: 0 /100 WBC (ref 0–0.2)
PLATELET # BLD AUTO: 227 10*3/MM3 (ref 140–450)
PMV BLD AUTO: 11.5 FL (ref 6–12)
POTASSIUM SERPL-SCNC: 4.2 MMOL/L (ref 3.5–5.2)
PROT SERPL-MCNC: 7.1 G/DL (ref 6–8.5)
RBC # BLD AUTO: 3.72 10*6/MM3 (ref 3.77–5.28)
RH BLD: NEGATIVE
RH BLD: NEGATIVE
SODIUM SERPL-SCNC: 137 MMOL/L (ref 136–145)
T&S EXPIRATION DATE: NORMAL
WBC NRBC COR # BLD AUTO: 13.04 10*3/MM3 (ref 3.4–10.8)

## 2024-09-25 PROCEDURE — 25010000002 DEXAMETHASONE PER 1 MG: Performed by: NURSE ANESTHETIST, CERTIFIED REGISTERED

## 2024-09-25 PROCEDURE — 25010000002 HYDROMORPHONE 1 MG/ML SOLUTION: Performed by: HOSPITALIST

## 2024-09-25 PROCEDURE — 25010000002 CEFAZOLIN PER 500 MG: Performed by: ORTHOPAEDIC SURGERY

## 2024-09-25 PROCEDURE — 25010000002 ONDANSETRON PER 1 MG: Performed by: NURSE ANESTHETIST, CERTIFIED REGISTERED

## 2024-09-25 PROCEDURE — 86900 BLOOD TYPING SEROLOGIC ABO: CPT

## 2024-09-25 PROCEDURE — C1713 ANCHOR/SCREW BN/BN,TIS/BN: HCPCS | Performed by: ORTHOPAEDIC SURGERY

## 2024-09-25 PROCEDURE — 25010000002 PROPOFOL 10 MG/ML EMULSION: Performed by: NURSE ANESTHETIST, CERTIFIED REGISTERED

## 2024-09-25 PROCEDURE — 25010000002 MIDAZOLAM PER 1MG: Performed by: ANESTHESIOLOGY

## 2024-09-25 PROCEDURE — 25010000002 CEFTRIAXONE PER 250 MG: Performed by: ORTHOPAEDIC SURGERY

## 2024-09-25 PROCEDURE — 94799 UNLISTED PULMONARY SVC/PX: CPT

## 2024-09-25 PROCEDURE — 82948 REAGENT STRIP/BLOOD GLUCOSE: CPT | Performed by: HOSPITALIST

## 2024-09-25 PROCEDURE — 25010000002 FENTANYL CITRATE (PF) 50 MCG/ML SOLUTION: Performed by: NURSE ANESTHETIST, CERTIFIED REGISTERED

## 2024-09-25 PROCEDURE — 99222 1ST HOSP IP/OBS MODERATE 55: CPT | Performed by: ORTHOPAEDIC SURGERY

## 2024-09-25 PROCEDURE — 86901 BLOOD TYPING SEROLOGIC RH(D): CPT

## 2024-09-25 PROCEDURE — 99233 SBSQ HOSP IP/OBS HIGH 50: CPT | Performed by: INTERNAL MEDICINE

## 2024-09-25 PROCEDURE — 80048 BASIC METABOLIC PNL TOTAL CA: CPT | Performed by: HOSPITALIST

## 2024-09-25 PROCEDURE — 86850 RBC ANTIBODY SCREEN: CPT | Performed by: ANESTHESIOLOGY

## 2024-09-25 PROCEDURE — 63710000001 INSULIN LISPRO (HUMAN) PER 5 UNITS: Performed by: HOSPITALIST

## 2024-09-25 PROCEDURE — 80076 HEPATIC FUNCTION PANEL: CPT | Performed by: INTERNAL MEDICINE

## 2024-09-25 PROCEDURE — 27245 TREAT THIGH FRACTURE: CPT | Performed by: ORTHOPAEDIC SURGERY

## 2024-09-25 PROCEDURE — 86901 BLOOD TYPING SEROLOGIC RH(D): CPT | Performed by: ANESTHESIOLOGY

## 2024-09-25 PROCEDURE — 25810000003 LACTATED RINGERS PER 1000 ML: Performed by: ANESTHESIOLOGY

## 2024-09-25 PROCEDURE — 82948 REAGENT STRIP/BLOOD GLUCOSE: CPT

## 2024-09-25 PROCEDURE — 0QS736Z REPOSITION LEFT UPPER FEMUR WITH INTRAMEDULLARY INTERNAL FIXATION DEVICE, PERCUTANEOUS APPROACH: ICD-10-PCS | Performed by: ORTHOPAEDIC SURGERY

## 2024-09-25 PROCEDURE — 82948 REAGENT STRIP/BLOOD GLUCOSE: CPT | Performed by: NURSE ANESTHETIST, CERTIFIED REGISTERED

## 2024-09-25 PROCEDURE — 63710000001 INSULIN LISPRO (HUMAN) PER 5 UNITS: Performed by: ORTHOPAEDIC SURGERY

## 2024-09-25 PROCEDURE — 25010000002 SUGAMMADEX 200 MG/2ML SOLUTION: Performed by: NURSE ANESTHETIST, CERTIFIED REGISTERED

## 2024-09-25 PROCEDURE — 27245 TREAT THIGH FRACTURE: CPT | Performed by: PHYSICIAN ASSISTANT

## 2024-09-25 PROCEDURE — 86900 BLOOD TYPING SEROLOGIC ABO: CPT | Performed by: ANESTHESIOLOGY

## 2024-09-25 PROCEDURE — 25810000003 SODIUM CHLORIDE 0.9 % SOLUTION: Performed by: ORTHOPAEDIC SURGERY

## 2024-09-25 PROCEDURE — C1769 GUIDE WIRE: HCPCS | Performed by: ORTHOPAEDIC SURGERY

## 2024-09-25 PROCEDURE — 76000 FLUOROSCOPY <1 HR PHYS/QHP: CPT

## 2024-09-25 PROCEDURE — 85025 COMPLETE CBC W/AUTO DIFF WBC: CPT | Performed by: HOSPITALIST

## 2024-09-25 DEVICE — ZNN CMN NAIL 10MMX21.5CM 125L
Type: IMPLANTABLE DEVICE | Site: HIP | Status: FUNCTIONAL
Brand: ZIMMER® NATURAL NAIL® SYSTEM

## 2024-09-25 DEVICE — ZNN CMN LAG SCREW 10.5X95
Type: IMPLANTABLE DEVICE | Site: HIP | Status: FUNCTIONAL
Brand: ZIMMER® NATURAL NAIL® SYSTEM

## 2024-09-25 DEVICE — SCRW CORT FA FUL/THRD HEX3.5 TI 5X35MM: Type: IMPLANTABLE DEVICE | Site: HIP | Status: FUNCTIONAL

## 2024-09-25 RX ORDER — POLYETHYLENE GLYCOL 3350 17 G/17G
17 POWDER, FOR SOLUTION ORAL 2 TIMES DAILY PRN
Status: DISCONTINUED | OUTPATIENT
Start: 2024-09-25 | End: 2024-09-28 | Stop reason: HOSPADM

## 2024-09-25 RX ORDER — OXYCODONE HYDROCHLORIDE 5 MG/1
5 TABLET ORAL
Status: DISCONTINUED | OUTPATIENT
Start: 2024-09-25 | End: 2024-09-25

## 2024-09-25 RX ORDER — LEVETIRACETAM 500 MG/1
500 TABLET ORAL EVERY MORNING
Status: DISCONTINUED | OUTPATIENT
Start: 2024-09-26 | End: 2024-09-28 | Stop reason: HOSPADM

## 2024-09-25 RX ORDER — ONDANSETRON 2 MG/ML
INJECTION INTRAMUSCULAR; INTRAVENOUS AS NEEDED
Status: DISCONTINUED | OUTPATIENT
Start: 2024-09-25 | End: 2024-09-25 | Stop reason: SURG

## 2024-09-25 RX ORDER — SODIUM CHLORIDE, SODIUM LACTATE, POTASSIUM CHLORIDE, CALCIUM CHLORIDE 600; 310; 30; 20 MG/100ML; MG/100ML; MG/100ML; MG/100ML
9 INJECTION, SOLUTION INTRAVENOUS CONTINUOUS PRN
Status: DISCONTINUED | OUTPATIENT
Start: 2024-09-25 | End: 2024-09-28 | Stop reason: HOSPADM

## 2024-09-25 RX ORDER — ACETAMINOPHEN 500 MG
1000 TABLET ORAL 3 TIMES DAILY
Status: DISCONTINUED | OUTPATIENT
Start: 2024-09-25 | End: 2024-09-28 | Stop reason: HOSPADM

## 2024-09-25 RX ORDER — HYDROXYZINE HYDROCHLORIDE 25 MG/1
25 TABLET, FILM COATED ORAL 3 TIMES DAILY PRN
Status: DISCONTINUED | OUTPATIENT
Start: 2024-09-25 | End: 2024-09-28 | Stop reason: HOSPADM

## 2024-09-25 RX ORDER — DEXAMETHASONE SODIUM PHOSPHATE 4 MG/ML
INJECTION, SOLUTION INTRA-ARTICULAR; INTRALESIONAL; INTRAMUSCULAR; INTRAVENOUS; SOFT TISSUE AS NEEDED
Status: DISCONTINUED | OUTPATIENT
Start: 2024-09-25 | End: 2024-09-25 | Stop reason: SURG

## 2024-09-25 RX ORDER — ROCURONIUM BROMIDE 10 MG/ML
INJECTION, SOLUTION INTRAVENOUS AS NEEDED
Status: DISCONTINUED | OUTPATIENT
Start: 2024-09-25 | End: 2024-09-25 | Stop reason: SURG

## 2024-09-25 RX ORDER — ONDANSETRON 2 MG/ML
4 INJECTION INTRAMUSCULAR; INTRAVENOUS EVERY 4 HOURS PRN
Status: DISCONTINUED | OUTPATIENT
Start: 2024-09-25 | End: 2024-09-28 | Stop reason: HOSPADM

## 2024-09-25 RX ORDER — SODIUM CHLORIDE 9 MG/ML
100 INJECTION, SOLUTION INTRAVENOUS CONTINUOUS
Status: DISCONTINUED | OUTPATIENT
Start: 2024-09-25 | End: 2024-09-27

## 2024-09-25 RX ORDER — OXYCODONE HYDROCHLORIDE 5 MG/1
5 TABLET ORAL EVERY 4 HOURS PRN
Status: DISCONTINUED | OUTPATIENT
Start: 2024-09-25 | End: 2024-09-28 | Stop reason: HOSPADM

## 2024-09-25 RX ORDER — FENTANYL CITRATE 50 UG/ML
INJECTION, SOLUTION INTRAMUSCULAR; INTRAVENOUS AS NEEDED
Status: DISCONTINUED | OUTPATIENT
Start: 2024-09-25 | End: 2024-09-25 | Stop reason: SURG

## 2024-09-25 RX ORDER — ALBUTEROL SULFATE 0.83 MG/ML
2.5 SOLUTION RESPIRATORY (INHALATION) EVERY 6 HOURS PRN
Status: DISCONTINUED | OUTPATIENT
Start: 2024-09-25 | End: 2024-09-28 | Stop reason: HOSPADM

## 2024-09-25 RX ORDER — OXYCODONE HYDROCHLORIDE 5 MG/1
7.5 TABLET ORAL EVERY 4 HOURS PRN
Status: DISCONTINUED | OUTPATIENT
Start: 2024-09-25 | End: 2024-09-28 | Stop reason: HOSPADM

## 2024-09-25 RX ORDER — OXYCODONE HYDROCHLORIDE 5 MG/1
5 TABLET ORAL EVERY 4 HOURS PRN
Status: DISCONTINUED | OUTPATIENT
Start: 2024-09-25 | End: 2024-09-25 | Stop reason: SDUPTHER

## 2024-09-25 RX ORDER — MIDAZOLAM HYDROCHLORIDE 2 MG/2ML
1 INJECTION, SOLUTION INTRAMUSCULAR; INTRAVENOUS ONCE
Status: COMPLETED | OUTPATIENT
Start: 2024-09-25 | End: 2024-09-25

## 2024-09-25 RX ORDER — ALUMINA, MAGNESIA, AND SIMETHICONE 2400; 2400; 240 MG/30ML; MG/30ML; MG/30ML
15 SUSPENSION ORAL EVERY 6 HOURS PRN
Status: DISCONTINUED | OUTPATIENT
Start: 2024-09-25 | End: 2024-09-28 | Stop reason: HOSPADM

## 2024-09-25 RX ORDER — EPHEDRINE SULFATE 50 MG/ML
INJECTION INTRAVENOUS AS NEEDED
Status: DISCONTINUED | OUTPATIENT
Start: 2024-09-25 | End: 2024-09-25 | Stop reason: SURG

## 2024-09-25 RX ORDER — AMOXICILLIN 250 MG
2 CAPSULE ORAL 2 TIMES DAILY
Status: DISCONTINUED | OUTPATIENT
Start: 2024-09-25 | End: 2024-09-28 | Stop reason: HOSPADM

## 2024-09-25 RX ORDER — MEPERIDINE HYDROCHLORIDE 25 MG/ML
12.5 INJECTION INTRAMUSCULAR; INTRAVENOUS; SUBCUTANEOUS
Status: DISCONTINUED | OUTPATIENT
Start: 2024-09-25 | End: 2024-09-26

## 2024-09-25 RX ORDER — NICOTINE 21 MG/24HR
1 PATCH, TRANSDERMAL 24 HOURS TRANSDERMAL DAILY PRN
Status: DISCONTINUED | OUTPATIENT
Start: 2024-09-25 | End: 2024-09-28 | Stop reason: HOSPADM

## 2024-09-25 RX ORDER — LIDOCAINE HYDROCHLORIDE 20 MG/ML
INJECTION, SOLUTION EPIDURAL; INFILTRATION; INTRACAUDAL; PERINEURAL AS NEEDED
Status: DISCONTINUED | OUTPATIENT
Start: 2024-09-25 | End: 2024-09-25 | Stop reason: SURG

## 2024-09-25 RX ORDER — PROMETHAZINE HYDROCHLORIDE 25 MG/1
25 SUPPOSITORY RECTAL ONCE AS NEEDED
Status: DISCONTINUED | OUTPATIENT
Start: 2024-09-25 | End: 2024-09-28 | Stop reason: HOSPADM

## 2024-09-25 RX ORDER — FLUCONAZOLE 150 MG/1
150 TABLET ORAL ONCE
Status: COMPLETED | OUTPATIENT
Start: 2024-09-25 | End: 2024-09-25

## 2024-09-25 RX ORDER — OXYCODONE HYDROCHLORIDE 5 MG/1
7.5 TABLET ORAL EVERY 4 HOURS PRN
Status: DISCONTINUED | OUTPATIENT
Start: 2024-09-25 | End: 2024-09-25 | Stop reason: SDUPTHER

## 2024-09-25 RX ORDER — LIDOCAINE 4 G/G
1 PATCH TOPICAL DAILY PRN
Status: DISCONTINUED | OUTPATIENT
Start: 2024-09-25 | End: 2024-09-28 | Stop reason: HOSPADM

## 2024-09-25 RX ORDER — ONDANSETRON 2 MG/ML
4 INJECTION INTRAMUSCULAR; INTRAVENOUS ONCE AS NEEDED
Status: DISCONTINUED | OUTPATIENT
Start: 2024-09-25 | End: 2024-09-28 | Stop reason: HOSPADM

## 2024-09-25 RX ORDER — CLOPIDOGREL BISULFATE 75 MG/1
75 TABLET ORAL DAILY
Status: DISCONTINUED | OUTPATIENT
Start: 2024-09-25 | End: 2024-09-28 | Stop reason: HOSPADM

## 2024-09-25 RX ORDER — PROPOFOL 10 MG/ML
VIAL (ML) INTRAVENOUS AS NEEDED
Status: DISCONTINUED | OUTPATIENT
Start: 2024-09-25 | End: 2024-09-25 | Stop reason: SURG

## 2024-09-25 RX ORDER — HYDROCODONE BITARTRATE AND ACETAMINOPHEN 7.5; 325 MG/1; MG/1
1 TABLET ORAL EVERY 4 HOURS PRN
Status: DISCONTINUED | OUTPATIENT
Start: 2024-09-25 | End: 2024-09-25

## 2024-09-25 RX ORDER — HYDROCODONE BITARTRATE AND ACETAMINOPHEN 7.5; 325 MG/1; MG/1
2 TABLET ORAL EVERY 4 HOURS PRN
Status: DISCONTINUED | OUTPATIENT
Start: 2024-09-25 | End: 2024-09-25

## 2024-09-25 RX ORDER — PROCHLORPERAZINE EDISYLATE 5 MG/ML
5 INJECTION INTRAMUSCULAR; INTRAVENOUS EVERY 6 HOURS PRN
Status: DISCONTINUED | OUTPATIENT
Start: 2024-09-25 | End: 2024-09-28 | Stop reason: HOSPADM

## 2024-09-25 RX ORDER — ACETAMINOPHEN 325 MG/1
650 TABLET ORAL EVERY 6 HOURS PRN
Status: DISCONTINUED | OUTPATIENT
Start: 2024-09-25 | End: 2024-09-28 | Stop reason: HOSPADM

## 2024-09-25 RX ORDER — MAGNESIUM HYDROXIDE 1200 MG/15ML
LIQUID ORAL AS NEEDED
Status: DISCONTINUED | OUTPATIENT
Start: 2024-09-25 | End: 2024-09-25 | Stop reason: HOSPADM

## 2024-09-25 RX ORDER — PROMETHAZINE HYDROCHLORIDE 25 MG/1
25 TABLET ORAL ONCE AS NEEDED
Status: DISCONTINUED | OUTPATIENT
Start: 2024-09-25 | End: 2024-09-28 | Stop reason: HOSPADM

## 2024-09-25 RX ADMIN — HYDROMORPHONE HYDROCHLORIDE 0.5 MG: 1 INJECTION, SOLUTION INTRAMUSCULAR; INTRAVENOUS; SUBCUTANEOUS at 07:26

## 2024-09-25 RX ADMIN — SUGAMMADEX 100 MG: 100 INJECTION, SOLUTION INTRAVENOUS at 13:41

## 2024-09-25 RX ADMIN — PROPOFOL 110 MG: 10 INJECTION, EMULSION INTRAVENOUS at 12:51

## 2024-09-25 RX ADMIN — ONDANSETRON HYDROCHLORIDE 4 MG: 2 SOLUTION INTRAMUSCULAR; INTRAVENOUS at 13:01

## 2024-09-25 RX ADMIN — SODIUM CHLORIDE, POTASSIUM CHLORIDE, SODIUM LACTATE AND CALCIUM CHLORIDE 9 ML/HR: 600; 310; 30; 20 INJECTION, SOLUTION INTRAVENOUS at 12:13

## 2024-09-25 RX ADMIN — HYDROCODONE BITARTRATE AND ACETAMINOPHEN 1 TABLET: 5; 325 TABLET ORAL at 09:40

## 2024-09-25 RX ADMIN — FENTANYL CITRATE 25 MCG: 50 INJECTION, SOLUTION INTRAMUSCULAR; INTRAVENOUS at 12:51

## 2024-09-25 RX ADMIN — ACETAMINOPHEN 1000 MG: 500 TABLET ORAL at 20:52

## 2024-09-25 RX ADMIN — LEVETIRACETAM 1000 MG: 500 TABLET, FILM COATED ORAL at 20:52

## 2024-09-25 RX ADMIN — SODIUM CHLORIDE 2000 MG: 9 INJECTION, SOLUTION INTRAVENOUS at 20:53

## 2024-09-25 RX ADMIN — EPHEDRINE SULFATE 10 MG: 50 INJECTION INTRAVENOUS at 13:12

## 2024-09-25 RX ADMIN — HYDROCODONE BITARTRATE AND ACETAMINOPHEN 1 TABLET: 5; 325 TABLET ORAL at 03:49

## 2024-09-25 RX ADMIN — LIDOCAINE HYDROCHLORIDE 60 MG: 20 INJECTION, SOLUTION INTRAVENOUS at 12:50

## 2024-09-25 RX ADMIN — INSULIN LISPRO 2 UNITS: 100 INJECTION, SOLUTION INTRAVENOUS; SUBCUTANEOUS at 07:24

## 2024-09-25 RX ADMIN — MIDAZOLAM HYDROCHLORIDE 1 MG: 1 INJECTION, SOLUTION INTRAMUSCULAR; INTRAVENOUS at 12:13

## 2024-09-25 RX ADMIN — SENNOSIDES AND DOCUSATE SODIUM 2 TABLET: 50; 8.6 TABLET ORAL at 20:52

## 2024-09-25 RX ADMIN — ATORVASTATIN CALCIUM 80 MG: 40 TABLET, FILM COATED ORAL at 08:09

## 2024-09-25 RX ADMIN — SUGAMMADEX 100 MG: 100 INJECTION, SOLUTION INTRAVENOUS at 13:38

## 2024-09-25 RX ADMIN — SODIUM CHLORIDE, POTASSIUM CHLORIDE, SODIUM LACTATE AND CALCIUM CHLORIDE: 600; 310; 30; 20 INJECTION, SOLUTION INTRAVENOUS at 13:21

## 2024-09-25 RX ADMIN — METOPROLOL TARTRATE 25 MG: 25 TABLET, FILM COATED ORAL at 08:10

## 2024-09-25 RX ADMIN — DULOXETINE HYDROCHLORIDE 60 MG: 30 CAPSULE, DELAYED RELEASE ORAL at 08:10

## 2024-09-25 RX ADMIN — FENTANYL CITRATE 50 MCG: 50 INJECTION, SOLUTION INTRAMUSCULAR; INTRAVENOUS at 13:21

## 2024-09-25 RX ADMIN — CEFTRIAXONE SODIUM 1000 MG: 1 INJECTION, POWDER, FOR SOLUTION INTRAMUSCULAR; INTRAVENOUS at 18:12

## 2024-09-25 RX ADMIN — FLUCONAZOLE 150 MG: 150 TABLET ORAL at 22:10

## 2024-09-25 RX ADMIN — SODIUM CHLORIDE 100 ML/HR: 9 INJECTION, SOLUTION INTRAVENOUS at 15:10

## 2024-09-25 RX ADMIN — ROCURONIUM BROMIDE 50 MG: 10 INJECTION, SOLUTION INTRAVENOUS at 12:51

## 2024-09-25 RX ADMIN — LISINOPRIL 10 MG: 10 TABLET ORAL at 08:09

## 2024-09-25 RX ADMIN — ACETAMINOPHEN 1000 MG: 500 TABLET ORAL at 17:43

## 2024-09-25 RX ADMIN — DEXAMETHASONE SODIUM PHOSPHATE 4 MG: 4 INJECTION, SOLUTION INTRAMUSCULAR; INTRAVENOUS at 13:01

## 2024-09-25 RX ADMIN — METOPROLOL TARTRATE 25 MG: 25 TABLET, FILM COATED ORAL at 20:52

## 2024-09-25 RX ADMIN — FENTANYL CITRATE 25 MCG: 50 INJECTION, SOLUTION INTRAMUSCULAR; INTRAVENOUS at 12:47

## 2024-09-25 RX ADMIN — INSULIN LISPRO 2 UNITS: 100 INJECTION, SOLUTION INTRAVENOUS; SUBCUTANEOUS at 18:11

## 2024-09-25 RX ADMIN — SODIUM CHLORIDE 2000 MG: 9 INJECTION, SOLUTION INTRAVENOUS at 12:51

## 2024-09-25 NOTE — NURSING NOTE
Pt is A&O with periods of confusion, on 2L NC, and bedrest. All scheduled medications given as ordered and pain managed with PRN medications. Safety checks maintained throughout shift with pt resting in bed, bed in lowest position, wheels to bed locked, and personal items and call light within reach. VSS.

## 2024-09-25 NOTE — PROGRESS NOTES
[facility]   Hospitalist Progress Note    Date of admission: 9/24/2024  Patient Name: Haylee Gibbs  1950  Date: 9/25/2024      Subjective     Chief Complaint   Patient presents with    Fall    Hip Pain     Patient fell last night night up to the bathroom. Patient found on floor this morning by family. Patient reports left hip pain with rotation noted. EMS Bgl 220 mg/dL    Dizziness       Interval Followup: seen preop, pain ok currently.  Pt unclear of cause of fall, not sure if tripped or presyncopal symptoms.  Has baseline dementia issues, had gotten up in a rush to use the restroom apparently.  Does have frequency and maybe some dysuria.        Objective     Vitals:   Temp:  [97.3 °F (36.3 °C)-98.6 °F (37 °C)] 98.2 °F (36.8 °C)  Heart Rate:  [61-76] 68  Resp:  [17-18] 17  BP: (103-186)/(59-88) 132/60  Flow (L/min):  [3] 3    Physical Exam  Awake conversant, alert to self, recognizes family, not able to tell me the year, poor health literacy,   Left hip pain exam limited given pain  Ctab   Rrr  Abd soft      Result Review:  Vital signs, labs and recent relevant imaging reviewed.      CBC          8/20/2024    08:42 9/24/2024    10:24 9/25/2024    04:00   CBC   WBC 7.51  15.83  13.04    RBC 4.10  4.12  3.72    Hemoglobin 12.8  12.5  11.2    Hematocrit 39.6  39.6  35.9    MCV 96.6  96.1  96.5    MCH 31.2  30.3  30.1    MCHC 32.3  31.6  31.2    RDW 12.9  13.6  13.6    Platelets 277  233  227      CMP          8/20/2024    08:42 9/24/2024    10:24 9/25/2024    04:00   CMP   Glucose 119  180  166    BUN 17  23  25    Creatinine 0.93  0.76  0.78    EGFR 64.2  81.8  79.3    Sodium 136  138  137    Potassium 5.1  4.2  4.2    Chloride 103  100  104    Calcium 10.5  9.9  9.3    Total Protein 7.5  7.7     Albumin 4.5  4.4     Globulin 3.0  3.3     Total Bilirubin 0.7  1.6     Alkaline Phosphatase 47  50     AST (SGOT) 16  25     ALT (SGPT) 14  18     Albumin/Globulin Ratio 1.5  1.3     BUN/Creatinine Ratio 18.3   30.3  32.1    Anion Gap 11.0  16.8  11.6          acetaminophen    aluminum-magnesium hydroxide-simethicone    dextrose    dextrose    Diclofenac Sodium    glucagon (human recombinant)    HYDROcodone-acetaminophen    HYDROmorphone    hydrOXYzine    Lidocaine    melatonin    nicotine    ondansetron    polyethylene glycol    prochlorperazine    atorvastatin, 80 mg, Oral, Daily  ceFAZolin, 2,000 mg, Intravenous, On Call to OR  cefTRIAXone, 1,000 mg, Intravenous, Q24H  [Held by provider] clopidogrel, 75 mg, Oral, Daily  DULoxetine, 60 mg, Oral, Daily  [START ON 9/26/2024] empagliflozin, 25 mg, Oral, Daily  fluconazole, 150 mg, Oral, Once  insulin lispro, 2-7 Units, Subcutaneous, 4x Daily AC & at Bedtime  levETIRAcetam, 1,000 mg, Oral, Nightly  [START ON 9/26/2024] levETIRAcetam, 500 mg, Oral, QAM  lisinopril, 10 mg, Oral, Daily  metoprolol tartrate, 25 mg, Oral, BID  tranexamic acid, 2,000 mg, Topical, Once        CT Head Without Contrast    Result Date: 9/24/2024  Impression: 1.Moderate small vessel ischemic changes in the white matter. 2.No calvarial fracture or intracranial hemorrhage Electronically Signed: Joe Briseno MD  9/24/2024 12:54 PM EDT  Workstation ID: [id number]    XR Knee 3 View Left    Result Date: 9/24/2024  Impression: 1. No acute osseous abnormality. 2. Mild medial compartment joint space narrowing. 3. Chondrocalcinosis within the medial and lateral compartment of the left knee which can be seen with CPPD arthropathy. Electronically Signed: Ammon Ling  9/24/2024 11:58 AM EDT  Workstation ID: [id number]    XR Hip With or Without Pelvis 2 - 3 View Left    Result Date: 9/24/2024  Impression: Comminuted displaced left intertrochanteric fracture. Electronically Signed: Cecilia Ibarra MD  9/24/2024 10:58 AM EDT  Workstation ID: [id number]    XR Chest 1 View    Result Date: 9/24/2024  Impression: Negative. Electronically Signed: Cecilia Ibarra MD  9/24/2024 10:57 AM EDT  Workstation ID: [id number]      Assessment / Plan     Summary: 74 y.o. with dementia, seizure disorder, htn who presented after a fall while going to the bathroom and found to have comminuted displaced left intertrochanteric fracture, ortho consulted. Treating for uti as well.      Assessment/Plan (clinically significant if listed here)  Fall, question mechanical vs orthostatic, with comminuted displaced left intertrochanteric fracture  UTI  Postop hypoxemia 2/2 sedation/atelectasis, requiring 2L NC   Seizure disorder  Dementia   Hypertension  DM2  Anxiety/mood disorder, on duloxetine  Hx of CVA ~2018 last time reportedly, on plavix outpatient      CT head small vessel disease, moderate, no hemorrhage    Holding home Plavix perioperatively resume when okay from surgical standpoint - is apparently on this for cva hx   9/24 ceftriaxone continued for UTI follow-up urine culture, has already received antibiotics will defer bl cultures at this time given low yield leukocytosis starting to improve, yeast also noted on it, will give a dose of fluconazole x 1 for symptoms monitor for Desenex need  IS/bph, resp hygiene, bowel regimen, monitor postoperatively closely  Try scheduled tylenol, oxycodone prn, iv morphine breakthrough  Continue metoprolol and will hold lisinopril and monitor bp, question hypotension/orthostatic related cause of fall   Check OVS when able, reassess tomorrow   Continue Keppra at night 1g and apparently takes 500mg in the am only   Continue SSI monitor blood glucose, start home jardiance tomorrow; holding home metformin and trulicity   Continue duloxetine  Continues statin  Initial T. bili elevated, previously normal outpt, resolved on repeat  PT/OT  Check a.m. CBC, BMP, magnesium, phosphorus  Continue hospitalization at current level of care    Dispo: rehab placement once medically stable.   D/w SW    VTE Prophylaxis:  Mechanical VTE prophylaxis orders are present.      Level Of Support Discussed With: Patient  Code Status  (Patient has no pulse and is not breathing): CPR (Attempt to Resuscitate)  Medical Interventions (Patient has pulse or is breathing): Full Support    Greater than 50 minutes on this encounter including review of labs, imaging, documentation, orders, vitals, monitoring and adjusting treatment and orders as indicated, discussion with the patient family sw rn, and documenting.

## 2024-09-25 NOTE — H&P (VIEW-ONLY)
[facility]   Consult Note    Patient Name: Haylee Gibbs  : 1950  MRN: [id number]  Primary Care Physician:  Kj Canales MD  Referring Physician: No ref. provider found  Date of admission: 2024    Subjective   Subjective     Reason for Consult/ Chief Complaint: Left hip fracture    HPI:  Haylee Gibbs is a 74 y.o. female who fell yesterday at home.  She landed on the left hip.  She was found to have a left intertrochanteric proximal femur fracture.  She was admitted to the hospital and has been n.p.o. after midnight.  She reports pain and disability to the left hip.  X-rays reveal intertrochanteric fracture.  She wishes to undergo operative treatment.  Plan for surgery later today.    Review of Systems   All systems were reviewed and negative except for those mentioned in HPI    Personal History     Past Medical History:   Diagnosis Date    Anxiety disorder 2018    Benign essential hypertension     Cataract     COPD (chronic obstructive pulmonary disease)     CVA (cerebral vascular accident)     Depression     Diabetes mellitus, type 2     Hyperlipidemia     Paresthesia and pain of both upper extremities 2018    Seizure disorder 2018    Vitamin B 12 deficiency     Vitamin D deficiency 2018       Past Surgical History:   Procedure Laterality Date    APPENDECTOMY      BACK SURGERY      LOW BACK DISC    CATARACT EXTRACTION      HYSTERECTOMY      NECK SURGERY      NECK DISC    TONSILLECTOMY         Family History: family history includes Alcohol abuse in her brother; Cancer in her mother and sister; Diabetes type II in her brother and sister; Heart attack in her brother and father; Heart disease in her sister, sister and another family member; Hyperlipidemia in her sister; Hypertension in her brother, sister, and sister; Multiple myeloma in her mother; Seizures in her mother; Stomach cancer in her brother; Stroke in her mother. Otherwise pertinent FHx was reviewed and  not pertinent to current issue.    Social History:  reports that she quit smoking about 9 years ago. Her smoking use included cigarettes. She started smoking about 29 years ago. She has a 10 pack-year smoking history. She has been exposed to tobacco smoke. She has never used smokeless tobacco. She reports current alcohol use. She reports that she does not use drugs.    Home Medications:  DULoxetine, Diclofenac Sodium, Dulaglutide, Ergocalciferol, Vitamin B-12, atorvastatin, clopidogrel, empagliflozin, levETIRAcetam, lisinopril, metFORMIN, and metoprolol tartrate    Allergies:  Allergies   Allergen Reactions    Tramadol Hcl GI Intolerance       Objective    Objective     Vitals:   Temp:  [97.3 °F (36.3 °C)-98.6 °F (37 °C)] 98.6 °F (37 °C)  Heart Rate:  [61-87] 72  Resp:  [18] 18  BP: (103-186)/(59-88) 141/63    Physical Exam:   Constitutional: Awake, alert   HENT: NCAT, mucous membranes moist   Neck: Supple   Respiratory: Unlabored breathing   Cardiovascular: Regular heart rate   Gastrointestinal: Nontender and nondistended   musculoskeletal: The left lower extremity is shortened and externally rotated.  Positive pulses.  Tender to palpation.  Neurovascular intact to extremity.  Tender to palpation to hip.   Psychiatric: Appropriate affect, cooperative   Neurologic: Grossly intact   Skin: No rashes     Result Review    Result Review:  I have personally reviewed the results from the time of this admission to 9/25/2024 06:56 EDT and agree with these findings:  [x]  Laboratory  []  Microbiology  [x]  Radiology  []  EKG/Telemetry   []  Cardiology/Vascular   []  Pathology  []  Old records  []  Other:    Most notable findings include: X-ray: Left hip intertrochanteric proximal femur fracture    Assessment & Plan   Assessment / Plan     Brief Patient Summary:  Haylee Gibbs is a 74 y.o. female who has left intertrochanteric proximal femur fracture    Active Hospital Problems:  Active Hospital Problems    Diagnosis      **Closed fracture of left hip     Fracture        Plan: I discussed treatment options with the patient.  Operative versus nonoperative treatment was discussed.  Risks and benefits of surgery were discussed.  She wishes to proceed with operative treatment with reduction and internal fixation with cephalomedullary nailing of left proximal femur fracture.  Plan for n.p.o. and surgery later today.  Thank you for the consultation.        Electronically signed by Hussain Singleton MD, 09/25/24, 6:56 AM EDT.

## 2024-09-25 NOTE — CONSULTS
[facility]   Consult Note    Patient Name: Haylee Gibbs  : 1950  MRN: [id number]  Primary Care Physician:  Kj Canales MD  Referring Physician: No ref. provider found  Date of admission: 2024    Subjective   Subjective     Reason for Consult/ Chief Complaint: Left hip fracture    HPI:  Haylee Gibbs is a 74 y.o. female who fell yesterday at home.  She landed on the left hip.  She was found to have a left intertrochanteric proximal femur fracture.  She was admitted to the hospital and has been n.p.o. after midnight.  She reports pain and disability to the left hip.  X-rays reveal intertrochanteric fracture.  She wishes to undergo operative treatment.  Plan for surgery later today.    Review of Systems   All systems were reviewed and negative except for those mentioned in HPI    Personal History     Past Medical History:   Diagnosis Date    Anxiety disorder 2018    Benign essential hypertension     Cataract     COPD (chronic obstructive pulmonary disease)     CVA (cerebral vascular accident)     Depression     Diabetes mellitus, type 2     Hyperlipidemia     Paresthesia and pain of both upper extremities 2018    Seizure disorder 2018    Vitamin B 12 deficiency     Vitamin D deficiency 2018       Past Surgical History:   Procedure Laterality Date    APPENDECTOMY      BACK SURGERY      LOW BACK DISC    CATARACT EXTRACTION      HYSTERECTOMY      NECK SURGERY      NECK DISC    TONSILLECTOMY         Family History: family history includes Alcohol abuse in her brother; Cancer in her mother and sister; Diabetes type II in her brother and sister; Heart attack in her brother and father; Heart disease in her sister, sister and another family member; Hyperlipidemia in her sister; Hypertension in her brother, sister, and sister; Multiple myeloma in her mother; Seizures in her mother; Stomach cancer in her brother; Stroke in her mother. Otherwise pertinent FHx was reviewed and  not pertinent to current issue.    Social History:  reports that she quit smoking about 9 years ago. Her smoking use included cigarettes. She started smoking about 29 years ago. She has a 10 pack-year smoking history. She has been exposed to tobacco smoke. She has never used smokeless tobacco. She reports current alcohol use. She reports that she does not use drugs.    Home Medications:  DULoxetine, Diclofenac Sodium, Dulaglutide, Ergocalciferol, Vitamin B-12, atorvastatin, clopidogrel, empagliflozin, levETIRAcetam, lisinopril, metFORMIN, and metoprolol tartrate    Allergies:  Allergies   Allergen Reactions    Tramadol Hcl GI Intolerance       Objective    Objective     Vitals:   Temp:  [97.3 °F (36.3 °C)-98.6 °F (37 °C)] 98.6 °F (37 °C)  Heart Rate:  [61-87] 72  Resp:  [18] 18  BP: (103-186)/(59-88) 141/63    Physical Exam:   Constitutional: Awake, alert   HENT: NCAT, mucous membranes moist   Neck: Supple   Respiratory: Unlabored breathing   Cardiovascular: Regular heart rate   Gastrointestinal: Nontender and nondistended   musculoskeletal: The left lower extremity is shortened and externally rotated.  Positive pulses.  Tender to palpation.  Neurovascular intact to extremity.  Tender to palpation to hip.   Psychiatric: Appropriate affect, cooperative   Neurologic: Grossly intact   Skin: No rashes     Result Review    Result Review:  I have personally reviewed the results from the time of this admission to 9/25/2024 06:56 EDT and agree with these findings:  [x]  Laboratory  []  Microbiology  [x]  Radiology  []  EKG/Telemetry   []  Cardiology/Vascular   []  Pathology  []  Old records  []  Other:    Most notable findings include: X-ray: Left hip intertrochanteric proximal femur fracture    Assessment & Plan   Assessment / Plan     Brief Patient Summary:  Haylee Gibbs is a 74 y.o. female who has left intertrochanteric proximal femur fracture    Active Hospital Problems:  Active Hospital Problems    Diagnosis      **Closed fracture of left hip     Fracture        Plan: I discussed treatment options with the patient.  Operative versus nonoperative treatment was discussed.  Risks and benefits of surgery were discussed.  She wishes to proceed with operative treatment with reduction and internal fixation with cephalomedullary nailing of left proximal femur fracture.  Plan for n.p.o. and surgery later today.  Thank you for the consultation.        Electronically signed by Hussain Singleton MD, 09/25/24, 6:56 AM EDT.

## 2024-09-25 NOTE — PLAN OF CARE
Goal Outcome Evaluation:  Plan of Care Reviewed With: patient        Progress: no change  Outcome Evaluation: Pt alert and oriented x4. Pt had no complaints of pain during shift. Surgery scheduled for today and pt has been NPO since midnight.

## 2024-09-25 NOTE — OP NOTE
HIP INTERTROCHANTERIC NAILING  Procedure Report    Patient Name:  Haylee Gibbs  YOB: 1950    Date of Surgery:  9/25/2024     Indications: The patient is a 74-year-old female who fell and sustained a left intertrochanteric proximal femur fracture.  She was admitted to the hospital and found to be stable for surgery.  Risks and benefits of surgery were discussed.  Risk of bleeding, infection, damage neurovascular structures, heart attack, stroke, DVT/PE, anesthesia complications including death, malunion, nonunion, symptomatic hardware, among others were discussed.    Pre-op Diagnosis:   Closed fracture of left hip, initial encounter [S72.002A]       Post-Op Diagnosis Codes:     * Closed fracture of left hip, initial encounter [S72.002A]    Procedure/CPT® Codes:      Procedure(s):  Closed reduction and LEFT HIP CEPHALOMEDULLARY NAILING    Staff:  Surgeon(s):  Hussain Singleton MD    Assistant: Luis M Esteves PA    Anesthesia: General    Estimated Blood Loss: 50 mL    Implants:    Implant Name Type Inv. Item Serial No.  Lot No. LRB No. Used Action   NAIL IM/FEM CEPH CORRIE TI 21.5CM 10MM 125DEG LT - [id number] Implant NAIL IM/FEM CEPH CORRIE TI 21.5CM 10MM 125DEG LT  [employer] [id number] Left 1 Implanted   SCRW LAG CEPH TI 10.5X95MM - [id number] Implant SCRW LAG CEPH TI 10.5X95MM  [employer] [id number] Left 1 Implanted   SCRW MARGARITA FA FUL/THRD HEX3.5 TI 5X35MM - [id number] Implant SCRW MARGARITA FA FUL/THRD HEX3.5 TI 5X35MM  [employer] [id number] Left 1 Implanted       Specimen:          None        Findings: Left intertrochanteric proximal femur fracture.    Complications: None    Description of Procedure: The operative site was marked in the preoperative holding area.  The patient was brought to the operating room.  Anesthesia was given.  The patient was placed supine on the [place] operative table. Well-padded traction boots were placed to the lower extremities. All bony prominences  were padded.  We then placed a padded perineal post. Formal time out was held.  The patient received preoperative antibiotic. Traction and internal rotation was placed across the operative lower extremity. The nonoperative leg was extended and adducted.  Fluoroscopic imaging was taken AP and lateral plane to confirm anatomic reduction.    At this point, after prepping and draping, a longitudinal incision was made just proximal to the greater trochanter.  Subcutaneous tissues and fascia were divided.  A guide pin was placed at the tip of the greater trochanter, was driven into the intramedullary canal, centered on the canal on the lateral view.  The opening reamer and soft tissue protector were then used to create an opening hole with the greater trochanter and a ball-tipped guidewire was then inserted down into the distal femur.  This was confirmed to be at the center of the knee on both AP/lateral and fluoroscopic imaging.  The nail was measured.  We then began reaming sequentially the femur, reaming to 2mm greater than the chosen nail size. The nail was inserted over the guide wire. The guide arm was placed onto the nail for aiming the lag screw.  A stab incision was made at the lateral thigh, and the fascia was divided.  The trocar was then inserted to the lateral femur.  The guide pin was drilled in the center of the femoral head on the AP and lateral view, and the lag screw length was measured.  The drill was used to drill for the lag screw and then an appropriately sized lag screw was inserted. At this point, the antirotation screw was placed in the proximal nail and was tightened.  At this point, traction was released to the nail.  The trocar for the distal interlocking screw was placed through the aiming arm of the nail .  A stab incision was made and the trocar was inserted.  The distal interlocking screw was drilled, measured, and inserted.  The aiming arm was then removed.  At this point, final  fluoroscopic images were taken.  The wounds were irrigated.  They were closed with #1 Vicryl in the fascial layer, 2-0 Vicryl, and skin closed with staples.  A sterile dressing was placed.  The patient was awoken from anesthesia in stable condition.  There were no complications.  All counts were correct.  Stable to recovery.    Assistant: Luis M Esteves PA  was responsible for performing the following activities: Retraction, Suction, Irrigation, and Placing Dressing and their skilled assistance was necessary for the success of this case.    Hussain Singleton MD     Date: 9/25/2024  Time: 13:41 EDT

## 2024-09-26 LAB
ANION GAP SERPL CALCULATED.3IONS-SCNC: 11.6 MMOL/L (ref 5–15)
BACTERIA SPEC AEROBE CULT: ABNORMAL
BASOPHILS # BLD AUTO: 0.03 10*3/MM3 (ref 0–0.2)
BASOPHILS NFR BLD AUTO: 0.3 % (ref 0–1.5)
BUN SERPL-MCNC: 25 MG/DL (ref 8–23)
BUN/CREAT SERPL: 34.2 (ref 7–25)
CALCIUM SPEC-SCNC: 8.9 MG/DL (ref 8.6–10.5)
CHLORIDE SERPL-SCNC: 104 MMOL/L (ref 98–107)
CO2 SERPL-SCNC: 21.4 MMOL/L (ref 22–29)
CREAT SERPL-MCNC: 0.73 MG/DL (ref 0.57–1)
DEPRECATED RDW RBC AUTO: 49.1 FL (ref 37–54)
EGFRCR SERPLBLD CKD-EPI 2021: 85.9 ML/MIN/1.73
EOSINOPHIL # BLD AUTO: 0 10*3/MM3 (ref 0–0.4)
EOSINOPHIL NFR BLD AUTO: 0 % (ref 0.3–6.2)
ERYTHROCYTE [DISTWIDTH] IN BLOOD BY AUTOMATED COUNT: 13.5 % (ref 12.3–15.4)
GLUCOSE BLDC GLUCOMTR-MCNC: 103 MG/DL (ref 70–99)
GLUCOSE BLDC GLUCOMTR-MCNC: 143 MG/DL (ref 70–99)
GLUCOSE BLDC GLUCOMTR-MCNC: 198 MG/DL (ref 70–99)
GLUCOSE BLDC GLUCOMTR-MCNC: 206 MG/DL (ref 70–99)
GLUCOSE SERPL-MCNC: 118 MG/DL (ref 65–99)
HCT VFR BLD AUTO: 30.9 % (ref 34–46.6)
HGB BLD-MCNC: 9.4 G/DL (ref 12–15.9)
IMM GRANULOCYTES # BLD AUTO: 0.06 10*3/MM3 (ref 0–0.05)
IMM GRANULOCYTES NFR BLD AUTO: 0.6 % (ref 0–0.5)
LYMPHOCYTES # BLD AUTO: 0.95 10*3/MM3 (ref 0.7–3.1)
LYMPHOCYTES NFR BLD AUTO: 9.8 % (ref 19.6–45.3)
MAGNESIUM SERPL-MCNC: 2.1 MG/DL (ref 1.6–2.4)
MCH RBC QN AUTO: 30.2 PG (ref 26.6–33)
MCHC RBC AUTO-ENTMCNC: 30.4 G/DL (ref 31.5–35.7)
MCV RBC AUTO: 99.4 FL (ref 79–97)
MONOCYTES # BLD AUTO: 0.99 10*3/MM3 (ref 0.1–0.9)
MONOCYTES NFR BLD AUTO: 10.2 % (ref 5–12)
NEUTROPHILS NFR BLD AUTO: 7.67 10*3/MM3 (ref 1.7–7)
NEUTROPHILS NFR BLD AUTO: 79.1 % (ref 42.7–76)
NRBC BLD AUTO-RTO: 0 /100 WBC (ref 0–0.2)
PHOSPHATE SERPL-MCNC: 3.6 MG/DL (ref 2.5–4.5)
PLATELET # BLD AUTO: 170 10*3/MM3 (ref 140–450)
PMV BLD AUTO: 11.8 FL (ref 6–12)
POTASSIUM SERPL-SCNC: 4.5 MMOL/L (ref 3.5–5.2)
RBC # BLD AUTO: 3.11 10*6/MM3 (ref 3.77–5.28)
SODIUM SERPL-SCNC: 137 MMOL/L (ref 136–145)
WBC NRBC COR # BLD AUTO: 9.7 10*3/MM3 (ref 3.4–10.8)

## 2024-09-26 PROCEDURE — 84100 ASSAY OF PHOSPHORUS: CPT | Performed by: ORTHOPAEDIC SURGERY

## 2024-09-26 PROCEDURE — 83735 ASSAY OF MAGNESIUM: CPT | Performed by: ORTHOPAEDIC SURGERY

## 2024-09-26 PROCEDURE — 99232 SBSQ HOSP IP/OBS MODERATE 35: CPT | Performed by: INTERNAL MEDICINE

## 2024-09-26 PROCEDURE — 97165 OT EVAL LOW COMPLEX 30 MIN: CPT

## 2024-09-26 PROCEDURE — 85025 COMPLETE CBC W/AUTO DIFF WBC: CPT | Performed by: ORTHOPAEDIC SURGERY

## 2024-09-26 PROCEDURE — 25010000002 CEFAZOLIN PER 500 MG: Performed by: ORTHOPAEDIC SURGERY

## 2024-09-26 PROCEDURE — 97161 PT EVAL LOW COMPLEX 20 MIN: CPT

## 2024-09-26 PROCEDURE — 25010000002 CEFTRIAXONE PER 250 MG: Performed by: ORTHOPAEDIC SURGERY

## 2024-09-26 PROCEDURE — 82948 REAGENT STRIP/BLOOD GLUCOSE: CPT | Performed by: ORTHOPAEDIC SURGERY

## 2024-09-26 PROCEDURE — 25810000003 SODIUM CHLORIDE 0.9 % SOLUTION: Performed by: ORTHOPAEDIC SURGERY

## 2024-09-26 PROCEDURE — 82948 REAGENT STRIP/BLOOD GLUCOSE: CPT

## 2024-09-26 PROCEDURE — 80048 BASIC METABOLIC PNL TOTAL CA: CPT | Performed by: ORTHOPAEDIC SURGERY

## 2024-09-26 PROCEDURE — 63710000001 INSULIN LISPRO (HUMAN) PER 5 UNITS: Performed by: ORTHOPAEDIC SURGERY

## 2024-09-26 RX ADMIN — APIXABAN 2.5 MG: 2.5 TABLET, FILM COATED ORAL at 08:59

## 2024-09-26 RX ADMIN — SODIUM CHLORIDE 2000 MG: 9 INJECTION, SOLUTION INTRAVENOUS at 04:24

## 2024-09-26 RX ADMIN — LEVETIRACETAM 500 MG: 500 TABLET, FILM COATED ORAL at 06:06

## 2024-09-26 RX ADMIN — SENNOSIDES AND DOCUSATE SODIUM 2 TABLET: 50; 8.6 TABLET ORAL at 08:58

## 2024-09-26 RX ADMIN — ACETAMINOPHEN 1000 MG: 500 TABLET ORAL at 15:19

## 2024-09-26 RX ADMIN — SODIUM CHLORIDE 100 ML/HR: 9 INJECTION, SOLUTION INTRAVENOUS at 12:10

## 2024-09-26 RX ADMIN — ACETAMINOPHEN 1000 MG: 500 TABLET ORAL at 08:58

## 2024-09-26 RX ADMIN — METOPROLOL TARTRATE 25 MG: 25 TABLET, FILM COATED ORAL at 08:59

## 2024-09-26 RX ADMIN — ACETAMINOPHEN 1000 MG: 500 TABLET ORAL at 22:05

## 2024-09-26 RX ADMIN — CEFTRIAXONE SODIUM 1000 MG: 1 INJECTION, POWDER, FOR SOLUTION INTRAMUSCULAR; INTRAVENOUS at 15:19

## 2024-09-26 RX ADMIN — SENNOSIDES AND DOCUSATE SODIUM 2 TABLET: 50; 8.6 TABLET ORAL at 22:04

## 2024-09-26 RX ADMIN — OXYCODONE HYDROCHLORIDE 7.5 MG: 5 TABLET ORAL at 09:00

## 2024-09-26 RX ADMIN — SODIUM CHLORIDE 100 ML/HR: 9 INJECTION, SOLUTION INTRAVENOUS at 01:49

## 2024-09-26 RX ADMIN — DULOXETINE HYDROCHLORIDE 60 MG: 30 CAPSULE, DELAYED RELEASE ORAL at 08:57

## 2024-09-26 RX ADMIN — INSULIN LISPRO 2 UNITS: 100 INJECTION, SOLUTION INTRAVENOUS; SUBCUTANEOUS at 22:06

## 2024-09-26 RX ADMIN — EMPAGLIFLOZIN 25 MG: 10 TABLET, FILM COATED ORAL at 08:57

## 2024-09-26 RX ADMIN — OXYCODONE HYDROCHLORIDE 5 MG: 5 TABLET ORAL at 04:24

## 2024-09-26 RX ADMIN — APIXABAN 2.5 MG: 2.5 TABLET, FILM COATED ORAL at 22:05

## 2024-09-26 RX ADMIN — ATORVASTATIN CALCIUM 80 MG: 40 TABLET, FILM COATED ORAL at 08:58

## 2024-09-26 RX ADMIN — LEVETIRACETAM 1000 MG: 500 TABLET, FILM COATED ORAL at 22:04

## 2024-09-26 RX ADMIN — INSULIN LISPRO 3 UNITS: 100 INJECTION, SOLUTION INTRAVENOUS; SUBCUTANEOUS at 12:40

## 2024-09-26 NOTE — PROGRESS NOTES
[facility]     Progress Note    Patient Name: Haylee Gibbs  : 1950  MRN: [id number]  Primary Care Physician:  Kj Canales MD  Date of admission: 2024    Subjective   Subjective     HPI:  Patient Reports doing well this morning.  She is less confused than after surgery yesterday.  Her pain is controlled.    Review of Systems   See HPI    Objective   Objective     Vitals:   Temp:  [97.6 °F (36.4 °C)-98.6 °F (37 °C)] 98.1 °F (36.7 °C)  Heart Rate:  [59-92] 60  Resp:  [14-20] 18  BP: (117-185)/(38-87) 133/40  Flow (L/min):  [2-5] 2  Physical Exam    General: Alert, no acute distress   Chest: Unlabored breathing, cardiovascular: Regular heart rate   Musculoskeletal: Dressing intact.  Neurovascular intact.  Positive pulses.    Result Review      WBC   Date Value Ref Range Status   2024 9.70 3.40 - 10.80 10*3/mm3 Final     RBC   Date Value Ref Range Status   2024 3.11 (L) 3.77 - 5.28 10*6/mm3 Final     Hemoglobin   Date Value Ref Range Status   2024 9.4 (L) 12.0 - 15.9 g/dL Final     Hematocrit   Date Value Ref Range Status   2024 30.9 (L) 34.0 - 46.6 % Final     MCV   Date Value Ref Range Status   2024 99.4 (H) 79.0 - 97.0 fL Final     MCH   Date Value Ref Range Status   2024 30.2 26.6 - 33.0 pg Final     MCHC   Date Value Ref Range Status   2024 30.4 (L) 31.5 - 35.7 g/dL Final     RDW   Date Value Ref Range Status   2024 13.5 12.3 - 15.4 % Final     RDW-SD   Date Value Ref Range Status   2024 49.1 37.0 - 54.0 fl Final     MPV   Date Value Ref Range Status   2024 11.8 6.0 - 12.0 fL Final     Platelets   Date Value Ref Range Status   2024 170 140 - 450 10*3/mm3 Final     Neutrophil %   Date Value Ref Range Status   2024 79.1 (H) 42.7 - 76.0 % Final     Lymphocyte %   Date Value Ref Range Status   2024 9.8 (L) 19.6 - 45.3 % Final     Monocyte %   Date Value Ref Range Status   2024 10.2 5.0 - 12.0 % Final      Eosinophil %   Date Value Ref Range Status   09/26/2024 0.0 (L) 0.3 - 6.2 % Final     Basophil %   Date Value Ref Range Status   09/26/2024 0.3 0.0 - 1.5 % Final     Immature Grans %   Date Value Ref Range Status   09/26/2024 0.6 (H) 0.0 - 0.5 % Final     Neutrophils, Absolute   Date Value Ref Range Status   09/26/2024 7.67 (H) 1.70 - 7.00 10*3/mm3 Final     Lymphocytes, Absolute   Date Value Ref Range Status   09/26/2024 0.95 0.70 - 3.10 10*3/mm3 Final     Monocytes, Absolute   Date Value Ref Range Status   09/26/2024 0.99 (H) 0.10 - 0.90 10*3/mm3 Final     Eosinophils, Absolute   Date Value Ref Range Status   09/26/2024 0.00 0.00 - 0.40 10*3/mm3 Final     Basophils, Absolute   Date Value Ref Range Status   09/26/2024 0.03 0.00 - 0.20 10*3/mm3 Final     Immature Grans, Absolute   Date Value Ref Range Status   09/26/2024 0.06 (H) 0.00 - 0.05 10*3/mm3 Final     nRBC   Date Value Ref Range Status   09/26/2024 0.0 0.0 - 0.2 /100 WBC Final        Result Review:  I have personally reviewed the results from the time of this admission to 9/26/2024 06:20 EDT and agree with these findings:  [x]  Laboratory  []  Microbiology  [x]  Radiology  []  EKG/Telemetry   []  Cardiology/Vascular   []  Pathology  []  Old records  []  Other:      Assessment & Plan   Assessment / Plan     Brief Patient Summary:  Haylee Gibbs is a 74 y.o. female who is status post left hip cephalomedullary nail    Active Hospital Problems:  Active Hospital Problems    Diagnosis     **Closed fracture of left hip     Fracture      Plan: Weightbearing as tolerated  Physical and Occupational Therapy  Pain control  DVT prophylaxis  Discharge planning: Inpatient rehab when able       VTE Prophylaxis:  Pharmacologic & mechanical VTE prophylaxis orders are present.        CODE STATUS:   Level Of Support Discussed With: Patient  Code Status (Patient has no pulse and is not breathing): CPR (Attempt to Resuscitate)  Medical Interventions (Patient has pulse or is  breathing): Full Support    Disposition:  I expect patient to be discharged to inpatient rehab when able.    Electronically signed by Hussain Singleton MD, 09/26/24, 6:20 AM EDT.

## 2024-09-26 NOTE — PLAN OF CARE
Goal Outcome Evaluation:  Plan of Care Reviewed With: patient        Progress: no change  Outcome Evaluation: Pt alert and oriented x4. PRN pain medication administered x1 during shift. L hip dressing clean, dry, and intact.

## 2024-09-26 NOTE — THERAPY EVALUATION
Acute Care - Physical Therapy Initial Evaluation   [place]     Patient Name: Haylee Gibbs  : 1950  MRN: [id number]  Today's Date: 2024      Admit date: 2024     Referring Physician: Melchor Woods MD     Surgery Date:2024 - 2024   Procedure(s) (LRB):  LEFT HIP INTERTROCHANTERIC NAILING (Left)       Visit Dx:     ICD-10-CM ICD-9-CM   1. Closed fracture of left hip, initial encounter  S72.002A 820.8   2. Decreased activities of daily living (ADL)  Z78.9 V49.89   3. Difficulty in walking  R26.2 719.7     Patient Active Problem List   Diagnosis    Diabetes mellitus    Seizure disorder    Arthritis    Depression    Hypertension    Acute cough    Infection due to Enterobacter cloacae    Cataract    COPD (chronic obstructive pulmonary disease)    Dysarthria    Facial numbness    Hyperlipidemia    Mixed dyslipidemia    Paresthesia and pain of both upper extremities    Vitamin B12 deficiency    Vitamin D deficiency    History of stroke    Memory difficulty    Hemoglobin A1c between 7.0% and 9.0%    Closed fracture of left hip    Fracture     Past Medical History:   Diagnosis Date    Anxiety disorder 2018    Benign essential hypertension     Cataract     COPD (chronic obstructive pulmonary disease)     CVA (cerebral vascular accident)     Depression     Diabetes mellitus, type 2     Hyperlipidemia     Paresthesia and pain of both upper extremities 2018    Seizure disorder 2018    Vitamin B 12 deficiency     Vitamin D deficiency 2018     Past Surgical History:   Procedure Laterality Date    APPENDECTOMY      BACK SURGERY      LOW BACK DISC    CATARACT EXTRACTION      HIP INTERTROCHANTERIC NAILING Left 2024    Procedure: LEFT HIP INTERTROCHANTERIC NAILING;  Surgeon: Hussain Singleton MD;  Location: [facility];  Service: Orthopedics;  Laterality: Left;    HYSTERECTOMY      NECK SURGERY      NECK DISC    TONSILLECTOMY       PT Assessment (Last 12 Hours)       PT  Evaluation and Treatment       [facility] Name 09/26/24 1400          Physical Therapy Time and Intention    Subjective Information complains of;pain  -JULIANO     Document Type evaluation  -JULIANO     Mode of Treatment individual therapy;physical therapy  -JULIANO     Patient Effort good  -JULIANO       Row Name 09/26/24 1400          General Information    Patient Observations alert;cooperative;agree to therapy  -JULIANO     Prior Level of Function independent:;all household mobility;community mobility  -JLUIANO     Equipment Currently Used at Home cane, straight  -JULIANO     Existing Precautions/Restrictions fall;weight bearing  -JULIANO     Barriers to Rehab none identified  -JULIANO       [facility] Name 09/26/24 1400          Living Environment    Current Living Arrangements home  -JULIANO       [facility] Name 09/26/24 1400          Range of Motion (ROM)    Range of Motion ROM is WFL  -[facility] Name 09/26/24 1400          Strength (Manual Muscle Testing)    Strength (Manual Muscle Testing) left lower extremity  3/5  -JULIANO       [facility] Name 09/26/24 1400          Mobility    Extremity Weight-bearing Status left lower extremity  -JULIANO     Left Lower Extremity (Weight-bearing Status) weight-bearing as tolerated (WBAT)  -JULIANO       [facility] Name 09/26/24 1400          Bed Mobility    Bed Mobility bed mobility (all) activities;supine-sit  -JULIANO     All Activities, [place] (Bed Mobility) moderate assist (50% patient effort)  -JULIANO     Supine-Sit [place] (Bed Mobility) moderate assist (50% patient effort)  -JULIANO       [facility] Name 09/26/24 1400          Transfers    Transfers bed-chair transfer;sit-stand transfer  -JULIANO       [facility] Name 09/26/24 1400          Bed-Chair Transfer    Bed-Chair [place] (Transfers) moderate assist (50% patient effort)  -JULIANO     Assistive Device (Bed-Chair Transfers) walker, front-wheeled  -JULIANO       Row Name 09/26/24 1400          Sit-Stand Transfer    Sit-Stand [place] (Transfers) moderate assist (50% patient effort)  -JULIANO     Assistive Device (Sit-Stand  Transfers) walker, front-wheeled  -JULIANO       Row Name 09/26/24 1255          Gait/Stairs (Locomotion)    Gait/Stairs Locomotion gait/ambulation assistive device  -JULIANO     [place] Level (Gait) moderate assist (50% patient effort)  -JULIANO     Assistive Device (Gait) walker, front-wheeled  -JULIANO     Patient was able to Ambulate yes  -JULIANO     Distance in Feet (Gait) 5  -JULIANO     Pattern (Gait) step-to  difficulty advancing LLE  -JULIANO       Row Name 09/26/24 1255          Safety Issues, Functional Mobility    Impairments Affecting Function (Mobility) balance;endurance/activity tolerance;pain;strength  -JULIANO       Row Name 09/26/24 1255          Balance    Dynamic Standing Balance moderate assist  -JULIANO     Position/Device Used, Standing Balance walker, front-wheeled  -JULIANO       Row Name             Wound 09/25/24 1320 Left anterior thigh Incision    Wound - Properties Group Placement Date: 09/25/24  -SC Placement Time: 1320  -SC Present on Original Admission: N  -SC Side: Left  -SC Orientation: anterior  -SC Location: thigh  -SC Primary Wound Type: Incision  -SC    Retired Wound - Properties Group Placement Date: 09/25/24  -SC Placement Time: 1320  -SC Present on Original Admission: N  -SC Side: Left  -SC Orientation: anterior  -SC Location: thigh  -SC Primary Wound Type: Incision  -SC    Retired Wound - Properties Group Date first assessed: 09/25/24  -SC Time first assessed: 1320  -SC Present on Original Admission: N  -SC Side: Left  -SC Location: thigh  -SC Primary Wound Type: Incision  -SC      Row Name 09/26/24 1400          Plan of Care Review    Plan of Care Reviewed With patient  -JULIANO     Outcome Evaluation Patient presents with decreased strength, transfers and ambulation.  Skilled physical therapy services will be required to address these mobility deficits.  -JULIANO       Row Name 09/26/24 1400          Therapy Assessment/Plan (PT)    Rehab Potential (PT) good, to achieve stated therapy goals  -JULIANO     Criteria for Skilled  Interventions Met (PT) skilled treatment is necessary  -JULIANO     Therapy Frequency (PT) daily  -JULIANO     Predicted Duration of Therapy Intervention (PT) 10 days  -JULIANO     Problem List (PT) problems related to;balance;mobility;pain  -JULIANO     Activity Limitations Related to Problem List (PT) unable to transfer safely;unable to ambulate safely  -JULIANO       Row Name 09/26/24 1400          PT Evaluation Complexity    History, PT Evaluation Complexity no personal factors and/or comorbidities  -JULIANO     Examination of Body Systems (PT Eval Complexity) total of 4 or more elements  -JULIANO     Clinical Presentation (PT Evaluation Complexity) stable  -JULIANO     Clinical Decision Making (PT Evaluation Complexity) low complexity  -JULIANO     Overall Complexity (PT Evaluation Complexity) low complexity  -JULIANO       Row Name 09/26/24 1400          Therapy Plan Review/Discharge Plan (PT)    Therapy Plan Review (PT) evaluation/treatment results reviewed;participants voiced agreement with care plan;participants included;patient  -JULIANO       Row Name 09/26/24 1400          Physical Therapy Goals    Transfer Goal Selection (PT) transfer, PT goal 1  -JULIANO     Gait Training Goal Selection (PT) gait training, PT goal 1  -JULIANO       Row Name 09/26/24 1400          Transfer Goal 1 (PT)    Activity/Assistive Device (Transfer Goal 1, PT) transfers, all  -JULIANO     [place] Level/Cues Needed (Transfer Goal 1, PT) independent  -JULIANO     Time Frame (Transfer Goal 1, PT) long term goal (LTG);10 days  -JULIANO       Row Name 09/26/24 1400          Gait Training Goal 1 (PT)    Activity/Assistive Device (Gait Training Goal 1, PT) gait (walking locomotion);assistive device use;walker, rolling  -JULIANO     [place] Level (Gait Training Goal 1, PT) independent  -JULIANO     Distance (Gait Training Goal 1, PT) 100  -JULIANO     Time Frame (Gait Training Goal 1, PT) long term goal (LTG);10 days  -JULIANO               User Key  (r) = Recorded By, (t) = Taken By, (c) = Cosigned By      Initials Name  Provider Type    Suma Conley, RN Registered Nurse    Joao Cortes, PT Physical Therapist                      PT Recommendation and Plan  Anticipated Discharge Disposition (PT): sub acute care setting  Planned Therapy Interventions (PT): balance training, bed mobility training, gait training, home exercise program, stair training, strengthening, transfer training  Therapy Frequency (PT): daily  Plan of Care Reviewed With: patient  Outcome Evaluation: Patient presents with decreased strength, transfers and ambulation.  Skilled physical therapy services will be required to address these mobility deficits.   Outcome Measures       Row Name 09/26/24 1400             How much help from another person do you currently need...    Turning from your back to your side while in flat bed without using bedrails? 2  -JULIANO      Moving from lying on back to sitting on the side of a flat bed without bedrails? 2  -JULIANO      Moving to and from a bed to a chair (including a wheelchair)? 2  -JULIANO      Standing up from a chair using your arms (e.g., wheelchair, bedside chair)? 2  -JULIANO      Climbing 3-5 steps with a railing? 2  -JULIANO      To walk in hospital room? 2  -JULIANO      AM-PAC 6 Clicks Score (PT) 12  -JULIANO      Highest Level of Mobility Goal 4 --> Transfer to chair/commode  -JULIANO         Functional Assessment    Outcome Measure Options AM-PAC 6 Clicks Basic Mobility (PT)  -JULIANO                User Key  (r) = Recorded By, (t) = Taken By, (c) = Cosigned By      Initials Name Provider Type    Joao Cortes, PT Physical Therapist                     Time Calculation:    PT Charges       Row Name 09/26/24 1411             Time Calculation    PT Received On 09/26/24  -JULIANO      PT Goal Re-Cert Due Date 10/05/24  -JULIANO         Untimed Charges    PT Eval/Re-eval Minutes 30  -JULIANO         Total Minutes    Untimed Charges Total Minutes 30  -JULIANO       Total Minutes 30  -JULIANO                User Key  (r) = Recorded By, (t) = Taken By, (c) =  Cosigned By      Initials Name Provider Type    JULIANO Joao Duong, PT Physical Therapist                      PT G-Codes  Outcome Measure Options: AM-PAC 6 Clicks Basic Mobility (PT)  AM-PAC 6 Clicks Score (PT): 12  AM-PAC 6 Clicks Score (OT): 16    Joao Duong PT  9/26/2024

## 2024-09-26 NOTE — PLAN OF CARE
Goal Outcome Evaluation:              Outcome Evaluation: Patient alert and oriented but can be forgetful. Medicated x1 for pain with voiced relief. Awaiting rehab placement. Medications given as scheduled and safety checks maintained.

## 2024-09-26 NOTE — PLAN OF CARE
Goal Outcome Evaluation:  Plan of Care Reviewed With: patient, sibling        Progress: no change  Outcome Evaluation: Patient presents with limitations in self-care, functional transfers, balance, and endurance. She would benefit from continued skilled occupational therapy services to maximize independence with ADLs/functional transfers.      Anticipated Discharge Disposition (OT): inpatient rehabilitation facility

## 2024-09-26 NOTE — PROGRESS NOTES
[facility]   Hospitalist Progress Note    Date of admission: 9/24/2024  Patient Name: Haylee Gibbs  1950  Date: 9/26/2024      Subjective     Chief Complaint   Patient presents with    Fall    Hip Pain     Patient fell last night night up to the bathroom. Patient found on floor this morning by family. Patient reports left hip pain with rotation noted. EMS Bgl 220 mg/dL    Dizziness       Interval Followup: pain control reasonable.  No fevers.   Interested in rehab      Objective     Vitals:   Temp:  [97.7 °F (36.5 °C)-98.2 °F (36.8 °C)] 98.1 °F (36.7 °C)  Heart Rate:  [59-77] 64  Resp:  [17-18] 18  BP: (110-146)/(38-59) 126/59  Flow (L/min):  [2] 2    Physical Exam  Awake conversant, alert to self, recognizes family, not able to tell me the year, poor health literacy, at baseline per family   Decreasing left hip pain, distal sens appears intactLeft hip pain exam limited given pain  Ctab   Rrr  Abd soft      Result Review:  Vital signs, labs and recent relevant imaging reviewed.      CBC          9/24/2024    10:24 9/25/2024    04:00 9/26/2024    04:00   CBC   WBC 15.83  13.04  9.70    RBC 4.12  3.72  3.11    Hemoglobin 12.5  11.2  9.4    Hematocrit 39.6  35.9  30.9    MCV 96.1  96.5  99.4    MCH 30.3  30.1  30.2    MCHC 31.6  31.2  30.4    RDW 13.6  13.6  13.5    Platelets 233  227  170      CMP          9/24/2024    10:24 9/25/2024    04:00 9/26/2024    04:00   CMP   Glucose 180  166  118    BUN 23  25  25    Creatinine 0.76  0.78  0.73    EGFR 81.8  79.3  85.9    Sodium 138  137  137    Potassium 4.2  4.2  4.5    Chloride 100  104  104    Calcium 9.9  9.3  8.9    Total Protein 7.7  7.1     Albumin 4.4  4.0     Globulin 3.3      Total Bilirubin 1.6  1.2     Alkaline Phosphatase 50  48     AST (SGOT) 25  20     ALT (SGPT) 18  15     Albumin/Globulin Ratio 1.3      BUN/Creatinine Ratio 30.3  32.1  34.2    Anion Gap 16.8  11.6  11.6          acetaminophen    albuterol    aluminum-magnesium  hydroxide-simethicone    dextrose    dextrose    Diclofenac Sodium    glucagon (human recombinant)    HYDROmorphone    hydrOXYzine    lactated ringers    Lidocaine    melatonin    Morphine    nicotine    ondansetron    ondansetron    oxyCODONE    oxyCODONE    polyethylene glycol    prochlorperazine    promethazine **OR** promethazine    acetaminophen, 1,000 mg, Oral, TID  apixaban, 2.5 mg, Oral, Q12H  atorvastatin, 80 mg, Oral, Daily  cefTRIAXone, 1,000 mg, Intravenous, Q24H  clopidogrel, 75 mg, Oral, Daily  DULoxetine, 60 mg, Oral, Daily  empagliflozin, 25 mg, Oral, Daily  insulin lispro, 2-7 Units, Subcutaneous, 4x Daily AC & at Bedtime  levETIRAcetam, 1,000 mg, Oral, Nightly  levETIRAcetam, 500 mg, Oral, QAM  [Held by provider] lisinopril, 10 mg, Oral, Daily  metoprolol tartrate, 25 mg, Oral, BID  senna-docusate sodium, 2 tablet, Oral, BID        CT Head Without Contrast    Result Date: 9/24/2024  Impression: 1.Moderate small vessel ischemic changes in the white matter. 2.No calvarial fracture or intracranial hemorrhage Electronically Signed: Joe Briseno MD  9/24/2024 12:54 PM EDT  Workstation ID: [id number]    XR Knee 3 View Left    Result Date: 9/24/2024  Impression: 1. No acute osseous abnormality. 2. Mild medial compartment joint space narrowing. 3. Chondrocalcinosis within the medial and lateral compartment of the left knee which can be seen with CPPD arthropathy. Electronically Signed: Ammon Ling  9/24/2024 11:58 AM EDT  Workstation ID: [id number]    XR Hip With or Without Pelvis 2 - 3 View Left    Result Date: 9/24/2024  Impression: Comminuted displaced left intertrochanteric fracture. Electronically Signed: Cecilia Ibarra MD  9/24/2024 10:58 AM EDT  Workstation ID: [id number]    XR Chest 1 View    Result Date: 9/24/2024  Impression: Negative. Electronically Signed: Cecilia Ibarra MD  9/24/2024 10:57 AM EDT  Workstation ID: [id number]     Assessment / Plan     Summary: 74 y.o. with dementia, seizure  disorder, htn who presented after a fall while going to the bathroom and found to have comminuted displaced left intertrochanteric fracture, ortho consulted. Treating for uti as well.  9/25 Closed reduction and LEFT HIP CEPHALOMEDULLARY NAILING.  Working on rehab placement.     Assessment/Plan (clinically significant if listed here)  Fall, question mechanical vs orthostatic, with comminuted displaced left intertrochanteric fracture  E coli UTI  Postop hypoxemia 2/2 sedation/atelectasis, requiring 2L NC   Seizure disorder  Dementia   Hypertension  DM2  Anxiety/mood disorder, on duloxetine  Hx of CVA ~2018 last time reportedly, on plavix outpatient    CT head small vessel disease, moderate, no hemorrhage    D/w ortho - ok to resume home plavix (for cva hx)  9/24 ceftriaxone for e coli uti, finish tomorrow  Yeast on ua, s/p dose of fluconazole, monitor  IS/bph, resp hygiene, bowel regimen, monitor postoperatively closely  Try scheduled tylenol, oxycodone prn, no iv opiates needed postop   Continue metoprolol, holding lisinopril, monitor bp,   Question orthostatic hypotension related cause of fall/along with uti,   Check OVS today if able  Continue Keppra at night 1g and apparently takes 500mg in the am only   Continue SSI monitor blood glucose, start home jardiance tomorrow; holding home metformin and trulicity   Continue duloxetine  Continues statin  PT/OT  Continue hospitalization at current level of care    Dispo: rehab placement pending  D/w SW    VTE Prophylaxis:  Pharmacologic & mechanical VTE prophylaxis orders are present.      Level Of Support Discussed With: Patient  Code Status (Patient has no pulse and is not breathing): CPR (Attempt to Resuscitate)  Medical Interventions (Patient has pulse or is breathing): Full Support

## 2024-09-26 NOTE — PLAN OF CARE
Goal Outcome Evaluation:  Plan of Care Reviewed With: patient           Outcome Evaluation: Patient presents with decreased strength, transfers and ambulation.  Skilled physical therapy services will be required to address these mobility deficits.      Anticipated Discharge Disposition (PT): sub acute care setting

## 2024-09-26 NOTE — THERAPY EVALUATION
Patient Name: Haylee Gibbs  : 1950    MRN: [id number]                              Today's Date: 2024       Admit Date: 2024    Visit Dx:     ICD-10-CM ICD-9-CM   1. Closed fracture of left hip, initial encounter  S72.002A 820.8   2. Decreased activities of daily living (ADL)  Z78.9 V49.89     Patient Active Problem List   Diagnosis    Diabetes mellitus    Seizure disorder    Arthritis    Depression    Hypertension    Acute cough    Infection due to Enterobacter cloacae    Cataract    COPD (chronic obstructive pulmonary disease)    Dysarthria    Facial numbness    Hyperlipidemia    Mixed dyslipidemia    Paresthesia and pain of both upper extremities    Vitamin B12 deficiency    Vitamin D deficiency    History of stroke    Memory difficulty    Hemoglobin A1c between 7.0% and 9.0%    Closed fracture of left hip    Fracture     Past Medical History:   Diagnosis Date    Anxiety disorder 2018    Benign essential hypertension     Cataract     COPD (chronic obstructive pulmonary disease)     CVA (cerebral vascular accident)     Depression     Diabetes mellitus, type 2     Hyperlipidemia     Paresthesia and pain of both upper extremities 2018    Seizure disorder 2018    Vitamin B 12 deficiency     Vitamin D deficiency 2018     Past Surgical History:   Procedure Laterality Date    APPENDECTOMY      BACK SURGERY      LOW BACK DISC    CATARACT EXTRACTION      HIP INTERTROCHANTERIC NAILING Left 2024    Procedure: LEFT HIP INTERTROCHANTERIC NAILING;  Surgeon: Hussain Singleton MD;  Location: [facility];  Service: Orthopedics;  Laterality: Left;    HYSTERECTOMY      NECK SURGERY      NECK DISC    TONSILLECTOMY        General Information       Row Name 24 1249          OT Time and Intention    Document Type evaluation  -LF     Mode of Treatment individual therapy;occupational therapy  -LF       Row Name 24 1249          General Information    Patient Profile  Reviewed yes  -     Prior Level of Function --  (I) with ADLs, ambulated with a quad cane PRN outdoors, has a step over tub with shower chair, elevated commode, stands to groom, no longer drives, and no home O2.  -     Existing Precautions/Restrictions fall;weight bearing  WBAT LLE  -     Barriers to Rehab none identified  -       Row Name 09/26/24 1249          Occupational Profile    Reason for Services/Referral (Occupational Profile) Patient is a 74 year old female who is currently status post closed reduction and left hip cephalomedullary nailing on September 24th, 2024. Occupational therapy consulted due to recent decline in ADLs/functional transfers. No previous occupational therapy services for current condition.  -       Row Name 09/26/24 1249          Living Environment    People in Home alone  Family able to assist if needed  -       Row Name 09/26/24 1249          Home Main Entrance    Number of Stairs, Main Entrance one  -       Row Name 09/26/24 1249          Stairs Within Home, Primary    Number of Stairs, Within Home, Primary none  -       Row Name 09/26/24 1249          Cognition    Orientation Status (Cognition) oriented x 3  -       Row Name 09/26/24 1249          Safety Issues, Functional Mobility    Safety Issues Affecting Function (Mobility) judgment;problem-solving;sequencing abilities  -     Impairments Affecting Function (Mobility) balance;endurance/activity tolerance;pain;strength  -               User Key  (r) = Recorded By, (t) = Taken By, (c) = Cosigned By      Initials Name Provider Type     Melissa Cordova OT Occupational Therapist                     Mobility/ADL's       Row Name 09/26/24 1252          Bed Mobility    Bed Mobility supine-sit  -     Supine-Sit [place] (Bed Mobility) maximum assist (25% patient effort)  -     Bed Mobility, Safety Issues decreased use of arms for pushing/pulling;decreased use of legs for bridging/pushing  -      Assistive Device (Bed Mobility) bed rails;draw sheet;head of bed elevated  -LF       Row Name 09/26/24 1252          Transfers    Transfers sit-stand transfer;stand-sit transfer  -LF       Row Name 09/26/24 1252          Sit-Stand Transfer    Sit-Stand [place] (Transfers) moderate assist (50% patient effort)  -     Assistive Device (Sit-Stand Transfers) walker, front-wheeled  -[facility] Name 09/26/24 1252          Stand-Sit Transfer    Stand-Sit [place] (Transfers) moderate assist (50% patient effort)  -     Assistive Device (Stand-Sit Transfers) walker, front-wheeled  -[facility] Name 09/26/24 1252          Functional Mobility    Functional Mobility- Ind. Level maximum assist (25% patient effort)  -     Functional Mobility- Device walker, front-wheeled  -     Functional Mobility- Safety Issues step length decreased;weight-shifting ability decreased  -     Functional Mobility- Comment Max cues for sequencing and assist advancing BLEs d/t difficulty weightshifting/pain.  -LF       Row Name 09/26/24 1252          Activities of Daily Living    BADL Assessment/Intervention bathing;upper body dressing;lower body dressing;grooming;feeding;toileting  -LF       Row Name 09/26/24 1252          Mobility    Extremity Weight-bearing Status left lower extremity  -     Left Lower Extremity (Weight-bearing Status) weight-bearing as tolerated (WBAT)  -LF       Row Name 09/26/24 1252          Bathing Assessment/Intervention    [place] Level (Bathing) bathing skills;upper body;standby assist;lower body;maximum assist (25% patient effort);dependent (less than 25% patient effort)  -LF       Row Name 09/26/24 1252          Upper Body Dressing Assessment/Training    [place] Level (Upper Body Dressing) upper body dressing skills;standby assist  -LF       Row Name 09/26/24 1252          Lower Body Dressing Assessment/Training    [place] Level (Lower Body Dressing) lower body dressing skills;maximum  assist (25% patient effort);dependent (less than 25% patient effort)  -       Row Name 09/26/24 1252          Grooming Assessment/Training    [place] Level (Grooming) grooming skills;set up  -       Row Name 09/26/24 1252          Self-Feeding Assessment/Training    [place] Level (Feeding) feeding skills;set up  -       Row Name 09/26/24 1252          Toileting Assessment/Training    [place] Level (Toileting) toileting skills;maximum assist (25% patient effort);dependent (less than 25% patient effort)  -     Comment, (Toileting) Female purewick currently in place  -               User Key  (r) = Recorded By, (t) = Taken By, (c) = Cosigned By      Initials Name Provider Type     Melissa Cordova OT Occupational Therapist                   Obj/Interventions       [facility] Name 09/26/24 1253          Sensory Assessment (Somatosensory)    Sensory Assessment (Somatosensory) UE sensation intact  -LF       Row Name 09/26/24 1253          Vision Assessment/Intervention    Visual Impairment/Limitations WFL  -LF       Row Name 09/26/24 1253          Range of Motion Comprehensive    General Range of Motion bilateral upper extremity ROM WFL  -LF       Row Name 09/26/24 1253          Strength Comprehensive (MMT)    Comment, General Manual Muscle Testing (MMT) Assessment 4/5 BUEs  -[facility] Name 09/26/24 1253          Motor Skills    Motor Skills coordination;functional endurance  -     Coordination bilateral;upper extremity;WFL  -LF     Functional Endurance Fair  -LF       Row Name 09/26/24 1253          Balance    Balance Assessment sitting dynamic balance;standing dynamic balance  -LF     Dynamic Sitting Balance minimal assist  -LF     Position, Sitting Balance supported;sitting edge of bed  -     Dynamic Standing Balance maximum assist  -LF     Position/Device Used, Standing Balance supported;walker, front-wheeled  -               User Key  (r) = Recorded By, (t) = Taken By, (c) = Cosigned By       Initials Name Provider Type    LF Melissa Cordova, OT Occupational Therapist                   Goals/Plan       Row Name 09/26/24 1259          Bed Mobility Goal 1 (OT)    Activity/Assistive Device (Bed Mobility Goal 1, OT) bed mobility activities, all  -LF     [place] Level/Cues Needed (Bed Mobility Goal 1, OT) modified independence  -LF     Time Frame (Bed Mobility Goal 1, OT) long term goal (LTG);10 days  -LF       Row Name 09/26/24 1259          Transfer Goal 1 (OT)    Activity/Assistive Device (Transfer Goal 1, OT) transfers, all  -LF     [place] Level/Cues Needed (Transfer Goal 1, OT) modified independence  -LF     Time Frame (Transfer Goal 1, OT) long term goal (LTG);10 days  -       Row Name 09/26/24 1259          Bathing Goal 1 (OT)    Activity/Device (Bathing Goal 1, OT) bathing skills, all  -LF     [place] Level/Cues Needed (Bathing Goal 1, OT) modified independence  -LF     Time Frame (Bathing Goal 1, OT) long term goal (LTG);10 days  -       Row Name 09/26/24 1259          Dressing Goal 1 (OT)    Activity/Device (Dressing Goal 1, OT) dressing skills, all  -LF     [place]/Cues Needed (Dressing Goal 1, OT) modified independence  -LF     Time Frame (Dressing Goal 1, OT) long term goal (LTG);10 days  -       Row Name 09/26/24 1259          Toileting Goal 1 (OT)    Activity/Device (Toileting Goal 1, OT) toileting skills, all  -LF     [place] Level/Cues Needed (Toileting Goal 1, OT) modified independence  -LF     Time Frame (Toileting Goal 1, OT) long term goal (LTG);10 days  -       Row Name 09/26/24 1259          Problem Specific Goal 1 (OT)    Problem Specific Goal 1 (OT) Patient will demonstrate good endurance to support ADLs/functional transfers.  -LF     Time Frame (Problem Specific Goal 1, OT) long term goal (LTG);10 days  -       Row Name 09/26/24 1259          Therapy Assessment/Plan (OT)    Planned Therapy Interventions (OT) activity tolerance training;BADL  retraining;functional balance retraining;occupation/activity based interventions;patient/caregiver education/training;transfer/mobility retraining;strengthening exercise  -               User Key  (r) = Recorded By, (t) = Taken By, (c) = Cosigned By      Initials Name Provider Type     Melissa Cordova, OT Occupational Therapist                   Clinical Impression       Row Name 09/26/24 9115          Pain Assessment    Additional Documentation Pain Scale: FACES Pre/Post-Treatment (Group)  -       Row Name 09/26/24 1942          Pain Scale: FACES Pre/Post-Treatment    Pain: FACES Scale, Pretreatment 2-->hurts little bit  -LF     Posttreatment Pain Rating 4-->hurts little more  -LF     Pain Location - Side/Orientation Left  -     Pain Location - hip  -       Row Name 09/26/24 1250          Plan of Care Review    Plan of Care Reviewed With patient;sibling  -     Progress no change  -     Outcome Evaluation Patient presents with limitations in self-care, functional transfers, balance, and endurance. She would benefit from continued skilled occupational therapy services to maximize independence with ADLs/functional transfers.  -       Row Name 09/26/24 4248          Therapy Assessment/Plan (OT)    Patient/Family Therapy Goal Statement (OT) To maximize independence.  -     Rehab Potential (OT) good, to achieve stated therapy goals  -     Criteria for Skilled Therapeutic Interventions Met (OT) yes;meets criteria;skilled treatment is necessary  -     Therapy Frequency (OT) 5 times/wk  -       Row Name 09/26/24 3702          Therapy Plan Review/Discharge Plan (OT)    Equipment Needs Upon Discharge (OT) walker, rolling;tub bench  -     Anticipated Discharge Disposition (OT) inpatient rehabilitation facility  -       Row Name 09/26/24 9791          Vital Signs    O2 Delivery Pre Treatment nasal cannula  -LF     O2 Delivery Intra Treatment nasal cannula  -LF     O2 Delivery Post Treatment nasal  cannula  -LF       Row Name 09/26/24 1255          Positioning and Restraints    Pre-Treatment Position in bed  -LF     Post Treatment Position chair  -LF     In Chair reclined;call light within reach;encouraged to call for assist;exit alarm on;with family/caregiver  -LF               User Key  (r) = Recorded By, (t) = Taken By, (c) = Cosigned By      Initials Name Provider Type     Melissa Cordova OT Occupational Therapist                   Outcome Measures       Row Name 09/26/24 1259          How much help from another is currently needed...    Putting on and taking off regular lower body clothing? 2  -LF     Bathing (including washing, rinsing, and drying) 2  -LF     Toileting (which includes using toilet bed pan or urinal) 1  -LF     Putting on and taking off regular upper body clothing 3  -LF     Taking care of personal grooming (such as brushing teeth) 4  -LF     Eating meals 4  -LF     AM-PAC 6 Clicks Score (OT) 16  -LF       Row Name 09/26/24 1259          Functional Assessment    Outcome Measure Options AM-PAC 6 Clicks Daily Activity (OT);Optimal Instrument  -LF       Row Name 09/26/24 1259          Optimal Instrument    Optimal Instrument Optimal - 3  -LF     Bending/Stooping 4  -LF     Standing 3  -LF     Reaching 1  -LF     From the list, choose the 3 activities you would most like to be able to do without any difficulty Bending/stooping;Standing;Reaching  -LF     Total Score Optimal - 3 8  -LF               User Key  (r) = Recorded By, (t) = Taken By, (c) = Cosigned By      Initials Name Provider Type     Melissa Cordova OT Occupational Therapist                    Occupational Therapy Education       Title: PT OT SLP Therapies (In Progress)       Topic: Occupational Therapy (In Progress)       Point: ADL training (In Progress)       Description:   Instruct learner(s) on proper safety adaptation and remediation techniques during self care or transfers.   Instruct in proper use of assistive  devices.                  Learning Progress Summary             Patient Acceptance, E,TB, NR by  at 9/26/2024 1300    Acceptance, E, VU by  at 9/24/2024 1916                         Point: Home exercise program (Done)       Description:   Instruct learner(s) on appropriate technique for monitoring, assisting and/or progressing therapeutic exercises/activities.                  Learning Progress Summary             Patient Acceptance, E, VU by  at 9/24/2024 1916                         Point: Precautions (In Progress)       Description:   Instruct learner(s) on prescribed precautions during self-care and functional transfers.                  Learning Progress Summary             Patient Acceptance, E,TB, NR by  at 9/26/2024 1300    Acceptance, E, VU by  at 9/24/2024 1916                         Point: Body mechanics (In Progress)       Description:   Instruct learner(s) on proper positioning and spine alignment during self-care, functional mobility activities and/or exercises.                  Learning Progress Summary             Patient Acceptance, E,TB, NR by  at 9/26/2024 1300    Acceptance, E, VU by  at 9/24/2024 1916                                         User Key       Initials Effective Dates Name Provider Type Discipline     07/13/23 -  Abbie Frazier RN Registered Nurse Nurse     06/16/21 -  Melissa Cordova OT Occupational Therapist OT                  OT Recommendation and Plan  Planned Therapy Interventions (OT): activity tolerance training, BADL retraining, functional balance retraining, occupation/activity based interventions, patient/caregiver education/training, transfer/mobility retraining, strengthening exercise  Therapy Frequency (OT): 5 times/wk  Plan of Care Review  Plan of Care Reviewed With: patient, sibling  Progress: no change  Outcome Evaluation: Patient presents with limitations in self-care, functional transfers, balance, and endurance. She would benefit from  continued skilled occupational therapy services to maximize independence with ADLs/functional transfers.     Time Calculation:   Evaluation Complexity (OT)  Review Occupational Profile/Medical/Therapy History Complexity: brief/low complexity  Assessment, Occupational Performance/Identification of Deficit Complexity: 3-5 performance deficits  Clinical Decision Making Complexity (OT): problem focused assessment/low complexity  Overall Complexity of Evaluation (OT): low complexity     Time Calculation- OT       Row Name 09/26/24 1300             Time Calculation- OT    OT Received On 09/26/24  -LF      OT Goal Re-Cert Due Date 10/05/24  -LF         Untimed Charges    OT Eval/Re-eval Minutes 50  -LF         Total Minutes    Untimed Charges Total Minutes 50  -LF       Total Minutes 50  -LF                User Key  (r) = Recorded By, (t) = Taken By, (c) = Cosigned By      Initials Name Provider Type    LF Melissa Cordova OT Occupational Therapist                  Therapy Charges for Today       Code Description Service Date Service Provider Modifiers Qty    [id number] HC OT EVAL LOW COMPLEXITY 4 9/26/2024 Melissa Cordova OT GO 1                 Melissa Cordova OT  9/26/2024

## 2024-09-27 VITALS
RESPIRATION RATE: 17 BRPM | HEART RATE: 74 BPM | WEIGHT: 175.93 LBS | OXYGEN SATURATION: 91 % | TEMPERATURE: 98.2 F | BODY MASS INDEX: 28.27 KG/M2 | SYSTOLIC BLOOD PRESSURE: 170 MMHG | HEIGHT: 66 IN | DIASTOLIC BLOOD PRESSURE: 64 MMHG

## 2024-09-27 LAB
GLUCOSE BLDC GLUCOMTR-MCNC: 113 MG/DL (ref 70–99)
GLUCOSE BLDC GLUCOMTR-MCNC: 139 MG/DL (ref 70–99)
GLUCOSE BLDC GLUCOMTR-MCNC: 143 MG/DL (ref 70–99)
GLUCOSE BLDC GLUCOMTR-MCNC: 144 MG/DL (ref 70–99)

## 2024-09-27 PROCEDURE — 99238 HOSP IP/OBS DSCHRG MGMT 30/<: CPT | Performed by: FAMILY MEDICINE

## 2024-09-27 PROCEDURE — 97530 THERAPEUTIC ACTIVITIES: CPT

## 2024-09-27 PROCEDURE — 82948 REAGENT STRIP/BLOOD GLUCOSE: CPT | Performed by: ORTHOPAEDIC SURGERY

## 2024-09-27 PROCEDURE — 94799 UNLISTED PULMONARY SVC/PX: CPT

## 2024-09-27 RX ORDER — LIDOCAINE 4 G/G
1 PATCH TOPICAL DAILY PRN
Start: 2024-09-27 | End: 2024-09-30

## 2024-09-27 RX ADMIN — METOPROLOL TARTRATE 25 MG: 25 TABLET, FILM COATED ORAL at 23:07

## 2024-09-27 RX ADMIN — CLOPIDOGREL BISULFATE 75 MG: 75 TABLET ORAL at 08:37

## 2024-09-27 RX ADMIN — OXYCODONE HYDROCHLORIDE 5 MG: 5 TABLET ORAL at 04:18

## 2024-09-27 RX ADMIN — LEVETIRACETAM 1000 MG: 500 TABLET, FILM COATED ORAL at 23:07

## 2024-09-27 RX ADMIN — SENNOSIDES AND DOCUSATE SODIUM 2 TABLET: 50; 8.6 TABLET ORAL at 23:07

## 2024-09-27 RX ADMIN — LEVETIRACETAM 500 MG: 500 TABLET, FILM COATED ORAL at 06:04

## 2024-09-27 RX ADMIN — APIXABAN 2.5 MG: 2.5 TABLET, FILM COATED ORAL at 08:38

## 2024-09-27 RX ADMIN — ACETAMINOPHEN 1000 MG: 500 TABLET ORAL at 23:07

## 2024-09-27 RX ADMIN — APIXABAN 2.5 MG: 2.5 TABLET, FILM COATED ORAL at 23:07

## 2024-09-27 RX ADMIN — OXYCODONE HYDROCHLORIDE 7.5 MG: 5 TABLET ORAL at 13:34

## 2024-09-27 RX ADMIN — OXYCODONE HYDROCHLORIDE 7.5 MG: 5 TABLET ORAL at 08:38

## 2024-09-27 RX ADMIN — DULOXETINE HYDROCHLORIDE 60 MG: 30 CAPSULE, DELAYED RELEASE ORAL at 08:38

## 2024-09-27 RX ADMIN — SENNOSIDES AND DOCUSATE SODIUM 2 TABLET: 50; 8.6 TABLET ORAL at 08:37

## 2024-09-27 RX ADMIN — EMPAGLIFLOZIN 25 MG: 10 TABLET, FILM COATED ORAL at 08:38

## 2024-09-27 RX ADMIN — ATORVASTATIN CALCIUM 80 MG: 40 TABLET, FILM COATED ORAL at 08:37

## 2024-09-27 RX ADMIN — METOPROLOL TARTRATE 25 MG: 25 TABLET, FILM COATED ORAL at 08:37

## 2024-09-27 NOTE — SIGNIFICANT NOTE
09/27/24 1200   Physical Therapy Time and Intention   Session Not Performed other (see comments)  (Pt in process of discharge to rehab)

## 2024-09-27 NOTE — PROGRESS NOTES
[facility]     Progress Note    Patient Name: Haylee Gibbs  : 1950  MRN: [id number]  Primary Care Physician:  Kj Canales MD  Date of admission: 2024    Subjective   Subjective     HPI:  Patient Reports no new complaints this morning.  Her pain is controlled.  She denies any chest pain or shortness of air.    Review of Systems   See HPI    Objective   Objective     Vitals:   Temp:  [97.7 °F (36.5 °C)-98.2 °F (36.8 °C)] 98.2 °F (36.8 °C)  Heart Rate:  [64-71] 71  Resp:  [18] 18  BP: (110-165)/(41-64) 165/56  Flow (L/min):  [2] 2  Physical Exam    General: Alert, no acute distress   Chest: Unlabored breathing, cardiovascular: Regular heart rate   Musculoskeletal: Neurovascular intact extremity.  Positive pulses.  Dressing intact.    Result Review      WBC   Date Value Ref Range Status   2024 9.70 3.40 - 10.80 10*3/mm3 Final     RBC   Date Value Ref Range Status   2024 3.11 (L) 3.77 - 5.28 10*6/mm3 Final     Hemoglobin   Date Value Ref Range Status   2024 9.4 (L) 12.0 - 15.9 g/dL Final     Hematocrit   Date Value Ref Range Status   2024 30.9 (L) 34.0 - 46.6 % Final     MCV   Date Value Ref Range Status   2024 99.4 (H) 79.0 - 97.0 fL Final     MCH   Date Value Ref Range Status   2024 30.2 26.6 - 33.0 pg Final     MCHC   Date Value Ref Range Status   2024 30.4 (L) 31.5 - 35.7 g/dL Final     RDW   Date Value Ref Range Status   2024 13.5 12.3 - 15.4 % Final     RDW-SD   Date Value Ref Range Status   2024 49.1 37.0 - 54.0 fl Final     MPV   Date Value Ref Range Status   2024 11.8 6.0 - 12.0 fL Final     Platelets   Date Value Ref Range Status   2024 170 140 - 450 10*3/mm3 Final     Neutrophil %   Date Value Ref Range Status   2024 79.1 (H) 42.7 - 76.0 % Final     Lymphocyte %   Date Value Ref Range Status   2024 9.8 (L) 19.6 - 45.3 % Final     Monocyte %   Date Value Ref Range Status   2024 10.2 5.0 - 12.0 %  Final     Eosinophil %   Date Value Ref Range Status   09/26/2024 0.0 (L) 0.3 - 6.2 % Final     Basophil %   Date Value Ref Range Status   09/26/2024 0.3 0.0 - 1.5 % Final     Immature Grans %   Date Value Ref Range Status   09/26/2024 0.6 (H) 0.0 - 0.5 % Final     Neutrophils, Absolute   Date Value Ref Range Status   09/26/2024 7.67 (H) 1.70 - 7.00 10*3/mm3 Final     Lymphocytes, Absolute   Date Value Ref Range Status   09/26/2024 0.95 0.70 - 3.10 10*3/mm3 Final     Monocytes, Absolute   Date Value Ref Range Status   09/26/2024 0.99 (H) 0.10 - 0.90 10*3/mm3 Final     Eosinophils, Absolute   Date Value Ref Range Status   09/26/2024 0.00 0.00 - 0.40 10*3/mm3 Final     Basophils, Absolute   Date Value Ref Range Status   09/26/2024 0.03 0.00 - 0.20 10*3/mm3 Final     Immature Grans, Absolute   Date Value Ref Range Status   09/26/2024 0.06 (H) 0.00 - 0.05 10*3/mm3 Final     nRBC   Date Value Ref Range Status   09/26/2024 0.0 0.0 - 0.2 /100 WBC Final        Result Review:  I have personally reviewed the results from the time of this admission to 9/27/2024 07:52 EDT and agree with these findings:  [x]  Laboratory  []  Microbiology  []  Radiology  []  EKG/Telemetry   []  Cardiology/Vascular   []  Pathology  []  Old records  []  Other:      Assessment & Plan   Assessment / Plan     Brief Patient Summary:  Haylee Gibbs is a 74 y.o. female who is status post left hip CM nailing    Active Hospital Problems:  Active Hospital Problems    Diagnosis     **Closed fracture of left hip     Fracture      Plan: Weightbearing as tolerated  Physical and Occupational Therapy  Pain control  DVT prophylaxis  Discharge planning: Inpatient rehab when able       VTE Prophylaxis:  Pharmacologic & mechanical VTE prophylaxis orders are present.        CODE STATUS:   Level Of Support Discussed With: Patient  Code Status (Patient has no pulse and is not breathing): CPR (Attempt to Resuscitate)  Medical Interventions (Patient has pulse or is  breathing): Full Support    Disposition:  I expect patient to be discharged to inpatient rehab when able.    Electronically signed by Hussain Singleton MD, 09/27/24, 7:52 AM EDT.

## 2024-09-27 NOTE — NURSING NOTE
Namita SOTO RN gave report to Samir BRADFORD at [facility] at 1313. Patient's sister, Ruby, was notified of transfer at 1328.

## 2024-09-27 NOTE — PLAN OF CARE
Goal Outcome Evaluation:  Plan of Care Reviewed With: patient        Progress: no change  Outcome Evaluation: Pt alert and able to make needs known. Pain controlled with PRN Roxicodone. Pt urinating without difficulty- utilizing purewick. Pt turned and repositioned throughout shift. Pt pending discharge planning.

## 2024-09-27 NOTE — DISCHARGE SUMMARY
[facility]         HOSPITALIST  DISCHARGE SUMMARY    Patient Name: Haylee Gibbs  : 1950  MRN: [id number]    Date of Admission: 2024  Date of Discharge: 2024  Primary Care Physician: Kj Canales MD    Consults       Date and Time Order Name Status Description    2024  1:49 PM Hospitalist (on-call MD unless specified)              Active and Resolved Hospital Problems:  Fall, question mechanical vs orthostatic,   Comminuted displaced left intertrochanteric fracture  E coli UTI  Postop hypoxemia 2/2 sedation/atelectasis, requiring 2L NC   Seizure disorder  Dementia   Hypertension  DM2  Anxiety/mood disorder, on duloxetine  Hx of CVA ~2018 last time reportedly, on plavix outpatient  Active Hospital Problems    Diagnosis POA    **Closed fracture of left hip [S72.002A] Unknown    Fracture [T14.8XXA] Yes      Resolved Hospital Problems   No resolved problems to display.       Hospital Course     Hospital Course:  Haylee Gibbs is a 74 y.o. female  with dementia, seizure disorder, htn who presented after a fall while going to the bathroom and found to have comminuted displaced left intertrochanteric fracture, ortho consulted.  Also found to have urinary tract infection and completed treatment with Rocephin while inpatient.   Closed reduction and LEFT HIP CEPHALOMEDULLARY NAILING and tolerated procedure well.  Patient did have downtrending hemoglobin postoperatively but no signs of bleeding.  Patient worked well physical therapy Occupational Therapy thought to have good rehab potential.  Patient seen and evaluated on day of discharge and thought stable for discharge to [facility]        DISCHARGE Follow Up Recommendations for labs and diagnostics: As above      Day of Discharge     Vital Signs:  Temp:  [98.1 °F (36.7 °C)-98.2 °F (36.8 °C)] 98.2 °F (36.8 °C)  Heart Rate:  [64-77] 77  Resp:  [18] 18  BP: (110-167)/(41-64) 167/55  Physical Exam:   Gen. well-developed  appearing stated age in no acute distress  HEENT: Normocephalic atraumatic moist membranes pupils equal round reactive light, no scleral icterus no conjunctival injection  Cardiovascular: regular rate and rhythm no murmurs rubs or gallops S1-S2, no lower extremity edema appreciated  Pulmonary: Clear to auscultation bilaterally no wheezes rales or rhonchi symmetric chest expansion, unlabored, no conversational dyspnea appreciated  Gastrointestinal: Soft nontender nondistended positive bowel sounds all 4 quadrants no rebound or guarding  Musculoskeletal: No clubbing cyanosis, warm and well-perfused, calves soft symmetric nontender bilaterally, surgical dressings clean dry intact over the left hip and thigh  Skin: Clean dry without rashs  Neuro: Cranial nerves II through XII intact grossly no sensorimotor deficits appreciated bilateral upper and lower extremities  Psych: Patient is calm cooperative and appropriate with exam not responding to internal stimuli  : No Reyes catheter no bladder distention no suprapubic tenderness      Discharge Details        Discharge Medications        New Medications        Instructions Start Date   apixaban 2.5 MG tablet tablet  Commonly known as: ELIQUIS   2.5 mg, Oral, Every 12 Hours Scheduled      Lidocaine 4 %   1 patch, Transdermal, Daily PRN, Remove & Discard patch within 12 hours or as directed by MD      oxyCODONE HCl 7.5 MG tablet   7.5 mg, Oral, Every 4 Hours PRN             Changes to Medications        Instructions Start Date   Diclofenac Sodium 1 % gel gel  Commonly known as: VOLTAREN  What changed: See the new instructions.   apply 4 grams topically to the appropriate area as directed FOUR TIMES DAILY AS NEEDED FOR PAIN      Trulicity 0.75 MG/0.5ML solution pen-injector  Generic drug: Dulaglutide  What changed: additional instructions   0.75 mg, Subcutaneous, Weekly      Vitamin B-12 1000 MCG sublingual tablet  What changed:   when to take this  additional instructions    1,000 mcg, Sublingual, Daily             Continue These Medications        Instructions Start Date   atorvastatin 80 MG tablet  Commonly known as: LIPITOR   80 mg, Oral, Every Night at Bedtime      clopidogrel 75 MG tablet  Commonly known as: PLAVIX   75 mg, Oral, Daily      DULoxetine 60 MG capsule  Commonly known as: CYMBALTA   60 mg, Oral, Daily      Ergocalciferol 50 MCG (2000 UT) capsule   2,000 Units, Oral, Daily      Jardiance 25 MG tablet tablet  Generic drug: empagliflozin   25 mg, Oral, Daily      levETIRAcetam 500 MG tablet  Commonly known as: KEPPRA   1,000 mg, Oral, Nightly, Pt takes 1 tab qam and 2 tabs hs      levETIRAcetam 500 MG tablet  Commonly known as: KEPPRA   1,000 mg, Oral, Nightly, Pt takes 1 tab qam and 2 tabs hs      lisinopril 10 MG tablet  Commonly known as: PRINIVIL,ZESTRIL   10 mg, Oral, Daily      metFORMIN 500 MG tablet  Commonly known as: GLUCOPHAGE   500 mg, Oral, 2 Times Daily      metoprolol tartrate 25 MG tablet  Commonly known as: LOPRESSOR   25 mg, Oral, 2 Times Daily               Allergies   Allergen Reactions    Tramadol Hcl GI Intolerance       Discharge Disposition:  Rehab Facility or Unit (DC - External)    Diet:  Hospital:  Diet Order   Procedures    Diet: Regular/House; Fluid Consistency: Thin (IDDSI 0)       Discharge Activity:   Activity Instructions       Gradually Increase Activity Until at Pre-Hospitalization Level              CODE STATUS:  Code Status and Medical Interventions: CPR (Attempt to Resuscitate); Full Support   Ordered at: 09/24/24 1509     Level Of Support Discussed With:    Patient     Code Status (Patient has no pulse and is not breathing):    CPR (Attempt to Resuscitate)     Medical Interventions (Patient has pulse or is breathing):    Full Support         Future Appointments   Date Time Provider Department Center   10/8/2024 10:00 AM Luis M Esteves PA [facility]   1/2/2025 10:45 AM Mignon Jennings APRN [facility]        Additional Instructions for the Follow-ups that You Need to Schedule       Discharge Follow-up with PCP   As directed       Currently Documented PCP:    Kj Canales MD    PCP Phone Number:    [phone]     Follow Up Details: Hospital discharge follow up 1-2 weeks after discharge from [facility]                Pertinent  and/or Most Recent Results     PROCEDURES:         Hussain Singleton MD   Physician  Orthopedics     Op Note     Signed     Date of Service: 09/25/24 1312  Creation Time: 09/25/24 1341  Case Time: Procedures: Surgeons:   09/25/24 1312 LEFT HIP INTERTROCHANTERIC NAILING    Hussain Singleton MD               Signed         HIP INTERTROCHANTERIC NAILING  Procedure Report     Patient Name:  Haylee Gibbs  YOB: 1950     Date of Surgery:  9/25/2024     Indications: The patient is a 74-year-old female who fell and sustained a left intertrochanteric proximal femur fracture.  She was admitted to the hospital and found to be stable for surgery.  Risks and benefits of surgery were discussed.  Risk of bleeding, infection, damage neurovascular structures, heart attack, stroke, DVT/PE, anesthesia complications including death, malunion, nonunion, symptomatic hardware, among others were discussed.     Pre-op Diagnosis:   Closed fracture of left hip, initial encounter [S72.002A]       Post-Op Diagnosis Codes:     * Closed fracture of left hip, initial encounter [S72.002A]     Procedure/CPT® Codes:        Procedure(s):  Closed reduction and LEFT HIP CEPHALOMEDULLARY NAILING     Staff:  Surgeon(s):  Hussain Singleton MD     Assistant: Luis M Esteves PA     Anesthesia: General     Estimated Blood Loss: 50 mL     Implants:    Implant Name Type Inv. Item Serial No.  Lot No. LRB No. Used Action   NAIL IM/FEM CEPH CORRIE TI 21.5CM 10MM 125DEG LT - [id number] Implant NAIL IM/FEM CEPH CORRIE TI 21.5CM 10MM 125DEG LT   [employer] [id number] Left 1 Implanted   SCRW  LAG CEPH TI 10.5X95MM - [id number] Implant SCRW LAG CEPH TI 10.5X95MM   [employer] [id number] Left 1 Implanted   SCRW MARGARITA FA FUL/THRD HEX3.5 TI 5X35MM - [id number] Implant SCRW MARGARITA FA FUL/THRD HEX3.5 TI 5X35MM   [employer] [id number] Left 1 Implanted         Specimen:          None         Findings: Left intertrochanteric proximal femur fracture.     Complications: None     Description of Procedure: The operative site was marked in the preoperative holding area.  The patient was brought to the operating room.  Anesthesia was given.  The patient was placed supine on the [place] operative table. Well-padded traction boots were placed to the lower extremities. All bony prominences were padded.  We then placed a padded perineal post. Formal time out was held.  The patient received preoperative antibiotic. Traction and internal rotation was placed across the operative lower extremity. The nonoperative leg was extended and adducted.  Fluoroscopic imaging was taken AP and lateral plane to confirm anatomic reduction.     At this point, after prepping and draping, a longitudinal incision was made just proximal to the greater trochanter.  Subcutaneous tissues and fascia were divided.  A guide pin was placed at the tip of the greater trochanter, was driven into the intramedullary canal, centered on the canal on the lateral view.  The opening reamer and soft tissue protector were then used to create an opening hole with the greater trochanter and a ball-tipped guidewire was then inserted down into the distal femur.  This was confirmed to be at the center of the knee on both AP/lateral and fluoroscopic imaging.  The nail was measured.  We then began reaming sequentially the femur, reaming to 2mm greater than the chosen nail size. The nail was inserted over the guide wire. The guide arm was placed onto the nail for aiming the lag screw.  A stab incision was made at the lateral thigh, and the fascia was divided.  The trocar was  then inserted to the lateral femur.  The guide pin was drilled in the center of the femoral head on the AP and lateral view, and the lag screw length was measured.  The drill was used to drill for the lag screw and then an appropriately sized lag screw was inserted. At this point, the antirotation screw was placed in the proximal nail and was tightened.  At this point, traction was released to the nail.  The trocar for the distal interlocking screw was placed through the aiming arm of the nail .  A stab incision was made and the trocar was inserted.  The distal interlocking screw was drilled, measured, and inserted.  The aiming arm was then removed.  At this point, final fluoroscopic images were taken.  The wounds were irrigated.  They were closed with #1 Vicryl in the fascial layer, 2-0 Vicryl, and skin closed with staples.  A sterile dressing was placed.  The patient was awoken from anesthesia in stable condition.  There were no complications.  All counts were correct.  Stable to recovery.     Assistant: Luis M Esteves PA  was responsible for performing the following activities: Retraction, Suction, Irrigation, and Placing Dressing and their skilled assistance was necessary for the success of this case.     Hussain Singleton MD      Date: 9/25/2024  Time: 13:41 EDT                         LAB RESULTS:      Lab 09/26/24 0400 09/25/24 0400 09/24/24  1024   WBC 9.70 13.04* 15.83*   HEMOGLOBIN 9.4* 11.2* 12.5   HEMATOCRIT 30.9* 35.9 39.6   PLATELETS 170 227 233   NEUTROS ABS 7.67* 10.75* 14.22*   IMMATURE GRANS (ABS) 0.06* 0.07* 0.07*   LYMPHS ABS 0.95 1.08 0.43*   MONOS ABS 0.99* 1.10* 1.07*   EOS ABS 0.00 0.01 0.01   MCV 99.4* 96.5 96.1   PROTIME  --   --  14.3         Lab 09/26/24  0400 09/25/24  0400 09/24/24  1024   SODIUM 137 137 138   POTASSIUM 4.5 4.2 4.2   CHLORIDE 104 104 100   CO2 21.4* 21.4* 21.2*   ANION GAP 11.6 11.6 16.8*   BUN 25* 25* 23   CREATININE 0.73 0.78 0.76   EGFR 85.9  79.3 81.8   GLUCOSE 118* 166* 180*   CALCIUM 8.9 9.3 9.9   MAGNESIUM 2.1  --   --    PHOSPHORUS 3.6  --   --          Lab 09/25/24  0400 09/24/24  1024   TOTAL PROTEIN 7.1 7.7   ALBUMIN 4.0 4.4   GLOBULIN  --  3.3   ALT (SGPT) 15 18   AST (SGOT) 20 25   BILIRUBIN 1.2 1.6*   INDIRECT BILIRUBIN 0.8  --    BILIRUBIN DIRECT 0.4*  --    ALK PHOS 48 50         Lab 09/24/24  1024   PROTIME 14.3   INR 1.09             Lab 09/25/24  1010   ABO TYPING O   RH TYPING Negative   ANTIBODY SCREEN Negative         Brief Urine Lab Results  (Last result in the past 365 days)        Color   Clarity   Blood   Leuk Est   Nitrite   Protein   CREAT   Urine HCG        09/24/24 1052 Yellow   Cloudy   Small (1+)   Small (1+)   Positive   Trace                 Microbiology Results (last 10 days)       Procedure Component Value - Date/Time    Urine Culture - Urine, Urine, Clean Catch [[id number]]  (Abnormal)  (Susceptibility) Collected: 09/24/24 1052    Lab Status: Final result Specimen: Urine, Clean Catch Updated: 09/26/24 0858     Urine Culture >100,000 CFU/mL Escherichia coli    Narrative:      Colonization of the urinary tract without infection is common. Treatment is discouraged unless the patient is symptomatic, pregnant, or undergoing an invasive urologic procedure.    Susceptibility        Escherichia coli      SARAH      Amoxicillin + Clavulanate Susceptible      Ampicillin Susceptible      Ampicillin + Sulbactam Susceptible      Cefazolin Susceptible      Cefepime Susceptible      Ceftazidime Susceptible      Ceftriaxone Susceptible      Gentamicin Susceptible      Levofloxacin Susceptible      Nitrofurantoin Susceptible      Piperacillin + Tazobactam Susceptible      Trimethoprim + Sulfamethoxazole Susceptible                                   CT Head Without Contrast    Result Date: 9/24/2024  Impression: Impression: 1.Moderate small vessel ischemic changes in the white matter. 2.No calvarial fracture or intracranial hemorrhage  Electronically Signed: Joe Briseno MD  9/24/2024 12:54 PM EDT  Workstation ID: [id number]    XR Knee 3 View Left    Result Date: 9/24/2024  Impression: Impression: 1. No acute osseous abnormality. 2. Mild medial compartment joint space narrowing. 3. Chondrocalcinosis within the medial and lateral compartment of the left knee which can be seen with CPPD arthropathy. Electronically Signed: Ammon Ling  9/24/2024 11:58 AM EDT  Workstation ID: [id number]    XR Hip With or Without Pelvis 2 - 3 View Left    Result Date: 9/24/2024  Impression: Impression: Comminuted displaced left intertrochanteric fracture. Electronically Signed: Cecilia Ibarra MD  9/24/2024 10:58 AM EDT  Workstation ID: [id number]    XR Chest 1 View    Result Date: 9/24/2024  Impression: Impression: Negative. Electronically Signed: Cecilia Ibarra MD  9/24/2024 10:57 AM EDT  Workstation ID: [id number]             Results for orders placed during the hospital encounter of 07/01/22    Adult Transthoracic Echo Complete W/ Cont if Necessary Per Protocol (With Agitated Saline)    Interpretation Summary  · Left ventricular wall thickness is consistent with mild concentric hypertrophy.  · Calculated left ventricular EF = 55.9% Estimated left ventricular EF was in agreement with the calculated left ventricular EF.  · Left ventricular diastolic function is consistent with (grade Ia w/high LAP) impaired relaxation.  · Mild mitral valve regurgitation is present      Labs Pending at Discharge:        Time spent on Discharge including face to face service: 25 minutes    Electronically signed by Melchor Marmolejo MD, 09/27/24, 12:11 PM EDT.

## 2024-09-27 NOTE — THERAPY TREATMENT NOTE
Patient Name: Haylee Gibbs  : 1950    MRN: [id number]                              Today's Date: 2024       Admit Date: 2024    Visit Dx:     ICD-10-CM ICD-9-CM   1. Closed fracture of left hip, initial encounter  S72.002A 820.8   2. Decreased activities of daily living (ADL)  Z78.9 V49.89   3. Difficulty in walking  R26.2 719.7     Patient Active Problem List   Diagnosis    Diabetes mellitus    Seizure disorder    Arthritis    Depression    Hypertension    Acute cough    Infection due to Enterobacter cloacae    Cataract    COPD (chronic obstructive pulmonary disease)    Dysarthria    Facial numbness    Hyperlipidemia    Mixed dyslipidemia    Paresthesia and pain of both upper extremities    Vitamin B12 deficiency    Vitamin D deficiency    History of stroke    Memory difficulty    Hemoglobin A1c between 7.0% and 9.0%    Closed fracture of left hip    Fracture     Past Medical History:   Diagnosis Date    Anxiety disorder 2018    Benign essential hypertension     Cataract     COPD (chronic obstructive pulmonary disease)     CVA (cerebral vascular accident)     Depression     Diabetes mellitus, type 2     Hyperlipidemia     Paresthesia and pain of both upper extremities 2018    Seizure disorder 2018    Vitamin B 12 deficiency     Vitamin D deficiency 2018     Past Surgical History:   Procedure Laterality Date    APPENDECTOMY      BACK SURGERY      LOW BACK DISC    CATARACT EXTRACTION      HIP INTERTROCHANTERIC NAILING Left 2024    Procedure: LEFT HIP INTERTROCHANTERIC NAILING;  Surgeon: Hussain Singleton MD;  Location: [facility];  Service: Orthopedics;  Laterality: Left;    HYSTERECTOMY      NECK SURGERY      NECK DISC    TONSILLECTOMY        General Information       Row Name 24 1100          OT Time and Intention    Document Type therapy note (daily note)  -LF     Mode of Treatment individual therapy;occupational therapy  -LF       Row Name 24 1100           General Information    Existing Precautions/Restrictions fall;weight bearing  WBAT LLE  -LF               User Key  (r) = Recorded By, (t) = Taken By, (c) = Cosigned By      Initials Name Provider Type     Melissa Cordova OT Occupational Therapist                     Mobility/ADL's       Row Name 09/27/24 1100          Bed Mobility    Bed Mobility supine-sit  -     Supine-Sit [place] (Bed Mobility) minimum assist (75% patient effort)  -     Bed Mobility, Safety Issues decreased use of arms for pushing/pulling;decreased use of legs for bridging/pushing  -     Assistive Device (Bed Mobility) bed rails;draw sheet;head of bed elevated  -       Row Name 09/27/24 1100          Transfers    Transfers sit-stand transfer;stand-sit transfer  -       Row Name 09/27/24 1100          Sit-Stand Transfer    Sit-Stand [place] (Transfers) minimum assist (75% patient effort)  -     Assistive Device (Sit-Stand Transfers) walker, front-wheeled  -       Row Name 09/27/24 1100          Stand-Sit Transfer    Stand-Sit [place] (Transfers) minimum assist (75% patient effort)  -     Assistive Device (Stand-Sit Transfers) walker, front-wheeled  -       Row Name 09/27/24 1100          Functional Mobility    Functional Mobility- Ind. Level moderate assist (50% patient effort)  -     Functional Mobility- Device walker, front-wheeled  -     Functional Mobility- Safety Issues sequencing ability decreased;step length decreased;weight-shifting ability decreased  -     Functional Mobility- Comment Pt able to better weightshift between BLEs to advance LLE during functional mobility  -       Row Name 09/27/24 1100          Mobility    Extremity Weight-bearing Status left lower extremity  -LF     Left Lower Extremity (Weight-bearing Status) weight-bearing as tolerated (WBAT)  -               User Key  (r) = Recorded By, (t) = Taken By, (c) = Cosigned By      Initials Name Provider Type      Melissa Cordova OT Occupational Therapist                   Obj/Interventions       Row Name 09/27/24 1101          Balance    Balance Assessment sitting dynamic balance;standing dynamic balance  -LF     Dynamic Sitting Balance standby assist  -LF     Position, Sitting Balance unsupported;sitting edge of bed  -LF     Dynamic Standing Balance moderate assist  -LF     Position/Device Used, Standing Balance supported;walker, front-wheeled  -LF     Balance Interventions sitting;standing;sit to stand;supported;dynamic;occupation based/functional task;weight shifting activity  -               User Key  (r) = Recorded By, (t) = Taken By, (c) = Cosigned By      Initials Name Provider Type     Melissa Cordova OT Occupational Therapist                   Goals/Plan    No documentation.                  Clinical Impression       Row Name 09/27/24 1101          Pain Assessment    Pain Intervention(s) Nursing Notified  Pain medication administered during session  -LF       Row Name 09/27/24 1101          Pain Scale: FACES Pre/Post-Treatment    Pain: FACES Scale, Pretreatment 6-->hurts even more  -LF     Posttreatment Pain Rating 6-->hurts even more  -LF     Pain Location - Side/Orientation Left  -LF     Pain Location - hip  -LF       Row Name 09/27/24 1101          Plan of Care Review    Plan of Care Reviewed With patient  -LF     Progress improving  -LF     Outcome Evaluation Pt able to better weightshift between BLEs to advance LLE during functional mobility and improved sitting balance on EOB. She would benefit from continued OT services to maximize independence.  -       Row Name 09/27/24 1101          Vital Signs    O2 Delivery Pre Treatment room air  -LF     O2 Delivery Intra Treatment room air  -LF     O2 Delivery Post Treatment room air  -       Row Name 09/27/24 1101          Positioning and Restraints    Pre-Treatment Position in bed  -LF     Post Treatment Position chair  -LF     In Chair reclined;call  light within reach;encouraged to call for assist;exit alarm on;with nsg  PCA  -LF               User Key  (r) = Recorded By, (t) = Taken By, (c) = Cosigned By      Initials Name Provider Type    LF Melissa Cordova, OT Occupational Therapist                   Outcome Measures       Row Name 09/27/24 1103          How much help from another is currently needed...    Putting on and taking off regular lower body clothing? 2  -LF     Bathing (including washing, rinsing, and drying) 2  -LF     Toileting (which includes using toilet bed pan or urinal) 1  -LF     Putting on and taking off regular upper body clothing 3  -LF     Taking care of personal grooming (such as brushing teeth) 4  -LF     Eating meals 4  -LF     AM-PAC 6 Clicks Score (OT) 16  -LF       Row Name 09/27/24 0701          How much help from another person do you currently need...    Turning from your back to your side while in flat bed without using bedrails? 2  -MH     Moving from lying on back to sitting on the side of a flat bed without bedrails? 2  -MH     Moving to and from a bed to a chair (including a wheelchair)? 2  -MH     Standing up from a chair using your arms (e.g., wheelchair, bedside chair)? 2  -MH     Climbing 3-5 steps with a railing? 2  -MH     To walk in hospital room? 2  -MH     AM-PAC 6 Clicks Score (PT) 12  -MH     Highest Level of Mobility Goal 4 --> Transfer to chair/commode  -       Row Name 09/27/24 1103          Functional Assessment    Outcome Measure Options AM-PAC 6 Clicks Daily Activity (OT);Optimal Instrument  -LF       Row Name 09/27/24 1103          Optimal Instrument    Optimal Instrument Optimal - 3  -LF     Bending/Stooping 4  -LF     Standing 3  -LF     Reaching 1  -LF     From the list, choose the 3 activities you would most like to be able to do without any difficulty Bending/stooping;Standing;Reaching  -LF     Total Score Optimal - 3 8  -LF               User Key  (r) = Recorded By, (t) = Taken By, (c) = Cosigned  By      Initials Name Provider Type     Namita Grider, RN Registered Nurse     Melissa Cordova, OT Occupational Therapist                    Occupational Therapy Education       Title: PT OT SLP Therapies (In Progress)       Topic: Occupational Therapy (In Progress)       Point: ADL training (In Progress)       Description:   Instruct learner(s) on proper safety adaptation and remediation techniques during self care or transfers.   Instruct in proper use of assistive devices.                  Learning Progress Summary             Patient Acceptance, E,TB, NR by  at 9/26/2024 1300    Acceptance, E, VU by  at 9/24/2024 1916                         Point: Home exercise program (Done)       Description:   Instruct learner(s) on appropriate technique for monitoring, assisting and/or progressing therapeutic exercises/activities.                  Learning Progress Summary             Patient Acceptance, E, VU by  at 9/24/2024 1916                         Point: Precautions (In Progress)       Description:   Instruct learner(s) on prescribed precautions during self-care and functional transfers.                  Learning Progress Summary             Patient Acceptance, E,TB, NR by  at 9/26/2024 1300    Acceptance, E, VU by  at 9/24/2024 1916                         Point: Body mechanics (In Progress)       Description:   Instruct learner(s) on proper positioning and spine alignment during self-care, functional mobility activities and/or exercises.                  Learning Progress Summary             Patient Acceptance, E,TB, NR by  at 9/26/2024 1300    Acceptance, E, VU by  at 9/24/2024 1916                                         User Key       Initials Effective Dates Name Provider Type Discipline     07/13/23 -  Abbie Frazier RN Registered Nurse Nurse     06/16/21 -  Melissa Cordova OT Occupational Therapist OT                  OT Recommendation and Plan  Planned Therapy Interventions  (OT): activity tolerance training, BADL retraining, functional balance retraining, occupation/activity based interventions, patient/caregiver education/training, transfer/mobility retraining, strengthening exercise  Therapy Frequency (OT): 5 times/wk  Plan of Care Review  Plan of Care Reviewed With: patient  Progress: improving  Outcome Evaluation: Pt able to better weightshift between BLEs to advance LLE during functional mobility and improved sitting balance on EOB. She would benefit from continued OT services to maximize independence.     Time Calculation:   Evaluation Complexity (OT)  Review Occupational Profile/Medical/Therapy History Complexity: brief/low complexity  Assessment, Occupational Performance/Identification of Deficit Complexity: 3-5 performance deficits  Clinical Decision Making Complexity (OT): problem focused assessment/low complexity  Overall Complexity of Evaluation (OT): low complexity     Time Calculation- OT       Row Name 09/27/24 1103             Time Calculation- OT    OT Received On 09/27/24  -LF      OT Goal Re-Cert Due Date 10/05/24  -LF         Timed Charges    22423 - OT Therapeutic Activity Minutes 12  -LF         Total Minutes    Timed Charges Total Minutes 12  -LF       Total Minutes 12  -LF                User Key  (r) = Recorded By, (t) = Taken By, (c) = Cosigned By      Initials Name Provider Type    LF Melissa Cordova OT Occupational Therapist                  Therapy Charges for Today       Code Description Service Date Service Provider Modifiers Qty    [id number]  OT EVAL LOW COMPLEXITY 4 9/26/2024 Melissa Cordova OT GO 1    [id number]  OT THERAPEUTIC ACT EA 15 MIN 9/27/2024 Melissa Cordova OT GO 1                 Melissa Cordova OT  9/27/2024

## 2024-09-27 NOTE — PLAN OF CARE
Goal Outcome Evaluation:   Patient has had no new changes throughout shift and continues to remains stable. Patient is POD2 after having a left hip nailing performed by Dr. Singleton. Patient has had complaints of pain that has been controlled with prn medication. Patient has been medicated x1 during shift. Patient is x2 assist with walker and gait belt. Patient is WBAT to left LE. Patient will be DC to [facility] for rehab later this afternoon via EMS. Patient stated she will notify family of transfer.

## 2024-09-28 NOTE — NURSING NOTE
This RN gave report to EMS, nightly medications administered per orders prior to EMS arrival. Pt. On insulin protocol and did not need treatment prior to discharge. Purewick removed, marek-care provided. No new concerns stated.

## 2024-10-01 LAB
QT INTERVAL: 409 MS
QTC INTERVAL: 481 MS

## 2024-10-08 ENCOUNTER — OFFICE VISIT (OUTPATIENT)
Dept: ORTHOPEDIC SURGERY | Facility: CLINIC | Age: 74
End: 2024-10-08
Payer: MEDICARE

## 2024-10-08 VITALS — HEART RATE: 92 BPM | OXYGEN SATURATION: 94 % | DIASTOLIC BLOOD PRESSURE: 66 MMHG | SYSTOLIC BLOOD PRESSURE: 101 MMHG

## 2024-10-08 DIAGNOSIS — Z47.89 AFTERCARE FOLLOWING SURGERY OF THE MUSCULOSKELETAL SYSTEM: Primary | ICD-10-CM

## 2024-10-08 NOTE — PROGRESS NOTES
Chief Complaint  Follow-up of the Left Hip and Suture / Staple Removal    Subjective          History of Present Illness      Haylee Gibbs is a 74 y.o. female  presents to [facility] for     Patient presents with her sister for 2-week postop evaluation of closed reduction left hip cephalomedullary nailing, 2024.  She came with x-rays already taken.  Patient Sister is historian due to patient appearing groggy.  Patient is staying at [facility] patient's sister states that patient has been in pain even with pain medication she is receiving from her care facility.  Patient states her worst pain is in her back she has a history of sciatica.  She states the hip is not causing her pain.  She had staples removed today and Steri-Strips placed.  She presents in a wheelchair.  Patient states it is hard to walk due to her back pain.    Allergies   Allergen Reactions    Tramadol Hcl GI Intolerance        Social History     Socioeconomic History    Marital status: Single   Tobacco Use    Smoking status: Former     Current packs/day: 0.00     Average packs/day: 0.5 packs/day for 20.0 years (10.0 ttl pk-yrs)     Types: Cigarettes     Start date:      Quit date: 2015     Years since quittin.7     Passive exposure: Past    Smokeless tobacco: Never   Vaping Use    Vaping status: Never Used   Substance and Sexual Activity    Alcohol use: Yes     Comment: occ    Drug use: Never    Sexual activity: Defer        REVIEW OF SYSTEMS    Constitutional: Awake alert and oriented x3, no acute distress, denies fevers, chills, weight loss  Respiratory: No respiratory distress  Vascular: Brisk cap refill, Intact distal pulses, No cyanosis, compartments soft with no signs or symptoms of compartment syndrome or DVT.   Cardiovascular: Denies chest pain, shortness of breath  Skin: Denies rashes, acute skin changes  Neurologic: Denies headache, loss of consciousness  MSK: Left hip pain      Objective    Vital Signs:   /66   Pulse 92   SpO2 94%     There is no height or weight on file to calculate BMI.    Physical Exam       Left hip: Incisions are healing well, no erythema, no drainage or dehiscence, no signs of infection passive flexion of the hip 120 no pain with rotation, nontender calf, negative Lionel testing      Procedures    Imaging Results (Most Recent)       None           X-ray [facility] left hip, 10/7/2024, results: There is a metallic pin spanning the femoral head and neck, there is an intramedullary vishal along the shaft of the femur stabilizing an intertrochanteric fracture with avulsion of the lesser trochanter.  There is degenerative narrowing of the hip.  Conclusion: Postoperative and degenerative changes  Result Review :   The following data was reviewed by: GEOVANI Plascencia on 10/08/2024:  Data reviewed : Radiologic studies reviewed by me with the patient and her sister              Assessment and Plan    Diagnoses and all orders for this visit:    1. Aftercare following surgery of Closed reduction and LEFT HIP CEPHALOMEDULLARY NAILING 9/25/24 (Primary)        Reviewed x-rays with the patient and her sister discussed diagnosis and treatment options.  Patient was given orders for weightbearing as tolerated, physical therapy daily for strength and range of motion, daily incision checks, pain medication as needed, follow-up in 4 weeks for recheck with x-rays    Call or return if worsening symptoms.    Follow Up   Return in about 4 weeks (around 11/5/2024) for Recheck.  Patient was given instructions and counseling regarding her condition or for health maintenance advice. Please see specific information pulled into the AVS if appropriate.       EMR Dragon/Transcription disclaimer:  Part of this note may be an electronic transcription/translation of spoken language to printed text using the Dragon Dictation System

## 2024-10-09 ENCOUNTER — HOSPITAL ENCOUNTER (EMERGENCY)
Facility: HOSPITAL | Age: 74
Discharge: HOME | End: 2024-10-09
Attending: EMERGENCY MEDICINE
Payer: COMMERCIAL

## 2024-10-09 ENCOUNTER — APPOINTMENT (OUTPATIENT)
Dept: CT IMAGING | Facility: HOSPITAL | Age: 74
End: 2024-10-09
Payer: COMMERCIAL

## 2024-10-09 VITALS
TEMPERATURE: 98.2 F | HEIGHT: 66 IN | RESPIRATION RATE: 18 BRPM | HEART RATE: 111 BPM | SYSTOLIC BLOOD PRESSURE: 103 MMHG | OXYGEN SATURATION: 94 % | DIASTOLIC BLOOD PRESSURE: 57 MMHG | BODY MASS INDEX: 25.83 KG/M2 | WEIGHT: 160.72 LBS

## 2024-10-09 DIAGNOSIS — N39.0 URINARY TRACT INFECTION IN FEMALE: Primary | ICD-10-CM

## 2024-10-09 DIAGNOSIS — N18.9 CHRONIC KIDNEY DISEASE, UNSPECIFIED CKD STAGE: ICD-10-CM

## 2024-10-09 LAB
ALBUMIN SERPL-MCNC: 3.3 G/DL (ref 3.5–5.2)
ALBUMIN/GLOB SERPL: 0.8 G/DL
ALP SERPL-CCNC: 133 U/L (ref 39–117)
ALT SERPL W P-5'-P-CCNC: 9 U/L (ref 1–33)
ANION GAP SERPL CALCULATED.3IONS-SCNC: 13.8 MMOL/L (ref 5–15)
AST SERPL-CCNC: 19 U/L (ref 1–32)
BACTERIA UR QL AUTO: ABNORMAL /HPF
BASOPHILS # BLD AUTO: 0.06 10*3/MM3 (ref 0–0.2)
BASOPHILS NFR BLD AUTO: 0.3 % (ref 0–1.5)
BILIRUB SERPL-MCNC: 1.3 MG/DL (ref 0–1.2)
BILIRUB UR QL STRIP: NEGATIVE
BUN SERPL-MCNC: 36 MG/DL (ref 8–23)
BUN/CREAT SERPL: 26.1 (ref 7–25)
CALCIUM SPEC-SCNC: 9.1 MG/DL (ref 8.6–10.5)
CHLORIDE SERPL-SCNC: 99 MMOL/L (ref 98–107)
CLARITY UR: ABNORMAL
CO2 SERPL-SCNC: 19.2 MMOL/L (ref 22–29)
COLOR UR: YELLOW
CREAT SERPL-MCNC: 1.38 MG/DL (ref 0.57–1)
D-LACTATE SERPL-SCNC: 1.1 MMOL/L (ref 0.5–2)
DEPRECATED RDW RBC AUTO: 51.3 FL (ref 37–54)
EGFRCR SERPLBLD CKD-EPI 2021: 40 ML/MIN/1.73
EOSINOPHIL # BLD AUTO: 0.02 10*3/MM3 (ref 0–0.4)
EOSINOPHIL NFR BLD AUTO: 0.1 % (ref 0.3–6.2)
ERYTHROCYTE [DISTWIDTH] IN BLOOD BY AUTOMATED COUNT: 14.2 % (ref 12.3–15.4)
GLOBULIN UR ELPH-MCNC: 4 GM/DL
GLUCOSE SERPL-MCNC: 147 MG/DL (ref 65–99)
GLUCOSE UR STRIP-MCNC: ABNORMAL MG/DL
HCT VFR BLD AUTO: 33.4 % (ref 34–46.6)
HGB BLD-MCNC: 10.2 G/DL (ref 12–15.9)
HGB UR QL STRIP.AUTO: NEGATIVE
HOLD SPECIMEN: NORMAL
HOLD SPECIMEN: NORMAL
HYALINE CASTS UR QL AUTO: ABNORMAL /LPF
IMM GRANULOCYTES # BLD AUTO: 0.27 10*3/MM3 (ref 0–0.05)
IMM GRANULOCYTES NFR BLD AUTO: 1.5 % (ref 0–0.5)
KETONES UR QL STRIP: ABNORMAL
LEUKOCYTE ESTERASE UR QL STRIP.AUTO: ABNORMAL
LYMPHOCYTES # BLD AUTO: 0.55 10*3/MM3 (ref 0.7–3.1)
LYMPHOCYTES NFR BLD AUTO: 3 % (ref 19.6–45.3)
MCH RBC QN AUTO: 30.4 PG (ref 26.6–33)
MCHC RBC AUTO-ENTMCNC: 30.5 G/DL (ref 31.5–35.7)
MCV RBC AUTO: 99.4 FL (ref 79–97)
MONOCYTES # BLD AUTO: 1.16 10*3/MM3 (ref 0.1–0.9)
MONOCYTES NFR BLD AUTO: 6.4 % (ref 5–12)
NEUTROPHILS NFR BLD AUTO: 16.2 10*3/MM3 (ref 1.7–7)
NEUTROPHILS NFR BLD AUTO: 88.7 % (ref 42.7–76)
NITRITE UR QL STRIP: NEGATIVE
NRBC BLD AUTO-RTO: 0 /100 WBC (ref 0–0.2)
PH UR STRIP.AUTO: 5.5 [PH] (ref 5–8)
PLATELET # BLD AUTO: 444 10*3/MM3 (ref 140–450)
PMV BLD AUTO: 10.9 FL (ref 6–12)
POTASSIUM SERPL-SCNC: 5.6 MMOL/L (ref 3.5–5.2)
PROT SERPL-MCNC: 7.3 G/DL (ref 6–8.5)
PROT UR QL STRIP: NEGATIVE
RBC # BLD AUTO: 3.36 10*6/MM3 (ref 3.77–5.28)
RBC # UR STRIP: ABNORMAL /HPF
REF LAB TEST METHOD: ABNORMAL
SODIUM SERPL-SCNC: 132 MMOL/L (ref 136–145)
SP GR UR STRIP: 1.02 (ref 1–1.03)
SQUAMOUS #/AREA URNS HPF: ABNORMAL /HPF
UROBILINOGEN UR QL STRIP: ABNORMAL
WBC # UR STRIP: ABNORMAL /HPF
WBC NRBC COR # BLD AUTO: 18.26 10*3/MM3 (ref 3.4–10.8)
WHOLE BLOOD HOLD SPECIMEN: NORMAL
YEAST URNS QL MICRO: ABNORMAL /HPF

## 2024-10-09 PROCEDURE — 80053 COMPREHEN METABOLIC PANEL: CPT | Performed by: EMERGENCY MEDICINE

## 2024-10-09 PROCEDURE — 96374 THER/PROPH/DIAG INJ IV PUSH: CPT

## 2024-10-09 PROCEDURE — 81001 URINALYSIS AUTO W/SCOPE: CPT

## 2024-10-09 PROCEDURE — 25010000002 CEFTRIAXONE PER 250 MG

## 2024-10-09 PROCEDURE — 83605 ASSAY OF LACTIC ACID: CPT | Performed by: EMERGENCY MEDICINE

## 2024-10-09 PROCEDURE — 99284 EMERGENCY DEPT VISIT MOD MDM: CPT

## 2024-10-09 PROCEDURE — 85025 COMPLETE CBC W/AUTO DIFF WBC: CPT | Performed by: EMERGENCY MEDICINE

## 2024-10-09 PROCEDURE — 70450 CT HEAD/BRAIN W/O DYE: CPT

## 2024-10-09 RX ORDER — SODIUM CHLORIDE 0.9 % (FLUSH) 0.9 %
10 SYRINGE (ML) INJECTION AS NEEDED
Status: DISCONTINUED | OUTPATIENT
Start: 2024-10-09 | End: 2024-10-09 | Stop reason: HOSPADM

## 2024-10-09 RX ADMIN — SODIUM CHLORIDE 1000 MG: 9 INJECTION, SOLUTION INTRAMUSCULAR; INTRAVENOUS; SUBCUTANEOUS at 10:44

## 2024-10-09 NOTE — DISCHARGE INSTRUCTIONS
Okay please know that your labs do indicate that you have a urinary tract infection.  I think that your mild confusion is related to your infection.  You know I could your CT of your head was also within normal limits.  You will need to continue to take all of your antibiotics that you have been prescribed.  We have given you some IV while you are here in the emergency department to help your infection as well.  If it anytime you develop a fever that you cannot control with Tylenol or Motrin, severe nausea, vomiting, diarrhea, or your confusion increases please return to the emergency department.  Otherwise follow-up with your primary care provider or the [facility] medical staff.    Additionally a urine culture has been completed on your urine sample.  It will be resulted in 24 to 48 hours.  If it indicates that you need a different antibiotic, someone from the hospital will reach out to you and then prescribe the appropriate medication

## 2024-10-09 NOTE — ED PROVIDER NOTES
Time: 7:53 AM EDT  Date of encounter:  10/9/2024  Room number: 21/21  Independent Historian/Clinical History and Information was obtained by:   Patient    History is limited by:  poor historian     Chief Complaint: elevated WBC      History of Present Illness:  Patient is a 74 y.o. year old female who presents to the emergency department for evaluation of elevated WBC.  It is known that patient was recently diagnosed with a urinary tract infection and per her MAR sent from [facility] she started Augmentin yesterday.  Patient is a poor historian and cannot advise as to why she is here.  She denies any nausea, vomiting, diarrhea, fever, or chills.  She does describe having some lower back pain.     HPI    Patient Care Team  Primary Care Provider: Kj Canales MD    Past Medical History:     Allergies   Allergen Reactions    Tramadol Hcl GI Intolerance     Past Medical History:   Diagnosis Date    Anxiety disorder 06/26/2018    Benign essential hypertension     Cataract     COPD (chronic obstructive pulmonary disease)     CVA (cerebral vascular accident)     Depression     Diabetes mellitus, type 2     Hyperlipidemia     Paresthesia and pain of both upper extremities 06/26/2018    Seizure disorder 06/26/2018    Vitamin B 12 deficiency     Vitamin D deficiency 06/26/2018     Past Surgical History:   Procedure Laterality Date    APPENDECTOMY      BACK SURGERY      LOW BACK DISC    CATARACT EXTRACTION      HIP INTERTROCHANTERIC NAILING Left 9/25/2024    Procedure: LEFT HIP INTERTROCHANTERIC NAILING;  Surgeon: Hussain Singleton MD;  Location: [facility];  Service: Orthopedics;  Laterality: Left;    HYSTERECTOMY      NECK SURGERY      NECK DISC    TONSILLECTOMY       Family History   Problem Relation Age of Onset    Cancer Mother     Seizures Mother     Stroke Mother     Multiple myeloma Mother     Heart attack Father     Diabetes type II Sister     Hypertension Sister     Cancer Sister     Heart disease  Sister     Hyperlipidemia Sister     Hypertension Sister     Heart disease Sister     Hypertension Brother     Alcohol abuse Brother     Diabetes type II Brother     Stomach cancer Brother     Heart attack Brother     Heart disease Other         MOTHER, GRANDMOTHER, SISTER; FATHER, GRANDFATHER OR BROTHER DEVELOPED HEART DISEASE BEFORE THE AGE OF 65       Home Medications:  Prior to Admission medications    Medication Sig Start Date End Date Taking? Authorizing Provider   apixaban (ELIQUIS) 2.5 MG tablet tablet Take 1 tablet by mouth Every 12 (Twelve) Hours for 28 days. Indications: Prevention of Unwanted Clot in Veins 9/27/24 10/25/24  Melchor Marmolejo MD   atorvastatin (LIPITOR) 80 MG tablet TAKE 1 TABLET BY MOUTH EVERY NIGHT AT BEDTIME 8/13/24   Kj Canales MD   clopidogrel (PLAVIX) 75 MG tablet TAKE 1 TABLET BY MOUTH EVERY DAY 8/13/24   Kj Canales MD   Cyanocobalamin (Vitamin B-12) 1000 MCG sublingual tablet Place 1 tablet under the tongue Daily.  Patient taking differently: Place 1 tablet under the tongue 1 (One) Time Per Week. Mondays 2/20/24   Kj Canales MD   Diclofenac Sodium (VOLTAREN) 1 % gel gel apply 4 grams topically to the appropriate area as directed FOUR TIMES DAILY AS NEEDED FOR PAIN  Patient taking differently: Apply 4 g topically to the appropriate area as directed 4 (Four) Times a Day As Needed. 8/13/24   Kj Canales MD   Dulaglutide (Trulicity) 0.75 MG/0.5ML solution pen-injector Inject 0.75 mg under the skin into the appropriate area as directed 1 (One) Time Per Week.  Patient taking differently: Inject 0.75 mg under the skin into the appropriate area as directed 1 (One) Time Per Week. Mondays 8/27/24   Kj Canales MD   DULoxetine (CYMBALTA) 60 MG capsule TAKE 1 CAPSULE BY MOUTH EVERY DAY 8/13/24   Kj Canales MD   Ergocalciferol 50 MCG (2000 UT) capsule Take 2,000 Units by mouth Daily. 2/27/24   Provider, Historical, MD   Jardiance 25  "MG tablet tablet TAKE 1 TABLET BY MOUTH EVERY DAY 24   Kj Canales MD   levETIRAcetam (KEPPRA) 500 MG tablet Take 2 tablets by mouth Every Night. Pt takes 1 tab qam and 2 tabs hs    Juan Avilez MD   levETIRAcetam (KEPPRA) 500 MG tablet Take 2 tablets by mouth Every Night. Pt takes 1 tab qam and 2 tabs hs    Juan Avilez MD   lisinopril (PRINIVIL,ZESTRIL) 10 MG tablet TAKE 1 TABLET BY MOUTH EVERY DAY 24   Kj Canales MD   metFORMIN (GLUCOPHAGE) 500 MG tablet TAKE 1 TABLET BY MOUTH TWICE DAILY 24   Kj Canales MD   metoprolol tartrate (LOPRESSOR) 25 MG tablet TAKE 1 TABLET BY MOUTH TWICE DAILY 24   Kj Canales MD        Social History:   Social History     Tobacco Use    Smoking status: Former     Current packs/day: 0.00     Average packs/day: 0.5 packs/day for 20.0 years (10.0 ttl pk-yrs)     Types: Cigarettes     Start date:      Quit date: 2015     Years since quittin.7     Passive exposure: Past    Smokeless tobacco: Never   Vaping Use    Vaping status: Never Used   Substance Use Topics    Alcohol use: Yes     Comment: occ    Drug use: Never         Review of Systems:  Review of Systems   Constitutional:  Negative for chills and fever.        Patient states she does not have any pain anywhere she just feels a little bit \"blah\"   HENT:  Negative for congestion, ear pain and sore throat.    Eyes:  Negative for pain.   Respiratory:  Negative for cough, chest tightness and shortness of breath.    Cardiovascular:  Negative for chest pain.   Gastrointestinal:  Negative for abdominal pain, diarrhea, nausea and vomiting.   Genitourinary:  Negative for flank pain and hematuria.   Musculoskeletal:  Positive for back pain. Negative for joint swelling.   Skin:  Negative for pallor.   Neurological:  Negative for seizures, weakness and headaches.   All other systems reviewed and are negative.       Physical Exam:  /53 (BP Location: " "Right arm, Patient Position: Lying)   Pulse 81   Temp 98.2 °F (36.8 °C) (Oral)   Resp 18   Ht 166.4 cm (65.5\")   Wt 72.9 kg (160 lb 11.5 oz)   SpO2 94%   BMI 26.34 kg/m²     Physical Exam  Vitals and nursing note reviewed.   Constitutional:       General: She is not in acute distress.     Appearance: Normal appearance. She is not ill-appearing, toxic-appearing or diaphoretic.   HENT:      Head: Normocephalic and atraumatic.      Mouth/Throat:      Mouth: Mucous membranes are moist.   Eyes:      General: No scleral icterus.  Cardiovascular:      Rate and Rhythm: Normal rate and regular rhythm.      Pulses: Normal pulses.      Heart sounds: Normal heart sounds.   Pulmonary:      Effort: Pulmonary effort is normal. No respiratory distress.      Breath sounds: Normal breath sounds. No stridor. No wheezing, rhonchi or rales.   Chest:      Chest wall: No tenderness.   Abdominal:      General: Abdomen is flat. There is no distension.      Palpations: Abdomen is soft.      Tenderness: There is no abdominal tenderness. There is no right CVA tenderness, left CVA tenderness or guarding.   Musculoskeletal:         General: No tenderness. Normal range of motion.      Cervical back: Normal range of motion and neck supple.   Skin:     General: Skin is warm and dry.      Coloration: Skin is not jaundiced or pale.      Findings: No bruising, erythema, lesion or rash.   Neurological:      General: No focal deficit present.      Mental Status: She is alert. Mental status is at baseline.      Sensory: Sensory deficit present.                  Procedures:  Procedures      Medical Decision Making:      Comorbidities that affect care:    Current UTI infection, seizure disorder, vitamin D deficiency, previous CVA, diabetes, COPD, anxiety, current resident at physical rehab facility for previous fractured hip    External Notes reviewed:    Previous Radiological Studies: I reviewed patient's most recent CT of the head before today " which was 9/24/2024 and Previous Labs: Reviewed patient's labs from 9/26/2024 for comparison and trending purposes.      The following orders were placed and all results were independently analyzed by me:  Orders Placed This Encounter   Procedures    CT Head Without Contrast    Comprehensive Metabolic Panel    [place] Draw    CBC Auto Differential    Urinalysis With Microscopic If Indicated (No Culture) - Urine, Clean Catch    Lactic Acid, Plasma    Urinalysis, Microscopic Only - Urine, Clean Catch    Straight Cath    Straight Cath    Insert peripheral IV    CBC & Differential    Green Top (Gel)    Lavender Top    Gold Top - SST    Light Blue Top       Medications Given in the Emergency Department:  Medications   sodium chloride 0.9 % flush 10 mL (has no administration in time range)   cefTRIAXone (ROCEPHIN) in NS 1 gram/10ml IV PUSH syringe (1,000 mg Intravenous Given 10/9/24 1044)        ED Course:    ED Course as of 10/09/24 1246   Wed Oct 09, 2024   0821 Nursing staff is calling [facility] to obtain a better report and HPI.  [MS]   0837 Pt's UA results from the nursing home were available for reviewing and patient was nitrate positive as of yesterday.  She was also started on Augmentin yesterday.  It is likely that her elevated WBC is due to her urinary tract infection [MS]   0915 Collection of urine sample remains pending [MS]   1014 RN was able to speak with NH and they advised they had concerns over patient's confusion.  She has been confused and altered per her baseline over the past couple days.  The nurse actually states however over the last 24 hours her confusion has slightly improved.  This does correlate with 24 hours worth of antibiotics.  They report that she is taking 2 blood thinners due to her history of stroke.  Due to the multiple blood thinners and her confusion a CT will be completed however I do feel that this is related to her urinary tract infection [MS]   1127 I discussed this pt with ER  attending Dr. Alfred who is agreeable with plan of dishcarge [MS]   1130 eGFR(!): 40.0  Pt GFR is chronically low [MS]      ED Course User Index  [MS] Chyna Angel Laurie, EZEKIEL       Labs:    Lab Results (last 24 hours)       Procedure Component Value Units Date/Time    CBC & Differential [[id number]]  (Abnormal) Collected: 10/09/24 0820    Specimen: Blood Updated: 10/09/24 0834    Narrative:      The following orders were created for panel order CBC & Differential.  Procedure                               Abnormality         Status                     ---------                               -----------         ------                     CBC Auto Differential[[id number]]        Abnormal            Final result                 Please view results for these tests on the individual orders.    Comprehensive Metabolic Panel [[id number]]  (Abnormal) Collected: 10/09/24 0820    Specimen: Blood Updated: 10/09/24 0854     Glucose 147 mg/dL      BUN 36 mg/dL      Creatinine 1.38 mg/dL      Sodium 132 mmol/L      Potassium 5.6 mmol/L      Chloride 99 mmol/L      CO2 19.2 mmol/L      Calcium 9.1 mg/dL      Total Protein 7.3 g/dL      Albumin 3.3 g/dL      ALT (SGPT) 9 U/L      AST (SGOT) 19 U/L      Alkaline Phosphatase 133 U/L      Total Bilirubin 1.3 mg/dL      Globulin 4.0 gm/dL      A/G Ratio 0.8 g/dL      BUN/Creatinine Ratio 26.1     Anion Gap 13.8 mmol/L      eGFR 40.0 mL/min/1.73     Narrative:      GFR Normal >60  Chronic Kidney Disease <60  Kidney Failure <15    The GFR formula is only valid for adults with stable renal function between ages 18 and 70.    CBC Auto Differential [[id number]]  (Abnormal) Collected: 10/09/24 0820    Specimen: Blood Updated: 10/09/24 0834     WBC 18.26 10*3/mm3      RBC 3.36 10*6/mm3      Hemoglobin 10.2 g/dL      Hematocrit 33.4 %      MCV 99.4 fL      MCH 30.4 pg      MCHC 30.5 g/dL      RDW 14.2 %      RDW-SD 51.3 fl      MPV 10.9 fL      Platelets 444 10*3/mm3      Neutrophil % 88.7 %       Lymphocyte % 3.0 %      Monocyte % 6.4 %      Eosinophil % 0.1 %      Basophil % 0.3 %      Immature Grans % 1.5 %      Neutrophils, Absolute 16.20 10*3/mm3      Lymphocytes, Absolute 0.55 10*3/mm3      Monocytes, Absolute 1.16 10*3/mm3      Eosinophils, Absolute 0.02 10*3/mm3      Basophils, Absolute 0.06 10*3/mm3      Immature Grans, Absolute 0.27 10*3/mm3      nRBC 0.0 /100 WBC     Lactic Acid, Plasma [[id number]]  (Normal) Collected: 10/09/24 0821    Specimen: Blood Updated: 10/09/24 0848     Lactate 1.1 mmol/L     Urinalysis With Microscopic If Indicated (No Culture) - Urine, Clean Catch [[id number]]  (Abnormal) Collected: 10/09/24 1020    Specimen: Urine, Clean Catch Updated: 10/09/24 1034     Color, UA Yellow     Appearance, UA Cloudy     pH, UA 5.5     Specific Gravity, UA 1.019     Glucose, UA >=1000 mg/dL (3+)     Ketones, UA 15 mg/dL (1+)     Bilirubin, UA Negative     Blood, UA Negative     Protein, UA Negative     Leuk Esterase, UA Small (1+)     Nitrite, UA Negative     Urobilinogen, UA 1.0 E.U./dL    Urinalysis, Microscopic Only - Urine, Clean Catch [[id number]]  (Abnormal) Collected: 10/09/24 1020    Specimen: Urine, Clean Catch Updated: 10/09/24 1052     RBC, UA None Seen /HPF      WBC, UA 6-10 /HPF      Bacteria, UA None Seen /HPF      Squamous Epithelial Cells, UA 3-6 /HPF      Yeast, UA Large/3+ Budding Yeast /HPF      Hyaline Casts, UA None Seen /LPF      Methodology Manual Light Microscopy             Imaging:    CT Head Without Contrast    Result Date: 10/9/2024  CT HEAD WO CONTRAST Date of Exam: 10/9/2024 10:27 AM EDT Indication: change in mental status. Comparison: CT brain dated 9/24/2024 Technique: Axial CT images were obtained of the head without contrast administration.  Reconstructed coronal and sagittal images were also obtained. Automated exposure control and iterative construction methods were used. Findings: There is no evidence of hemorrhage. There is no mass effect or midline  shift. Diffuse brain atrophy and chronic microvascular ischemic changes There is no extracerebral collection. Ventricles are normal in size and configuration for patient's stated age. Posterior fossa is within normal limits. Calvarium and skull base appear intact.  Visualized sinuses show no air fluid levels. Visualized orbits are unremarkable.     Impression: No acute intracranial abnormality Stable brain atrophy and chronic microvascular ischemic changes Electronically Signed: Laz Navarrete MD  10/9/2024 10:43 AM EDT  Workstation ID: [id number]       Differential Diagnosis and Discussion:    Altered Mental Status: Based on the patient's signs and symptoms, differential diagnosis includes but is not limited to meningitis, stroke, sepsis, subarachnoid hemorrhage, intracranial bleeding, encephalitis, and metabolic encephalopathy.  Back Pain: The patient presents with back pain. My differential diagnosis includes but is not limited to acute spinal epidural abscess, acute spinal epidural bleed, cauda equina syndrome, abdominal aortic aneurysm, aortic dissection, kidney stone, pyelonephritis, musculoskeletal back pain, spinal fracture, and osteoarthritis.     All labs were reviewed and interpreted by me.  All X-rays impressions were independently interpreted by me.  CT scan radiology impression was interpreted by me.    MDM     Amount and/or Complexity of Data Reviewed  Clinical lab tests: reviewed  Tests in the radiology section of CPT®: reviewed  Decide to obtain previous medical records or to obtain history from someone other than the patient: yes                   Patient Care Considerations:    SEPSIS was considered but is NOT present in the emergency department as SIRS criteria is not present.      Consultants/Shared Management Plan:    SHARED VISIT: I have discussed the case with my supervising physician, Dr. Alfred who states he agrees that patient is appropriate for discharge and her confusion is likely  related to her urinary tract infection. The substantive portion of the medical decision was made by the attesting physician who made or approve the management plan and will take responsibility for the patient.  Clinical findings were discussed and ultimate disposition was made in consult with supervising physician.    Social Determinants of Health:    Patient is a nursing home/assisted living resident and has reliable access to care.      Disposition and Care Coordination:    Discharged: The patient is suitable and stable for discharge with no need for consideration of admission.    I have explained the patient´s condition, diagnoses and treatment plan based on the information available to me at this time. I have answered questions and addressed any concerns. The patient has a good  understanding of the patient´s diagnosis, condition, and treatment plan as can be expected at this point. The vital signs have been stable. The patient´s condition is stable and appropriate for discharge from the emergency department.      The patient will pursue further outpatient evaluation with the primary care physician or other designated or consulting physician as outlined in the discharge instructions. They are agreeable to this plan of care and follow-up instructions have been explained in detail. The patient has received these instructions in written format and has expressed an understanding of the discharge instructions. The patient is aware that any significant change in condition or worsening of symptoms should prompt an immediate return to this or the closest emergency department or call to 911.    Final diagnoses:   Urinary tract infection in female   Chronic kidney disease, unspecified CKD stage        ED Disposition       ED Disposition   Discharge    Condition   Stable    Comment   --               This medical record created using voice recognition software.       Chyna Angel, APRN  10/09/24 [id number]        Chyna Angel, APRN  10/09/24 2550

## 2024-10-09 NOTE — ED PROVIDER NOTES
"SHARED VISIT NOTE:    Patient is 74 y.o. year old female that presents to the ED for evaluation of abnormal labs and confusion..         ED Course:    /57 (BP Location: Right arm, Patient Position: Sitting)   Pulse 111   Temp 98.2 °F (36.8 °C) (Oral)   Resp 18   Ht 166.4 cm (65.5\")   Wt 72.9 kg (160 lb 11.5 oz)   SpO2 94%   BMI 26.34 kg/m²   Results for orders placed or performed during the hospital encounter of 10/09/24   Comprehensive Metabolic Panel    Specimen: Blood   Result Value Ref Range    Glucose 147 (H) 65 - 99 mg/dL    BUN 36 (H) 8 - 23 mg/dL    Creatinine 1.38 (H) 0.57 - 1.00 mg/dL    Sodium 132 (L) 136 - 145 mmol/L    Potassium 5.6 (H) 3.5 - 5.2 mmol/L    Chloride 99 98 - 107 mmol/L    CO2 19.2 (L) 22.0 - 29.0 mmol/L    Calcium 9.1 8.6 - 10.5 mg/dL    Total Protein 7.3 6.0 - 8.5 g/dL    Albumin 3.3 (L) 3.5 - 5.2 g/dL    ALT (SGPT) 9 1 - 33 U/L    AST (SGOT) 19 1 - 32 U/L    Alkaline Phosphatase 133 (H) 39 - 117 U/L    Total Bilirubin 1.3 (H) 0.0 - 1.2 mg/dL    Globulin 4.0 gm/dL    A/G Ratio 0.8 g/dL    BUN/Creatinine Ratio 26.1 (H) 7.0 - 25.0    Anion Gap 13.8 5.0 - 15.0 mmol/L    eGFR 40.0 (L) >60.0 mL/min/1.73   CBC Auto Differential    Specimen: Blood   Result Value Ref Range    WBC 18.26 (H) 3.40 - 10.80 10*3/mm3    RBC 3.36 (L) 3.77 - 5.28 10*6/mm3    Hemoglobin 10.2 (L) 12.0 - 15.9 g/dL    Hematocrit 33.4 (L) 34.0 - 46.6 %    MCV 99.4 (H) 79.0 - 97.0 fL    MCH 30.4 26.6 - 33.0 pg    MCHC 30.5 (L) 31.5 - 35.7 g/dL    RDW 14.2 12.3 - 15.4 %    RDW-SD 51.3 37.0 - 54.0 fl    MPV 10.9 6.0 - 12.0 fL    Platelets 444 140 - 450 10*3/mm3    Neutrophil % 88.7 (H) 42.7 - 76.0 %    Lymphocyte % 3.0 (L) 19.6 - 45.3 %    Monocyte % 6.4 5.0 - 12.0 %    Eosinophil % 0.1 (L) 0.3 - 6.2 %    Basophil % 0.3 0.0 - 1.5 %    Immature Grans % 1.5 (H) 0.0 - 0.5 %    Neutrophils, Absolute 16.20 (H) 1.70 - 7.00 10*3/mm3    Lymphocytes, Absolute 0.55 (L) 0.70 - 3.10 10*3/mm3    Monocytes, Absolute 1.16 (H) " 0.10 - 0.90 10*3/mm3    Eosinophils, Absolute 0.02 0.00 - 0.40 10*3/mm3    Basophils, Absolute 0.06 0.00 - 0.20 10*3/mm3    Immature Grans, Absolute 0.27 (H) 0.00 - 0.05 10*3/mm3    nRBC 0.0 0.0 - 0.2 /100 WBC   Urinalysis With Microscopic If Indicated (No Culture) - Urine, Clean Catch    Specimen: Urine, Clean Catch   Result Value Ref Range    Color, UA Yellow Yellow, Straw    Appearance, UA Cloudy (A) Clear    pH, UA 5.5 5.0 - 8.0    Specific Gravity, UA 1.019 1.005 - 1.030    Glucose, UA >=1000 mg/dL (3+) (A) Negative    Ketones, UA 15 mg/dL (1+) (A) Negative    Bilirubin, UA Negative Negative    Blood, UA Negative Negative    Protein, UA Negative Negative    Leuk Esterase, UA Small (1+) (A) Negative    Nitrite, UA Negative Negative    Urobilinogen, UA 1.0 E.U./dL 0.2 - 1.0 E.U./dL   Lactic Acid, Plasma    Specimen: Blood   Result Value Ref Range    Lactate 1.1 0.5 - 2.0 mmol/L   Urinalysis, Microscopic Only - Urine, Clean Catch    Specimen: Urine, Clean Catch   Result Value Ref Range    RBC, UA None Seen None Seen, 0-2 /HPF    WBC, UA 6-10 (A) None Seen, 0-2 /HPF    Bacteria, UA None Seen None Seen /HPF    Squamous Epithelial Cells, UA 3-6 (A) None Seen, 0-2 /HPF    Yeast, UA Large/3+ Budding Yeast (A) None Seen /HPF    Hyaline Casts, UA None Seen None Seen /LPF    Methodology Manual Light Microscopy    Green Top (Gel)   Result Value Ref Range    Extra Tube Hold for add-ons.    Lavender Top   Result Value Ref Range    Extra Tube hold for add-on    Gold Top - SST   Result Value Ref Range    Extra Tube Hold for add-ons.      Medications   cefTRIAXone (ROCEPHIN) in NS 1 gram/10ml IV PUSH syringe (1,000 mg Intravenous Given 10/9/24 1044)     CT Head Without Contrast    Result Date: 10/9/2024  Narrative: CT HEAD WO CONTRAST Date of Exam: 10/9/2024 10:27 AM EDT Indication: change in mental status. Comparison: CT brain dated 9/24/2024 Technique: Axial CT images were obtained of the head without contrast administration.   Reconstructed coronal and sagittal images were also obtained. Automated exposure control and iterative construction methods were used. Findings: There is no evidence of hemorrhage. There is no mass effect or midline shift. Diffuse brain atrophy and chronic microvascular ischemic changes There is no extracerebral collection. Ventricles are normal in size and configuration for patient's stated age. Posterior fossa is within normal limits. Calvarium and skull base appear intact.  Visualized sinuses show no air fluid levels. Visualized orbits are unremarkable.     Impression: Impression: No acute intracranial abnormality Stable brain atrophy and chronic microvascular ischemic changes Electronically Signed: Laz Navarrete MD  10/9/2024 10:43 AM EDT  Workstation ID: [id number]    FL Surgery Fluoro    Result Date: 9/25/2024  Narrative: This procedure was auto-finalized with no dictation required.    CT Head Without Contrast    Result Date: 9/24/2024  Narrative: CT HEAD WO CONTRAST Date of Exam: 9/24/2024 12:07 PM EDT Indication: Trauma. Comparison: None available. Technique: Axial CT images were obtained of the head without contrast administration.  Reconstructed coronal and sagittal images were also obtained. Automated exposure control and iterative construction methods were used. Findings: There is moderate low-density in the cerebral white matter consistent with small vessel ischemic disease. In the lateral right frontal lobe is a small chronic appearing subcortical infarct. A chronic lacunar infarct is noted in the left caudate nucleus head. No specific CT evidence of acute infarction, mass or intracranial hemorrhage is seen. Moderate diffuse age-appropriate cerebral and cerebellar atrophy is noted. Severe atherosclerotic calcification of the intracranial ICAs is seen. Visualized extracranial soft tissues appear normal. Mucosal inflammatory thickening is noted in the right sphenoid sinus. No focal calvarial abnormality  is seen.     Impression: Impression: 1.Moderate small vessel ischemic changes in the white matter. 2.No calvarial fracture or intracranial hemorrhage Electronically Signed: Joe Brsieno MD  9/24/2024 12:54 PM EDT  Workstation ID: [id number]    XR Knee 3 View Left    Result Date: 9/24/2024  Narrative: XR KNEE 3 VW LEFT Date of Exam: 9/24/2024 11:42 AM EDT Indication: Injury Comparison: Left knee radiographs dated 3/30/2022 Findings: There is no acute fracture or dislocation. The joint spaces are normally aligned. There is mild medial compartment joint space narrowing. There is chondrocalcinosis within the medial and lateral compartment. There is a trace joint effusion. There is peripheral vascular atherosclerotic calcification. Bone realization is normal. There is no osseous erosion.     Impression: Impression: 1. No acute osseous abnormality. 2. Mild medial compartment joint space narrowing. 3. Chondrocalcinosis within the medial and lateral compartment of the left knee which can be seen with CPPD arthropathy. Electronically Signed: Ammon Ling  9/24/2024 11:58 AM EDT  Workstation ID: [id number]    XR Hip With or Without Pelvis 2 - 3 View Left    Result Date: 9/24/2024  Narrative: XR HIP W OR WO PELVIS 2-3 VIEW LEFT Date of Exam: 9/24/2024 10:37 AM EDT Indication: Fall Comparison: None available. Findings: 3 films. There is an oblique comminuted fracture through the left intertrochanteric region. There is up to approximately 15 mm of separation of fracture fragments. The left femoral head remains seated within the left acetabulum. The bony pelvic ring is intact. The right hip joint is maintained.     Impression: Impression: Comminuted displaced left intertrochanteric fracture. Electronically Signed: Cecilia Ibarra MD  9/24/2024 10:58 AM EDT  Workstation ID: [id number]    XR Chest 1 View    Result Date: 9/24/2024  Narrative: XR CHEST 1 VW Date of Exam: 9/24/2024 10:37 AM EDT Indication: Preop Comparison: 11/26/2022.  Findings: Heart and pulmonary vessels appear within normal limits. Lung fields are clear of acute infiltrates or effusions. There is no pneumothorax.     Impression: Impression: Negative. Electronically Signed: Cecilia Ibarra MD  9/24/2024 10:57 AM EDT  Workstation ID: [id number]     MDM:    Procedures              SHARED VISIT ATTESTATION:    This visit was performed by both myself and an APC.  I performed the substantive portion of the medical decision making. The management plan was made or approved by me, and I take responsibility for patient management.           Abdoul Alfred DO  09:47 EDT  10/10/24         Abdoul Alfred DO  10/10/24 0947

## 2024-11-12 ENCOUNTER — OFFICE VISIT (OUTPATIENT)
Dept: ORTHOPEDIC SURGERY | Facility: CLINIC | Age: 74
End: 2024-11-12
Payer: MEDICARE

## 2024-11-12 ENCOUNTER — TELEPHONE (OUTPATIENT)
Dept: ORTHOPEDIC SURGERY | Facility: CLINIC | Age: 74
End: 2024-11-12
Payer: COMMERCIAL

## 2024-11-12 VITALS — DIASTOLIC BLOOD PRESSURE: 79 MMHG | SYSTOLIC BLOOD PRESSURE: 137 MMHG | OXYGEN SATURATION: 98 % | HEART RATE: 58 BPM

## 2024-11-12 DIAGNOSIS — Z47.89 AFTERCARE FOLLOWING SURGERY OF THE MUSCULOSKELETAL SYSTEM: Primary | ICD-10-CM

## 2024-11-12 NOTE — PROGRESS NOTES
Chief Complaint  Follow-up of the Left Hip    Subjective          History of Present Illness      Haylee Gibbs is a 74 y.o. female  presents to [facility] for     Patient presents with her son Patricio and caregiver from [facility] for follow-up evaluation of left hip cephalomedullary nailing, 2024.  She states she does not have any pain in her hip today but she has had pain.  She states that she has also has some sciatica pain which is causing her concern.  Her son and the patient states that she is not tolerating therapy well due to episodes of dizziness which she has she gets dizzy and gets nauseous and has had vomiting episodes.  She therefore has not been having much success with physical therapy due to her dizziness.  She states the incisions have healed well.  She denies calf pain.  She presents in a wheelchair    Allergies   Allergen Reactions    Tramadol Hcl GI Intolerance        Social History     Socioeconomic History    Marital status: Single   Tobacco Use    Smoking status: Former     Current packs/day: 0.00     Average packs/day: 0.5 packs/day for 20.0 years (10.0 ttl pk-yrs)     Types: Cigarettes     Start date:      Quit date: 2015     Years since quittin.8     Passive exposure: Past    Smokeless tobacco: Never   Vaping Use    Vaping status: Never Used   Substance and Sexual Activity    Alcohol use: Yes     Comment: occ    Drug use: Never    Sexual activity: Defer        REVIEW OF SYSTEMS    Constitutional: Awake alert and oriented x3, no acute distress, denies fevers, chills, weight loss  Respiratory: No respiratory distress  Vascular: Brisk cap refill, Intact distal pulses, No cyanosis, compartments soft with no signs or symptoms of compartment syndrome or DVT.   Cardiovascular: Denies chest pain, shortness of breath  Skin: Denies rashes, acute skin changes  Neurologic: Denies headache, loss of consciousness  MSK: Left hip  pain      Objective   Vital Signs:   /79   Pulse 58   SpO2 98%     There is no height or weight on file to calculate BMI.    Physical Exam       Left hip: Well-healed incisions, no erythema, no drainage or dehiscence, no signs of infection, active flexion 95 passive flexion 120 external rotation 30 internal rotation 20, leg lengths are equal, nontender calf, negative Lionel testing      Procedures    Imaging Results (Most Recent)       None             Result Review :   The following data was reviewed by: GEOVANI Plascencia on 11/12/2024:               Assessment and Plan    Diagnoses and all orders for this visit:    1. Aftercare following surgery of Closed reduction and LEFT HIP CEPHALOMEDULLARY NAILING 9/25/24 (Primary)        No x-rays were able to be performed today and none were taken prior to her arrival.  We do not have approval from the nursing home to do x-rays therefore they were not performed.  Discussed that patient should be evaluated for her dizziness by her primary care physician, patient's son agreed.  Patient was advised to continue with therapy orders were given for daily therapy, pain medication as needed and x-ray prior to next visit.  Follow-up in 6 weeks for recheck    Call or return if worsening symptoms.    Follow Up   Return in about 6 weeks (around 12/24/2024).  Patient was given instructions and counseling regarding her condition or for health maintenance advice. Please see specific information pulled into the AVS if appropriate.       EMR Dragon/Transcription disclaimer:  Part of this note may be an electronic transcription/translation of spoken language to printed text using the Dragon Dictation System

## 2024-11-12 NOTE — TELEPHONE ENCOUNTER
ATTEMPTED TO CONTACT [facility] 3X TO GET PERMISSION TO BILL THEIR FACILITY FOR XRAYS AT TODAY'S VISIT. NO ANSWER.

## (undated) DEVICE — DRP SURG U/DRP INVISISHIELD PA 48X52IN

## (undated) DEVICE — GUIDEPIN TEMP THRD 3.2X508MM DISP

## (undated) DEVICE — SUT VIC 0 CT1 36IN J946H

## (undated) DEVICE — UNDYED BRAIDED (POLYGLACTIN 910), SYNTHETIC ABSORBABLE SUTURE: Brand: COATED VICRYL

## (undated) DEVICE — SPNG CVR STR 2S 4X4

## (undated) DEVICE — PROXIMATE RH ROTATING HEAD SKIN STAPLERS (35 WIDE) CONTAINS 35 STAINLESS STEEL STAPLES: Brand: PROXIMATE

## (undated) DEVICE — GOWN,REINFORCE,POLY,SIRUS,BREATH SLV,XLG: Brand: MEDLINE

## (undated) DEVICE — DRSNG SURESITE WNDW 4X4.5

## (undated) DEVICE — BANDAGE,GAUZE,BULKEE II,4.5"X4.1YD,STRL: Brand: MEDLINE

## (undated) DEVICE — MAT FLR ABS W/BLU/LINER 56X72IN WHT

## (undated) DEVICE — KENDALL SCD EXPRESS SLEEVES, KNEE LENGTH, MEDIUM: Brand: KENDALL SCD

## (undated) DEVICE — PENCL ES MEGADINE EZ/CLEAN BUTN W/HOLSTR 10FT

## (undated) DEVICE — KT PT POSITION SUPINE HANA/PROFX TABL

## (undated) DEVICE — APPL CHLORAPREP HI/LITE 26ML ORNG

## (undated) DEVICE — GLV SURG SENSICARE PI ORTHO SZ8 LF STRL

## (undated) DEVICE — GW TIB BALL NOSE 3MM 100CM

## (undated) DEVICE — INTENDED FOR TISSUE SEPARATION, AND OTHER PROCEDURES THAT REQUIRE A SHARP SURGICAL BLADE TO PUNCTURE OR CUT.: Brand: BARD-PARKER ® CARBON RIB-BACK BLADES

## (undated) DEVICE — Device

## (undated) DEVICE — MINOR-LF: Brand: MEDLINE INDUSTRIES, INC.

## (undated) DEVICE — BLANKT WARM PACU MISTRAL/AIR PREM REFL A/ 85.8X50IN

## (undated) DEVICE — ANTIBACTERIAL VIOLET BRAIDED (POLYGLACTIN 910), SYNTHETIC ABSORBABLE SURGICAL SUTURE: Brand: COATED VICRYL

## (undated) DEVICE — DRAPE,U/ SHT,SPLIT,PLAS,STERIL: Brand: MEDLINE

## (undated) DEVICE — PAD GRND REM POLYHESIVE A/ DISP

## (undated) DEVICE — 3M™ IOBAN™ 2 ANTIMICROBIAL INCISE DRAPE 6650EZ: Brand: IOBAN™ 2

## (undated) DEVICE — DRESSING,GAUZE,XEROFORM,CURAD,1"X8",ST: Brand: CURAD

## (undated) DEVICE — 6617 IOBAN II PATIENT ISOLATION DRAPE 5/BX,4BX/CS: Brand: STERI-DRAPE™ IOBAN™ 2

## (undated) DEVICE — STERILE POLYISOPRENE POWDER-FREE SURGICAL GLOVES: Brand: PROTEXIS

## (undated) DEVICE — STERILE POLYISOPRENE POWDER-FREE SURGICAL GLOVES WITH EMOLLIENT COATING: Brand: PROTEXIS

## (undated) DEVICE — COVER,C-ARM,41X74: Brand: MEDLINE